# Patient Record
Sex: FEMALE | Race: WHITE | NOT HISPANIC OR LATINO | Employment: FULL TIME | URBAN - METROPOLITAN AREA
[De-identification: names, ages, dates, MRNs, and addresses within clinical notes are randomized per-mention and may not be internally consistent; named-entity substitution may affect disease eponyms.]

---

## 2017-03-16 DIAGNOSIS — M79.7 FIBROMYALGIA: ICD-10-CM

## 2017-03-16 RX ORDER — AMITRIPTYLINE HYDROCHLORIDE 25 MG/1
TABLET, FILM COATED ORAL
Qty: 30 TABLET | Refills: 11 | Status: SHIPPED | OUTPATIENT
Start: 2017-03-16 | End: 2018-04-23 | Stop reason: SDUPTHER

## 2017-03-21 ENCOUNTER — LAB VISIT (OUTPATIENT)
Dept: LAB | Facility: HOSPITAL | Age: 22
End: 2017-03-21
Attending: FAMILY MEDICINE
Payer: COMMERCIAL

## 2017-03-21 ENCOUNTER — OFFICE VISIT (OUTPATIENT)
Dept: INTERNAL MEDICINE | Facility: CLINIC | Age: 22
End: 2017-03-21
Payer: COMMERCIAL

## 2017-03-21 VITALS
WEIGHT: 162.06 LBS | DIASTOLIC BLOOD PRESSURE: 78 MMHG | HEIGHT: 61 IN | OXYGEN SATURATION: 98 % | BODY MASS INDEX: 30.6 KG/M2 | SYSTOLIC BLOOD PRESSURE: 114 MMHG | HEART RATE: 119 BPM | TEMPERATURE: 99 F

## 2017-03-21 DIAGNOSIS — R10.9 FLANK PAIN: Primary | ICD-10-CM

## 2017-03-21 DIAGNOSIS — R10.9 ABDOMINAL PAIN, UNSPECIFIED LOCATION: ICD-10-CM

## 2017-03-21 DIAGNOSIS — R10.9 FLANK PAIN: ICD-10-CM

## 2017-03-21 LAB
25(OH)D3+25(OH)D2 SERPL-MCNC: 24 NG/ML
ALBUMIN SERPL BCP-MCNC: 3.3 G/DL
ALP SERPL-CCNC: 77 U/L
ALT SERPL W/O P-5'-P-CCNC: 10 U/L
ANION GAP SERPL CALC-SCNC: 10 MMOL/L
AST SERPL-CCNC: 14 U/L
BASOPHILS # BLD AUTO: 0.01 K/UL
BASOPHILS NFR BLD: 0.2 %
BILIRUB SERPL-MCNC: 0.4 MG/DL
BUN SERPL-MCNC: 12 MG/DL
CALCIUM SERPL-MCNC: 8.7 MG/DL
CHLORIDE SERPL-SCNC: 103 MMOL/L
CO2 SERPL-SCNC: 23 MMOL/L
CREAT SERPL-MCNC: 0.9 MG/DL
DIFFERENTIAL METHOD: NORMAL
EOSINOPHIL # BLD AUTO: 0 K/UL
EOSINOPHIL NFR BLD: 0.3 %
ERYTHROCYTE [DISTWIDTH] IN BLOOD BY AUTOMATED COUNT: 12.4 %
EST. GFR  (AFRICAN AMERICAN): >60 ML/MIN/1.73 M^2
EST. GFR  (NON AFRICAN AMERICAN): >60 ML/MIN/1.73 M^2
FLUAV AG SPEC QL IA: NEGATIVE
FLUBV AG SPEC QL IA: NEGATIVE
GLUCOSE SERPL-MCNC: 110 MG/DL
HCT VFR BLD AUTO: 39 %
HGB BLD-MCNC: 13.1 G/DL
LYMPHOCYTES # BLD AUTO: 1.4 K/UL
LYMPHOCYTES NFR BLD: 21.2 %
MCH RBC QN AUTO: 29.1 PG
MCHC RBC AUTO-ENTMCNC: 33.6 %
MCV RBC AUTO: 87 FL
MONOCYTES # BLD AUTO: 0.5 K/UL
MONOCYTES NFR BLD: 7.6 %
NEUTROPHILS # BLD AUTO: 4.5 K/UL
NEUTROPHILS NFR BLD: 70.7 %
PLATELET # BLD AUTO: 279 K/UL
PMV BLD AUTO: 11 FL
POTASSIUM SERPL-SCNC: 3.5 MMOL/L
PROT SERPL-MCNC: 7.2 G/DL
RBC # BLD AUTO: 4.5 M/UL
SODIUM SERPL-SCNC: 136 MMOL/L
SPECIMEN SOURCE: NORMAL
T3 SERPL-MCNC: 92 NG/DL
T4 FREE SERPL-MCNC: 0.84 NG/DL
TSH SERPL DL<=0.005 MIU/L-ACNC: 0.81 UIU/ML
WBC # BLD AUTO: 6.41 K/UL

## 2017-03-21 PROCEDURE — 80053 COMPREHEN METABOLIC PANEL: CPT

## 2017-03-21 PROCEDURE — 87400 INFLUENZA A/B EACH AG IA: CPT | Mod: 59,PO

## 2017-03-21 PROCEDURE — 99213 OFFICE O/P EST LOW 20 MIN: CPT | Mod: S$GLB,,, | Performed by: PHYSICIAN ASSISTANT

## 2017-03-21 PROCEDURE — 85025 COMPLETE CBC W/AUTO DIFF WBC: CPT

## 2017-03-21 PROCEDURE — 83036 HEMOGLOBIN GLYCOSYLATED A1C: CPT

## 2017-03-21 PROCEDURE — 84480 ASSAY TRIIODOTHYRONINE (T3): CPT

## 2017-03-21 PROCEDURE — 36415 COLL VENOUS BLD VENIPUNCTURE: CPT | Mod: PO

## 2017-03-21 PROCEDURE — 1160F RVW MEDS BY RX/DR IN RCRD: CPT | Mod: S$GLB,,, | Performed by: PHYSICIAN ASSISTANT

## 2017-03-21 PROCEDURE — 99999 PR PBB SHADOW E&M-EST. PATIENT-LVL IV: CPT | Mod: PBBFAC,,, | Performed by: PHYSICIAN ASSISTANT

## 2017-03-21 PROCEDURE — 84439 ASSAY OF FREE THYROXINE: CPT

## 2017-03-21 PROCEDURE — 82306 VITAMIN D 25 HYDROXY: CPT

## 2017-03-21 PROCEDURE — 84443 ASSAY THYROID STIM HORMONE: CPT

## 2017-03-21 NOTE — PATIENT INSTRUCTIONS
Abdominal Pain    Abdominal pain is pain in the stomach or belly area. Everyone has this pain from time to time. In many cases it goes away on its own. But abdominal pain can sometimes be due to a serious problem, such as appendicitis. So its important to know when to seek help.  Causes of abdominal pain  There are many possible causes of abdominal pain. Common causes in adults include:  · Constipation, diarrhea, or gas  · Stomach acid flowing back up into the esophagus (acid reflux or heartburn)  · Severe acid reflux, called GERD (gastroesophageal reflux disease)  · A sore in the lining of the stomach or small intestine (peptic ulcer)  · Inflammation of the gallbladder, liver, or pancreas  · Gallstones or kidney stones  · Appendicitis   · Intestinal blockage   · An internal organ pushing through a muscle or other tissue (hernia)  · Urinary tract infections  · In women, menstrual cramps, fibroids, or endometriosis  · Inflammation or infection of the intestines  Diagnosing the cause of abdominal pain  Your healthcare provider will do a physical exam help find the cause of your pain. If needed, tests will be ordered. Belly pain has many possible causes. So it can be hard to find the reason for your pain. Giving details about your pain can help. Tell your provider where and when you feel the pain, and what makes it better or worse. Also let your provider know if you have other symptoms such as:  · Fever  · Tiredness  · Upset stomach (nausea)  · Vomiting  · Changes in bathroom habits  Treating abdominal pain  Some causes of pain need emergency medical treatment right away. These include appendicitis or a bowel blockage. Other problems can be treated with rest, fluids, or medicines. Your healthcare provider can give you specific instructions for treatment or self-care based on what is causing your pain.  If you have vomiting or diarrhea, sip water or other clear fluids. When you are ready to eat solid foods again,  start with small amounts of easy-to-digest, low-fat foods. These include apple sauce, toast, or crackers.   When to seek medical care  Call 911 or go to the hospital right away if you:  · Cant pass stool and are vomiting  · Are vomiting blood or have bloody diarrhea or black, tarry diarrhea  · Have chest, neck, or shoulder pain  · Feel like you might pass out  · Have pain in your shoulder blades with nausea  · Have sudden, severe belly pain  · Have new, severe pain unlike any you have felt before  · Have a belly that is rigid, hard, and tender to touch  Call your healthcare provider if you have:  · Pain for more than 5 days  · Bloating for more than 2 days  · Diarrhea for more than 5 days  · A fever of 100.4°F (38.0°C) or higher, or as directed by your provider  · Pain that gets worse  · Weight loss for no reason  · Continued lack of appetite  · Blood in your stool  How to prevent abdominal pain  Here are some tips to help prevent abdominal pain:  · Eat smaller amounts of food at one time.  · Avoid greasy, fried, or other high-fat foods.  · Avoid foods that give you gas.  · Exercise regularly.  · Drink plenty of fluids.  To help prevent GERD symptoms:  · Quit smoking.  · Reduce alcohol and certain foods that increase stomach acid.  · Avoid aspirin and over-the-counter pain and fever medicines (NSAIDS or nonsteroidal anti-inflammatory drugs), if possible  · Lose extra weight.  · Finish eating at least 2 hours before you go to bed or lie down.  · Raise the head of your bed.  Date Last Reviewed: 7/1/2016  © 2176-7141 [employer]. [address] [place] [address], [place] [address]. All rights reserved. This information is not intended as a substitute for professional medical care. Always follow your healthcare professional's instructions.        Back Care Tips    Caring for your back  These are things you can do to prevent a recurrence of acute back pain and to reduce symptoms from chronic back pain:  · Maintain a  healthy weight. If you are overweight, losing weight will help most types of back pain.  · Exercise is an important part of recovery from most types of back pain. The muscles behind and in front of the spine support the back. This means strengthening both the back muscles and the abdominal muscles will provide better support for your spine.   · Swimming and brisk walking are good overall exercises to improve your fitness level.  · Practice safe lifting methods (below).  · Practice good posture when sitting, standing and walking. Avoid prolonged sitting. This puts more stress on the lower back than standing or walking.  · Wear quality shoes with sufficient arch support. Foot and ankle alignment can affect back symptoms. Women should avoid wearing high heels.  · Therapeutic massage can help relax the back muscles without stretching them.  · During the first 24 to 72 hours after an acute injury or flare-up of chronic back pain, apply an ice pack to the painful area for 20 minutes and then remove it for 20 minutes, over a period of 60 to 90 minutes, or several times a day. As a safety precaution, do not use a heating pad at bedtime. Sleeping on a heating pad can lead to skin burns or tissue damage.  · You can alternate ice and heat therapies.  Medications  Talk to your healthcare provider before using medicines, especially if you have other medical problems or are taking other medicines.  · You may use acetaminophen or ibuprofen to control pain, unless your healthcare provider prescribed other pain medicine. If you have chronic conditions like diabetes, liver or kidney disease, stomach ulcers, or gastrointestinal bleeding, or are taking blood thinners, talk with your healthcare provider before taking any medicines.  · Be careful if you are given prescription pain medicines, narcotics, or medicine for muscle spasm. They can cause drowsiness, affect your coordination, reflexes, and judgment. Do not drive or operate heavy  machinery while taking these types of medicines. Take prescription pain medicine only as prescribed by your healthcare provider.  Lumbar stretch  Here is a simple stretching exercise that will help relax muscle spasm and keep your back more limber. If exercise makes your back pain worse, dont do it.  · Lie on your back with your knees bent and both feet on the ground.  · Slowly raise your left knee to your chest as you flatten your lower back against the floor. Hold for 5 seconds.  · Relax and repeat the exercise with your right knee.  · Do 10 of these exercises for each leg.  Safe lifting method  · Dont bend over at the waist to lift an object off the floor.  Instead, bend your knees and hips in a squat.   · Keep your back and head upright  · Hold the object close to your body, directly in front of you.  · Straighten your legs to lift the object.   · Lower the object to the floor in the reverse fashion.  · If you must slide something across the floor, push it.  Posture tips  Sitting  Sit in chairs with straight backs or low-back support. Keep your knees lower than your hips, with your feet flat on the floor.  When driving, sit up straight. Adjust the seat forward so you are not leaning toward the steering wheel.  A small pillow or rolled towel behind your lower back may help if you are driving long distances.   Standing  When standing for long periods, shift most of your weight to one leg at a time. Alternate legs every few minutes.   Sleeping  The best way to sleep is on your side with your knees bent. Put a low pillow under your head to support your neck in a neutral spine position. Avoid thick pillows that bend your neck to one side. Put a pillow between your legs to further relax your lower back. If you sleep on your back, put pillows under your knees to support your legs in a slightly flexed position. Use a firm mattress. If your mattress sags, replace it, or use a 1/2-inch plywood board under the mattress  to add support.  Follow-up care  Follow up with your healthcare provider, or as advised.  If X-rays, a CT scan or an MRI scan were taken, they will be reviewed by a radiologist. You will be notified of any new findings that may affect your care.  Call 911  Seek emergency medical care if any of the following occur:  · Trouble breathing  · Confusion  · Very drowsy  · Fainting or loss of consciousness  · Rapid or very slow heart rate  · Loss of  bowel or bladder control  When to seek medical care  Call your healthcare provider if any of the following occur:  · Pain becomes worse or spreads to your arms or legs  · Weakness or numbness in one or both arms or legs  · Numbness in the groin area  Date Last Reviewed: 6/1/2016 © 2000-2016 [employer]. [address] [place] [address]. All rights reserved. This information is not intended as a substitute for professional medical care. Always follow your healthcare professional's instructions.        Back Pain (Acute or Chronic)    Back pain is one of the most common problems. The good news is that most people feel better in 1 to 2 weeks, and most of the rest in 1 to 2 months. Most people can remain active.  People experience and describe pain differently; not everyone is the same.  · The pain can be sharp, stabbing, shooting, aching, cramping or burning.  · Movement, standing, bending, lifting, sitting, or walking may worsen pain.  · It can be localized to one spot or area, or it can be more generalized.  · It can spread or radiate upwards, to the front, or go down your arms or legs (sciatica).  · It can cause muscle spasm.  Most of the time, mechanical problems with the muscles or spine cause the pain. Mechanical problems are usually caused by an injury to the muscles or ligaments. While illness can cause back pain, it is usually not caused by a serious illness. Mechanical problems include:   · Physical activity such as sports, exercise, work, or  normal activity  · Overexertion, lifting, pushing, pulling incorrectly or too aggressively  · Sudden twisting, bending, or stretching from an accident, or accidental movement  · Poor posture  · Stretching or moving wrong, without noticing pain at the time  · Poor coordination, lack of regular exercise (check with your doctor about this)  · Spinal disc disease or arthritis  · Stress  Pain can also be related to pregnancy, or illness like appendicitis, bladder or kidney infections, pelvic infections, and many other things.  Acute back pain usually gets better in 1 to 2 weeks. Back pain related to disk disease, arthritis in the spinal joints or spinal stenosis (narrowing of the spinal canal) can become chronic and last for months or years.  Unless you had a physical injury (for example, a car accident or fall) X-rays are usually not needed for the initial evaluation of back pain. If pain continues and does not respond to medical treatment, X-rays and other tests may be needed.  Home care  Try these home care recommendations:  · When in bed, try to find a position of comfort. A firm mattress is best. Try lying flat on your back with pillows under your knees. You can also try lying on your side with your knees bent up towards your chest and a pillow between your knees.  · At first, do not try to stretch out the sore spots. If there is a strain, it is not like the good soreness you get after exercising without an injury. In this case, stretching may make it worse.  · Avoid prolong sitting, long car rides, or travel. This puts more stress on the lower back than standing or walking.  · During the first 24 to 72 hours after an acute injury or flare up of chronic back pain, apply an ice pack to the painful area for 20 minutes and then remove it for 20 minutes. Do this over a period of 60 to 90 minutes or several times a day. This will reduce swelling and pain. Wrap the ice pack in a thin towel or plastic to protect your  skin.  · You can start with ice, then switch to heat. Heat (hot shower, hot bath, or heating pad) reduces pain and works well for muscle spasms. Heat can be applied to the painful area for 20 minutes then remove it for 20 minutes. Do this over a period of 60 to 90 minutes or several times a day. Do not sleep on a heating pad. It can lead to skin burns or tissue damage.  · You can alternate ice and heat therapy. Talk with your doctor about the best treatment for your back pain.  · Therapeutic massage can help relax the back muscles without stretching them.  · Be aware of safe lifting methods and do not lift anything without stretching first.  Medicines  Talk to your doctor before using medicine, especially if you have other medical problems or are taking other medicines.  · You may use over-the-counter medicine as directed on the bottle to control pain, unless another pain medicine was prescribed. If you have chronic conditions like diabetes, liver or kidney disease, stomach ulcers, or gastrointestinal bleeding, or are taking blood thinners, talk to your doctor before taking any medicine.  · Be careful if you are given a prescription medicines, narcotics, or medicine for muscle spasms. They can cause drowsiness, affect your coordination, reflexes, and judgement. Do not drive or operate heavy machinery.  Follow-up care  Follow up with your healthcare provider, or as advised.   A radiologist will review any X-rays that were taken. Your provide will notify you of any new findings that may affect your care.  Call 911  Call emergency services if any of the following occur:  · Trouble breathing  · Confusion  · Very drowsy or trouble awakening  · Fainting or loss of consciousness  · Rapid or very slow heart rate  · Loss of bowel or bladder control  When to seek medical advice  Call your healthcare provider right away if any of these occur:   · Pain becomes worse or spreads to your legs  · Weakness or numbness in one or both  legs  · Numbness in the groin or genital area  Date Last Reviewed: 7/1/2016  © 5179-2908 [employer]. [address] [place] [id number]. All rights reserved. This information is not intended as a substitute for professional medical care. Always follow your healthcare professional's instructions.        Causes of Lumbar (Low Back) Pain  Low back pain can be caused by problems with any part of the lumbar spine. A disk can herniate (push out) and press on a nerve. Vertebrae can rub against each other or slip out of place. This can irritate facet joints and nerves. It can also lead to stenosis, a narrowing of the spinal canal or foramen.  Pressure from a disk  Constant wear and tear on a disk can cause it to weaken and push outward. Part of the disk may then press on nearby nerves. There are two common types of herniated disks:  Contained means the soft nucleus is protruding outward.   Extruded means the firm annulus has torn, letting the soft center squeeze through.     Pressure from bone  An unstable spine   With age, a disk may thin and wear out. Vertebrae above and below the disk may begin to touch. This can put pressure on nerves. It can also cause bone spurs (growths) to form where the bones rub together.    Stenosis results when bone spurs narrow the foramen or spinal canal. This also puts pressure on nerves. Slipping vertebrae can irritate nerves and joints. They can also worsen stenosis.    In some cases, vertebrae become unstable and slip forward. This is called spondylolisthesis.     Date Last Reviewed: 10/12/2015  © 3670-4240 [employer]. [address] [place] [id number]. All rights reserved. This information is not intended as a substitute for professional medical care. Always follow your healthcare professional's instructions.

## 2017-03-21 NOTE — PROGRESS NOTES
Subjective:      Patient ID: Erlinda Rain is a 21 y.o. female.    Chief Complaint: Abdominal Pain and Back Pain (lower/hx of fibromyalgia)    Abdominal Pain   This is a chronic problem. The current episode started more than 1 month ago. The onset quality is gradual. The problem occurs intermittently. The problem has been waxing and waning. The pain is located in the generalized abdominal region. The pain is moderate. The abdominal pain does not radiate. Associated symptoms include diarrhea, nausea and vomiting. Pertinent negatives include no anorexia, arthralgias, belching, constipation, dysuria, fever, flatus, frequency, headaches, hematochezia, hematuria, melena, myalgias or weight loss. The pain is aggravated by eating. The pain is relieved by nothing. She has tried nothing for the symptoms. The treatment provided no relief. There is no history of abdominal surgery, colon cancer, Crohn's disease, gallstones, GERD, irritable bowel syndrome, pancreatitis, PUD or ulcerative colitis.   Back Pain   This is a new problem. The current episode started yesterday. The problem occurs constantly. The problem has been gradually worsening since onset. The pain is present in the costovertebral angle. The pain is moderate. The pain is the same all the time. Associated symptoms include abdominal pain. Pertinent negatives include no bladder incontinence, bowel incontinence, chest pain, dysuria, fever, headaches, leg pain, numbness, paresis, paresthesias, pelvic pain, perianal numbness, tingling, weakness or weight loss. Risk factors: fibromyalgia. She has tried nothing for the symptoms.       Review of Systems   Constitutional: Negative for fever and weight loss.   Cardiovascular: Negative for chest pain.   Gastrointestinal: Positive for abdominal distention, abdominal pain, diarrhea, nausea and vomiting. Negative for anal bleeding, anorexia, blood in stool, bowel incontinence, constipation, flatus, hematochezia, melena and  "rectal pain.   Genitourinary: Negative for bladder incontinence, dysuria, frequency, hematuria and pelvic pain.   Musculoskeletal: Positive for back pain. Negative for arthralgias, joint swelling, myalgias, neck pain and neck stiffness.   Skin: Negative for rash.   Neurological: Negative for tingling, weakness, numbness, headaches and paresthesias.     Objective:   /78  Pulse (!) 119  Temp 98.7 °F (37.1 °C) (Tympanic)   Ht 5' 1" (1.549 m)  Wt 73.5 kg (162 lb 0.6 oz)  LMP 02/27/2017 (Approximate)  SpO2 98%  BMI 30.62 kg/m2    Physical Exam   Constitutional: She is oriented to person, place, and time. She appears well-developed and well-nourished.   HENT:   Head: Normocephalic and atraumatic.   Right Ear: External ear normal.   Left Ear: External ear normal.   Nose: Nose normal.   Mouth/Throat: Oropharynx is clear and moist.   Eyes: Conjunctivae and EOM are normal. Pupils are equal, round, and reactive to light.   Neck: Normal range of motion. Neck supple.   Cardiovascular: Normal rate, regular rhythm and normal heart sounds.  Exam reveals no gallop and no friction rub.    No murmur heard.  Pulmonary/Chest: Effort normal and breath sounds normal. No respiratory distress. She has no wheezes. She has no rales. She exhibits no tenderness.   Abdominal: Soft. Bowel sounds are normal. She exhibits no distension and no mass. There is no tenderness. There is no rebound and no guarding. No hernia.   Musculoskeletal: Normal range of motion.   Lymphadenopathy:     She has no cervical adenopathy.   Neurological: She is alert and oriented to person, place, and time.   Skin: Skin is warm and dry.   Psychiatric: She has a normal mood and affect. Her behavior is normal. Judgment and thought content normal.   Vitals reviewed.      Assessment:     1. Flank pain    2. Abdominal pain, unspecified location      Plan:   Flank pain  -     Vitamin D; Future; Expected date: 3/21/17  -     Influenza antigen Nasopharyngeal Swab  -  "    Urinalysis; Future; Expected date: 3/21/17    Abdominal pain, unspecified location  -     Ambulatory referral to Gastroenterology  -     CBC auto differential; Future; Expected date: 3/21/17  -     Comprehensive metabolic panel; Future  -     TSH; Future  -     Hemoglobin A1c; Future; Expected date: 3/21/17  -     T4, free; Future; Expected date: 3/21/17  -     T3; Future; Expected date: 3/21/17  -     Influenza antigen Nasopharyngeal Swab    -eliminate fatty foods from diet.     Return if symptoms worsen or fail to improve.

## 2017-03-21 NOTE — MR AVS SNAPSHOT
[facility] Medicine  [address] [facility] [place] [address]  Phone: [phone]  Fax: [phone]                  Erlinda Rain   3/21/2017 11:40 AM   Office Visit    Description:  Female : 1995   Provider:  Ella Dueñas PA-C   Department:  [facility] - Internal Medicine           Reason for Visit     Abdominal Pain     Back Pain           Diagnoses this Visit        Comments    Flank pain    -  Primary     Abdominal pain, unspecified location                To Do List           Future Appointments        Provider Department Dept Phone    3/21/2017 1:00 PM LAB, SAME DAY [facility] [phone]    3/21/2017 2:10 PM SPECIMEN, [facility] [phone]    3/23/2017 11:15 AM Amanda Patterson MD [facility] - OB/ -[phone]    3/23/2017 1:40 PM WILBER Euceda [facility] - Gastroenterology [phone]    2017 3:00 PM Charan Henriquez MD [facility] - Rheumatology [phone]      Goals (5 Years of Data)     None      Follow-Up and Disposition     Return if symptoms worsen or fail to improve.      [facility]     [facility] -  Assistance  Registered nurses in the [facility] provide clinical advisement, health education, appointment booking, and other advisory services.  Call for this free service at [phone].             Medications           Message regarding Medications     Verify the changes and/or additions to your medication regime listed below are the same as discussed with your clinician today.  If any of these changes or additions are incorrect, please notify your healthcare provider.        STOP taking these medications     ascorbic acid (VITAMIN C) 250 MG tablet Take 250 mg by mouth once daily.           Verify that the below list of medications is an accurate representation of the medications you are currently taking.  If none reported, the list may be blank. If incorrect, please  "contact your healthcare provider. Carry this list with you in case of emergency.           Current Medications     amitriptyline (ELAVIL) 25 MG tablet TAKE 1 TABLET(25 MG) BY MOUTH EVERY EVENING    duloxetine (CYMBALTA) 60 MG capsule Take 1 capsule (60 mg total) by mouth once daily.    MICROGESTIN FE 1/20, 28, 1 mg-20 mcg (21)/75 mg (7) per tablet TK 1 T PO QD    multivitamin capsule Take 1 capsule by mouth once daily.    polyethylene glycol (GLYCOLAX) 17 gram/dose powder Take 17 g by mouth once daily.           Clinical Reference Information           Your Vitals Were     BP Pulse Temp Height Weight Last Period    114/78 119 98.7 °F (37.1 °C) (Tympanic) 5' 1" (1.549 m) 73.5 kg (162 lb 0.6 oz) 02/27/2017 (Approximate)    SpO2 BMI             98% 30.62 kg/m2         Blood Pressure          Most Recent Value    BP  114/78      Allergies as of 3/21/2017     Kiwi (Actinidia Chinensis)      Immunizations Administered on Date of Encounter - 3/21/2017     None      Orders Placed During Today's Visit      Normal Orders This Visit    Ambulatory referral to Gastroenterology     Influenza antigen Nasopharyngeal Swab     Future Labs/Procedures Expected by Expires    CBC auto differential  3/21/2017 3/21/2018    Hemoglobin A1c  3/21/2017 5/20/2018    T3  3/21/2017 5/20/2018    T4, free  3/21/2017 5/20/2018    Urinalysis  3/21/2017 5/20/2018    Vitamin D  3/21/2017 3/21/2018    Comprehensive metabolic panel  As directed 3/21/2018    TSH  As directed 5/20/2018      Instructions      Abdominal Pain    Abdominal pain is pain in the stomach or belly area. Everyone has this pain from time to time. In many cases it goes away on its own. But abdominal pain can sometimes be due to a serious problem, such as appendicitis. So its important to know when to seek help.  Causes of abdominal pain  There are many possible causes of abdominal pain. Common causes in adults include:  · Constipation, diarrhea, or gas  · Stomach acid flowing back up " into the esophagus (acid reflux or heartburn)  · Severe acid reflux, called GERD (gastroesophageal reflux disease)  · A sore in the lining of the stomach or small intestine (peptic ulcer)  · Inflammation of the gallbladder, liver, or pancreas  · Gallstones or kidney stones  · Appendicitis   · Intestinal blockage   · An internal organ pushing through a muscle or other tissue (hernia)  · Urinary tract infections  · In women, menstrual cramps, fibroids, or endometriosis  · Inflammation or infection of the intestines  Diagnosing the cause of abdominal pain  Your healthcare provider will do a physical exam help find the cause of your pain. If needed, tests will be ordered. Belly pain has many possible causes. So it can be hard to find the reason for your pain. Giving details about your pain can help. Tell your provider where and when you feel the pain, and what makes it better or worse. Also let your provider know if you have other symptoms such as:  · Fever  · Tiredness  · Upset stomach (nausea)  · Vomiting  · Changes in bathroom habits  Treating abdominal pain  Some causes of pain need emergency medical treatment right away. These include appendicitis or a bowel blockage. Other problems can be treated with rest, fluids, or medicines. Your healthcare provider can give you specific instructions for treatment or self-care based on what is causing your pain.  If you have vomiting or diarrhea, sip water or other clear fluids. When you are ready to eat solid foods again, start with small amounts of easy-to-digest, low-fat foods. These include apple sauce, toast, or crackers.   When to seek medical care  Call 911 or go to the hospital right away if you:  · Cant pass stool and are vomiting  · Are vomiting blood or have bloody diarrhea or black, tarry diarrhea  · Have chest, neck, or shoulder pain  · Feel like you might pass out  · Have pain in your shoulder blades with nausea  · Have sudden, severe belly pain  · Have new,  severe pain unlike any you have felt before  · Have a belly that is rigid, hard, and tender to touch  Call your healthcare provider if you have:  · Pain for more than 5 days  · Bloating for more than 2 days  · Diarrhea for more than 5 days  · A fever of 100.4°F (38.0°C) or higher, or as directed by your provider  · Pain that gets worse  · Weight loss for no reason  · Continued lack of appetite  · Blood in your stool  How to prevent abdominal pain  Here are some tips to help prevent abdominal pain:  · Eat smaller amounts of food at one time.  · Avoid greasy, fried, or other high-fat foods.  · Avoid foods that give you gas.  · Exercise regularly.  · Drink plenty of fluids.  To help prevent GERD symptoms:  · Quit smoking.  · Reduce alcohol and certain foods that increase stomach acid.  · Avoid aspirin and over-the-counter pain and fever medicines (NSAIDS or nonsteroidal anti-inflammatory drugs), if possible  · Lose extra weight.  · Finish eating at least 2 hours before you go to bed or lie down.  · Raise the head of your bed.  Date Last Reviewed: 7/1/2016  © 9602-2399 [employer]. [address] [place] [address]. All rights reserved. This information is not intended as a substitute for professional medical care. Always follow your healthcare professional's instructions.        Back Care Tips    Caring for your back  These are things you can do to prevent a recurrence of acute back pain and to reduce symptoms from chronic back pain:  · Maintain a healthy weight. If you are overweight, losing weight will help most types of back pain.  · Exercise is an important part of recovery from most types of back pain. The muscles behind and in front of the spine support the back. This means strengthening both the back muscles and the abdominal muscles will provide better support for your spine.   · Swimming and brisk walking are good overall exercises to improve your fitness level.  · Practice safe lifting  methods (below).  · Practice good posture when sitting, standing and walking. Avoid prolonged sitting. This puts more stress on the lower back than standing or walking.  · Wear quality shoes with sufficient arch support. Foot and ankle alignment can affect back symptoms. Women should avoid wearing high heels.  · Therapeutic massage can help relax the back muscles without stretching them.  · During the first 24 to 72 hours after an acute injury or flare-up of chronic back pain, apply an ice pack to the painful area for 20 minutes and then remove it for 20 minutes, over a period of 60 to 90 minutes, or several times a day. As a safety precaution, do not use a heating pad at bedtime. Sleeping on a heating pad can lead to skin burns or tissue damage.  · You can alternate ice and heat therapies.  Medications  Talk to your healthcare provider before using medicines, especially if you have other medical problems or are taking other medicines.  · You may use acetaminophen or ibuprofen to control pain, unless your healthcare provider prescribed other pain medicine. If you have chronic conditions like diabetes, liver or kidney disease, stomach ulcers, or gastrointestinal bleeding, or are taking blood thinners, talk with your healthcare provider before taking any medicines.  · Be careful if you are given prescription pain medicines, narcotics, or medicine for muscle spasm. They can cause drowsiness, affect your coordination, reflexes, and judgment. Do not drive or operate heavy machinery while taking these types of medicines. Take prescription pain medicine only as prescribed by your healthcare provider.  Lumbar stretch  Here is a simple stretching exercise that will help relax muscle spasm and keep your back more limber. If exercise makes your back pain worse, dont do it.  · Lie on your back with your knees bent and both feet on the ground.  · Slowly raise your left knee to your chest as you flatten your lower back against  the floor. Hold for 5 seconds.  · Relax and repeat the exercise with your right knee.  · Do 10 of these exercises for each leg.  Safe lifting method  · Dont bend over at the waist to lift an object off the floor.  Instead, bend your knees and hips in a squat.   · Keep your back and head upright  · Hold the object close to your body, directly in front of you.  · Straighten your legs to lift the object.   · Lower the object to the floor in the reverse fashion.  · If you must slide something across the floor, push it.  Posture tips  Sitting  Sit in chairs with straight backs or low-back support. Keep your knees lower than your hips, with your feet flat on the floor.  When driving, sit up straight. Adjust the seat forward so you are not leaning toward the steering wheel.  A small pillow or rolled towel behind your lower back may help if you are driving long distances.   Standing  When standing for long periods, shift most of your weight to one leg at a time. Alternate legs every few minutes.   Sleeping  The best way to sleep is on your side with your knees bent. Put a low pillow under your head to support your neck in a neutral spine position. Avoid thick pillows that bend your neck to one side. Put a pillow between your legs to further relax your lower back. If you sleep on your back, put pillows under your knees to support your legs in a slightly flexed position. Use a firm mattress. If your mattress sags, replace it, or use a 1/2-inch plywood board under the mattress to add support.  Follow-up care  Follow up with your healthcare provider, or as advised.  If X-rays, a CT scan or an MRI scan were taken, they will be reviewed by a radiologist. You will be notified of any new findings that may affect your care.  Call 911  Seek emergency medical care if any of the following occur:  · Trouble breathing  · Confusion  · Very drowsy  · Fainting or loss of consciousness  · Rapid or very slow heart rate  · Loss of  bowel or  bladder control  When to seek medical care  Call your healthcare provider if any of the following occur:  · Pain becomes worse or spreads to your arms or legs  · Weakness or numbness in one or both arms or legs  · Numbness in the groin area  Date Last Reviewed: 6/1/2016 © 2000-2016 [employer]. [address] [place] [address], [place] [address]. All rights reserved. This information is not intended as a substitute for professional medical care. Always follow your healthcare professional's instructions.        Back Pain (Acute or Chronic)    Back pain is one of the most common problems. The good news is that most people feel better in 1 to 2 weeks, and most of the rest in 1 to 2 months. Most people can remain active.  People experience and describe pain differently; not everyone is the same.  · The pain can be sharp, stabbing, shooting, aching, cramping or burning.  · Movement, standing, bending, lifting, sitting, or walking may worsen pain.  · It can be localized to one spot or area, or it can be more generalized.  · It can spread or radiate upwards, to the front, or go down your arms or legs (sciatica).  · It can cause muscle spasm.  Most of the time, mechanical problems with the muscles or spine cause the pain. Mechanical problems are usually caused by an injury to the muscles or ligaments. While illness can cause back pain, it is usually not caused by a serious illness. Mechanical problems include:   · Physical activity such as sports, exercise, work, or normal activity  · Overexertion, lifting, pushing, pulling incorrectly or too aggressively  · Sudden twisting, bending, or stretching from an accident, or accidental movement  · Poor posture  · Stretching or moving wrong, without noticing pain at the time  · Poor coordination, lack of regular exercise (check with your doctor about this)  · Spinal disc disease or arthritis  · Stress  Pain can also be related to pregnancy, or illness like appendicitis,  bladder or kidney infections, pelvic infections, and many other things.  Acute back pain usually gets better in 1 to 2 weeks. Back pain related to disk disease, arthritis in the spinal joints or spinal stenosis (narrowing of the spinal canal) can become chronic and last for months or years.  Unless you had a physical injury (for example, a car accident or fall) X-rays are usually not needed for the initial evaluation of back pain. If pain continues and does not respond to medical treatment, X-rays and other tests may be needed.  Home care  Try these home care recommendations:  · When in bed, try to find a position of comfort. A firm mattress is best. Try lying flat on your back with pillows under your knees. You can also try lying on your side with your knees bent up towards your chest and a pillow between your knees.  · At first, do not try to stretch out the sore spots. If there is a strain, it is not like the good soreness you get after exercising without an injury. In this case, stretching may make it worse.  · Avoid prolong sitting, long car rides, or travel. This puts more stress on the lower back than standing or walking.  · During the first 24 to 72 hours after an acute injury or flare up of chronic back pain, apply an ice pack to the painful area for 20 minutes and then remove it for 20 minutes. Do this over a period of 60 to 90 minutes or several times a day. This will reduce swelling and pain. Wrap the ice pack in a thin towel or plastic to protect your skin.  · You can start with ice, then switch to heat. Heat (hot shower, hot bath, or heating pad) reduces pain and works well for muscle spasms. Heat can be applied to the painful area for 20 minutes then remove it for 20 minutes. Do this over a period of 60 to 90 minutes or several times a day. Do not sleep on a heating pad. It can lead to skin burns or tissue damage.  · You can alternate ice and heat therapy. Talk with your doctor about the best  treatment for your back pain.  · Therapeutic massage can help relax the back muscles without stretching them.  · Be aware of safe lifting methods and do not lift anything without stretching first.  Medicines  Talk to your doctor before using medicine, especially if you have other medical problems or are taking other medicines.  · You may use over-the-counter medicine as directed on the bottle to control pain, unless another pain medicine was prescribed. If you have chronic conditions like diabetes, liver or kidney disease, stomach ulcers, or gastrointestinal bleeding, or are taking blood thinners, talk to your doctor before taking any medicine.  · Be careful if you are given a prescription medicines, narcotics, or medicine for muscle spasms. They can cause drowsiness, affect your coordination, reflexes, and judgement. Do not drive or operate heavy machinery.  Follow-up care  Follow up with your healthcare provider, or as advised.   A radiologist will review any X-rays that were taken. Your provide will notify you of any new findings that may affect your care.  Call 911  Call emergency services if any of the following occur:  · Trouble breathing  · Confusion  · Very drowsy or trouble awakening  · Fainting or loss of consciousness  · Rapid or very slow heart rate  · Loss of bowel or bladder control  When to seek medical advice  Call your healthcare provider right away if any of these occur:   · Pain becomes worse or spreads to your legs  · Weakness or numbness in one or both legs  · Numbness in the groin or genital area  Date Last Reviewed: 7/1/2016  © 1870-6362 [employer]. [address] [place] [address], [place] [address]. All rights reserved. This information is not intended as a substitute for professional medical care. Always follow your healthcare professional's instructions.        Causes of Lumbar (Low Back) Pain  Low back pain can be caused by problems with any part of the lumbar spine. A disk can  herniate (push out) and press on a nerve. Vertebrae can rub against each other or slip out of place. This can irritate facet joints and nerves. It can also lead to stenosis, a narrowing of the spinal canal or foramen.  Pressure from a disk  Constant wear and tear on a disk can cause it to weaken and push outward. Part of the disk may then press on nearby nerves. There are two common types of herniated disks:  Contained means the soft nucleus is protruding outward.   Extruded means the firm annulus has torn, letting the soft center squeeze through.     Pressure from bone  An unstable spine   With age, a disk may thin and wear out. Vertebrae above and below the disk may begin to touch. This can put pressure on nerves. It can also cause bone spurs (growths) to form where the bones rub together.    Stenosis results when bone spurs narrow the foramen or spinal canal. This also puts pressure on nerves. Slipping vertebrae can irritate nerves and joints. They can also worsen stenosis.    In some cases, vertebrae become unstable and slip forward. This is called spondylolisthesis.     Date Last Reviewed: 10/12/2015  © 8183-0027 [employer]. [address] [place] [address]. All rights reserved. This information is not intended as a substitute for professional medical care. Always follow your healthcare professional's instructions.             Language Assistance Services     ATTENTION: Language assistance services are available, free of charge. Please call [phone].      ATENCIÓN: Si habla español, tiene a worley disposición servicios gratuitos de asistencia lingüística. Llame al [phone].     [facility] Ý: N?u b?n nói Ti?ng Vi?t, có các d?ch v? h? tr? ngôn ng? mi?n phí dành cho b?n. G?i s? [phone].         [facility] - [facility] complies with applicable Federal civil rights laws and does not discriminate on the basis of race, color, national origin, age, disability, or sex.

## 2017-03-21 NOTE — LETTER
March 21, 2017      [facility] - Internal Medicine  [address] [facility] [address] [place]  [place] [address]  Phone: [phone]  Fax: [phone]       Patient: Erlinda Rain   YOB: 1995  Date of Visit: 03/21/2017    To Whom It May Concern:    Erlinda was at [facility] on 03/21/2017. She may return to work/school on 3/22/2017 with no restrictions. If you have any questions or concerns, or if I can be of further assistance, please do not hesitate to contact me.    Sincerely,          Ella Dueñas PA-C

## 2017-03-22 ENCOUNTER — PATIENT MESSAGE (OUTPATIENT)
Dept: OBSTETRICS AND GYNECOLOGY | Facility: CLINIC | Age: 22
End: 2017-03-22

## 2017-03-22 LAB
ESTIMATED AVG GLUCOSE: 100 MG/DL
HBA1C MFR BLD HPLC: 5.1 %

## 2017-03-23 ENCOUNTER — TELEPHONE (OUTPATIENT)
Dept: INTERNAL MEDICINE | Facility: CLINIC | Age: 22
End: 2017-03-23

## 2017-03-23 ENCOUNTER — INITIAL CONSULT (OUTPATIENT)
Dept: GASTROENTEROLOGY | Facility: CLINIC | Age: 22
End: 2017-03-23
Payer: COMMERCIAL

## 2017-03-23 VITALS
HEART RATE: 100 BPM | BODY MASS INDEX: 30.39 KG/M2 | SYSTOLIC BLOOD PRESSURE: 112 MMHG | DIASTOLIC BLOOD PRESSURE: 78 MMHG | WEIGHT: 160.94 LBS | HEIGHT: 61 IN

## 2017-03-23 DIAGNOSIS — R31.9 HEMATURIA: Primary | ICD-10-CM

## 2017-03-23 DIAGNOSIS — K59.04 CHRONIC IDIOPATHIC CONSTIPATION: ICD-10-CM

## 2017-03-23 DIAGNOSIS — R10.9 FLANK PAIN: ICD-10-CM

## 2017-03-23 DIAGNOSIS — R10.13 EPIGASTRIC ABDOMINAL PAIN: Primary | ICD-10-CM

## 2017-03-23 DIAGNOSIS — R11.0 NAUSEA: ICD-10-CM

## 2017-03-23 PROCEDURE — 99214 OFFICE O/P EST MOD 30 MIN: CPT | Mod: S$GLB,,, | Performed by: NURSE PRACTITIONER

## 2017-03-23 PROCEDURE — 1160F RVW MEDS BY RX/DR IN RCRD: CPT | Mod: S$GLB,,, | Performed by: NURSE PRACTITIONER

## 2017-03-23 PROCEDURE — 99999 PR PBB SHADOW E&M-EST. PATIENT-LVL III: CPT | Mod: PBBFAC,,, | Performed by: NURSE PRACTITIONER

## 2017-03-23 RX ORDER — CHOLECALCIFEROL (VITAMIN D3) 25 MCG
185 TABLET ORAL DAILY
COMMUNITY
End: 2020-06-29

## 2017-03-23 RX ORDER — OMEPRAZOLE 20 MG/1
20 CAPSULE, DELAYED RELEASE ORAL DAILY
Qty: 30 CAPSULE | Refills: 2 | Status: SHIPPED | OUTPATIENT
Start: 2017-03-23 | End: 2017-05-15 | Stop reason: ALTCHOICE

## 2017-03-23 NOTE — MR AVS SNAPSHOT
[facility]  [address] [facility] [place] [address]  Phone: [phone]  Fax: [phone]                  Erlinda Rain   3/23/2017 1:40 PM   Initial consult    Description:  Female : 1995   Provider:  WILBER Euceda   Department:  [facility]           Reason for Visit     Abdominal Pain     Nausea     Emesis           Diagnoses this Visit        Comments    Epigastric abdominal pain    -  Primary            To Do List           Future Appointments        Provider Department Dept Phone    3/28/2017 3:30 PM [facility] CT1 LIMIT 500 LBS [facility] [phone]    2017 3:00 PM Charan Henriquez MD [facility] - Rheumatology [phone]    2017 3:40 PM WILBER Euceda [facility] Gastroenterology [phone]    2017 11:15 AM Amanda Patterson MD [facility] - OB/ -[phone]      Goals (5 Years of Data)     None      Follow-Up and Disposition     Return in about 4 weeks (around 2017).       These Medications        Disp Refills Start End    omeprazole (PRILOSEC) 20 MG capsule 30 capsule 2 3/23/2017 3/23/2018    Take 1 capsule (20 mg total) by mouth once daily. - Oral    Pharmacy: [facility] [address] [place] [address] TO [place] AT [facility] Ph #: [phone]         [facility]     [facility] Care [facility] -  Assistance  Registered nurses in the [facility] provide clinical advisement, health education, appointment booking, and other advisory services.  Call for this free service at [phone].             Medications           Message regarding Medications     Verify the changes and/or additions to your medication regime listed below are the same as discussed with your clinician today.  If any of these changes or additions are incorrect, please notify your healthcare provider.        START taking these NEW medications        Refills    omeprazole (PRILOSEC) 20 MG capsule 2    Sig: Take 1  "capsule (20 mg total) by mouth once daily.    Class: Normal    Route: Oral           Verify that the below list of medications is an accurate representation of the medications you are currently taking.  If none reported, the list may be blank. If incorrect, please contact your healthcare provider. Carry this list with you in case of emergency.           Current Medications     amitriptyline (ELAVIL) 25 MG tablet TAKE 1 TABLET(25 MG) BY MOUTH EVERY EVENING    duloxetine (CYMBALTA) 60 MG capsule Take 1 capsule (60 mg total) by mouth once daily.    MICROGESTIN FE 1/20, 28, 1 mg-20 mcg (21)/75 mg (7) per tablet TK 1 T PO QD    multivitamin capsule Take 1 capsule by mouth once daily.    omeprazole (PRILOSEC) 20 MG capsule Take 1 capsule (20 mg total) by mouth once daily.    polyethylene glycol (GLYCOLAX) 17 gram/dose powder Take 17 g by mouth once daily.    vitamin D 1000 units Tab Take 185 mg by mouth once daily.           Clinical Reference Information           Your Vitals Were     BP Pulse Height Weight Last Period BMI    112/78 100 5' 1" (1.549 m) 73 kg (160 lb 15 oz) 02/27/2017 (Approximate) 30.41 kg/m2      Blood Pressure          Most Recent Value    BP  112/78      Allergies as of 3/23/2017     Kiwi (Actinidia Chinensis)      Immunizations Administered on Date of Encounter - 3/23/2017     None      Language Assistance Services     ATTENTION: Language assistance services are available, free of charge. Please call [phone].      ATENCIÓN: Si habla español, tiene a worley disposición servicios gratuitos de asistencia lingüística. Llame al [phone].     [facility] Ý: N?u b?n nói Ti?ng Vi?t, có các d?ch v? h? tr? ngôn ng? mi?n phí dành cho b?n. G?i s? [phone].         [facility] - Gastroenterology complies with applicable Federal civil rights laws and does not discriminate on the basis of race, color, national origin, age, disability, or sex.        "

## 2017-03-23 NOTE — LETTER
March 23, 2017      Nury Díaz MD  [address] [facility] [place] [address]           [facility]  [address] [facility] [place] [address]  Phone: [phone]  Fax: [phone]          Patient: Erlnida Rain   MR Number: [id number]   YOB: 1995   Date of Visit: 3/23/2017       Dear Dr. Nury Díaz:    Thank you for referring Erlinda Rain to me for evaluation. Attached you will find relevant portions of my assessment and plan of care.    If you have questions, please do not hesitate to call me. I look forward to following Erlinda Rain along with you.    Sincerely,    Maria Dolores Dennis, [facility]    Enclosure  CC:  No Recipients    If you would like to receive this communication electronically, please contact [email or web address] or [phone] to request more information on [employer] access.    For providers and/or their staff who would like to refer a patient to [facility], please contact us through our one-stop-shop provider referral line, [facility] , at [phone].    If you feel you have received this communication in error or would no longer like to receive these types of communications, please e-mail [email or web address]

## 2017-03-23 NOTE — PROGRESS NOTES
Clinic Consult:  [facility] Consultation Note    Reason for Consult:  The primary encounter diagnosis was Epigastric abdominal pain. Diagnoses of Chronic idiopathic constipation and Nausea were also pertinent to this visit.    PCP: Gabrielle Macias   [address] [facility] [address] / [place] [address]    HPI:  This is a 21 y.o. female here for evaluation of the above  She reports that over the last few months, she has had intermittent episodes of epigastric abdominal pain with nausea.  She denies any known exacerbating factors or reliving factors.  She does not think that the symptoms are exacerbated by food intake or worse on an empty stomach as she has had symptoms at both of those times.  She has not tried any OTC antacids for relief.   She admits to chronic constipation for most of her life.  She has tried miralax, not consistently, with mild relief of symptoms. She reports a BM every 3-4 days with straining.   Denies any melena or hematochezia.  No weight loss.  She does have some associated low back pain, but has attributed that to her fibromyalgia.       Review of Systems   Constitutional: Negative for chills, fever, malaise/fatigue and weight loss.   Respiratory: Negative for cough.    Cardiovascular: Negative for chest pain.   Gastrointestinal:        Per HPI   Musculoskeletal: Negative for myalgias.   Skin: Negative for itching and rash.   Neurological: Negative for headaches.   Psychiatric/Behavioral: The patient is not nervous/anxious.        Medical History:   History reviewed. No pertinent past medical history.    Surgical History:  Past Surgical History:   Procedure Laterality Date    none         Family History:   Family History   Problem Relation Age of Onset    Hyperlipidemia Mother     Hyperlipidemia Father     Diabetes Father     Heart disease Father     Eczema Neg Hx     Lupus Neg Hx     Psoriasis Neg Hx     Melanoma Neg Hx        Social History:   Social History   Substance Use  "Topics    Smoking status: Never Smoker    Smokeless tobacco: Never Used    Alcohol use No      Comment: socially       Allergies: Reviewed    Home Medications:   Current Outpatient Prescriptions on File Prior to Visit   Medication Sig Dispense Refill    amitriptyline (ELAVIL) 25 MG tablet TAKE 1 TABLET(25 MG) BY MOUTH EVERY EVENING 30 tablet 11    duloxetine (CYMBALTA) 60 MG capsule Take 1 capsule (60 mg total) by mouth once daily. 30 capsule 11    MICROGESTIN FE 1/20, 28, 1 mg-20 mcg (21)/75 mg (7) per tablet TK 1 T PO QD  11    multivitamin capsule Take 1 capsule by mouth once daily.      polyethylene glycol (GLYCOLAX) 17 gram/dose powder Take 17 g by mouth once daily. 238 g 6     No current facility-administered medications on file prior to visit.        Physical Exam:  Vital Signs:  /78  Pulse 100  Ht 5' 1" (1.549 m)  Wt 73 kg (160 lb 15 oz)  LMP 02/27/2017 (Approximate)  BMI 30.41 kg/m2  Body mass index is 30.41 kg/(m^2).  Physical Exam   Constitutional: She is oriented to person, place, and time. She appears well-developed and well-nourished.   HENT:   Head: Normocephalic.   Eyes: No scleral icterus.   Neck: Normal range of motion.   Cardiovascular: Normal rate and regular rhythm.    Pulmonary/Chest: Effort normal and breath sounds normal.   Abdominal: Soft. Bowel sounds are normal. She exhibits no distension. There is no tenderness.   Musculoskeletal: Normal range of motion.   Neurological: She is alert and oriented to person, place, and time.   Skin: Skin is warm and dry.   Psychiatric: She has a normal mood and affect.   Vitals reviewed.      Labs: Pertinent labs reviewed.    Assessment:  1. Epigastric abdominal pain    2. Chronic idiopathic constipation    3. Nausea         Recommendations:  Epigastric abdominal pain  Nausea  - Unclear etiology of symptoms  -Gastritis vs PUD vs other etiologies.  - Will start on PPI and monitor symtpoms  -     omeprazole (PRILOSEC) 20 MG capsule; Take 1 " capsule (20 mg total) by mouth once daily.  Dispense: 30 capsule; Refill: 2    Chronic idiopathic constipation  - May be the cause of all symptoms.  - Start Miralax daily  - Increase water intake and dietary fiber.    Pt has CT scan schedule for next week by PCP.  Will monitor for results.             Return in about 4 weeks (around 4/20/2017).        Thank you so much for allowing me to participate in the care of Erlinda Hineskevin Dennis, WILBER-C

## 2017-03-27 ENCOUNTER — TELEPHONE (OUTPATIENT)
Dept: RADIOLOGY | Facility: HOSPITAL | Age: 22
End: 2017-03-27

## 2017-03-28 ENCOUNTER — HOSPITAL ENCOUNTER (OUTPATIENT)
Dept: RADIOLOGY | Facility: HOSPITAL | Age: 22
Discharge: HOME | End: 2017-03-28
Attending: FAMILY MEDICINE
Payer: COMMERCIAL

## 2017-03-28 DIAGNOSIS — R31.9 HEMATURIA: ICD-10-CM

## 2017-03-28 DIAGNOSIS — R10.9 FLANK PAIN: ICD-10-CM

## 2017-03-28 PROCEDURE — 74176 CT ABD & PELVIS W/O CONTRAST: CPT | Mod: 26,,, | Performed by: RADIOLOGY

## 2017-03-28 PROCEDURE — 74176 CT ABD & PELVIS W/O CONTRAST: CPT | Mod: TC,PO

## 2017-03-29 ENCOUNTER — TELEPHONE (OUTPATIENT)
Dept: INTERNAL MEDICINE | Facility: CLINIC | Age: 22
End: 2017-03-29

## 2017-03-29 ENCOUNTER — PATIENT MESSAGE (OUTPATIENT)
Dept: INTERNAL MEDICINE | Facility: CLINIC | Age: 22
End: 2017-03-29

## 2017-03-29 NOTE — TELEPHONE ENCOUNTER
----- Message from Ysabel Callahan sent at 3/29/2017 10:20 AM CDT -----  Contact: Pt   Pt called and stated she was returning a call to the nurse. She can be reached at [phone].    Thanks,  TF

## 2017-04-04 DIAGNOSIS — N94.6 DYSMENORRHEA: ICD-10-CM

## 2017-04-04 RX ORDER — NORETHINDRONE ACETATE AND ETHINYL ESTRADIOL .02; 1 MG/1; MG/1
TABLET ORAL
Qty: 28 TABLET | Refills: 1 | Status: SHIPPED | OUTPATIENT
Start: 2017-04-04 | End: 2017-05-02 | Stop reason: SDUPTHER

## 2017-04-05 ENCOUNTER — PATIENT MESSAGE (OUTPATIENT)
Dept: INTERNAL MEDICINE | Facility: CLINIC | Age: 22
End: 2017-04-05

## 2017-05-02 ENCOUNTER — OFFICE VISIT (OUTPATIENT)
Dept: OBSTETRICS AND GYNECOLOGY | Facility: CLINIC | Age: 22
End: 2017-05-02
Payer: COMMERCIAL

## 2017-05-02 VITALS
BODY MASS INDEX: 30.07 KG/M2 | WEIGHT: 163.38 LBS | DIASTOLIC BLOOD PRESSURE: 74 MMHG | HEIGHT: 62 IN | SYSTOLIC BLOOD PRESSURE: 110 MMHG

## 2017-05-02 DIAGNOSIS — N94.6 DYSMENORRHEA: ICD-10-CM

## 2017-05-02 DIAGNOSIS — Z01.419 ENCOUNTER FOR GYNECOLOGICAL EXAMINATION WITHOUT ABNORMAL FINDING: Primary | ICD-10-CM

## 2017-05-02 DIAGNOSIS — N92.6 IRREGULAR PERIODS/MENSTRUAL CYCLES: ICD-10-CM

## 2017-05-02 PROCEDURE — 99999 PR PBB SHADOW E&M-EST. PATIENT-LVL III: CPT | Mod: PBBFAC,,, | Performed by: OBSTETRICS & GYNECOLOGY

## 2017-05-02 PROCEDURE — 99395 PREV VISIT EST AGE 18-39: CPT | Mod: S$GLB,,, | Performed by: OBSTETRICS & GYNECOLOGY

## 2017-05-02 RX ORDER — MELOXICAM 15 MG/1
TABLET ORAL
Refills: 0 | COMMUNITY
Start: 2017-04-19 | End: 2018-04-27

## 2017-05-02 RX ORDER — NORETHINDRONE ACETATE AND ETHINYL ESTRADIOL .02; 1 MG/1; MG/1
1 TABLET ORAL DAILY
Qty: 28 TABLET | Refills: 11 | Status: SHIPPED | OUTPATIENT
Start: 2017-05-02 | End: 2017-06-15

## 2017-05-02 NOTE — PROGRESS NOTES
CC: Well woman exam    Erlinda Rain is a 21 y.o. female  presents for a well woman exam.  LMP: Patient's last menstrual period was 2017 (approximate)..  Has occ vaginal itching. Reports mother has questions about work up due to irregular cycles. Has been on ocps for few years for dysmenorrhea & irregular cycles. 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 No pertinent past medical history.  Past Surgical History:   Procedure Laterality Date    none       Social History     Social History    Marital status: Single     Spouse name: N/A    Number of children: 0    Years of education: N/A     Occupational History    Student- [employer]       Social History Main Topics    Smoking status: Never Smoker    Smokeless tobacco: Never Used    Alcohol use No      Comment: socially    Drug use: No    Sexual activity: Not Currently     Birth control/ protection: OCP     Other Topics Concern    None     Social History Narrative     Family History   Problem Relation Age of Onset    Hyperlipidemia Mother     Hyperlipidemia Father     Diabetes Father     Heart disease Father     Eczema Neg Hx     Lupus Neg Hx     Psoriasis Neg Hx     Melanoma Neg Hx      OB History      Para Term  AB TAB SAB Ectopic Multiple Living    0 0 0 0 0 0 0 0 0 0          Current Outpatient Prescriptions:     amitriptyline (ELAVIL) 25 MG tablet, TAKE 1 TABLET(25 MG) BY MOUTH EVERY EVENING, Disp: 30 tablet, Rfl: 11    duloxetine (CYMBALTA) 60 MG capsule, Take 1 capsule (60 mg total) by mouth once daily., Disp: 30 capsule, Rfl: 11    meloxicam (MOBIC) 15 MG tablet, TK 1 T PO QD PC, Disp: , Rfl: 0    MICROGESTIN FE /, 28, 1 mg-20 mcg (21)/75 mg (7) per tablet, TK 1 T PO QD, Disp: , Rfl: 11    multivitamin capsule, Take 1 capsule by mouth once daily., Disp: , Rfl:     norethindrone-ethinyl estradiol (MICROGESTIN /) 1-20 mg-mcg per tablet, Take 1 tablet  "by mouth once daily., Disp: 28 tablet, Rfl: 11    omeprazole (PRILOSEC) 20 MG capsule, Take 1 capsule (20 mg total) by mouth once daily., Disp: 30 capsule, Rfl: 2    polyethylene glycol (GLYCOLAX) 17 gram/dose powder, Take 17 g by mouth once daily., Disp: 238 g, Rfl: 6    vitamin D 1000 units Tab, Take 185 mg by mouth once daily., Disp: , Rfl:     GYNECOLOGY HISTORY:  No abnormal pap; never been sexually active. No stds    DATA REVIEWED:  Last pap: 2016 Results: normal    /74  Ht 5' 2" (1.575 m)  Wt 74.1 kg (163 lb 5.8 oz)  LMP 04/30/2017 (Approximate)  BMI 29.88 kg/m2    ROS:  GENERAL: Denies weight gain or weight loss. Feeling well overall.   SKIN: Denies rash or lesions.   HEAD: Denies head injury or headache.   NODES: Denies enlarged lymph nodes.   CHEST: Denies chest pain or shortness of breath.   CARDIOVASCULAR: Denies palpitations or left sided chest pain.   ABDOMEN: No abdominal pain, constipation, diarrhea, nausea, vomiting or rectal bleeding.   URINARY: No frequency, dysuria, hematuria, or burning on urination.  REPRODUCTIVE: See HPI.   BREASTS: The patient denies pain, lumps, or nipple discharge.   HEMATOLOGIC: No easy bruisability or excessive bleeding.   MUSCULOSKELETAL: Denies joint pain or swelling.   NEUROLOGIC: Denies syncope or weakness.   PSYCHIATRIC: Denies depression, anxiety or mood swings.    PHYSICAL EXAM:    APPEARANCE: Well nourished, well developed, in no acute distress.  AFFECT: WNL, alert and oriented x 3  SKIN: No acne; (+) hirsutism & body hair  NECK: Neck symmetric without masses or thyromegaly  NODES: No inguinal, cervical, axillary, or femoral lymph node enlargement  CHEST: Good respiratory effect  ABDOMEN: Soft.  No tenderness or masses.  No hepatosplenomegaly.  No hernias.  BREASTS: Symmetrical, no skin changes or visible lesions.  No palpable masses, nipple discharge bilaterally.  PELVIC: Normal external genitalia without lesions.  Normal hair distribution.  Adequate " perineal body, normal urethral meatus.  Vagina moist and well rugated without lesions or discharge; itching reported at introitus but appears normal.  Cervix pink, without lesions, discharge or tenderness.  No significant cystocele or rectocele.  Bimanual exam shows uterus to be normal size, regular, mobile and nontender.  Adnexa without masses or tenderness.    EXTREMITIES: No edema.    Encounter for gynecological examination without abnormal finding    Irregular periods/menstrual cycles  -     norethindrone-ethinyl estradiol (MICROGESTIN 1/20) 1-20 mg-mcg per tablet; Take 1 tablet by mouth once daily.  Dispense: 28 tablet; Refill: 11    Dysmenorrhea  -     norethindrone-ethinyl estradiol (MICROGESTIN 1/20) 1-20 mg-mcg per tablet; Take 1 tablet by mouth once daily.  Dispense: 28 tablet; Refill: 11    Patient was counseled today on A.C.S. Pap guidelines (Q3) and recommendations for yearly pelvic exams, yearly mammograms starting age 40, and clinical breast exams; to see her PCP for other health maintenance.

## 2017-05-15 ENCOUNTER — LAB VISIT (OUTPATIENT)
Dept: LAB | Facility: HOSPITAL | Age: 22
End: 2017-05-15
Attending: FAMILY MEDICINE
Payer: COMMERCIAL

## 2017-05-15 ENCOUNTER — OFFICE VISIT (OUTPATIENT)
Dept: INTERNAL MEDICINE | Facility: CLINIC | Age: 22
End: 2017-05-15
Payer: COMMERCIAL

## 2017-05-15 VITALS
OXYGEN SATURATION: 98 % | DIASTOLIC BLOOD PRESSURE: 80 MMHG | HEIGHT: 62 IN | HEART RATE: 114 BPM | RESPIRATION RATE: 16 BRPM | WEIGHT: 164.69 LBS | BODY MASS INDEX: 30.31 KG/M2 | TEMPERATURE: 97 F | SYSTOLIC BLOOD PRESSURE: 118 MMHG

## 2017-05-15 DIAGNOSIS — K64.0 FIRST DEGREE HEMORRHOIDS: ICD-10-CM

## 2017-05-15 DIAGNOSIS — Z00.00 ANNUAL PHYSICAL EXAM: Primary | ICD-10-CM

## 2017-05-15 DIAGNOSIS — Z00.00 ANNUAL PHYSICAL EXAM: ICD-10-CM

## 2017-05-15 PROCEDURE — 86787 VARICELLA-ZOSTER ANTIBODY: CPT

## 2017-05-15 PROCEDURE — 86765 RUBEOLA ANTIBODY: CPT

## 2017-05-15 PROCEDURE — 86580 TB INTRADERMAL TEST: CPT | Mod: S$GLB,,, | Performed by: FAMILY MEDICINE

## 2017-05-15 PROCEDURE — 86735 MUMPS ANTIBODY: CPT

## 2017-05-15 PROCEDURE — 86706 HEP B SURFACE ANTIBODY: CPT

## 2017-05-15 PROCEDURE — 99999 PR PBB SHADOW E&M-EST. PATIENT-LVL III: CPT | Mod: PBBFAC,,, | Performed by: FAMILY MEDICINE

## 2017-05-15 PROCEDURE — 86762 RUBELLA ANTIBODY: CPT

## 2017-05-15 PROCEDURE — 99395 PREV VISIT EST AGE 18-39: CPT | Mod: S$GLB,,, | Performed by: FAMILY MEDICINE

## 2017-05-15 PROCEDURE — 36415 COLL VENOUS BLD VENIPUNCTURE: CPT | Mod: PO

## 2017-05-15 NOTE — PROGRESS NOTES
Subjective:       Patient ID: Erlinda Rain is a 21 y.o. female.    Chief Complaint: Annual Exam; PPD Placement; and Rectal Bleeding    HPI Comments: Pt has been having some for 2 days ago has had some bright right blood.    Rectal Bleeding   This is a new problem. The current episode started today. The problem occurs constantly. The problem has been unchanged. Pertinent negatives include no chest pain, chills, congestion, myalgias, nausea, neck pain, urinary symptoms or vertigo. Nothing aggravates the symptoms. She has tried nothing for the symptoms. The treatment provided moderate relief.     Review of Systems   Constitutional: Negative for chills.   HENT: Negative for congestion.    Respiratory: Negative.    Cardiovascular: Negative for chest pain.   Gastrointestinal: Positive for hematochezia. Negative for nausea.   Genitourinary: Negative.    Musculoskeletal: Negative.  Negative for myalgias and neck pain.   Neurological: Negative.  Negative for vertigo.       Objective:      Physical Exam   Constitutional: She is oriented to person, place, and time. She appears well-developed and well-nourished.   Cardiovascular: Normal rate and regular rhythm.    Pulmonary/Chest: Effort normal and breath sounds normal.   Abdominal: Soft. Bowel sounds are normal.   Neurological: She is alert and oriented to person, place, and time.   Skin: Skin is warm and dry.   Psychiatric: She has a normal mood and affect. Her behavior is normal.       Assessment:       1. Annual physical exam    2. First degree hemorrhoids        Plan:       Annual physical exam  Comments:  Will do titers for school. Generally in good health  Orders:  -     Mumps, IgG Screen; Future; Expected date: 5/15/17  -     Rubeola antibody IgG; Future; Expected date: 5/15/17  -     Rubella antibody, IgG; Future; Expected date: 5/15/17  -     VARICELLA ZOSTER ANTIBODY, IGG; Future; Expected date: 5/15/17  -     Hepatitis B surface antibody; Future; Expected date:  5/15/17  -     POCT TB Skin Test Read    First degree hemorrhoids  Comments:  Will observe for no increase in fiber and water.

## 2017-05-15 NOTE — MR AVS SNAPSHOT
[facility]  [address] [facility] [place] [address]  Phone: [phone]  Fax: [phone]                  Erlinda Rain   5/15/2017 2:00 PM   Office Visit    Description:  Female : 1995   Provider:  Leobardo Zavala MD   Department:  [facility]           Reason for Visit     Annual Exam     PPD Placement     Rectal Bleeding           Diagnoses this Visit        Comments    Annual physical exam    -  Primary Will do titers for school. Generally in good health    First degree hemorrhoids     Will observe for no increase in fiber and water.           To Do List           Future Appointments        Provider Department Dept Phone    5/15/2017 3:00 PM LABORATORY, [facility] [phone]    2017 4:30 PM Charan Henriquez MD [facility] Rheumatology [phone]      Goals (5 Years of Data)     None      [facility]     [facility] Nurse Care Line -  Assistance  Unless otherwise directed by your provider, please contact [facility], our nurse care line that is available for  assistance.     Registered nurses in the [facility] provide: appointment scheduling, clinical advisement, health education, and other advisory services.  Call: [phone] (toll free)               Medications           Message regarding Medications     Verify the changes and/or additions to your medication regime listed below are the same as discussed with your clinician today.  If any of these changes or additions are incorrect, please notify your healthcare provider.        STOP taking these medications     MICROGESTIN FE , 28, 1 mg-20 mcg (21)/75 mg (7) per tablet TK 1 T PO QD    omeprazole (PRILOSEC) 20 MG capsule Take 1 capsule (20 mg total) by mouth once daily.           Verify that the below list of medications is an accurate representation of the medications you are currently taking.  If none reported, the list may be blank. If  "incorrect, please contact your healthcare provider. Carry this list with you in case of emergency.           Current Medications     amitriptyline (ELAVIL) 25 MG tablet TAKE 1 TABLET(25 MG) BY MOUTH EVERY EVENING    duloxetine (CYMBALTA) 60 MG capsule Take 1 capsule (60 mg total) by mouth once daily.    meloxicam (MOBIC) 15 MG tablet TK 1 T PO QD PC    multivitamin capsule Take 1 capsule by mouth once daily.    norethindrone-ethinyl estradiol (MICROGESTIN 1/20) 1-20 mg-mcg per tablet Take 1 tablet by mouth once daily.    polyethylene glycol (GLYCOLAX) 17 gram/dose powder Take 17 g by mouth once daily.    vitamin D 1000 units Tab Take 185 mg by mouth once daily.           Clinical Reference Information           Your Vitals Were     BP Pulse Temp Resp Height    118/80 (BP Location: Right arm, Patient Position: Sitting, BP Method: Manual) 114 97.4 °F (36.3 °C) (Tympanic) 16 5' 2" (1.575 m)    Weight Last Period SpO2 BMI    74.7 kg (164 lb 10.9 oz) 04/30/2017 (Exact Date) 98% 30.12 kg/m2      Blood Pressure          Most Recent Value    BP  118/80      Allergies as of 5/15/2017     Kiwi (Actinidia Chinensis)      Immunizations Administered on Date of Encounter - 5/15/2017     Name Date Dose VIS Date Route    PPD Test  Incomplete 0.1 mL N/A Subcutaneous      Orders Placed During Today's Visit      Normal Orders This Visit    POCT TB Skin Test Read     Future Labs/Procedures Expected by Expires    Hepatitis B surface antibody  5/15/2017 7/14/2018    Mumps, IgG Screen  5/15/2017 5/15/2018    Rubella antibody, IgG  5/15/2017 5/15/2018    Rubeola antibody IgG  5/15/2017 5/15/2018    VARICELLA ZOSTER ANTIBODY, IGG  5/15/2017 7/14/2018      Language Assistance Services     ATTENTION: Language assistance services are available, free of charge. Please call [phone].      ATENCIÓN: Si habla deirdreañol, tiene a worley disposición servicios gratuitos de asistencia lingüística. Llame al [phone].     CHÚ Ý: N?u b?n nói Ti?ng " Vi?t, có các d?ch v? h? tr? ngôn ng? mi?n phí dành cho b?n. G?i s? [phone].         [facility] - Internal Medicine complies with applicable Federal civil rights laws and does not discriminate on the basis of race, color, national origin, age, disability, or sex.

## 2017-05-16 LAB
HBV SURFACE AB SER-ACNC: POSITIVE M[IU]/ML
RUBV IGG SER-ACNC: 28.7 IU/ML
RUBV IGG SER-IMP: REACTIVE

## 2017-05-17 ENCOUNTER — CLINICAL SUPPORT (OUTPATIENT)
Dept: INTERNAL MEDICINE | Facility: CLINIC | Age: 22
End: 2017-05-17
Payer: COMMERCIAL

## 2017-05-17 LAB
MUMPS IGG INTERPRETATION: POSITIVE
MUMPS IGG SCREEN: 2.47 ISR
RUBEOLA IGG ANTIBODY: 2.52 ISR
RUBEOLA INTERPRETATION: POSITIVE
TB INDURATION - 48 HR READ: 0 MM
TB INDURATION - 72 HR READ: NORMAL MM
TB SKIN TEST - 48 HR READ: NEGATIVE
TB SKIN TEST - 72 HR READ: NORMAL
VARICELLA INTERPRETATION: POSITIVE
VARICELLA ZOSTER IGG: 2.04 ISR

## 2017-05-22 ENCOUNTER — TELEPHONE (OUTPATIENT)
Dept: INTERNAL MEDICINE | Facility: CLINIC | Age: 22
End: 2017-05-22

## 2017-05-22 NOTE — TELEPHONE ENCOUNTER
Patient informed that we are out of flu vaccines at this time but that she can go to any pharmacy to to get one if they are still giving them out,

## 2017-05-22 NOTE — TELEPHONE ENCOUNTER
----- Message from Ruby Hilario sent at 5/22/2017  3:29 PM CDT -----  Contact: self [phone]  States that she needs a flu shot before she starts school in August. Please call back at [phone]//thank you acc

## 2017-05-29 ENCOUNTER — TELEPHONE (OUTPATIENT)
Dept: RHEUMATOLOGY | Facility: CLINIC | Age: 22
End: 2017-05-29

## 2017-05-29 DIAGNOSIS — N94.6 DYSMENORRHEA: ICD-10-CM

## 2017-05-29 RX ORDER — NORETHINDRONE ACETATE AND ETHINYL ESTRADIOL AND FERROUS FUMARATE 1MG-20(21)
KIT ORAL
Qty: 28 TABLET | Refills: 11 | Status: SHIPPED | OUTPATIENT
Start: 2017-05-29 | End: 2018-12-27 | Stop reason: SDUPTHER

## 2017-05-30 ENCOUNTER — OFFICE VISIT (OUTPATIENT)
Dept: RHEUMATOLOGY | Facility: CLINIC | Age: 22
End: 2017-05-30
Payer: COMMERCIAL

## 2017-05-30 VITALS
HEART RATE: 107 BPM | DIASTOLIC BLOOD PRESSURE: 76 MMHG | SYSTOLIC BLOOD PRESSURE: 108 MMHG | BODY MASS INDEX: 29.88 KG/M2 | WEIGHT: 163.38 LBS

## 2017-05-30 DIAGNOSIS — E55.9 VITAMIN D DEFICIENCY: ICD-10-CM

## 2017-05-30 DIAGNOSIS — R31.29 HEMATURIA, MICROSCOPIC: ICD-10-CM

## 2017-05-30 DIAGNOSIS — M79.7 FIBROMYALGIA: Primary | ICD-10-CM

## 2017-05-30 PROCEDURE — 99214 OFFICE O/P EST MOD 30 MIN: CPT | Mod: S$GLB,,, | Performed by: INTERNAL MEDICINE

## 2017-05-30 PROCEDURE — 99999 PR PBB SHADOW E&M-EST. PATIENT-LVL III: CPT | Mod: PBBFAC,,, | Performed by: INTERNAL MEDICINE

## 2017-05-30 RX ORDER — ERGOCALCIFEROL 1.25 MG/1
50000 CAPSULE ORAL WEEKLY
Qty: 12 CAPSULE | Refills: 1 | Status: SHIPPED | OUTPATIENT
Start: 2017-05-30 | End: 2017-11-10 | Stop reason: SDUPTHER

## 2017-05-30 RX ORDER — DULOXETIN HYDROCHLORIDE 60 MG/1
60 CAPSULE, DELAYED RELEASE ORAL DAILY
Qty: 90 CAPSULE | Refills: 3 | Status: SHIPPED | OUTPATIENT
Start: 2017-05-30 | End: 2018-06-01 | Stop reason: SDUPTHER

## 2017-05-30 RX ORDER — AMITRIPTYLINE HYDROCHLORIDE 25 MG/1
25 TABLET, FILM COATED ORAL NIGHTLY
Qty: 90 TABLET | Refills: 3 | Status: SHIPPED | OUTPATIENT
Start: 2017-05-30 | End: 2018-06-01 | Stop reason: SDUPTHER

## 2017-05-30 NOTE — ASSESSMENT & PLAN NOTE
Since she is in her menstrual cycle today we couldn't check her urine for hematuria.  Requested to get in touch with Dr. Aguilar to have urine test scheduled sometime in the next couple weeks for evaluation of hematuria.

## 2017-05-30 NOTE — PROGRESS NOTES
RHEUMATOLOGY CLINIC FOLLOW UP VISIT  Chief complaints:-  To follow-up for fibromyalgia.    HPI:-  Erlinda Hawkins a 21 y.o. pleasant female comes in for a follow up visit with above chief complaints.  She has fibromyalgia.  Last visit she was started on amitriptyline along with Cymbalta.  She reports significant improvement and feels well rested after starting amitriptyline.  She denies any significant problems today.  She is concerned about the urine test being abnormal when checked by her primary care physician which was reported to have blood cells in it.  She denies any symptoms of urinary tract infection.  No recurrent fever or chills.  No muscle pain today.      Review of Systems   Constitutional: Negative for chills, fever and weight loss.   HENT: Negative for ear discharge, ear pain, hearing loss, nosebleeds and sore throat.    Eyes: Negative for blurred vision, double vision, photophobia, discharge and redness.   Respiratory: Negative for cough, hemoptysis, sputum production and shortness of breath.    Cardiovascular: Negative for chest pain, palpitations and claudication.   Gastrointestinal: Negative for abdominal pain, constipation, diarrhea, melena, nausea and vomiting.   Genitourinary: Negative for dysuria, frequency, hematuria and urgency.   Musculoskeletal: Positive for back pain and joint pain. Negative for falls, myalgias and neck pain.   Skin: Negative for itching and rash.   Neurological: Negative for dizziness, tremors, sensory change, speech change, focal weakness, seizures, loss of consciousness, weakness and headaches.   Endo/Heme/Allergies: Negative for environmental allergies. Does not bruise/bleed easily.   Psychiatric/Behavioral: Negative for hallucinations and memory loss. The patient does not have insomnia.        History reviewed. No pertinent past medical history.    Past Surgical History:   Procedure Laterality Date    none           Social History   Substance Use Topics    Smoking status: Never Smoker    Smokeless tobacco: Never Used    Alcohol use No      Comment: socially       Family History   Problem Relation Age of Onset    Hyperlipidemia Mother     Hyperlipidemia Father     Diabetes Father     Heart disease Father     Eczema Neg Hx     Lupus Neg Hx     Psoriasis Neg Hx     Melanoma Neg Hx        Review of patient's allergies indicates:   Allergen Reactions    Kiwi (actinidia chinensis) Itching and Nausea Only           Physical examination:-    Vitals:    05/30/17 1614   BP: 108/76   Pulse: 107   Weight: 74.1 kg (163 lb 5.8 oz)   PainSc:   2   PainLoc: Back       Physical Exam   Constitutional: She is oriented to person, place, and time and well-developed, well-nourished, and in no distress. No distress.   HENT:   Head: Normocephalic and atraumatic.   Nose: Nose normal.   Mouth/Throat: Oropharynx is clear and moist. No oropharyngeal exudate.   Eyes: Conjunctivae and EOM are normal. Pupils are equal, round, and reactive to light. Right eye exhibits no discharge. Left eye exhibits no discharge. No scleral icterus.   Neck: Normal range of motion. Neck supple.   Cardiovascular: Normal rate, regular rhythm and intact distal pulses.    Pulmonary/Chest: Effort normal. No respiratory distress. She exhibits no tenderness.   Abdominal: Soft. Bowel sounds are normal. There is no tenderness.   Musculoskeletal:   Few tender points in the interscapular region.  No synovitis or effusion of the small joints of the hands of the feet.  Good range of motion of the large joints.  No effusion.  No telangiectasias or sclerodactyly.   Lymphadenopathy:     She has no cervical adenopathy.   Neurological: She is alert and oriented to person, place, and time. No cranial nerve deficit.   Skin: Skin is warm. No rash noted. She is not diaphoretic. No erythema. No pallor.   Psychiatric: Mood, memory, affect and judgment normal.   Nursing note and vitals  reviewed.      Labs:-  Results for ALDO LIM (MRN [id number]) as of 5/30/2017 16:14   Ref. Range 9/19/2015 08:32 11/17/2015 15:50   CCP Antibodies Latest Ref Range: <5.0 U/mL  <0.5   Rheumatoid Factor Latest Ref Range: 0.0 - 15.0 IU/mL <10.0      Results for ALDO LIM (MRN [id number]) as of 5/30/2017 16:14   Ref. Range 10/14/2015 18:15   [place] Screen Latest Ref Range: Negative <1:160  Negative <1:160       Medication List with Changes/Refills   New Medications    ERGOCALCIFEROL (ERGOCALCIFEROL) 50,000 UNIT CAP    Take 1 capsule (50,000 Units total) by mouth once a week.   Current Medications    MELOXICAM (MOBIC) 15 MG TABLET    TK 1 T PO QD PC    MICROGESTIN FE 1/20, 28, 1 MG-20 MCG (21)/75 MG (7) PER TABLET    TAKE 1 TABLET BY MOUTH EVERY DAY    MULTIVITAMIN CAPSULE    Take 1 capsule by mouth once daily.    NORETHINDRONE-ETHINYL ESTRADIOL (MICROGESTIN 1/20) 1-20 MG-MCG PER TABLET    Take 1 tablet by mouth once daily.    POLYETHYLENE GLYCOL (GLYCOLAX) 17 GRAM/DOSE POWDER    Take 17 g by mouth once daily.    VITAMIN D 1000 UNITS TAB    Take 185 mg by mouth once daily.   Changed and/or Refilled Medications    Modified Medication Previous Medication    AMITRIPTYLINE (ELAVIL) 25 MG TABLET amitriptyline (ELAVIL) 25 MG tablet       Take 1 tablet (25 mg total) by mouth every evening.    TAKE 1 TABLET(25 MG) BY MOUTH EVERY EVENING    DULOXETINE (CYMBALTA) 60 MG CAPSULE duloxetine (CYMBALTA) 60 MG capsule       Take 1 capsule (60 mg total) by mouth once daily.    Take 1 capsule (60 mg total) by mouth once daily.       Assessment/Plans:-  # Fibromyalgia  Fibromyalgia on elavil and cymbalta. Doing well. Continue the same.   - duloxetine (CYMBALTA) 60 MG capsule; Take 1 capsule (60 mg total) by mouth once daily.  Dispense: 90 capsule; Refill: 3  - amitriptyline (ELAVIL) 25 MG tablet; Take 1 tablet (25 mg total) by mouth every evening.  Dispense: 90 tablet; Refill: 3    # Vitamin D deficiency  Try weekly ergocalciferol  supplementation.  - ergocalciferol (ERGOCALCIFEROL) 50,000 unit Cap; Take 1 capsule (50,000 Units total) by mouth once a week.  Dispense: 12 capsule; Refill: 1    # Hematuria, microscopic  Since she is in her menstrual cycle today,I couldn't do an UA.  Requested to get in touch with Dr. Aguilar to have urine test scheduled sometime in the next couple weeks for evaluation of hematuria.     # Return in about 6 months (around 11/30/2017).      Time spent: 30 minutes in face to face discussion concerning diagnosis, prognosis, review of lab and test results, benefits of treatment as well as management of disease, counseling of patient and coordination of care between various health care providers . Greater than half the time spent was used for coordination of care and counseling of patient.      Disclaimer: This note was prepared using voice recognition system and is likely to have sound alike errors and is not proof read.  Please call me with any questions.

## 2017-05-31 ENCOUNTER — PATIENT MESSAGE (OUTPATIENT)
Dept: INTERNAL MEDICINE | Facility: CLINIC | Age: 22
End: 2017-05-31

## 2017-05-31 ENCOUNTER — PATIENT MESSAGE (OUTPATIENT)
Dept: OBSTETRICS AND GYNECOLOGY | Facility: CLINIC | Age: 22
End: 2017-05-31

## 2017-06-14 ENCOUNTER — PATIENT MESSAGE (OUTPATIENT)
Dept: INTERNAL MEDICINE | Facility: CLINIC | Age: 22
End: 2017-06-14

## 2017-06-14 DIAGNOSIS — R30.0 DYSURIA: Primary | ICD-10-CM

## 2017-06-15 ENCOUNTER — OFFICE VISIT (OUTPATIENT)
Dept: OTOLARYNGOLOGY | Facility: CLINIC | Age: 22
End: 2017-06-15
Payer: COMMERCIAL

## 2017-06-15 ENCOUNTER — LAB VISIT (OUTPATIENT)
Dept: LAB | Facility: HOSPITAL | Age: 22
End: 2017-06-15
Attending: INTERNAL MEDICINE
Payer: COMMERCIAL

## 2017-06-15 VITALS
DIASTOLIC BLOOD PRESSURE: 85 MMHG | BODY MASS INDEX: 30 KG/M2 | HEART RATE: 94 BPM | SYSTOLIC BLOOD PRESSURE: 132 MMHG | WEIGHT: 164 LBS

## 2017-06-15 DIAGNOSIS — R30.0 DYSURIA: ICD-10-CM

## 2017-06-15 DIAGNOSIS — H61.21 HEARING LOSS DUE TO CERUMEN IMPACTION, RIGHT: Primary | ICD-10-CM

## 2017-06-15 LAB
BILIRUB UR QL STRIP: NEGATIVE
CLARITY UR: CLEAR
COLOR UR: YELLOW
GLUCOSE UR QL STRIP: NEGATIVE
HGB UR QL STRIP: ABNORMAL
KETONES UR QL STRIP: NEGATIVE
LEUKOCYTE ESTERASE UR QL STRIP: NEGATIVE
MICROSCOPIC COMMENT: ABNORMAL
NITRITE UR QL STRIP: NEGATIVE
PH UR STRIP: 6 [PH] (ref 5–8)
PROT UR QL STRIP: NEGATIVE
RBC #/AREA URNS HPF: 6 /HPF (ref 0–4)
SP GR UR STRIP: 1.02 (ref 1–1.03)
URN SPEC COLLECT METH UR: ABNORMAL

## 2017-06-15 PROCEDURE — 81000 URINALYSIS NONAUTO W/SCOPE: CPT | Mod: PO

## 2017-06-15 PROCEDURE — 99999 PR PBB SHADOW E&M-EST. PATIENT-LVL III: CPT | Mod: PBBFAC,,, | Performed by: PHYSICIAN ASSISTANT

## 2017-06-15 PROCEDURE — 99499 UNLISTED E&M SERVICE: CPT | Mod: S$GLB,,, | Performed by: PHYSICIAN ASSISTANT

## 2017-06-15 PROCEDURE — 69210 REMOVE IMPACTED EAR WAX UNI: CPT | Mod: S$GLB,,, | Performed by: PHYSICIAN ASSISTANT

## 2017-06-15 NOTE — LETTER
Erica 15, 2017      Gabrielle Aguilar MD  [address] [place] [address]           [facility]  [address]  [place] [address]  Phone: [phone]  Fax: [phone]          Patient: Erlinda Rain   MR Number: [id number]   YOB: 1995   Date of Visit: 6/15/2017       Dear Dr. Gabrielle Aguilar:    Thank you for referring Erlinda Rain to me for evaluation. Attached you will find relevant portions of my assessment and plan of care.    If you have questions, please do not hesitate to call me. I look forward to following Erlinda Rain along with you.    Sincerely,    Cecile Jacob PA-C    Enclosure  CC:  No Recipients    If you would like to receive this communication electronically, please contact [email or web address] or [phone] to request more information on [facility] Link access.    For providers and/or their staff who would like to refer a patient to [facility], please contact us through our one-stop-shop provider referral line, [facility], at [phone].    If you feel you have received this communication in error or would no longer like to receive these types of communications, please e-mail [email or web address]

## 2017-06-15 NOTE — PROGRESS NOTES
Subjective:   Cerumen impactions     Patient ID: Erlinda Rain is a 21 y.o. female.    Chief Complaint:  Excessive ear wax     Erlinda Rain is a 21 y.o. female here to see me today for evaluation of a possible wax impaction in right ear.  She has complaints of hearing loss in the affected ear, but denies pain or drainage.  Denies dizziness. This has been an issue in the past.  The patient has been using any sort of ear drop to soften the wax.    HPI  Review of Systems   HENT: Positive for hearing loss. Negative for ear discharge, ear pain and tinnitus.        Objective:     Physical Exam   HENT:   Right Ear: External ear and ear canal normal. Decreased hearing is noted.   Left Ear: External ear and ear canal normal. TM intact and normal.   Right cerumen impaction, removal described below       Procedure Note    CHIEF COMPLAINT:  Cerumen Impaction    Description:  The patient was seated in an exam chair.  An ear speculum was placed in the right EAC and was examined under the microscope.  Suction and/or loop curettes were used to remove a large cerumen impaction.  The tympanic membrane was visualized and was normal in appearance.    The patient tolerated the procedure well.          Assessment:     1. Hearing loss due to cerumen impaction, right        Plan:     1.  Cerumen impaction:  Removed today without difficulty.  I would recommend the use of a wax softening drop, either over the counter Debrox or mineral oil, on a weekly basis.  I also instructed the patient to avoid Qtips.  Will schedule an audiogram at her convenience if any further concerns about her hearing.

## 2017-06-26 ENCOUNTER — PATIENT MESSAGE (OUTPATIENT)
Dept: INTERNAL MEDICINE | Facility: CLINIC | Age: 22
End: 2017-06-26

## 2017-07-28 ENCOUNTER — PATIENT MESSAGE (OUTPATIENT)
Dept: RHEUMATOLOGY | Facility: CLINIC | Age: 22
End: 2017-07-28

## 2017-08-01 ENCOUNTER — PATIENT MESSAGE (OUTPATIENT)
Dept: RHEUMATOLOGY | Facility: CLINIC | Age: 22
End: 2017-08-01

## 2017-09-14 ENCOUNTER — PATIENT MESSAGE (OUTPATIENT)
Dept: RHEUMATOLOGY | Facility: CLINIC | Age: 22
End: 2017-09-14

## 2017-11-10 DIAGNOSIS — E55.9 VITAMIN D DEFICIENCY: ICD-10-CM

## 2017-11-10 RX ORDER — ERGOCALCIFEROL 1.25 MG/1
CAPSULE ORAL
Qty: 12 CAPSULE | Refills: 0 | Status: SHIPPED | OUTPATIENT
Start: 2017-11-10 | End: 2018-02-26 | Stop reason: SDUPTHER

## 2018-01-18 ENCOUNTER — PATIENT MESSAGE (OUTPATIENT)
Dept: RHEUMATOLOGY | Facility: CLINIC | Age: 23
End: 2018-01-18

## 2018-02-26 DIAGNOSIS — E55.9 VITAMIN D DEFICIENCY: ICD-10-CM

## 2018-02-26 RX ORDER — ERGOCALCIFEROL 1.25 MG/1
CAPSULE ORAL
Qty: 12 CAPSULE | Refills: 0 | Status: SHIPPED | OUTPATIENT
Start: 2018-02-26 | End: 2018-05-16 | Stop reason: SDUPTHER

## 2018-03-12 ENCOUNTER — CLINICAL SUPPORT (OUTPATIENT)
Dept: URGENT CARE | Facility: CLINIC | Age: 23
End: 2018-03-12
Payer: COMMERCIAL

## 2018-03-12 DIAGNOSIS — Z11.1 SCREENING-PULMONARY TB: Primary | ICD-10-CM

## 2018-03-12 PROCEDURE — 86580 TB INTRADERMAL TEST: CPT | Mod: S$GLB,,, | Performed by: EMERGENCY MEDICINE

## 2018-03-14 LAB
TB INDURATION - 48 HR READ: 0 MM
TB INDURATION - 72 HR READ: NORMAL MM
TB SKIN TEST - 48 HR READ: NEGATIVE
TB SKIN TEST - 72 HR READ: NORMAL

## 2018-03-14 NOTE — PROGRESS NOTES
PPD Reading Note  PPD read and results entered in [employer].  Result: 0 mm induration.  Interpretation: negative  Allergic reaction: no

## 2018-04-23 DIAGNOSIS — M79.7 FIBROMYALGIA: ICD-10-CM

## 2018-04-23 RX ORDER — AMITRIPTYLINE HYDROCHLORIDE 25 MG/1
TABLET, FILM COATED ORAL
Qty: 30 TABLET | Refills: 0 | Status: SHIPPED | OUTPATIENT
Start: 2018-04-23 | End: 2018-04-27 | Stop reason: SDUPTHER

## 2018-04-27 ENCOUNTER — OFFICE VISIT (OUTPATIENT)
Dept: OBSTETRICS AND GYNECOLOGY | Facility: CLINIC | Age: 23
End: 2018-04-27
Payer: COMMERCIAL

## 2018-04-27 ENCOUNTER — LAB VISIT (OUTPATIENT)
Dept: LAB | Facility: HOSPITAL | Age: 23
End: 2018-04-27
Attending: OBSTETRICS & GYNECOLOGY
Payer: COMMERCIAL

## 2018-04-27 ENCOUNTER — PATIENT MESSAGE (OUTPATIENT)
Dept: OBSTETRICS AND GYNECOLOGY | Facility: CLINIC | Age: 23
End: 2018-04-27

## 2018-04-27 VITALS
BODY MASS INDEX: 32.76 KG/M2 | WEIGHT: 178 LBS | HEIGHT: 62 IN | SYSTOLIC BLOOD PRESSURE: 116 MMHG | DIASTOLIC BLOOD PRESSURE: 76 MMHG

## 2018-04-27 DIAGNOSIS — Z12.4 PAP SMEAR FOR CERVICAL CANCER SCREENING: ICD-10-CM

## 2018-04-27 DIAGNOSIS — Z01.419 ENCOUNTER FOR ANNUAL ROUTINE GYNECOLOGICAL EXAMINATION: Primary | ICD-10-CM

## 2018-04-27 DIAGNOSIS — N91.5 OLIGOMENORRHEA, UNSPECIFIED TYPE: ICD-10-CM

## 2018-04-27 DIAGNOSIS — N94.6 DYSMENORRHEA: ICD-10-CM

## 2018-04-27 LAB
PROLACTIN SERPL IA-MCNC: 5 NG/ML
TSH SERPL DL<=0.005 MIU/L-ACNC: 0.98 UIU/ML

## 2018-04-27 PROCEDURE — 84443 ASSAY THYROID STIM HORMONE: CPT

## 2018-04-27 PROCEDURE — 84146 ASSAY OF PROLACTIN: CPT

## 2018-04-27 PROCEDURE — 88175 CYTOPATH C/V AUTO FLUID REDO: CPT

## 2018-04-27 PROCEDURE — 36415 COLL VENOUS BLD VENIPUNCTURE: CPT

## 2018-04-27 PROCEDURE — 99999 PR PBB SHADOW E&M-EST. PATIENT-LVL III: CPT | Mod: PBBFAC,,, | Performed by: OBSTETRICS & GYNECOLOGY

## 2018-04-27 PROCEDURE — 99395 PREV VISIT EST AGE 18-39: CPT | Mod: S$GLB,,, | Performed by: OBSTETRICS & GYNECOLOGY

## 2018-04-27 RX ORDER — NORETHINDRONE ACETATE AND ETHINYL ESTRADIOL .02; 1 MG/1; MG/1
1 TABLET ORAL DAILY
Qty: 30 TABLET | Refills: 11 | Status: SHIPPED | OUTPATIENT
Start: 2018-04-27 | End: 2019-03-20 | Stop reason: SDUPTHER

## 2018-04-27 NOTE — PROGRESS NOTES
Chief Complaint   Patient presents with    Well Woman       HISTORY OF PRESENT ILLNESS:   Erlinda Rain is a 22 y.o. female  who presents for well woman exam.  Patient's last menstrual period was 2018..  She has complains of when she isn't on birth control she will not have a cycle but once a year. She isn't sexually active.  Cycles are  irregular. Declines STD testing. Desires OCPs for birth control.      History reviewed. No pertinent past medical history.       Past Surgical History:   Procedure Laterality Date    none           Social History     Social History    Marital status: Single     Spouse name: N/A    Number of children: 0    Years of education: N/A     Occupational History    Student- [employer]       Social History Main Topics    Smoking status: Never Smoker    Smokeless tobacco: Never Used    Alcohol use No      Comment: socially    Drug use: No    Sexual activity: No     Other Topics Concern    Not on file     Social History Narrative    Full time college student, part time employed.       Family History   Problem Relation Age of Onset    Hyperlipidemia Mother     Hyperlipidemia Father     Diabetes Father     Heart disease Father     Eczema Neg Hx     Lupus Neg Hx     Psoriasis Neg Hx     Melanoma Neg Hx          OB History    Para Term  AB Living   0 0 0 0 0 0   SAB TAB Ectopic Multiple Live Births   0 0 0 0                 COMPREHENSIVE GYN HISTORY:  PAP History: Denies abnormal Paps, last was in 2016   Infection History: Denies STDs. Denies PID.  Benign History:Denies uterine fibroids. Denies ovarian cysts. Denies endometriosis Denies other conditions.  Cancer History: Denies cervical cancer. Denies uterine cancer or hyperplasia. Denies ovarian cancer. Denies vulvar cancer or pre-cancer. Denies vaginal cancer or pre-cancer. Denies breast cancer. Denies colon cancer.  Cycle: 12/once a year/3-5 days     ROS:  GENERAL: Denies weight gain or weight loss. Feeling well  "overall.   SKIN: Denies rash or lesions.   HEAD: Denies headache.   NODES: Denies enlarged lymph nodes.   CHEST: Denies shortness of breath.   ABDOMEN: No abdominal pain, constipation, diarrhea, nausea, vomiting or rectal bleeding.   URINARY: No frequency, dysuria, hematuria, or burning on urination.  REPRODUCTIVE: See HPI.   BREASTS: The patient denies pain, lumps, or nipple discharge.       /76   Ht 5' 2" (1.575 m)   Wt 80.7 kg (178 lb)   LMP 03/26/2018   BMI 32.56 kg/m²     APPEARANCE: Well nourished, well developed, in no acute distress.  NECK: Neck symmetric without  thyromegaly.  NODES: No inguinal, cervical lymph node enlargement.  CHEST: Lungs clear to auscultation.  HEART: Regular rate and rhythm, no murmurs, rubs or gallops.  ABDOMEN: Soft. No tenderness or masses. No hernias. No hepatosplenomegaly.  BREASTS: Symmetrical, no skin changes or visible lesions. No palpable masses, nipple discharge or adenopathy bilaterally.  PELVIC:   VULVA: No lesions. Normal female genitalia.  URETHRAL MEATUS: Normal size and location, no lesions, no prolapse.  URETHRA: No masses, tenderness, prolapse or scarring.  VAGINA: Moist and well rugated, no discharge, no significant cystocele or rectocele.  CERVIX: No lesions and discharge.  UTERUS: Normal size, regular shape, mobile, non-tender, bladder base nontender.  ADNEXA: No masses or tenderness.        1. Encounter for annual routine gynecological examination    2. Pap smear for cervical cancer screening    3. Oligomenorrhea, unspecified type        Plan:  Routine gyn s/p normal breast exam. Pap without HPV cotesting ordered . STD testing: GC/CT/trich, syphilis, HBV/HCV and HIV declined. Counseled on contraception and desires to continue  OCPs.  2. Discussed anovulation and that it is not normal to go months without a cycle if you are not on birth control. We discussed the most common reason in her age is anovualtion due to PCOS. Discussed the risk of endometrial " hyperplasia/cancer with long term anovulatory bleeding and oligomenorrhea and recommend provera or birth control to help with cycle regulation and to thin the lining of the uterus. Patient expressed her understanding and desires to do continue ocps. Will check TSH and prolactin and get US. If wants to stop the birth control pills can get other hormone work up but she declines at this time.         F/u after US

## 2018-05-02 ENCOUNTER — TELEPHONE (OUTPATIENT)
Dept: OBSTETRICS AND GYNECOLOGY | Facility: CLINIC | Age: 23
End: 2018-05-02

## 2018-05-02 NOTE — TELEPHONE ENCOUNTER
Please send patient pap letter or call to let her know her pap smear was negative and since she has never had an abnormal pap smear the recommendation is that we can space her pap smears to every 3 years but we will still see her annually for her exams. Thanks.

## 2018-05-04 ENCOUNTER — PATIENT MESSAGE (OUTPATIENT)
Dept: OBSTETRICS AND GYNECOLOGY | Facility: CLINIC | Age: 23
End: 2018-05-04

## 2018-05-04 ENCOUNTER — PROCEDURE VISIT (OUTPATIENT)
Dept: OBSTETRICS AND GYNECOLOGY | Facility: CLINIC | Age: 23
End: 2018-05-04
Payer: COMMERCIAL

## 2018-05-04 DIAGNOSIS — N91.5 OLIGOMENORRHEA, UNSPECIFIED TYPE: ICD-10-CM

## 2018-05-04 PROCEDURE — 76830 TRANSVAGINAL US NON-OB: CPT | Mod: S$GLB,,, | Performed by: OBSTETRICS & GYNECOLOGY

## 2018-05-16 DIAGNOSIS — E55.9 VITAMIN D DEFICIENCY: ICD-10-CM

## 2018-05-16 RX ORDER — ERGOCALCIFEROL 1.25 MG/1
CAPSULE ORAL
Qty: 12 CAPSULE | Refills: 0 | Status: SHIPPED | OUTPATIENT
Start: 2018-05-16 | End: 2018-12-27

## 2018-06-01 DIAGNOSIS — M79.7 FIBROMYALGIA: ICD-10-CM

## 2018-06-01 RX ORDER — DULOXETIN HYDROCHLORIDE 60 MG/1
60 CAPSULE, DELAYED RELEASE ORAL DAILY
Qty: 90 CAPSULE | Refills: 3 | Status: SHIPPED | OUTPATIENT
Start: 2018-06-01 | End: 2019-06-16 | Stop reason: SDUPTHER

## 2018-06-01 RX ORDER — AMITRIPTYLINE HYDROCHLORIDE 25 MG/1
25 TABLET, FILM COATED ORAL NIGHTLY
Qty: 90 TABLET | Refills: 3 | Status: SHIPPED | OUTPATIENT
Start: 2018-06-01 | End: 2020-06-29

## 2018-08-08 ENCOUNTER — OFFICE VISIT (OUTPATIENT)
Dept: OTOLARYNGOLOGY | Facility: CLINIC | Age: 23
End: 2018-08-08
Payer: COMMERCIAL

## 2018-08-08 VITALS — HEIGHT: 62 IN | BODY MASS INDEX: 33.47 KG/M2 | WEIGHT: 181.88 LBS | TEMPERATURE: 99 F

## 2018-08-08 DIAGNOSIS — H61.22 IMPACTED CERUMEN OF LEFT EAR: ICD-10-CM

## 2018-08-08 DIAGNOSIS — H93.13 TINNITUS OF BOTH EARS: Primary | ICD-10-CM

## 2018-08-08 PROCEDURE — 3008F BODY MASS INDEX DOCD: CPT | Mod: CPTII,S$GLB,, | Performed by: PHYSICIAN ASSISTANT

## 2018-08-08 PROCEDURE — 99213 OFFICE O/P EST LOW 20 MIN: CPT | Mod: 25,S$GLB,, | Performed by: PHYSICIAN ASSISTANT

## 2018-08-08 PROCEDURE — 99999 PR PBB SHADOW E&M-EST. PATIENT-LVL III: CPT | Mod: PBBFAC,,, | Performed by: PHYSICIAN ASSISTANT

## 2018-08-08 PROCEDURE — 92504 EAR MICROSCOPY EXAMINATION: CPT | Mod: S$GLB,,, | Performed by: PHYSICIAN ASSISTANT

## 2018-08-08 NOTE — PROGRESS NOTES
"REFERRING PROVIDER  Gabrielle Macias Md  [address] [place] [address]  Subjective:   Patient: Erlinda Rain [id number], :1995   Visit date:2018 1:29 PM    Chief Complaint:  Other (pt c/o pain and ear wax)    HPI:     Erlinda Rain is a 23 y.o. female whom I am asked to see for evaluation of diminished hearing in both ears for the past several months. There is a prior history of cerumen impaction.    Her meds, allergies, medical, surgical, social & family histories were reviewed & updated:  -     She has a current medication list which includes the following prescription(s): amitriptyline, duloxetine, ergocalciferol, microgestin fe  (28), multivitamin, norethindrone-ethinyl estradiol, and vitamin d.  -     She  has no past medical history on file.   -     She  does not have any pertinent problems on file.   -     She  has a past surgical history that includes none.  -     She  reports that she has never smoked. She has never used smokeless tobacco. She reports that she does not drink alcohol or use drugs.  -     Her family history includes Diabetes in her father; Heart disease in her father; Hyperlipidemia in her father and mother.  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      Review of Systems:  -     Allergic/Immunologic: is allergic to kiwi (actinidia chinensis)..  -     Constitutional: Current temp: 98.5 °F (36.9 °C) (Tympanic)        Objective:     Physical Exam:  Vitals:  Temp 98.5 °F (36.9 °C) (Tympanic)   Ht 5' 2" (1.575 m)   Wt 82.5 kg (181 lb 14.1 oz)   BMI 33.27 kg/m²   Communication:  Able to communicate, no hoarseness.  Head & Face:  Normocephalic, atraumatic, no sinus tenderness.  Eyes:  Extraocular motions intact.  Ears:  Otoscopy of external auditory canals reveals impaction of bilateral ear canals.  With the patient in the supine position, we used the operating microscope to examine both ears with the appropriate sized ear speculum.  A variety of sterile, " micro-instruments were utilized to remove the cerumen atraumatically from the impacted ear(s).   After removal, the ears were reexamined-  Right Ear:  No mass/lesion of auricle. The external auditory canals is without erythema or discharge. Pneumatic otoscopy of the tympanic membrane revealed no perforation and good mobility, with no fluid in middle ear. Clinical speech reception thresholds grossly normal.  Left Ear:  No mass/lesion of auricle. The external auditory canals is without erythema or discharge. Pneumatic otoscopy of the tympanic membrane revealed no perforation and good mobility, with no fluid in middle ear. Clinical speech reception thresholds grossly normal  Nose:  No masses/lesions of external nose, nasal mucosa, septum, and turbinates were within normal limits.  Mouth:  No mass/lesion of lips, teeth, gums, hard/soft palate, tongue, tonsils, or oropharynx.  Neck & Lymphatics:  No cervical lymphadenopathy, no neck mass/crepitus/ asymmetry, trachea is midline, no thyroid enlargement/tenderness/mass.  Neuro/Psych: Alert with normal mood and affect.   Respiration/Chest:  Symmetric expansion during respiration, normal respiratory effort.  Skin:  Warm and intact.    Assessment & Plan:      Cerumen Impaction - Erlinda has cerumen impaction.  Impaction was removed without difficulty and the patient tolerated this well. We discussed preventative measures and treatment options.  Q-tips must be avoided, instead the ears can be cleaned with OTC ear rinses (or a mixture of alcohol & vinegar in equal parts).   For hard wax, Erlinda may place mineral oil/baby oil in the ear with a cotton ball at night and remove in the shower.  This will assist in softening the wax and allow it to drain out on its own. If the cerumen impacts the ear canal and causes hearing loss or infection she needs to follow-up in the clinic for treatment and cleaning.    Tinnitus    Recommended audiogram, pt lives in [facility] and wishes to schedule with  [facility] there.   Advised pt may try Melatonin at bedtime.     RTC PRN      Mya Spaulding PA-C

## 2018-08-08 NOTE — LETTER
August 8, 2018      Gabrielle Aguilar MD  [address] [place] [address]           [facility]  [address] [place] [address]  [place] [address]  Phone: [phone]  Fax: [phone]          Patient: Erlinda Rain   MR Number: [id number]   YOB: 1995   Date of Visit: 8/8/2018       Dear Dr. Gabrielle Aguilar:    Thank you for referring Erlinda Rain to me for evaluation. Attached you will find relevant portions of my assessment and plan of care.    If you have questions, please do not hesitate to call me. I look forward to following Erlinda Rain along with you.    Sincerely,    Mya Spaulding PA-C    Enclosure  CC:  No Recipients    If you would like to receive this communication electronically, please contact [email or web address] or [phone] to request more information on [facility] Link access.    For providers and/or their staff who would like to refer a patient to [facility], please contact us through our one-stop-shop provider referral line, [facility], at [phone].    If you feel you have received this communication in error or would no longer like to receive these types of communications, please e-mail [email or web address]

## 2018-08-27 ENCOUNTER — CLINICAL SUPPORT (OUTPATIENT)
Dept: OTOLARYNGOLOGY | Facility: CLINIC | Age: 23
End: 2018-08-27
Payer: COMMERCIAL

## 2018-08-27 DIAGNOSIS — H93.13 BILATERAL TINNITUS: Primary | ICD-10-CM

## 2018-08-27 PROCEDURE — 92550 TYMPANOMETRY & REFLEX THRESH: CPT | Mod: S$GLB,,, | Performed by: AUDIOLOGIST-HEARING AID FITTER

## 2018-08-27 PROCEDURE — 92557 COMPREHENSIVE HEARING TEST: CPT | Mod: S$GLB,,, | Performed by: AUDIOLOGIST-HEARING AID FITTER

## 2018-08-27 NOTE — Clinical Note
Ms. Spaulding,Thank you for referring Ms. Rain to me for an audiological evaluation.  Please see the enclosed assessment and audiogram.  Thank you,Florian Owusu, FERN-A

## 2018-08-28 NOTE — PROGRESS NOTES
Florian Owusu, CCC-A  Audiologist - [facility] [address] [place] [address]  [email or web address]  [phone]    Patient: Erlinda Rain   MRN: [id number]  : 1995  AARON: 2018      AUDIOLOGICAL EVALUATION    CASE HISTORY:    Erlinda Rain, 23 y.o., was seen on the above date for an audiological evaluation.  See full case history on enclosed audiogram.    TEST RESULTS:    -Otoscopy was unremarkable for both ears.   -Immittance testing revealed normal middle ear compliance (Type A tympanograms) in both ears.  -Speech reception thresholds (SRT) were obtained at 15 dB HL and 10 dB HL, in the right and left ears, respectively, which was consistent with puretone results.   -Word recognition (WR) scores of 100% were obtained in both ears using a presentation level of 55 dB HL in the right ear and 50 dB HL in the left ear.    IMPRESSION:   Audiological testing indicated that Erlinda Rain has normal hearing in both ears.     RECOMMENDATIONS:   It is recommended that she:  Use precaution and/or hearing protection in noisy environments.    If you should have any questions or concerns regarding the above information, please do not hesitate to contact me at [phone].      _______________________________  Florian Owusu, CCC-A  Audiologist    enclosure: audiogram

## 2018-08-29 ENCOUNTER — PATIENT MESSAGE (OUTPATIENT)
Dept: OTOLARYNGOLOGY | Facility: CLINIC | Age: 23
End: 2018-08-29

## 2018-10-22 ENCOUNTER — OFFICE VISIT (OUTPATIENT)
Dept: INTERNAL MEDICINE | Facility: CLINIC | Age: 23
End: 2018-10-22
Payer: COMMERCIAL

## 2018-10-22 VITALS
DIASTOLIC BLOOD PRESSURE: 80 MMHG | BODY MASS INDEX: 32.78 KG/M2 | OXYGEN SATURATION: 99 % | HEIGHT: 62 IN | HEART RATE: 100 BPM | SYSTOLIC BLOOD PRESSURE: 110 MMHG | WEIGHT: 178.13 LBS

## 2018-10-22 DIAGNOSIS — K59.00 CONSTIPATION, UNSPECIFIED CONSTIPATION TYPE: ICD-10-CM

## 2018-10-22 DIAGNOSIS — Z00.00 ANNUAL PHYSICAL EXAM: Primary | ICD-10-CM

## 2018-10-22 DIAGNOSIS — R31.29 HEMATURIA, MICROSCOPIC: ICD-10-CM

## 2018-10-22 DIAGNOSIS — E55.9 VITAMIN D DEFICIENCY: ICD-10-CM

## 2018-10-22 DIAGNOSIS — Z00.00 HEALTH CARE MAINTENANCE: ICD-10-CM

## 2018-10-22 DIAGNOSIS — E66.9 OBESITY (BMI 30.0-34.9): ICD-10-CM

## 2018-10-22 DIAGNOSIS — M79.7 FIBROMYALGIA: ICD-10-CM

## 2018-10-22 DIAGNOSIS — R00.0 TACHYCARDIA: ICD-10-CM

## 2018-10-22 PROCEDURE — 99395 PREV VISIT EST AGE 18-39: CPT | Mod: S$GLB,,, | Performed by: INTERNAL MEDICINE

## 2018-10-22 PROCEDURE — 99999 PR PBB SHADOW E&M-EST. PATIENT-LVL III: CPT | Mod: PBBFAC,,, | Performed by: INTERNAL MEDICINE

## 2018-10-22 RX ORDER — DOCUSATE SODIUM 100 MG/1
100 CAPSULE, LIQUID FILLED ORAL 2 TIMES DAILY PRN
Qty: 30 CAPSULE | Refills: 3 | Status: CANCELLED | OUTPATIENT
Start: 2018-10-22 | End: 2018-11-01

## 2018-10-22 NOTE — PROGRESS NOTES
Subjective:       Patient ID: Erlinda Rain is a 23 y.o. female who  has a past medical history of Fibromyalgia, Obesity (BMI 30.0-34.9), PCOS (polycystic ovarian syndrome), and Vitamin D deficiency.    Chief Complaint: Establish Care; Labs Only; and Diabetes (wants to check her levels)    HPI    History was obtained from the patient and supplemented through chart review.    She graduated from [employer] in [place] in biology.  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    She was seen by Dr. Leobardo Zavala in 5/2017 for hemorrhoids and was told to increase fiber and water intake.    She was recently diagnosed with PCOS by OBGYN which is 1 of the reasons why she wants to be evaluated for diabetes.  She is on birth control.  She has had 10 lb weight gain for the past few months.  She is doing yoga about 2 to 3 times a week given her history of fibromyalgia.  She is also exercising on the treadmill and with weights.  She cooks her own food and is trying to eat more vegetables.  She is trying to limit carbohydrate intake.  She has had increased thirst and polydipsia for months.  She denies fever, dysuria, hematuria, cloudy appearing urine or odor.  Her dad had type 2 diabetes and an MI in his 30s or 40s, but has made lifestyle changes and is now much healthier.  Her sister has hypothyroidism.  She denies hair loss.  She has constipation about once a day.  She has 1-2 bowel movements a day that are sometimes hard with straining.    If she works out intensely such as with [employer], she will experience lightheadedness with tinnitus.  She notices bilateral tinnitus nightly.  This has been going on for months.  She has been taking amitriptyline for the past year for fibromyalgia.  She was recently seen by ENT 08/20/2018 for decreased hearing and underwent cerumen disimpaction.  Audiogram was normal.  Was recommended to start Melatonin for b/l tinnitus but has  not done that yet.    Tachycardia:  About twice a month, her apple watch has notified her that her heart rate has been above 120 associated with sweatiness.  This occurs randomly and she could be doing nothing at the time.  She does not know how long it lasts.  She has noticed this happening more frequently if she has a glass of alcohol to drink.  She does not notice palpitations, chest pain, shortness of breath, lightheadedness, dizziness.  There has been no syncope.  It might be associated with stress.  She has no family history of sudden early death except for her dad who suffered an MI in his late 30s and 40s.      She had 2 episodes of microscopic hematuria in 2017.  She was unsure of the circumstances when this was worked up but stated that she was not on her period at the time.  Her last menstrual period ended last week.  She has never smoked or has been on cyclophosphamide.    She is followed by Rheum for Fibromyalgia is on Amitriptyline and Cymbalta.    She has had low vitamin-D in the 20s since 2015 to 2017.  She has been taking daily over-the-counter vitamin-D supplement 1000 IU a day for the past 2 years.  Due to the persistently low levels, high-dose vitamin-D 50,000 weekly was added.    Review of Systems   Constitutional: Positive for unexpected weight change. Negative for activity change and fever.   HENT: Positive for rhinorrhea. Negative for hearing loss and trouble swallowing.         Runny nose this week   Eyes: Negative for discharge and visual disturbance.   Respiratory: Negative for chest tightness and wheezing.    Cardiovascular: Positive for palpitations. Negative for chest pain.   Gastrointestinal: Positive for constipation. Negative for blood in stool, diarrhea and vomiting.   Endocrine: Positive for polydipsia and polyuria.   Genitourinary: Negative for difficulty urinating, dysuria, hematuria and menstrual problem.   Musculoskeletal: Positive for arthralgias. Negative for joint swelling  "and neck pain.        Fibromyalgia   Skin: Negative for rash and wound.   Neurological: Positive for headaches. Negative for weakness.        Intermittent HA, stress related   Hematological: Negative for adenopathy.   Psychiatric/Behavioral: Negative for confusion and dysphoric mood.       Past Medical History:   Diagnosis Date    Fibromyalgia     Obesity (BMI 30.0-34.9)     PCOS (polycystic ovarian syndrome)     Vitamin D deficiency      Past Surgical History:   Procedure Laterality Date    none       Family History   Problem Relation Age of Onset    Hyperlipidemia Mother     Hyperlipidemia Father     Heart attacks under age 50 Father     Diabetes type II Father     Hypothyroidism Sister     Eczema Neg Hx     Lupus Neg Hx     Psoriasis Neg Hx     Melanoma Neg Hx     Breast cancer Neg Hx     Ovarian cancer Neg Hx     Colon cancer Neg Hx      Social History     Socioeconomic History    Marital status: Single     Spouse name: Not on file    Number of children: 0    Years of education: Not on file    Highest education level: Not on file   Social Needs    Financial resource strain: Not on file    Food insecurity - worry: Not on file    Food insecurity - inability: Not on file    Transportation needs - medical: Not on file    Transportation needs - non-medical: Not on file   Occupational History    Occupation: Student- [employer]    Tobacco Use    Smoking status: Never Smoker    Smokeless tobacco: Never Used   Substance and Sexual Activity    Alcohol use: No     Comment: socially    Drug use: No    Sexual activity: No     Partners: Male     Birth control/protection: OCP   Other Topics Concern    Are you pregnant or think you may be? Not Asked    Breast-feeding Not Asked   Social History Narrative    Full time college student, part time employed.     Objective:      Vitals:    10/22/18 1353   BP: 110/80   Pulse: 100   SpO2: 99%   Weight: 80.8 kg (178 lb 2.1 oz)   Height: 5' 2" (1.575 m) "      Physical Exam   Constitutional: She is oriented to person, place, and time. She appears well-developed and well-nourished. No distress.   HENT:   Head: Normocephalic and atraumatic.   Nose: Nose normal. No mucosal edema.   Mouth/Throat: Posterior oropharyngeal erythema present. No oropharyngeal exudate.   Some facial hair   Eyes: Conjunctivae and EOM are normal. Pupils are equal, round, and reactive to light. Right eye exhibits no discharge. Left eye exhibits no discharge. No scleral icterus.   Neck: Neck supple. No tracheal deviation present. No thyromegaly present.   Cardiovascular: Normal rate, regular rhythm, normal heart sounds and intact distal pulses.   No murmur heard.  Regular tachycardia   Pulmonary/Chest: Effort normal and breath sounds normal. No respiratory distress. She has no wheezes.   Abdominal: Soft. Bowel sounds are normal. She exhibits no distension. There is no tenderness. There is no rebound.   Musculoskeletal: She exhibits no edema or deformity.   Lymphadenopathy:     She has no cervical adenopathy.   Neurological: She is alert and oriented to person, place, and time. No cranial nerve deficit. Coordination normal.   Skin: Skin is warm and dry. Capillary refill takes less than 2 seconds. She is not diaphoretic. No erythema.   Psychiatric: She has a normal mood and affect. Her behavior is normal.         Lab Results   Component Value Date    WBC 6.41 03/21/2017    HGB 13.1 03/21/2017    HCT 39.0 03/21/2017     03/21/2017    CHOL 185 06/24/2015    TRIG 126 06/24/2015    HDL 48 06/24/2015    ALT 10 03/21/2017    AST 14 03/21/2017     03/21/2017    K 3.5 03/21/2017     03/21/2017    CREATININE 0.9 03/21/2017    BUN 12 03/21/2017    CO2 23 03/21/2017    TSH 0.977 04/27/2018    HGBA1C 5.1 03/21/2017       The ASCVD Risk score (Sancho MARIJA Jr., et al., 2013) failed to calculate for the following reasons:    The 2013 ASCVD risk score is only valid for ages 40 to 79    (Imaging have  been independently reviewed)  03/20/2017 CT abdomen without nephrolithiasis.  Normal Adrenals.    Assessment:       1. Annual physical exam    2. Hematuria, microscopic    3. Vitamin D deficiency    4. Health care maintenance    5. Constipation, unspecified constipation type    6. Tachycardia    7. Obesity (BMI 30.0-34.9)    8. Fibromyalgia          Plan:       Erlinda was seen today for establish care, labs only and diabetes.    Diagnoses and all orders for this visit:    Annual physical exam    Hematuria, microscopic  Comments:  2 episodes in 2017. Unclear workup.  No tobacco use. Currently not on her period  Orders:  -     Cancel: URINALYSIS  -     Urinalysis; Future    Vitamin D deficiency  Comments:  on 1000 IU daily + 50K weekly.  Orders:  -     Vitamin D; Future    Health care maintenance  Comments:  Up-to-date on flu and Tdap  Orders:  -     CBC auto differential; Future  -     Comprehensive metabolic panel; Future  -     Hemoglobin A1c; Future  -     Lipid panel; Future    Constipation, unspecified constipation type  Comments:  Not bothersome. Declined stool softeners at this time. Has good hydration and is trying to increase fiber intake    Tachycardia  Comments:  Unclear triggers. Asymptomatic. Might be secondary to dehydration, stress, amitryiptline. If progressive, educated patient on ED return precautions  Orders:  -     TSH; Future  -     EKG 12-lead; Future    Obesity (BMI 30.0-34.9)  Comments:  Discussed increasing exercise    Fibromyalgia  Comments:  managed by Rheumatology. On Cymbalta and amitriptyline. Encouraged yoga and stretching    Other orders  -     Cancel: docusate sodium (COLACE) 100 MG capsule; Take 1 capsule (100 mg total) by mouth 2 (two) times daily as needed for Constipation.           Notification of Lab Results: [facility]    Side effects of medication(s) were discussed in detail and patient voiced understanding.  Patient will call back for any issues or complications.     RTC in 1  year(s) or sooner PRN.

## 2018-10-23 ENCOUNTER — HOSPITAL ENCOUNTER (OUTPATIENT)
Dept: CARDIOLOGY | Facility: OTHER | Age: 23
Discharge: HOME | End: 2018-10-23
Attending: INTERNAL MEDICINE
Payer: COMMERCIAL

## 2018-10-23 DIAGNOSIS — R00.0 TACHYCARDIA: ICD-10-CM

## 2018-10-23 PROCEDURE — 93005 ELECTROCARDIOGRAM TRACING: CPT

## 2018-10-23 PROCEDURE — 93010 ELECTROCARDIOGRAM REPORT: CPT | Mod: ,,, | Performed by: INTERNAL MEDICINE

## 2018-11-26 ENCOUNTER — IMMUNIZATION (OUTPATIENT)
Dept: URGENT CARE | Facility: CLINIC | Age: 23
End: 2018-11-26
Payer: COMMERCIAL

## 2018-11-26 DIAGNOSIS — Z11.1 SCREENING FOR TUBERCULOSIS: Primary | ICD-10-CM

## 2018-11-26 PROCEDURE — 86580 TB INTRADERMAL TEST: CPT | Mod: S$GLB,,, | Performed by: EMERGENCY MEDICINE

## 2018-11-26 NOTE — PROGRESS NOTES
Subjective:       Patient ID: Erlinda Rain is a 23 y.o. female.    Vitals:  vitals were not taken for this visit.     Chief Complaint: PPD Placement    PPD placement       <OUCOOHADULT>    Objective:      Physical Exam    Assessment:       No diagnosis found.    Plan:         There are no diagnoses linked to this encounter.

## 2018-11-28 LAB
TB INDURATION - 48 HR READ: NORMAL MM
TB INDURATION - 72 HR READ: 0 MM
TB SKIN TEST - 48 HR READ: NORMAL
TB SKIN TEST - 72 HR READ: NORMAL

## 2018-12-04 ENCOUNTER — PATIENT MESSAGE (OUTPATIENT)
Dept: INTERNAL MEDICINE | Facility: CLINIC | Age: 23
End: 2018-12-04

## 2018-12-04 DIAGNOSIS — M54.2 NECK PAIN: Primary | ICD-10-CM

## 2018-12-27 ENCOUNTER — OFFICE VISIT (OUTPATIENT)
Dept: OTOLARYNGOLOGY | Facility: CLINIC | Age: 23
End: 2018-12-27
Payer: COMMERCIAL

## 2018-12-27 VITALS
TEMPERATURE: 99 F | HEART RATE: 105 BPM | SYSTOLIC BLOOD PRESSURE: 116 MMHG | WEIGHT: 179.44 LBS | DIASTOLIC BLOOD PRESSURE: 80 MMHG | BODY MASS INDEX: 32.82 KG/M2

## 2018-12-27 DIAGNOSIS — H61.23 BILATERAL IMPACTED CERUMEN: Primary | ICD-10-CM

## 2018-12-27 PROCEDURE — 99499 UNLISTED E&M SERVICE: CPT | Mod: S$GLB,,, | Performed by: PHYSICIAN ASSISTANT

## 2018-12-27 PROCEDURE — 99999 PR PBB SHADOW E&M-EST. PATIENT-LVL III: CPT | Mod: PBBFAC,,, | Performed by: PHYSICIAN ASSISTANT

## 2018-12-27 PROCEDURE — 69210 REMOVE IMPACTED EAR WAX UNI: CPT | Mod: S$GLB,,, | Performed by: PHYSICIAN ASSISTANT

## 2018-12-27 NOTE — PROGRESS NOTES
Subjective:   Cerumen impactions     Patient ID: Erlinda Rain is a 23 y.o. female.    Chief Complaint:  Excessive ear wax     Erlinda Rain is a 23 y.o. female here to see me today for evaluation of a possible wax impaction in bilateral ears.  She has complaints of hearing loss in the affected ears, but denies pain or drainage.  This has been an issue in the past.  The patient has not been using any sort of ear drop to soften the wax.  She has used olive oil in the past.  She was here last with Mya for ear cleaning in 8/2018.    HPI  Review of Systems   HENT: Positive for hearing loss and tinnitus. Negative for ear discharge, ear pain.        Objective:     Physical Exam   HENT:   Right Ear: External ear and ear canal normal. Decreased hearing is noted.   Left Ear: External ear and ear canal normal. Decreased hearing is noted.   Bilateral complete cerumen impactions, removal described below       Procedure Note    CHIEF COMPLAINT:  Cerumen Impaction    Description:  The patient was seated in an exam chair.  An ear speculum was placed in the right EAC and was examined under the microscope.  Suction and/or loop curettes were used to remove a large cerumen impaction.  The tympanic membrane was visualized and was normal in appearance.  The procedure was repeated on the left side in a similar fashion.  The TM was intact and normal on this side as well.  The patient tolerated the procedure well.           Assessment:     1. Bilateral impacted cerumen        Plan:     1.  Cerumen impaction:  Removed today without difficulty.  I would recommend the use of a wax softening drop, either over the counter Debrox or mineral oil, on a weekly basis.  I also instructed the patient to avoid Qtips.  RTC in 4-6 months for ear cleaning.

## 2018-12-27 NOTE — LETTER
December 27, 2018      Princess Patrick MD  [address]  [address]  [place] [address]           [facility]  [address] [facility] [address]  [place] [address]  Phone: [phone]  Fax: [phone]          Patient: Erlinda Rain   MR Number: [id number]   YOB: 1995   Date of Visit: 12/27/2018       Dear Dr. Princess Patrick:    Thank you for referring Erlinda Rain to me for evaluation. Attached you will find relevant portions of my assessment and plan of care.    If you have questions, please do not hesitate to call me. I look forward to following Erlinda Rain along with you.    Sincerely,    Cecile Jacob PA-C    Enclosure  CC:  No Recipients    If you would like to receive this communication electronically, please contact [email or web address] or [phone] to request more information on [facility] access.    For providers and/or their staff who would like to refer a patient to [facility], please contact us through our one-stop-shop provider referral line, [facility], at [phone].    If you feel you have received this communication in error or would no longer like to receive these types of communications, please e-mail [email or web address]

## 2019-02-16 ENCOUNTER — HOSPITAL ENCOUNTER (EMERGENCY)
Facility: HOSPITAL | Age: 24
Discharge: HOME | End: 2019-02-16
Attending: EMERGENCY MEDICINE
Payer: COMMERCIAL

## 2019-02-16 VITALS
BODY MASS INDEX: 31.28 KG/M2 | TEMPERATURE: 99 F | HEIGHT: 62 IN | DIASTOLIC BLOOD PRESSURE: 90 MMHG | RESPIRATION RATE: 18 BRPM | SYSTOLIC BLOOD PRESSURE: 136 MMHG | OXYGEN SATURATION: 98 % | HEART RATE: 100 BPM | WEIGHT: 170 LBS

## 2019-02-16 DIAGNOSIS — Z77.21 EXPOSURE TO BLOOD OR BODY FLUID: Primary | ICD-10-CM

## 2019-02-16 LAB
ALBUMIN SERPL BCP-MCNC: 3.8 G/DL
ALP SERPL-CCNC: 89 U/L
ALT SERPL W/O P-5'-P-CCNC: 19 U/L
ANION GAP SERPL CALC-SCNC: 12 MMOL/L
AST SERPL-CCNC: 19 U/L
B-HCG UR QL: NEGATIVE
BASOPHILS # BLD AUTO: 0.02 K/UL
BASOPHILS NFR BLD: 0.2 %
BILIRUB SERPL-MCNC: 0.1 MG/DL
BUN SERPL-MCNC: 13 MG/DL
CALCIUM SERPL-MCNC: 9.6 MG/DL
CHLORIDE SERPL-SCNC: 106 MMOL/L
CO2 SERPL-SCNC: 22 MMOL/L
CREAT SERPL-MCNC: 0.9 MG/DL
DIFFERENTIAL METHOD: ABNORMAL
EOSINOPHIL # BLD AUTO: 0.1 K/UL
EOSINOPHIL NFR BLD: 1 %
ERYTHROCYTE [DISTWIDTH] IN BLOOD BY AUTOMATED COUNT: 12.6 %
EST. GFR  (AFRICAN AMERICAN): >60 ML/MIN/1.73 M^2
EST. GFR  (NON AFRICAN AMERICAN): >60 ML/MIN/1.73 M^2
GLUCOSE SERPL-MCNC: 90 MG/DL
HCT VFR BLD AUTO: 37.3 %
HGB BLD-MCNC: 12.1 G/DL
LYMPHOCYTES # BLD AUTO: 3.7 K/UL
LYMPHOCYTES NFR BLD: 29.4 %
MCH RBC QN AUTO: 27.4 PG
MCHC RBC AUTO-ENTMCNC: 32.4 G/DL
MCV RBC AUTO: 85 FL
MONOCYTES # BLD AUTO: 0.7 K/UL
MONOCYTES NFR BLD: 5.7 %
NEUTROPHILS # BLD AUTO: 8 K/UL
NEUTROPHILS NFR BLD: 63.7 %
PLATELET # BLD AUTO: 389 K/UL
PMV BLD AUTO: 10.3 FL
POTASSIUM SERPL-SCNC: 3.2 MMOL/L
PROT SERPL-MCNC: 8.2 G/DL
RBC # BLD AUTO: 4.41 M/UL
SODIUM SERPL-SCNC: 140 MMOL/L
WBC # BLD AUTO: 12.54 K/UL

## 2019-02-16 PROCEDURE — 86703 HIV-1/HIV-2 1 RESULT ANTBDY: CPT

## 2019-02-16 PROCEDURE — 99282 EMERGENCY DEPT VISIT SF MDM: CPT

## 2019-02-16 PROCEDURE — 86705 HEP B CORE ANTIBODY IGM: CPT

## 2019-02-16 PROCEDURE — 80053 COMPREHEN METABOLIC PANEL: CPT

## 2019-02-16 PROCEDURE — 85025 COMPLETE CBC W/AUTO DIFF WBC: CPT

## 2019-02-16 PROCEDURE — 86706 HEP B SURFACE ANTIBODY: CPT

## 2019-02-16 PROCEDURE — 81025 URINE PREGNANCY TEST: CPT
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
## 2019-02-18 ENCOUNTER — TELEPHONE (OUTPATIENT)
Dept: RHEUMATOLOGY | Facility: CLINIC | Age: 24
End: 2019-02-18

## 2019-02-18 LAB
HBV CORE IGM SERPL QL IA: NEGATIVE
HBV SURFACE AB SER-ACNC: POSITIVE M[IU]/ML

## 2019-02-19 LAB — HIV 1+2 AB+HIV1 P24 AG SERPL QL IA: NEGATIVE

## 2019-02-28 ENCOUNTER — TELEPHONE (OUTPATIENT)
Dept: INTERNAL MEDICINE | Facility: CLINIC | Age: 24
End: 2019-02-28

## 2019-02-28 DIAGNOSIS — Z77.21 EXPOSURE TO BLOOD OR BODY FLUID: Primary | ICD-10-CM

## 2019-03-04 PROBLEM — Z77.21 EXPOSURE TO BLOOD OR BODY FLUID: Status: ACTIVE | Noted: 2019-03-04

## 2019-03-04 NOTE — PROGRESS NOTES
Subjective:       Patient ID: Erlinda Rain is a 23 y.o. female who  has a past medical history of Fibromyalgia, Obesity (BMI 30.0-34.9), PCOS (polycystic ovarian syndrome), and Vitamin D deficiency.    Chief Complaint: Follow-up (hep C blood splatered on face, bump behind ear)    HPI    History was obtained from the patient and supplemented through chart review.  ED 2/2019 for blood exposure, but not in eyes/mouth.     She graduated from [employer] in [place] in biology.  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    Presents to follow-up following ER visit for body fluid exposure.    Body fluid exposure:  Was working at [facility].  The physician was removing leg wrapping on a dialysis patient with known HCV.  Unknown if he had been treated, but likely not since the patient denied h/o HCV.  Reports blood splattering in her face, but not in eyes mouth.  Went to the ED 2/2019.  She declined ART. HIV negative.  Hepatitis B ab positive, but she also already received the Hepatitis vaccine in the past for nursing school.  Had not yet had blood work for Hepatitis B antigen and Hepatitis-C.  No transaminitis on ED labs.      She currently feels well and denies  jaundice, nausea, dark urine, and right upper quadrant pain.      R ear abscess:    Underwent R ear piercing at the mid ear in the cartilage in 1/2019.  Reports a raised area with some clear discharge and crusting.  No erythema, fever.  Has some soreness.  History of similar episode when she had an ear piercing at the superior aspect of her ear (helix); this     Microscopic hematuria:  She had 2 episodes of microscopic hematuria in 2017.  She was unsure of the circumstances when this was worked up but stated that she was not on her period at the time.  She has never smoked or has been on cyclophosphamide.  Repeat UA with occult blood, but 1 RBCs.    Review of Systems   Constitutional: Negative for  "activity change and fever.   HENT: Negative for hearing loss, rhinorrhea and trouble swallowing.    Eyes: Negative for discharge and visual disturbance.   Respiratory: Negative for chest tightness and wheezing.    Cardiovascular: Negative for chest pain and palpitations.   Gastrointestinal: Negative for abdominal pain and nausea.   Genitourinary: Negative for dysuria and menstrual problem.   Musculoskeletal: Positive for arthralgias. Negative for joint swelling and neck pain.        Fibromyalgia   Skin: Positive for wound. Negative for color change.   Neurological: Negative for weakness and light-headedness.   Hematological: Negative for adenopathy.   Psychiatric/Behavioral: Negative for confusion and dysphoric mood.       I personally reviewed Past Medical History, Past Surgical History, Social History, and Family History.    Objective:      Vitals:    03/06/19 1306   BP: 117/72   Pulse: 88   SpO2: 98%   Weight: 81.3 kg (179 lb 3.7 oz)   Height: 5' 2" (1.575 m)      Physical Exam   Constitutional: She is oriented to person, place, and time. She appears well-developed and well-nourished. No distress.   HENT:   Head: Normocephalic and atraumatic.   Right Ear: External ear normal.   Left Ear: External ear normal.   Ears:    Nose: Nose normal. No mucosal edema.   Mouth/Throat: Posterior oropharyngeal erythema present. No oropharyngeal exudate.   hirsutism   Eyes: Conjunctivae and EOM are normal. Pupils are equal, round, and reactive to light. Right eye exhibits no discharge. Left eye exhibits no discharge. No scleral icterus.   Neck: Neck supple. No tracheal deviation present. No thyromegaly present.   Cardiovascular: Normal rate, regular rhythm, normal heart sounds and intact distal pulses.   No murmur heard.  Pulmonary/Chest: Effort normal and breath sounds normal. No respiratory distress. She has no wheezes.   Abdominal: Soft. Bowel sounds are normal. She exhibits no distension. There is no tenderness. There is no " rebound.   Musculoskeletal: She exhibits no edema or deformity.   Lymphadenopathy:     She has no cervical adenopathy.   Neurological: She is alert and oriented to person, place, and time. No cranial nerve deficit. Coordination normal.   Skin: Skin is warm and dry. Capillary refill takes less than 2 seconds. She is not diaphoretic. No erythema.   Psychiatric: She has a normal mood and affect. Her behavior is normal.         Lab Results   Component Value Date    WBC 12.54 02/16/2019    HGB 12.1 02/16/2019    HCT 37.3 02/16/2019     (H) 02/16/2019    CHOL 194 10/23/2018    TRIG 123 10/23/2018    HDL 51 10/23/2018    ALT 19 02/16/2019    AST 19 02/16/2019     02/16/2019    K 3.2 (L) 02/16/2019     02/16/2019    CREATININE 0.9 02/16/2019    BUN 13 02/16/2019    CO2 22 (L) 02/16/2019    TSH 1.841 10/23/2018    HGBA1C 5.0 10/23/2018       The ASCVD Risk score ([place] MARIJA Jr., et al., 2013) failed to calculate for the following reasons:    The 2013 ASCVD risk score is only valid for ages 40 to 79    (Imaging have been independently reviewed)  03/20/2017 CT abdomen without nephrolithiasis.  Normal Adrenals.    EKG NSR    Assessment:       1. Exposure to blood or body fluid    2. Abscess of external ear, right    3. Hematuria, microscopic          Plan:       Erlinda was seen today for follow-up.    Diagnoses and all orders for this visit:    Exposure to blood or body fluid  Comments:  Expsosure to HCV. HIV NR. HBc Ab NR, HBsAb +, but had HBV vaccine for nursing school. Reschedule HBV antigen, HCV Ab. Add RNA. Check in 3mo, 6 mo. for acute HCV  Orders:  -     HIV RNA, QUANTITATIVE, PCR; Future  -     HIV RNA, QUANTITATIVE, PCR; Future  -     Hepatitis C antibody; Future  -     HIV RNA, QUANTITATIVE, PCR; Future  -     Hepatitis C antibody; Future  -     HCV MONITOR, PARUL AMPLICOR; Future  -     HCV MONITOR, PARUL AMPLICOR; Future  -     HCV MONITOR, PARUL AMPLICOR; Future    Abscess of external ear,  right  Comments:  No systemic symptoms.  Recommend removal of ear piercing.  Discussed return precautions. Rec referral to plastics for I&D if persistent or worsening  Orders:  -     mupirocin calcium 2% (BACTROBAN) 2 % cream; Apply topically 3 (three) times daily. for 7 days    Hematuria, microscopic  Comments:  Repeat UA with occult blood, but 1 RBCs. CT without nephrolithiasis.  No further workup needed.         Addendum:  Ordered HIV RNA instead of hepatitis C RNA.  Will contact the lab to see if it can be done as an add on from earlier blood work.  Will also contact the patient to reschedule labs now, in 3 months, and in 6 months.    Notification of Lab Results: [facility]    Side effects of medication(s) were discussed in detail and patient voiced understanding.  Patient will call back for any issues or complications.     RTC in  for well visit

## 2019-03-06 ENCOUNTER — OFFICE VISIT (OUTPATIENT)
Dept: INTERNAL MEDICINE | Facility: CLINIC | Age: 24
End: 2019-03-06
Payer: COMMERCIAL

## 2019-03-06 ENCOUNTER — LAB VISIT (OUTPATIENT)
Dept: LAB | Facility: OTHER | Age: 24
End: 2019-03-06
Attending: INTERNAL MEDICINE
Payer: COMMERCIAL

## 2019-03-06 VITALS
OXYGEN SATURATION: 98 % | BODY MASS INDEX: 32.99 KG/M2 | SYSTOLIC BLOOD PRESSURE: 117 MMHG | HEART RATE: 88 BPM | DIASTOLIC BLOOD PRESSURE: 72 MMHG | WEIGHT: 179.25 LBS | HEIGHT: 62 IN

## 2019-03-06 DIAGNOSIS — Z77.21 EXPOSURE TO BLOOD OR BODY FLUID: ICD-10-CM

## 2019-03-06 DIAGNOSIS — R31.29 HEMATURIA, MICROSCOPIC: ICD-10-CM

## 2019-03-06 DIAGNOSIS — H60.01 ABSCESS OF EXTERNAL EAR, RIGHT: ICD-10-CM

## 2019-03-06 DIAGNOSIS — Z77.21 EXPOSURE TO BLOOD OR BODY FLUID: Primary | ICD-10-CM

## 2019-03-06 PROCEDURE — 86803 HEPATITIS C AB TEST: CPT

## 2019-03-06 PROCEDURE — 99215 OFFICE O/P EST HI 40 MIN: CPT | Mod: S$GLB,,, | Performed by: INTERNAL MEDICINE

## 2019-03-06 PROCEDURE — 87536 HIV-1 QUANT&REVRSE TRNSCRPJ: CPT

## 2019-03-06 PROCEDURE — 3008F BODY MASS INDEX DOCD: CPT | Mod: CPTII,S$GLB,, | Performed by: INTERNAL MEDICINE

## 2019-03-06 PROCEDURE — 87340 HEPATITIS B SURFACE AG IA: CPT

## 2019-03-06 PROCEDURE — 99999 PR PBB SHADOW E&M-EST. PATIENT-LVL III: CPT | Mod: PBBFAC,,, | Performed by: INTERNAL MEDICINE

## 2019-03-06 PROCEDURE — 99215 PR OFFICE/OUTPT VISIT, EST, LEVL V, 40-54 MIN: ICD-10-PCS | Mod: S$GLB,,, | Performed by: INTERNAL MEDICINE

## 2019-03-06 PROCEDURE — 3008F PR BODY MASS INDEX (BMI) DOCUMENTED: ICD-10-PCS | Mod: CPTII,S$GLB,, | Performed by: INTERNAL MEDICINE

## 2019-03-06 PROCEDURE — 36415 COLL VENOUS BLD VENIPUNCTURE: CPT

## 2019-03-06 PROCEDURE — 99999 PR PBB SHADOW E&M-EST. PATIENT-LVL III: ICD-10-PCS | Mod: PBBFAC,,, | Performed by: INTERNAL MEDICINE

## 2019-03-06 RX ORDER — MUPIROCIN CALCIUM 20 MG/G
CREAM TOPICAL 3 TIMES DAILY
Qty: 15 G | Refills: 0 | Status: SHIPPED | OUTPATIENT
Start: 2019-03-06 | End: 2019-03-13

## 2019-03-07 ENCOUNTER — TELEPHONE (OUTPATIENT)
Dept: INTERNAL MEDICINE | Facility: CLINIC | Age: 24
End: 2019-03-07

## 2019-03-07 LAB
HBV SURFACE AG SERPL QL IA: NEGATIVE
HCV AB SERPL QL IA: NEGATIVE

## 2019-03-07 NOTE — TELEPHONE ENCOUNTER
Called patient backand left voicemail stating the time is now 4:14pm I was returning your call to call me back when you get the message

## 2019-03-07 NOTE — TELEPHONE ENCOUNTER
----- Message from Berlin Marino sent at 3/7/2019 11:03 AM CST -----  Contact: ALDO LIM [[id number]]  Name of Who is Calling: ALDO LIM [[id number]]       What is the request in detail: Pt returned a miss call from from staff. A call back is requested. Also, pt states that she wont be able to talk after 4pm due to work. Please advise. Thanks.         Can the clinic reply by [facility]: No       What Number to Call Back if not in [facility]: [phone]

## 2019-03-07 NOTE — TELEPHONE ENCOUNTER
----- Message from Princess Patrick MD sent at 3/7/2019 10:19 AM CST -----  Please delete the scheduled HIV RNA (viral load) lab tests.  Please call the lab to see if they can add the hepatitis C viral load to her earlier blood work.  If not, please ask the patient if she can come back for repeat blood draw.  I have also ordered hepatitis C viral load in 3 months and in 6 months; please lump this with her scheduled blood work in 3 and 6 months.  Thank you!

## 2019-03-11 ENCOUNTER — LAB VISIT (OUTPATIENT)
Dept: LAB | Facility: OTHER | Age: 24
End: 2019-03-11
Attending: INTERNAL MEDICINE
Payer: COMMERCIAL

## 2019-03-11 DIAGNOSIS — Z77.21 EXPOSURE TO BLOOD OR BODY FLUID: ICD-10-CM

## 2019-03-11 DIAGNOSIS — Z77.21 EXPOSURE TO BLOOD OR BODY FLUID: Primary | ICD-10-CM

## 2019-03-11 LAB
HCV AB SERPL QL IA: NEGATIVE
HIV UQ DATE RECEIVED: NORMAL
HIV UQ DATE REPORTED: NORMAL
HIV1 RNA # SERPL NAA+PROBE: <40 COPIES/ML
HIV1 RNA SERPL NAA+PROBE-LOG#: <1.6 LOG (10) COPIES/ML
HIV1 RNA SERPL QL NAA+PROBE: NOT DETECTED

## 2019-03-11 PROCEDURE — 36415 COLL VENOUS BLD VENIPUNCTURE: CPT

## 2019-03-11 PROCEDURE — 87522 HEPATITIS C REVRS TRNSCRPJ: CPT

## 2019-03-11 PROCEDURE — 86803 HEPATITIS C AB TEST: CPT

## 2019-03-13 LAB — HCV RNA SERPL NAA+PROBE-ACNC: NORMAL IU/ML

## 2019-03-16 ENCOUNTER — PATIENT MESSAGE (OUTPATIENT)
Dept: DERMATOLOGY | Facility: CLINIC | Age: 24
End: 2019-03-16

## 2019-03-19 ENCOUNTER — PATIENT MESSAGE (OUTPATIENT)
Dept: OBSTETRICS AND GYNECOLOGY | Facility: CLINIC | Age: 24
End: 2019-03-19

## 2019-03-20 ENCOUNTER — PATIENT MESSAGE (OUTPATIENT)
Dept: OBSTETRICS AND GYNECOLOGY | Facility: CLINIC | Age: 24
End: 2019-03-20

## 2019-03-20 RX ORDER — NORETHINDRONE ACETATE AND ETHINYL ESTRADIOL .02; 1 MG/1; MG/1
1 TABLET ORAL DAILY
Qty: 30 TABLET | Refills: 1 | Status: SHIPPED | OUTPATIENT
Start: 2019-03-20 | End: 2019-04-10 | Stop reason: SDUPTHER

## 2019-03-20 NOTE — TELEPHONE ENCOUNTER
Patient has her annual exam scheduled for April   She states her pharmacy says she need a refill sent in  Please advise

## 2019-03-21 ENCOUNTER — PATIENT MESSAGE (OUTPATIENT)
Dept: OBSTETRICS AND GYNECOLOGY | Facility: CLINIC | Age: 24
End: 2019-03-21

## 2019-03-27 ENCOUNTER — OFFICE VISIT (OUTPATIENT)
Dept: DERMATOLOGY | Facility: CLINIC | Age: 24
End: 2019-03-27
Payer: COMMERCIAL

## 2019-03-27 DIAGNOSIS — L91.0 KELOID: Primary | ICD-10-CM

## 2019-03-27 PROCEDURE — 99213 OFFICE O/P EST LOW 20 MIN: CPT | Mod: 25,S$GLB,, | Performed by: NURSE PRACTITIONER

## 2019-03-27 PROCEDURE — 99999 PR PBB SHADOW E&M-EST. PATIENT-LVL II: ICD-10-PCS | Mod: PBBFAC,,, | Performed by: NURSE PRACTITIONER

## 2019-03-27 PROCEDURE — 99213 PR OFFICE/OUTPT VISIT, EST, LEVL III, 20-29 MIN: ICD-10-PCS | Mod: 25,S$GLB,, | Performed by: NURSE PRACTITIONER

## 2019-03-27 PROCEDURE — 11900 PR INJECTION INTO SKIN LESIONS, UP TO 7: ICD-10-PCS | Mod: S$GLB,,, | Performed by: NURSE PRACTITIONER

## 2019-03-27 PROCEDURE — 11900 INJECT SKIN LESIONS </W 7: CPT | Mod: S$GLB,,, | Performed by: NURSE PRACTITIONER

## 2019-03-27 PROCEDURE — 99999 PR PBB SHADOW E&M-EST. PATIENT-LVL II: CPT | Mod: PBBFAC,,, | Performed by: NURSE PRACTITIONER

## 2019-03-27 NOTE — PROGRESS NOTES
Subjective:       Patient ID:  Erlinda Rain is a 23 y.o. female who presents for   Chief Complaint   Patient presents with    Keloid     right ear, [id number] , getting bigger , tender, no tx      Keloid  - Initial  Affected locations: right ear  Duration: 1 month  Signs / symptoms: growing and tender  Severity: mild to moderate  Timing: constant  Aggravated by: picking, pressure and friction  Relieving factors/Treatments tried: nothing  Improvement on treatment: no relief        Review of Systems   Constitutional: Negative for fever, chills and fatigue.   Skin: Positive for tendency to form keloidal scars.        Objective:    Physical Exam   Constitutional: She appears well-developed and well-nourished. No distress.   Neurological: She is alert and oriented to person, place, and time. She is not disoriented.   Psychiatric: She has a normal mood and affect.   Skin:   Areas Examined (abnormalities noted in diagram):   Head / Face Inspection Performed  Neck Inspection Performed              Diagram Legend     Erythematous scaling macule/papule c/w actinic keratosis       Vascular papule c/w angioma      Pigmented verrucoid papule/plaque c/w seborrheic keratosis      Yellow umbilicated papule c/w sebaceous hyperplasia      Irregularly shaped tan macule c/w lentigo     1-2 mm smooth white papules consistent with Milia      Movable subcutaneous cyst with punctum c/w epidermal inclusion cyst      Subcutaneous movable cyst c/w pilar cyst      Firm pink to brown papule c/w dermatofibroma      Pedunculated fleshy papule(s) c/w skin tag(s)      Evenly pigmented macule c/w junctional nevus     Mildly variegated pigmented, slightly irregular-bordered macule c/w mildly atypical nevus      Flesh colored to evenly pigmented papule c/w intradermal nevus       Pink pearly papule/plaque c/w basal cell carcinoma      Erythematous hyperkeratotic cursted plaque c/w SCC      Surgical scar with no sign of skin cancer recurrence      Open  and closed comedones      Inflammatory papules and pustules      Verrucoid papule consistent consistent with wart     Erythematous eczematous patches and plaques     Dystrophic onycholytic nail with subungual debris c/w onychomycosis     Umbilicated papule    Erythematous-base heme-crusted tan verrucoid plaque consistent with inflamed seborrheic keratosis     Erythematous Silvery Scaling Plaque c/w Psoriasis     See annotation      Assessment / Plan:        Keloid    Intralesional Kenalog 40mg/cc (0.3 cc total) injected into 1 lesions on the right posterior helix today after obtaining verbal consent including risk of surrounding hypopigmentation. Patient tolerated procedure well.    Units:1  NDC for Kenalog 40mg/cc:  8561-9023-51    Recommend to discontinue wearing earring before follow up visit.    RTC 1 month               Follow up in about 1 month (around 4/27/2019).

## 2019-04-10 ENCOUNTER — OFFICE VISIT (OUTPATIENT)
Dept: OBSTETRICS AND GYNECOLOGY | Facility: CLINIC | Age: 24
End: 2019-04-10
Payer: COMMERCIAL

## 2019-04-10 VITALS
WEIGHT: 175.94 LBS | DIASTOLIC BLOOD PRESSURE: 84 MMHG | BODY MASS INDEX: 32.37 KG/M2 | HEIGHT: 62 IN | SYSTOLIC BLOOD PRESSURE: 102 MMHG

## 2019-04-10 DIAGNOSIS — Z11.3 SCREENING EXAMINATION FOR STD (SEXUALLY TRANSMITTED DISEASE): ICD-10-CM

## 2019-04-10 DIAGNOSIS — Z30.41 ENCOUNTER FOR SURVEILLANCE OF CONTRACEPTIVE PILLS: ICD-10-CM

## 2019-04-10 DIAGNOSIS — Z01.419 ENCOUNTER FOR ANNUAL ROUTINE GYNECOLOGICAL EXAMINATION: Primary | ICD-10-CM

## 2019-04-10 PROCEDURE — 99395 PREV VISIT EST AGE 18-39: CPT | Mod: S$GLB,,, | Performed by: OBSTETRICS & GYNECOLOGY

## 2019-04-10 PROCEDURE — 99999 PR PBB SHADOW E&M-EST. PATIENT-LVL III: ICD-10-PCS | Mod: PBBFAC,,, | Performed by: OBSTETRICS & GYNECOLOGY

## 2019-04-10 PROCEDURE — 99395 PR PREVENTIVE VISIT,EST,18-39: ICD-10-PCS | Mod: S$GLB,,, | Performed by: OBSTETRICS & GYNECOLOGY

## 2019-04-10 PROCEDURE — 99999 PR PBB SHADOW E&M-EST. PATIENT-LVL III: CPT | Mod: PBBFAC,,, | Performed by: OBSTETRICS & GYNECOLOGY

## 2019-04-10 RX ORDER — NORETHINDRONE ACETATE AND ETHINYL ESTRADIOL .02; 1 MG/1; MG/1
1 TABLET ORAL DAILY
Qty: 90 TABLET | Refills: 3 | Status: SHIPPED | OUTPATIENT
Start: 2019-04-10 | End: 2019-09-24 | Stop reason: SDUPTHER

## 2019-04-10 NOTE — PROGRESS NOTES
Chief Complaint   Patient presents with    Well Woman       HISTORY OF PRESENT ILLNESS:   Erlinda Rain is a 23 y.o. female  who presents for well woman exam.  No LMP recorded..  She has complains of when she isn't on birth control she will not have a cycle but once a year. She isn't sexually active.  Cycles are  irregular. Had normal pelvic US in 2018 but was diagnosed with PCOS in the past. Declines STD testing. On OCPs for birth control.      Past Medical History:   Diagnosis Date    Fibromyalgia     Obesity (BMI 30.0-34.9)     PCOS (polycystic ovarian syndrome)     Vitamin D deficiency           Past Surgical History:   Procedure Laterality Date    none           Social History     Socioeconomic History    Marital status: Single     Spouse name: Not on file    Number of children: 0    Years of education: Not on file    Highest education level: Not on file   Occupational History    Occupation: Student- [employer]    Social Needs    Financial resource strain: Not on file    Food insecurity:     Worry: Not on file     Inability: Not on file    Transportation needs:     Medical: Not on file     Non-medical: Not on file   Tobacco Use    Smoking status: Never Smoker    Smokeless tobacco: Never Used   Substance and Sexual Activity    Alcohol use: No     Comment: socially    Drug use: No    Sexual activity: Never     Partners: Male     Birth control/protection: OCP   Lifestyle    Physical activity:     Days per week: Not on file     Minutes per session: Not on file    Stress: Not on file   Relationships    Social connections:     Talks on phone: Not on file     Gets together: Not on file     Attends Judaism service: Not on file     Active member of club or organization: Not on file     Attends meetings of clubs or organizations: Not on file     Relationship status: Not on file   Other Topics Concern    Are you pregnant or think you may be? Not Asked    Breast-feeding Not Asked   Social History  "Narrative    Full time college student, part time employed.       Family History   Problem Relation Age of Onset    Hyperlipidemia Mother     Hyperlipidemia Father     Heart attacks under age 50 Father     Diabetes type II Father     Hypothyroidism Sister     Eczema Neg Hx     Lupus Neg Hx     Psoriasis Neg Hx     Melanoma Neg Hx     Breast cancer Neg Hx     Ovarian cancer Neg Hx     Colon cancer Neg Hx          OB History    Para Term  AB Living   0 0 0 0 0 0   SAB TAB Ectopic Multiple Live Births   0 0 0 0           COMPREHENSIVE GYN HISTORY:  PAP History: Denies abnormal Paps, 216 and 2018 NILM   Infection History: Denies STDs. Denies PID.  Benign History:Denies uterine fibroids. Denies ovarian cysts. Denies endometriosis Denies other conditions.  Cancer History: Denies cervical cancer. Denies uterine cancer or hyperplasia. Denies ovarian cancer. Denies vulvar cancer or pre-cancer. Denies vaginal cancer or pre-cancer. Denies breast cancer. Denies colon cancer.  Cycle: 10/once a year/3-5 days (no polycystic ovaries on US)  Not sexually active    ROS:  GENERAL: Denies weight gain or weight loss. Feeling well overall.   SKIN: Denies rash or lesions.   HEAD: Denies headache.   NODES: Denies enlarged lymph nodes.   CHEST: Denies shortness of breath.   ABDOMEN: No abdominal pain, constipation, diarrhea, nausea, vomiting or rectal bleeding.   URINARY: No frequency, dysuria, hematuria, or burning on urination.  REPRODUCTIVE: See HPI.   BREASTS: The patient denies pain, lumps, or nipple discharge.       /84   Ht 5' 2" (1.575 m)   Wt 79.8 kg (175 lb 14.8 oz)   BMI 32.18 kg/m²     APPEARANCE: Well nourished, well developed, in no acute distress.  NECK: Neck symmetric without  thyromegaly.  NODES: No inguinal, cervical lymph node enlargement.  CHEST: Lungs clear to auscultation.  HEART: Regular rate and rhythm, no murmurs, rubs or gallops.  ABDOMEN: Soft. No tenderness or masses. No " hernias. No hepatosplenomegaly.  BREASTS: Symmetrical, no skin changes or visible lesions. No palpable masses, nipple discharge or adenopathy bilaterally.  PELVIC:   VULVA: No lesions. Normal female genitalia.  URETHRAL MEATUS: Normal size and location, no lesions, no prolapse.  URETHRA: No masses, tenderness, prolapse or scarring.  VAGINA: Moist and well rugated, no discharge, no significant cystocele or rectocele.  CERVIX: No lesions and discharge.  UTERUS: Normal size, regular shape, mobile, non-tender, bladder base nontender.  ADNEXA: No masses or tenderness.    4/2018 TVUS: normal     1. Encounter for annual routine gynecological examination    2. Screening examination for STD (sexually transmitted disease)    3. Encounter for surveillance of contraceptive pills        Plan:  Routine gyn s/p normal breast exam. Pap up to date, next needed 2021. STD testing: GC/CT/trich, syphilis, HBV/HCV and HIV declined. Counseled on contraception and desires to continue  OCPs.          F/u after US

## 2019-04-20 ENCOUNTER — PATIENT MESSAGE (OUTPATIENT)
Dept: RHEUMATOLOGY | Facility: CLINIC | Age: 24
End: 2019-04-20

## 2019-06-15 DIAGNOSIS — M79.7 FIBROMYALGIA: ICD-10-CM

## 2019-06-16 RX ORDER — DULOXETIN HYDROCHLORIDE 60 MG/1
CAPSULE, DELAYED RELEASE ORAL
Qty: 90 CAPSULE | Refills: 0 | Status: SHIPPED | OUTPATIENT
Start: 2019-06-16 | End: 2019-09-17 | Stop reason: SDUPTHER

## 2019-09-17 DIAGNOSIS — M79.7 FIBROMYALGIA: ICD-10-CM

## 2019-09-17 RX ORDER — DULOXETIN HYDROCHLORIDE 60 MG/1
CAPSULE, DELAYED RELEASE ORAL
Qty: 90 CAPSULE | Refills: 3 | Status: SHIPPED | OUTPATIENT
Start: 2019-09-17 | End: 2019-10-01 | Stop reason: SDUPTHER

## 2019-09-24 RX ORDER — NORETHINDRONE ACETATE AND ETHINYL ESTRADIOL .02; 1 MG/1; MG/1
1 TABLET ORAL DAILY
Qty: 84 TABLET | Refills: 2 | Status: SHIPPED | OUTPATIENT
Start: 2019-09-24 | End: 2020-06-08 | Stop reason: SDUPTHER

## 2019-10-01 DIAGNOSIS — M79.7 FIBROMYALGIA: ICD-10-CM

## 2019-10-01 RX ORDER — DULOXETIN HYDROCHLORIDE 60 MG/1
60 CAPSULE, DELAYED RELEASE ORAL DAILY
Qty: 90 CAPSULE | Refills: 3 | Status: SHIPPED | OUTPATIENT
Start: 2019-10-01 | End: 2020-08-20 | Stop reason: SDUPTHER

## 2019-10-03 ENCOUNTER — OFFICE VISIT (OUTPATIENT)
Dept: URGENT CARE | Facility: CLINIC | Age: 24
End: 2019-10-03
Payer: COMMERCIAL

## 2019-10-03 VITALS
BODY MASS INDEX: 32.46 KG/M2 | DIASTOLIC BLOOD PRESSURE: 88 MMHG | TEMPERATURE: 99 F | SYSTOLIC BLOOD PRESSURE: 118 MMHG | HEIGHT: 62 IN | OXYGEN SATURATION: 100 % | RESPIRATION RATE: 20 BRPM | WEIGHT: 176.38 LBS | HEART RATE: 86 BPM

## 2019-10-03 DIAGNOSIS — J02.9 SORE THROAT: ICD-10-CM

## 2019-10-03 DIAGNOSIS — R05.9 COUGH: ICD-10-CM

## 2019-10-03 DIAGNOSIS — J32.9 SINUSITIS, UNSPECIFIED CHRONICITY, UNSPECIFIED LOCATION: Primary | ICD-10-CM

## 2019-10-03 LAB
CTP QC/QA: YES
S PYO RRNA THROAT QL PROBE: NEGATIVE

## 2019-10-03 PROCEDURE — 87880 POCT RAPID STREP A: ICD-10-PCS | Mod: QW,S$GLB,, | Performed by: PHYSICIAN ASSISTANT

## 2019-10-03 PROCEDURE — 87880 STREP A ASSAY W/OPTIC: CPT | Mod: QW,S$GLB,, | Performed by: PHYSICIAN ASSISTANT

## 2019-10-03 PROCEDURE — 99214 PR OFFICE/OUTPT VISIT, EST, LEVL IV, 30-39 MIN: ICD-10-PCS | Mod: 25,S$GLB,, | Performed by: PHYSICIAN ASSISTANT

## 2019-10-03 PROCEDURE — 99214 OFFICE O/P EST MOD 30 MIN: CPT | Mod: 25,S$GLB,, | Performed by: PHYSICIAN ASSISTANT

## 2019-10-03 PROCEDURE — 3008F PR BODY MASS INDEX (BMI) DOCUMENTED: ICD-10-PCS | Mod: CPTII,S$GLB,, | Performed by: PHYSICIAN ASSISTANT

## 2019-10-03 PROCEDURE — 3008F BODY MASS INDEX DOCD: CPT | Mod: CPTII,S$GLB,, | Performed by: PHYSICIAN ASSISTANT

## 2019-10-03 RX ORDER — PREDNISONE 20 MG/1
TABLET ORAL
Qty: 6 TABLET | Refills: 0 | Status: SHIPPED | OUTPATIENT
Start: 2019-10-03 | End: 2019-10-07

## 2019-10-03 RX ORDER — PROMETHAZINE HYDROCHLORIDE AND DEXTROMETHORPHAN HYDROBROMIDE 6.25; 15 MG/5ML; MG/5ML
5 SYRUP ORAL NIGHTLY PRN
Qty: 118 ML | Refills: 0 | Status: SHIPPED | OUTPATIENT
Start: 2019-10-03 | End: 2019-10-13

## 2019-10-03 RX ORDER — AMOXICILLIN AND CLAVULANATE POTASSIUM 875; 125 MG/1; MG/1
1 TABLET, FILM COATED ORAL 2 TIMES DAILY
Qty: 14 TABLET | Refills: 0 | Status: SHIPPED | OUTPATIENT
Start: 2019-10-03 | End: 2019-10-10

## 2019-10-03 RX ORDER — BENZONATATE 200 MG/1
200 CAPSULE ORAL 3 TIMES DAILY PRN
Qty: 30 CAPSULE | Refills: 0 | Status: SHIPPED | OUTPATIENT
Start: 2019-10-03 | End: 2019-10-13

## 2019-10-03 NOTE — PROGRESS NOTES
"Subjective:       Patient ID: Erlinda Rain is a 24 y.o. female.    Vitals:  height is 5' 2" (1.575 m) and weight is 80 kg (176 lb 5.9 oz). Her temperature is 98.7 °F (37.1 °C). Her blood pressure is 118/88 and her pulse is 86. Her respiration is 20 and oxygen saturation is 100%.     Chief Complaint: Sore Throat    Patient has had sore throat, sinus congestion, sinus pressure, cough and fatigue for over 1 week.    Sore Throat    This is a new problem. The current episode started in the past 7 days. The problem has been unchanged. Neither side of throat is experiencing more pain than the other. There has been no fever. The pain is at a severity of 5/10. The pain is moderate. Associated symptoms include congestion and coughing. Pertinent negatives include no diarrhea, ear pain, headaches, neck pain, shortness of breath, stridor or vomiting. 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 The treatment provided no relief.       Constitution: Negative for chills, sweating, fatigue and fever.   HENT: Positive for congestion, postnasal drip, sinus pressure and sore throat. Negative for ear pain.    Neck: Negative for neck pain, neck stiffness and painful lymph nodes.   Cardiovascular: Negative for chest pain, leg swelling, palpitations and sob on exertion.   Eyes: Negative for double vision and blurred vision.   Respiratory: Positive for cough. Negative for sputum production, bloody sputum, COPD, shortness of breath, stridor and wheezing.    Gastrointestinal: Negative for nausea, vomiting and diarrhea.   Genitourinary: Negative for dysuria, frequency, urgency and history of kidney stones.   Musculoskeletal: Negative for joint pain, joint swelling, muscle cramps and muscle ache.   Skin: Negative for color change, pale, rash and bruising.   Allergic/Immunologic: Negative for seasonal allergies.   Neurological: Negative for dizziness, history of vertigo, light-headedness, passing out and headaches.   Hematologic/Lymphatic: Negative for swollen lymph " nodes.   Psychiatric/Behavioral: Negative for nervous/anxious, sleep disturbance and depression. The patient is not nervous/anxious.        Objective:      Physical Exam   Constitutional: She is oriented to person, place, and time. She appears well-developed and well-nourished. She is cooperative.  Non-toxic appearance. She does not have a sickly appearance. She does not appear ill. No distress.   HENT:   Head: Normocephalic and atraumatic.   Right Ear: Hearing, tympanic membrane, external ear and ear canal normal.   Left Ear: Hearing, tympanic membrane, external ear and ear canal normal.   Nose: Mucosal edema present. No rhinorrhea or nasal deformity. No epistaxis. Right sinus exhibits maxillary sinus tenderness. Right sinus exhibits no frontal sinus tenderness. Left sinus exhibits maxillary sinus tenderness. Left sinus exhibits no frontal sinus tenderness.   Mouth/Throat: Uvula is midline, oropharynx is clear and moist and mucous membranes are normal. No trismus in the jaw. Normal dentition. No uvula swelling. No oropharyngeal exudate, posterior oropharyngeal edema, posterior oropharyngeal erythema or tonsillar abscesses. Tonsils are 2+ on the right. Tonsils are 2+ on the left. No tonsillar exudate.   Postnasal drainage appreciated posterior pharynx.   Eyes: Conjunctivae and lids are normal. No scleral icterus.   Sclera clear bilat   Neck: Trachea normal, full passive range of motion without pain and phonation normal. Neck supple.   Cardiovascular: Normal rate, regular rhythm, normal heart sounds, intact distal pulses and normal pulses.   Pulmonary/Chest: Effort normal and breath sounds normal. No accessory muscle usage or stridor. No tachypnea and no bradypnea. No respiratory distress. She has no decreased breath sounds. She has no wheezes. She has no rhonchi. She has no rales.   Abdominal: Soft. Normal appearance and bowel sounds are normal. She exhibits no distension. There is no tenderness.   Musculoskeletal:  Normal range of motion. She exhibits no edema or deformity.   Lymphadenopathy:        Head (right side): No submandibular, no preauricular and no posterior auricular adenopathy present.        Head (left side): No submandibular, no preauricular and no posterior auricular adenopathy present.     She has no cervical adenopathy.   Neurological: She is alert and oriented to person, place, and time. She exhibits normal muscle tone. Coordination normal.   Skin: Skin is warm, dry and intact. She is not diaphoretic. No pallor.   Psychiatric: She has a normal mood and affect. Her speech is normal and behavior is normal. Judgment and thought content normal. Cognition and memory are normal.   Nursing note and vitals reviewed.        Results for orders placed or performed in visit on 10/03/19   POCT rapid strep A   Result Value Ref Range    Rapid Strep A Screen Negative Negative     Acceptable Yes        Assessment:       1. Sinusitis, unspecified chronicity, unspecified location    2. Sore throat    3. Cough        Plan:         Sinusitis, unspecified chronicity, unspecified location  -     amoxicillin-clavulanate 875-125mg (AUGMENTIN) 875-125 mg per tablet; Take 1 tablet by mouth 2 (two) times daily. for 7 days  Dispense: 14 tablet; Refill: 0  -     predniSONE (DELTASONE) 20 MG tablet; Take 2 tablets (40 mg total) by mouth once daily for 2 days, THEN 1 tablet (20 mg total) once daily for 2 days.  Dispense: 6 tablet; Refill: 0    Sore throat  -     POCT rapid strep A    Cough  -     benzonatate (TESSALON) 200 MG capsule; Take 1 capsule (200 mg total) by mouth 3 (three) times daily as needed for Cough.  Dispense: 30 capsule; Refill: 0  -     promethazine-dextromethorphan (PROMETHAZINE-DM) 6.25-15 mg/5 mL Syrp; Take 5 mLs by mouth nightly as needed.  Dispense: 118 mL; Refill: 0  -     predniSONE (DELTASONE) 20 MG tablet; Take 2 tablets (40 mg total) by mouth once daily for 2 days, THEN 1 tablet (20 mg total) once  daily for 2 days.  Dispense: 6 tablet; Refill: 0      Patient Instructions     - Rest.    - Drink plenty of fluids.      - Viral upper respiratory infections typically run their course in 10-14 days.     - Tylenol or Ibuprofen as directed as needed for fever/pain. Avoid tylenol if you have a history of liver disease. Do not take ibuprofen if you have a history of GI bleeding, kidney disease, or if you take blood thinners.     - You can take over-the-counter claritin, zyrtec, allegra, or xyzal as directed. These are antihistamines that can help with runny nose, nasal congestion, sneezing, and helps to dry up post-nasal drip, which usually causes sore throat and cough.   - If you do NOT have high blood pressure, you may use a decongestant form (D)  of this medication or if you do not take the D form, you can take sudafed  (pseudoephedrine) over the counter, which is a decongestant.    - You can use Flonase (fluticasone) nasal spray as directed for sinus congestion and postnasal drip. This is a steroid nasal spray that works locally over time to decrease the inflammation in your nose/sinuses and help with allergic symptoms. This is not an quick- relief spray like afrin, but it works well if used daily.  Discontinue if you develop nose bleed  - use nasal saline prior to Flonase.    - Use Ocean Spray Nasal Saline 1-3 puffs each nostril every 2-3 hours then blow out onto tissue. This is to irrigate the nasal passage way to clear the sinus openings. Use until sinus problem resolved.    - Take the tessalon (benzonatate) as needed as prescribed for cough   - Only take the promethazine- DM as directed, as needed at night for cough. This medication may cause drowsiness.     - you can take plain Mucinex (guaifenesin) 1200 mg twice a day to help loosen mucous    -warm salt water gargles can help with sore throat    - warm tea with honey can help with cough. Honey is a natural cough suppressant.    - You received a steroid  (prednisone) today.  This can elevate your blood pressure, elevate your blood sugar, water weight gain, nervous energy, redness to the face and dimpling of the skin where the shot goes in.   - Do not use steroids more than 3 times per year.   - If you have diabetes, please check you blood sugar frequently.  - If you have high blood pressure, please check your blood pressure frequently.       - You have been given an antibiotic to treat your condition today.    - Please complete the antibiotic as directed on the bottle.   - If you are female and on oral birth control pills, use additional methods to prevent pregnancy while on antibiotics and for one cycle after.   - you can take over-the-counter probiotics during and after antibiotic use to help preserve gut brendon and reduce gastrointestinal symptoms  - You were given a prescription for antibiotic, but do not start taking it yet.  Wait 2-3 days to see if your symptoms improve without it.  If they don't or you get significantly worse, then start the antibiotics.    - Follow up with your PCP or specialty clinic as directed in the next 1-2 weeks if not improved or as needed.  You can call [phone] to schedule an appointment with the appropriate provider.      - Go to the ER if you develop new or worsening symptoms.     - You must understand that you have received an Urgent Care treatment only and that you may be released before all of your medical problems are known or treated.   - You, the patient, will arrange for follow up care as instructed.   - If your condition worsens or fails to improve we recommend that you receive another evaluation at the ER immediately or contact your PCP to discuss your concerns or return here.         Self-Care for Sore Throats    Sore throats happen for many reasons, such as colds, allergies, and infections caused by viruses or bacteria. In any case, your throat becomes red and sore. Your goal for self-care is to reduce your  discomfort while giving your throat a chance to heal.  Moisten and soothe your throat  Tips include the following:  · Try a sip of water first thing after waking up.  · Keep your throat moist by drinking 6 or more glasses of clear liquids every day.  · Run a cool-air humidifier in your room overnight.  · Avoid cigarette smoke.   · Suck on throat lozenges, cough drops, hard candy, ice chips, or frozen fruit-juice bars. Use the sugar-free versions if your diet or medical condition requires them.  Gargle to ease irritation  Gargling every hour or 2 can ease irritation. Try gargling with 1 of these solutions:  · 1/4 teaspoon of salt in 1/2 cup of warm water  · An over-the-counter anesthetic gargle  Use medicine for more relief  Over-the-counter medicine can reduce sore throat symptoms. Ask your pharmacist if you have questions about which medicine to use:  · Ease pain with anesthetic sprays. Aspirin or an aspirin substitute also helps. Remember, never give aspirin to anyone 18 or younger, or if you are already taking blood thinners.   · For sore throats caused by allergies, try antihistamines to block the allergic reaction.  · Remember: unless a sore throat is caused by a bacterial infection, antibiotics wont help you.  Prevent future sore throats  Prevention tips include the following:  · Stop smoking or reduce contact with secondhand smoke. Smoke irritates the tender throat lining.  · Limit contact with pets and with allergy-causing substances, such as pollen and mold.  · When youre around someone with a sore throat or cold, wash your hands often to keep viruses or bacteria from spreading.  · Dont strain your vocal cords.  Call your healthcare provider  Contact your healthcare provider if you have:  · A temperature over 101°F (38.3°C)  · White spots on the throat  · Great difficulty swallowing  · Trouble breathing  · A skin rash  · Recent exposure to someone else with strep bacteria  · Severe hoarseness and swollen  glands in the neck or jaw   Date Last Reviewed: 8/1/2016  © 2818-9613 [employer]. [address] [place] [address], [place] [address]. All rights reserved. This information is not intended as a substitute for professional medical care. Always follow your healthcare professional's instructions.

## 2019-10-03 NOTE — PROGRESS NOTES
"Subjective:       Patient ID: Erlinda Rain is a 24 y.o. female.    Vitals:  height is 5' 2" (1.575 m) and weight is 80 kg (176 lb 5.9 oz). Her temperature is 98.7 °F (37.1 °C). Her blood pressure is 118/88 and her pulse is 86. Her respiration is 20 and oxygen saturation is 100%.     Chief Complaint: Sore Throat    HPI  <OUCOOHADULT>    Objective:      Physical Exam    Assessment:       No diagnosis found.    Plan:         There are no diagnoses linked to this encounter.     "

## 2019-10-03 NOTE — PATIENT INSTRUCTIONS
- Rest.    - Drink plenty of fluids.      - Viral upper respiratory infections typically run their course in 10-14 days.     - Tylenol or Ibuprofen as directed as needed for fever/pain. Avoid tylenol if you have a history of liver disease. Do not take ibuprofen if you have a history of GI bleeding, kidney disease, or if you take blood thinners.     - You can take over-the-counter claritin, zyrtec, allegra, or xyzal as directed. These are antihistamines that can help with runny nose, nasal congestion, sneezing, and helps to dry up post-nasal drip, which usually causes sore throat and cough.   - If you do NOT have high blood pressure, you may use a decongestant form (D)  of this medication or if you do not take the D form, you can take sudafed  (pseudoephedrine) over the counter, which is a decongestant.    - You can use Flonase (fluticasone) nasal spray as directed for sinus congestion and postnasal drip. This is a steroid nasal spray that works locally over time to decrease the inflammation in your nose/sinuses and help with allergic symptoms. This is not an quick- relief spray like afrin, but it works well if used daily.  Discontinue if you develop nose bleed  - use nasal saline prior to Flonase.    - Use Ocean Spray Nasal Saline 1-3 puffs each nostril every 2-3 hours then blow out onto tissue. This is to irrigate the nasal passage way to clear the sinus openings. Use until sinus problem resolved.    - Take the tessalon (benzonatate) as needed as prescribed for cough   - Only take the promethazine- DM as directed, as needed at night for cough. This medication may cause drowsiness.     - you can take plain Mucinex (guaifenesin) 1200 mg twice a day to help loosen mucous    -warm salt water gargles can help with sore throat    - warm tea with honey can help with cough. Honey is a natural cough suppressant.    - You received a steroid (prednisone) today.  This can elevate your blood pressure, elevate your blood sugar,  water weight gain, nervous energy, redness to the face and dimpling of the skin where the shot goes in.   - Do not use steroids more than 3 times per year.   - If you have diabetes, please check you blood sugar frequently.  - If you have high blood pressure, please check your blood pressure frequently.       - You have been given an antibiotic to treat your condition today.    - Please complete the antibiotic as directed on the bottle.   - If you are female and on oral birth control pills, use additional methods to prevent pregnancy while on antibiotics and for one cycle after.   - you can take over-the-counter probiotics during and after antibiotic use to help preserve gut brendon and reduce gastrointestinal symptoms  - You were given a prescription for antibiotic, but do not start taking it yet.  Wait 2-3 days to see if your symptoms improve without it.  If they don't or you get significantly worse, then start the antibiotics.    - Follow up with your PCP or specialty clinic as directed in the next 1-2 weeks if not improved or as needed.  You can call [phone] to schedule an appointment with the appropriate provider.      - Go to the ER if you develop new or worsening symptoms.     - You must understand that you have received an Urgent Care treatment only and that you may be released before all of your medical problems are known or treated.   - You, the patient, will arrange for follow up care as instructed.   - If your condition worsens or fails to improve we recommend that you receive another evaluation at the ER immediately or contact your PCP to discuss your concerns or return here.         Self-Care for Sore Throats    Sore throats happen for many reasons, such as colds, allergies, and infections caused by viruses or bacteria. In any case, your throat becomes red and sore. Your goal for self-care is to reduce your discomfort while giving your throat a chance to heal.  Moisten and soothe your throat  Tips  include the following:  · Try a sip of water first thing after waking up.  · Keep your throat moist by drinking 6 or more glasses of clear liquids every day.  · Run a cool-air humidifier in your room overnight.  · Avoid cigarette smoke.   · Suck on throat lozenges, cough drops, hard candy, ice chips, or frozen fruit-juice bars. Use the sugar-free versions if your diet or medical condition requires them.  Gargle to ease irritation  Gargling every hour or 2 can ease irritation. Try gargling with 1 of these solutions:  · 1/4 teaspoon of salt in 1/2 cup of warm water  · An over-the-counter anesthetic gargle  Use medicine for more relief  Over-the-counter medicine can reduce sore throat symptoms. Ask your pharmacist if you have questions about which medicine to use:  · Ease pain with anesthetic sprays. Aspirin or an aspirin substitute also helps. Remember, never give aspirin to anyone 18 or younger, or if you are already taking blood thinners.   · For sore throats caused by allergies, try antihistamines to block the allergic reaction.  · Remember: unless a sore throat is caused by a bacterial infection, antibiotics wont help you.  Prevent future sore throats  Prevention tips include the following:  · Stop smoking or reduce contact with secondhand smoke. Smoke irritates the tender throat lining.  · Limit contact with pets and with allergy-causing substances, such as pollen and mold.  · When youre around someone with a sore throat or cold, wash your hands often to keep viruses or bacteria from spreading.  · Dont strain your vocal cords.  Call your healthcare provider  Contact your healthcare provider if you have:  · A temperature over 101°F (38.3°C)  · White spots on the throat  · Great difficulty swallowing  · Trouble breathing  · A skin rash  · Recent exposure to someone else with strep bacteria  · Severe hoarseness and swollen glands in the neck or jaw   Date Last Reviewed: 8/1/2016  © 7835-9736 [employer] [address] [place] [address]. All rights reserved. This information is not intended as a substitute for professional medical care. Always follow your healthcare professional's instructions.

## 2019-11-14 ENCOUNTER — OFFICE VISIT (OUTPATIENT)
Dept: URGENT CARE | Facility: CLINIC | Age: 24
End: 2019-11-14
Payer: COMMERCIAL

## 2019-11-14 VITALS
OXYGEN SATURATION: 99 % | RESPIRATION RATE: 20 BRPM | TEMPERATURE: 100 F | WEIGHT: 176 LBS | HEART RATE: 135 BPM | BODY MASS INDEX: 32.39 KG/M2 | DIASTOLIC BLOOD PRESSURE: 96 MMHG | SYSTOLIC BLOOD PRESSURE: 131 MMHG | HEIGHT: 62 IN

## 2019-11-14 DIAGNOSIS — H66.91 RIGHT OTITIS MEDIA, UNSPECIFIED OTITIS MEDIA TYPE: ICD-10-CM

## 2019-11-14 DIAGNOSIS — R55 SYNCOPE AND COLLAPSE: ICD-10-CM

## 2019-11-14 DIAGNOSIS — J06.9 VIRAL UPPER RESPIRATORY TRACT INFECTION WITH COUGH: Primary | ICD-10-CM

## 2019-11-14 LAB
CTP QC/QA: YES
CTP QC/QA: YES
FLUAV AG NPH QL: NEGATIVE
FLUBV AG NPH QL: NEGATIVE
GLUCOSE SERPL-MCNC: 123 MG/DL (ref 70–110)
POC ANION GAP: 18 MMOL/L (ref 10–20)
POC BUN: 9 MMOL/L (ref 8–26)
POC CHLORIDE: 100 MMOL/L (ref 98–109)
POC CREATININE: 0.9 MG/DL (ref 0.6–1.3)
POC HEMATOCRIT: 37 %PCV (ref 37–47)
POC HEMOGLOBIN: 12.6 G/DL (ref 12.5–16)
POC ICA: 1.2 MMOL/L (ref 1.12–1.32)
POC POTASSIUM: 3.5 MMOL/L (ref 3.5–4.9)
POC SODIUM: 139 MMOL/L (ref 138–146)
POC TCO2: 25 MMOL/L (ref 24–29)
S PYO RRNA THROAT QL PROBE: NEGATIVE

## 2019-11-14 PROCEDURE — 80047 BASIC METABLC PNL IONIZED CA: CPT | Mod: QW,S$GLB,, | Performed by: PHYSICIAN ASSISTANT

## 2019-11-14 PROCEDURE — 87804 INFLUENZA ASSAY W/OPTIC: CPT | Mod: QW,S$GLB,, | Performed by: PHYSICIAN ASSISTANT

## 2019-11-14 PROCEDURE — 87880 POCT RAPID STREP A: ICD-10-PCS | Mod: QW,S$GLB,, | Performed by: PHYSICIAN ASSISTANT

## 2019-11-14 PROCEDURE — 99214 PR OFFICE/OUTPT VISIT, EST, LEVL IV, 30-39 MIN: ICD-10-PCS | Mod: S$GLB,,, | Performed by: PHYSICIAN ASSISTANT

## 2019-11-14 PROCEDURE — 99214 OFFICE O/P EST MOD 30 MIN: CPT | Mod: S$GLB,,, | Performed by: PHYSICIAN ASSISTANT

## 2019-11-14 PROCEDURE — 93010 EKG 12-LEAD: ICD-10-PCS | Mod: S$GLB,,, | Performed by: INTERNAL MEDICINE

## 2019-11-14 PROCEDURE — 3008F BODY MASS INDEX DOCD: CPT | Mod: CPTII,S$GLB,, | Performed by: PHYSICIAN ASSISTANT

## 2019-11-14 PROCEDURE — 93010 ELECTROCARDIOGRAM REPORT: CPT | Mod: S$GLB,,, | Performed by: INTERNAL MEDICINE

## 2019-11-14 PROCEDURE — 3008F PR BODY MASS INDEX (BMI) DOCUMENTED: ICD-10-PCS | Mod: CPTII,S$GLB,, | Performed by: PHYSICIAN ASSISTANT

## 2019-11-14 PROCEDURE — 87804 POCT INFLUENZA A/B: ICD-10-PCS | Mod: QW,S$GLB,, | Performed by: PHYSICIAN ASSISTANT

## 2019-11-14 PROCEDURE — 93005 EKG 12-LEAD: ICD-10-PCS | Mod: S$GLB,,, | Performed by: PHYSICIAN ASSISTANT

## 2019-11-14 PROCEDURE — 93005 ELECTROCARDIOGRAM TRACING: CPT | Mod: S$GLB,,, | Performed by: PHYSICIAN ASSISTANT

## 2019-11-14 PROCEDURE — 80047 POCT CHEMISTRY PANEL: ICD-10-PCS | Mod: QW,S$GLB,, | Performed by: PHYSICIAN ASSISTANT

## 2019-11-14 PROCEDURE — 87880 STREP A ASSAY W/OPTIC: CPT | Mod: QW,S$GLB,, | Performed by: PHYSICIAN ASSISTANT

## 2019-11-14 RX ORDER — BENZONATATE 200 MG/1
200 CAPSULE ORAL 3 TIMES DAILY PRN
Qty: 30 CAPSULE | Refills: 0 | Status: SHIPPED | OUTPATIENT
Start: 2019-11-14 | End: 2019-11-24

## 2019-11-14 RX ORDER — AMOXICILLIN 875 MG/1
875 TABLET, FILM COATED ORAL 2 TIMES DAILY
Qty: 14 TABLET | Refills: 0 | Status: SHIPPED | OUTPATIENT
Start: 2019-11-14 | End: 2019-11-21

## 2019-11-14 RX ORDER — PREDNISONE 20 MG/1
TABLET ORAL
Qty: 6 TABLET | Refills: 0 | Status: SHIPPED | OUTPATIENT
Start: 2019-11-14 | End: 2019-11-18

## 2019-11-14 NOTE — PATIENT INSTRUCTIONS
- Rest.    - Drink plenty of fluids.      - Viral upper respiratory infections typically run their course in 10-14 days.     - Tylenol or Ibuprofen as directed as needed for fever/pain. Avoid tylenol if you have a history of liver disease. Do not take ibuprofen if you have a history of GI bleeding, kidney disease, or if you take blood thinners.     - You can take over-the-counter claritin, zyrtec, allegra, or xyzal as directed. These are antihistamines that can help with runny nose, nasal congestion, sneezing, and helps to dry up post-nasal drip, which usually causes sore throat and cough.   - If you do NOT have high blood pressure, you may use a decongestant form (D)  of this medication or if you do not take the D form, you can take sudafed  (pseudoephedrine) over the counter, which is a decongestant.    - You can use Flonase (fluticasone) nasal spray as directed for sinus congestion and postnasal drip. This is a steroid nasal spray that works locally over time to decrease the inflammation in your nose/sinuses and help with allergic symptoms. This is not an quick- relief spray like afrin, but it works well if used daily.  Discontinue if you develop nose bleed  - use nasal saline prior to Flonase.    - Use Ocean Spray Nasal Saline 1-3 puffs each nostril every 2-3 hours then blow out onto tissue. This is to irrigate the nasal passage way to clear the sinus openings. Use until sinus problem resolved.    - You have been given an antibiotic to treat your condition today.    - Please complete the antibiotic as directed on the bottle.   - If you are female and on oral birth control pills, use additional methods to prevent pregnancy while on antibiotics and for one cycle after.   - you can take over-the-counter probiotics during and after antibiotic use to help preserve gut brendon and reduce gastrointestinal symptoms  .- You were given a prescription for antibiotic, but do not start taking it yet.  Wait 2-3 days to see if  your symptoms improve without it.  If they don't or you get significantly worse, then start the antibiotics.      - you can take plain Mucinex (guaifenesin)  twice a day to help loosen mucous    - Take the tessalon (benzonatate) as needed as prescribed for cough   - Only take the promethazine- DM as directed, as needed at night for cough. This medication may cause drowsiness.     -warm salt water gargles can help with sore throat    - warm tea with honey can help with cough. Honey is a natural cough suppressant.    - You received a steroid (prednisone) today.  This can elevate your blood pressure, elevate your blood sugar, water weight gain, nervous energy, redness to the face and dimpling of the skin where the shot goes in.   - Do not use steroids more than 3 times per year.   - If you have diabetes, please check you blood sugar frequently.  - If you have high blood pressure, please check your blood pressure frequently.     - Follow up with your PCP or specialty clinic as directed in the next 1-2 weeks if not improved or as needed.  You can call [phone] to schedule an appointment with the appropriate provider.      - Go to the ER if you develop new or worsening symptoms.     - You must understand that you have received an Urgent Care treatment only and that you may be released before all of your medical problems are known or treated.   - You, the patient, will arrange for follow up care as instructed.   - If your condition worsens or fails to improve we recommend that you receive another evaluation at the ER immediately or contact your PCP to discuss your concerns or return here.           Viral Upper Respiratory Illness (Adult)  You have a viral upper respiratory illness (URI), which is another term for the common cold. This illness is contagious during the first few days. It is spread through the air by coughing and sneezing. It may also be spread by direct contact (touching the sick person and then  touching your own eyes, nose, or mouth). Frequent handwashing will decrease risk of spread. Most viral illnesses go away within 7 to 10 days with rest and simple home remedies. Sometimes the illness may last for several weeks. Antibiotics will not kill a virus, and they are generally not prescribed for this condition.    Home care  · If symptoms are severe, rest at home for the first 2 to 3 days. When you resume activity, don't let yourself get too tired.  · Avoid being exposed to cigarette smoke (yours or others).  · You may use acetaminophen or ibuprofen to control pain and fever, unless another medicine was prescribed. (Note: If you have chronic liver or kidney disease, have ever had a stomach ulcer or gastrointestinal bleeding, or are taking blood-thinning medicines, talk with your healthcare provider before using these medicines.) Aspirin should never be given to anyone under 18 years of age who is ill with a viral infection or fever. It may cause severe liver or brain damage.  · Your appetite may be poor, so a light diet is fine. Avoid dehydration by drinking 6 to 8 glasses of fluids per day (water, soft drinks, juices, tea, or soup). Extra fluids will help loosen secretions in the nose and lungs.  · Over-the-counter cold medicines will not shorten the length of time youre sick, but they may be helpful for the following symptoms: cough, sore throat, and nasal and sinus congestion. (Note: Do not use decongestants if you have high blood pressure.)  Follow-up care  Follow up with your healthcare provider, or as advised.  When to seek medical advice  Call your healthcare provider right away if any of these occur:  · Cough with lots of colored sputum (mucus)  · Severe headache; face, neck, or ear pain  · Difficulty swallowing due to throat pain  · Fever of 100.4°F (38°C)  Call 911, or get immediate medical care  Call emergency services right away if any of these occur:  · Chest pain, shortness of breath,  wheezing, or difficulty breathing  · Coughing up blood  · Inability to swallow due to throat pain  Date Last Reviewed: 9/13/2015 © 2000-2017 [employer]. [address] [place] [address], [place] [address]. All rights reserved. This information is not intended as a substitute for professional medical care. Always follow your healthcare professional's instructions.        Causes of Syncope  Syncope (fainting) has many causes. Sometimes it is not serious. In other cases, syncope is a sign of a heart problem. But treatment can help    When syncope is not serious  Your healthcare provider may call your problem vasovagal syncope, reflex syncope, or orthostatic hypotension. These types of syncope are generally not serious. They can be caused by:  · Strong feelings, such as anxiety or fear. A nerve signal may briefly change your heart rate and lower your blood pressure too much.  · Standing for too long. Standing may cause blood to pool in your legs. When this happens, your brain may not receive all the blood it needs.  · Standing up too quickly. Your blood pressure may not adjust fast enough to changes in posture and may drop too low. Certain medicines can also cause this problem. Examples of medicines that can cause a drop in blood pressure include diuretics, blood pressure medicines, and medicines for chest pain. Your pharmacist or healthcare provider can discuss these with you.  · Reaction to normal body functions. When you go to the bathroom, have gastrointestinal discomfort, nausea, or pain, your heart may have a natural reflex to slow down and lower blood pressure. This can result in syncope. This may also follow exercise, eating, laughter, weight lifting, or playing musical instruments like the trumpet or trombone.  When heart trouble causes syncope  A heart problem can decrease the amount of oxygen-rich blood that reaches the brain. Heart trouble can be serious and even life threatening if not treated:  · A  slow heart rate. Electrical signals tell the chambers of the heart when to pump. But the signals may be slowed or blocked (heart block) as they travel on the hearts electrical pathways. This can be caused by aging, scarred heart tissue, or damage from heart disease. When the heart rate slows, not enough blood is pumped.  · A fast heart rate. Certain problems can make the heart race. For instance, after a heart attack, also known as acute myocardial infarction, or AMI, abnormal electrical signals may be created. These signals can make the heart suddenly beat very fast. The heart pumps before the chambers can fill with blood. So less blood reaches the brain and other parts of the body. Illegal drugs, certain medicines, heart disease, or an inherited condition can also cause this.  · A heart valve problem. Blood travels through the chambers of the heart as it is pumped. Heart valves open and close to help move blood in the right direction. But a valve may not open or close fully, if its hardened or scarred. As a result, less blood is pumped through the heart to the brain and body. Most often, syncope occurs when a person's aortic valve is critically narrowed and he or she participates in  a strenuous activity.  · A heart muscle problem. Some people develop a thickened heart muscle that blocks blood flow out of the heart to the body. This is called hypertrophic cardiomyopathy. Being dehydrated and having hypertrophic cardiomyopathy can increase the risk for syncope.  Whatever the cause of syncope, it is important to be evaluated by your healthcare provider. You may need to be seen by a cardiologist, neurologist, or an ear, nose, and throat specialist. Do not drive, operate heavy machinery, or participate in activities in which you would be at risk for falls and injury if you have syncope and have not been evaluated.  Date Last Reviewed: 5/1/2016  © 7134-2410 [employer]. [address] [place] [address], [facility]  [place] [address]. All rights reserved. This information is not intended as a substitute for professional medical care. Always follow your healthcare professional's instructions.        Treating Syncope: Prevention  If you have been told that your fainting is not caused by a heart problem, you can help prevent fainting. And you can learn to respond to your bodys warning signs.     For your safety and the safety of others, limit your driving as instructed. Do not operate heavy machinery. Also, be careful not to engage in activities that could cause injury if you lose consciousness. Ask your healthcare provider what activities are safe for you.         If you feel faint, lie down for a while and elevate your feet. Sit down and put your head between your knees if you can't lie down.     If you feel faint  · Know the warning signs of fainting: weakness, nausea, dimmed vision, tunnel vision, sweating, feeling flush or warm, dizziness, lightheadedness, or a fast heartbeat.  · Dont ignore or fight any signs that you may faint.  · Lie down until you feel better and elevate your feet if possible. Your symptoms should go away in about 20 to 30 minutes.  Small changes make a big difference  · If you are in a conference, auditorium, or group setting such as a movie theater or play, sit near the aisle so you can leave if you feel faint.  · Get up slowly after you have been lying down.  · If prescribed, wear special stockings to keep blood from pooling in your legs.  · If directed, add salt to your food to raise your blood pressure. Dont skip meals.  · Dont stand for long. Shift your weight over your legs while standing. Shop when lines are short.  · Drink water often, especially when exercising during hot weather.  · Ask your healthcare provider if it is safe for you to drink alcohol, as this can cause syncope in some people.  The role of medicine  Medicines sometimes can play a role in both causing and preventing syncope.  Your  medicines may be changed or reduced. Blood pressure medicine may cause fainting. Certain combinations of medicines may also make you faint. And some nonprescription medicines, herbs, and teas may cause symptoms, too. Tell your healthcare provider about any medicine youre taking and if you started using a new one recently.  Medicines may be prescribed. Taking certain medicines can help prevent fainting. Your healthcare provider can discuss these with you.  Do not use illicit or recreational drugs. These can worsen your risk factors or interfere with prescribed medicines that your healthcare provider has given you.   Date Last Reviewed: 5/1/2016  © 2387-9535 [employer]. [address] [place] [address], [place] [address]. All rights reserved. This information is not intended as a substitute for professional medical care. Always follow your healthcare professional's instructions.

## 2019-11-14 NOTE — PROGRESS NOTES
"Subjective:       Patient ID: Erlinda Rain is a 24 y.o. female.    Vitals:  height is 5' 2" (1.575 m) and weight is 79.8 kg (176 lb). Her temperature is 100.1 °F (37.8 °C). Her blood pressure is 131/96 (abnormal) and her pulse is 135 (abnormal). Her respiration is 20 and oxygen saturation is 99%.     Chief Complaint: URI    Patient presents with 4 days of fever, cough, chills, congestion, runny nose, sore throat, body aches.  She denies chest pain or shortness of breath. Pt has taken mucin ex yesterday , promethazine-dm at night, sudafed in the morning and tylenol.     Patient also prevents for evaluation of 2 syncopal episodes recently.  She states that 8 days ago she was working out during a boxing class and sat down because she was tired and upon standing she began to immediately feel lightheaded, with ringing in her years, and felt like her heart was beating really fast and then she fainted for a few seconds.  This was witnessed by her .  She states earlier that day she did donate blood.  She had no chest pain or shortness of breath.  She recovered completely without any issues.  She states that this is happened in the past many years ago when she over exerted herself.      Yesterday she passed out again. She states she lost conscious for a few seconds.  This occurred when she was sitting for long period time and stood up quickly and started walking towards her room and was almost to her bed when she collapsed.  She had the same prodrome of symptoms. She did not hit her head. Denies chest pain, dyspnea.    She denies any current chest pain, dyspnea, palpitations, dizziness, lightheadedness, or presyncope. She has not been able to reproduce presyncope.     URI    This is a new problem. Episode onset: 4 days ago. The problem has been unchanged. There has been no fever. Associated symptoms include congestion, coughing, headaches and a sore throat. Pertinent negatives include no chest pain, diarrhea, dysuria, " ear pain, nausea, neck pain, rash or vomiting.       Constitution: Negative for appetite change, chills, sweating, fatigue, fever, unexpected weight change and generalized weakness.   HENT: Positive for congestion, postnasal drip and sore throat. Negative for ear pain.    Neck: Negative for neck pain, neck stiffness, painful lymph nodes and neck swelling.   Cardiovascular: Negative for chest pain, leg swelling, palpitations and sob on exertion.   Eyes: Negative for double vision and blurred vision.   Respiratory: Positive for cough and sputum production. Negative for chest tightness, bloody sputum, COPD and shortness of breath.    Gastrointestinal: Negative for nausea, vomiting and diarrhea.   Genitourinary: Negative for dysuria, frequency, urgency, urine decreased, flank pain, bed wetting, hematuria and history of kidney stones.   Musculoskeletal: Negative for pain, joint pain, joint swelling, muscle cramps and muscle ache.   Skin: Negative for color change, pale, rash and bruising.   Allergic/Immunologic: Negative for seasonal allergies.   Neurological: Positive for headaches and loss of consciousness. Negative for dizziness, history of vertigo, light-headedness, passing out, facial drooping, speech difficulty, coordination disturbances, loss of balance, history of migraines, disorientation, altered mental status, numbness, tingling, seizures and tremors.   Hematologic/Lymphatic: Negative for swollen lymph nodes.   Psychiatric/Behavioral: Negative for altered mental status, disorientation, nervous/anxious, sleep disturbance and depression. The patient is not nervous/anxious.        Objective:      Physical Exam   Constitutional: She is oriented to person, place, and time. She appears well-developed and well-nourished. She is cooperative.  Non-toxic appearance. She does not have a sickly appearance. She does not appear ill. No distress.   Patient very pleasant sitting comfortably in no acute distress.  Nontoxic  appearing.   HENT:   Head: Normocephalic and atraumatic.   Right Ear: Hearing, external ear and ear canal normal. Tympanic membrane is erythematous and bulging.   Left Ear: Hearing, tympanic membrane, external ear and ear canal normal.   Nose: Mucosal edema present. No rhinorrhea or nasal deformity. No epistaxis. Right sinus exhibits no maxillary sinus tenderness and no frontal sinus tenderness. Left sinus exhibits no maxillary sinus tenderness and no frontal sinus tenderness.   Mouth/Throat: Uvula is midline, oropharynx is clear and moist and mucous membranes are normal. No trismus in the jaw. Normal dentition. No uvula swelling. No oropharyngeal exudate, posterior oropharyngeal edema, posterior oropharyngeal erythema, tonsillar abscesses or cobblestoning. Tonsils are 1+ on the right. Tonsils are 1+ on the left. No tonsillar exudate.   Eyes: Pupils are equal, round, and reactive to light. Conjunctivae, EOM and lids are normal. No scleral icterus.   Neck: Trachea normal, normal range of motion, full passive range of motion without pain and phonation normal. Neck supple. No neck rigidity. No edema and no erythema present.   Cardiovascular: Normal rate, regular rhythm, normal heart sounds, intact distal pulses and normal pulses.   Pulmonary/Chest: Effort normal and breath sounds normal. No accessory muscle usage or stridor. No tachypnea and no bradypnea. No respiratory distress. She has no decreased breath sounds. She has no wheezes. She has no rhonchi. She has no rales.   Abdominal: Soft. Normal appearance and bowel sounds are normal. She exhibits no distension. There is no tenderness.   Musculoskeletal: Normal range of motion. She exhibits no edema or deformity.   Lymphadenopathy:        Head (right side): No submandibular, no preauricular and no posterior auricular adenopathy present.        Head (left side): No submandibular, no preauricular and no posterior auricular adenopathy present.     She has no cervical  adenopathy.   Neurological: She is alert and oriented to person, place, and time. She has normal strength. She is not disoriented. She displays no atrophy and no tremor. No cranial nerve deficit or sensory deficit. She exhibits normal muscle tone. She displays a negative Romberg sign. She displays no seizure activity. Coordination and gait normal. GCS eye subscore is 4. GCS verbal subscore is 5. GCS motor subscore is 6.   Alert, oriented x 3. EOMI, PERRLA. Cranial nerves intact: facial expressions (smile, raising eyebrows, shutting eyes, pursed lips) symmetric. Shoulder shrug strength 5/5; sternocleidomastoid muscle strength 5/5 bilaterally. Jaw is midline without deviation. Tongue protrudes at midline without fasciculations.  Uvula rises at midline. Sensation to face in distribution of CN V1, V2, and V3 intact. Sensation to upper and lower extremities intact. Finger to nose, rapid rhythmic alternating movements, and heel to shin test are intact and smooth bilaterally. Patient ambulates unassisted without rigidity or ataxia. Romberg negative. Voice quality, comprehension, articulation, coherence assessed as appropriate.   Bilateral shoulders, elbows, wrists, knees exhibit full range of motion and 5/5 strength.   strength 5/5 bilaterally. Uvula rises at midline.       Skin: Skin is warm, dry, intact, not diaphoretic and not pale.   Psychiatric: She has a normal mood and affect. Her speech is normal and behavior is normal. Judgment and thought content normal. Cognition and memory are normal.   Nursing note and vitals reviewed.        Vitals:    11/14/19 1616 11/14/19 1624 11/14/19 1626 11/14/19 1628   BP: 134/86 (!) 139/94 (!) 144/93 (!) 131/96   BP Location:  Left arm Left arm Left arm   Patient Position:  Lying Sitting Standing   BP Method:  Small (Automatic) Small (Automatic) Small (Automatic)   Pulse: (!) 132 (!) 119 (!) 124 (!) 135   Resp: 20      Temp: 100.1 °F (37.8 °C)      SpO2: 100%  99% 99%   Weight:  "79.8 kg (176 lb)      Height: 5' 2" (1.575 m)        Patient is not symptomatic during orthostatics vital signs.     Results for orders placed or performed in visit on 11/14/19   POCT rapid strep A   Result Value Ref Range    Rapid Strep A Screen Negative Negative     Acceptable Yes    POCT Influenza A/B   Result Value Ref Range    Rapid Influenza A Ag Negative Negative    Rapid Influenza B Ag Negative Negative     Acceptable Yes    POCT Chemistry Panel   Result Value Ref Range    POC Sodium 139 138 - 146 MMOL/L    POC Potassium 3.5 3.5 - 4.9 MMOL/L    POC Chloride 100 98 - 109 MMOL/L    POC BUN 9 8 - 26 MMOL/L    POC Glucose 123 (A) 70 - 110 MG/DL    POC Creatinine 0.9 0.6 - 1.3 mg/dL    POC iCA 1.20 1.12 - 1.32 MMOL/L    POC TCO2 25 24 - 29 MMOL/L    POC Hematocrit 37 37 - 47 %PCV    POC Hemoglobin 12.6 12.5 - 16 g/dL    POC Anion Gap 18 10.0 - 20 MMOL/L     The EKG shows a normal, regular Sinus Tachycardia, at a rate of 109, there are not ST Changes. There is a previous EKG for comparison.  When compared to EKG from 10/23//2018, sinus tachycardia replaces normal sinus rhythm.  This EKG was interpreted by me.    Instructed to follow up closely with PCP regarding syncope. Gave ER precautions and informed of safety measures with syncope.  Instructed to drink plenty of fluids and rest.     Assessment:       1. Viral upper respiratory tract infection with cough    2. Syncope and collapse    3. Right otitis media, unspecified otitis media type        Plan:         Viral upper respiratory tract infection with cough  -     POCT rapid strep A  -     POCT Influenza A/B  -     benzonatate (TESSALON) 200 MG capsule; Take 1 capsule (200 mg total) by mouth 3 (three) times daily as needed for Cough.  Dispense: 30 capsule; Refill: 0  -     predniSONE (DELTASONE) 20 MG tablet; Take 2 tablets (40 mg total) by mouth once daily for 2 days, THEN 1 tablet (20 mg total) once daily for 2 days.  Dispense: 6 " tablet; Refill: 0    Syncope and collapse  -     IN OFFICE EKG 12-LEAD (to Muse)  -     POCT Chemistry Panel    Right otitis media, unspecified otitis media type  -     amoxicillin (AMOXIL) 875 MG tablet; Take 1 tablet (875 mg total) by mouth 2 (two) times daily. for 7 days  Dispense: 14 tablet; Refill: 0      Patient Instructions     - Rest.    - Drink plenty of fluids.      - Viral upper respiratory infections typically run their course in 10-14 days.     - Tylenol or Ibuprofen as directed as needed for fever/pain. Avoid tylenol if you have a history of liver disease. Do not take ibuprofen if you have a history of GI bleeding, kidney disease, or if you take blood thinners.     - You can take over-the-counter claritin, zyrtec, allegra, or xyzal as directed. These are antihistamines that can help with runny nose, nasal congestion, sneezing, and helps to dry up post-nasal drip, which usually causes sore throat and cough.   - If you do NOT have high blood pressure, you may use a decongestant form (D)  of this medication or if you do not take the D form, you can take sudafed  (pseudoephedrine) over the counter, which is a decongestant.    - You can use Flonase (fluticasone) nasal spray as directed for sinus congestion and postnasal drip. This is a steroid nasal spray that works locally over time to decrease the inflammation in your nose/sinuses and help with allergic symptoms. This is not an quick- relief spray like afrin, but it works well if used daily.  Discontinue if you develop nose bleed  - use nasal saline prior to Flonase.    - Use Ocean Spray Nasal Saline 1-3 puffs each nostril every 2-3 hours then blow out onto tissue. This is to irrigate the nasal passage way to clear the sinus openings. Use until sinus problem resolved.    - You have been given an antibiotic to treat your condition today.    - Please complete the antibiotic as directed on the bottle.   - If you are female and on oral birth control pills,  use additional methods to prevent pregnancy while on antibiotics and for one cycle after.   - you can take over-the-counter probiotics during and after antibiotic use to help preserve gut brendon and reduce gastrointestinal symptoms  .- You were given a prescription for antibiotic, but do not start taking it yet.  Wait 2-3 days to see if your symptoms improve without it.  If they don't or you get significantly worse, then start the antibiotics.      - you can take plain Mucinex (guaifenesin)  twice a day to help loosen mucous    - Take the tessalon (benzonatate) as needed as prescribed for cough   - Only take the promethazine- DM as directed, as needed at night for cough. This medication may cause drowsiness.     -warm salt water gargles can help with sore throat    - warm tea with honey can help with cough. Honey is a natural cough suppressant.    - You received a steroid (prednisone) today.  This can elevate your blood pressure, elevate your blood sugar, water weight gain, nervous energy, redness to the face and dimpling of the skin where the shot goes in.   - Do not use steroids more than 3 times per year.   - If you have diabetes, please check you blood sugar frequently.  - If you have high blood pressure, please check your blood pressure frequently.     - Follow up with your PCP or specialty clinic as directed in the next 1-2 weeks if not improved or as needed.  You can call [phone] to schedule an appointment with the appropriate provider.      - Go to the ER if you develop new or worsening symptoms.     - You must understand that you have received an Urgent Care treatment only and that you may be released before all of your medical problems are known or treated.   - You, the patient, will arrange for follow up care as instructed.   - If your condition worsens or fails to improve we recommend that you receive another evaluation at the ER immediately or contact your PCP to discuss your concerns or return  here.           Viral Upper Respiratory Illness (Adult)  You have a viral upper respiratory illness (URI), which is another term for the common cold. This illness is contagious during the first few days. It is spread through the air by coughing and sneezing. It may also be spread by direct contact (touching the sick person and then touching your own eyes, nose, or mouth). Frequent handwashing will decrease risk of spread. Most viral illnesses go away within 7 to 10 days with rest and simple home remedies. Sometimes the illness may last for several weeks. Antibiotics will not kill a virus, and they are generally not prescribed for this condition.    Home care  · If symptoms are severe, rest at home for the first 2 to 3 days. When you resume activity, don't let yourself get too tired.  · Avoid being exposed to cigarette smoke (yours or others).  · You may use acetaminophen or ibuprofen to control pain and fever, unless another medicine was prescribed. (Note: If you have chronic liver or kidney disease, have ever had a stomach ulcer or gastrointestinal bleeding, or are taking blood-thinning medicines, talk with your healthcare provider before using these medicines.) Aspirin should never be given to anyone under 18 years of age who is ill with a viral infection or fever. It may cause severe liver or brain damage.  · Your appetite may be poor, so a light diet is fine. Avoid dehydration by drinking 6 to 8 glasses of fluids per day (water, soft drinks, juices, tea, or soup). Extra fluids will help loosen secretions in the nose and lungs.  · Over-the-counter cold medicines will not shorten the length of time youre sick, but they may be helpful for the following symptoms: cough, sore throat, and nasal and sinus congestion. (Note: Do not use decongestants if you have high blood pressure.)  Follow-up care  Follow up with your healthcare provider, or as advised.  When to seek medical advice  Call your healthcare provider  right away if any of these occur:  · Cough with lots of colored sputum (mucus)  · Severe headache; face, neck, or ear pain  · Difficulty swallowing due to throat pain  · Fever of 100.4°F (38°C)  Call 911, or get immediate medical care  Call emergency services right away if any of these occur:  · Chest pain, shortness of breath, wheezing, or difficulty breathing  · Coughing up blood  · Inability to swallow due to throat pain  Date Last Reviewed: 9/13/2015 © 2000-2017 [employer]. [address] [place] [id number]. All rights reserved. This information is not intended as a substitute for professional medical care. Always follow your healthcare professional's instructions.        Causes of Syncope  Syncope (fainting) has many causes. Sometimes it is not serious. In other cases, syncope is a sign of a heart problem. But treatment can help    When syncope is not serious  Your healthcare provider may call your problem vasovagal syncope, reflex syncope, or orthostatic hypotension. These types of syncope are generally not serious. They can be caused by:  · Strong feelings, such as anxiety or fear. A nerve signal may briefly change your heart rate and lower your blood pressure too much.  · Standing for too long. Standing may cause blood to pool in your legs. When this happens, your brain may not receive all the blood it needs.  · Standing up too quickly. Your blood pressure may not adjust fast enough to changes in posture and may drop too low. Certain medicines can also cause this problem. Examples of medicines that can cause a drop in blood pressure include diuretics, blood pressure medicines, and medicines for chest pain. Your pharmacist or healthcare provider can discuss these with you.  · Reaction to normal body functions. When you go to the bathroom, have gastrointestinal discomfort, nausea, or pain, your heart may have a natural reflex to slow down and lower blood pressure. This can result in  syncope. This may also follow exercise, eating, laughter, weight lifting, or playing musical instruments like the trumpet or trombone.  When heart trouble causes syncope  A heart problem can decrease the amount of oxygen-rich blood that reaches the brain. Heart trouble can be serious and even life threatening if not treated:  · A slow heart rate. Electrical signals tell the chambers of the heart when to pump. But the signals may be slowed or blocked (heart block) as they travel on the hearts electrical pathways. This can be caused by aging, scarred heart tissue, or damage from heart disease. When the heart rate slows, not enough blood is pumped.  · A fast heart rate. Certain problems can make the heart race. For instance, after a heart attack, also known as acute myocardial infarction, or AMI, abnormal electrical signals may be created. These signals can make the heart suddenly beat very fast. The heart pumps before the chambers can fill with blood. So less blood reaches the brain and other parts of the body. Illegal drugs, certain medicines, heart disease, or an inherited condition can also cause this.  · A heart valve problem. Blood travels through the chambers of the heart as it is pumped. Heart valves open and close to help move blood in the right direction. But a valve may not open or close fully, if its hardened or scarred. As a result, less blood is pumped through the heart to the brain and body. Most often, syncope occurs when a person's aortic valve is critically narrowed and he or she participates in  a strenuous activity.  · A heart muscle problem. Some people develop a thickened heart muscle that blocks blood flow out of the heart to the body. This is called hypertrophic cardiomyopathy. Being dehydrated and having hypertrophic cardiomyopathy can increase the risk for syncope.  Whatever the cause of syncope, it is important to be evaluated by your healthcare provider. You may need to be seen by a  cardiologist, neurologist, or an ear, nose, and throat specialist. Do not drive, operate heavy machinery, or participate in activities in which you would be at risk for falls and injury if you have syncope and have not been evaluated.  Date Last Reviewed: 5/1/2016  © 4478-1625 [employer]. [address] [place] [address], [place] [address]. All rights reserved. This information is not intended as a substitute for professional medical care. Always follow your healthcare professional's instructions.        Treating Syncope: Prevention  If you have been told that your fainting is not caused by a heart problem, you can help prevent fainting. And you can learn to respond to your bodys warning signs.     For your safety and the safety of others, limit your driving as instructed. Do not operate heavy machinery. Also, be careful not to engage in activities that could cause injury if you lose consciousness. Ask your healthcare provider what activities are safe for you.         If you feel faint, lie down for a while and elevate your feet. Sit down and put your head between your knees if you can't lie down.     If you feel faint  · Know the warning signs of fainting: weakness, nausea, dimmed vision, tunnel vision, sweating, feeling flush or warm, dizziness, lightheadedness, or a fast heartbeat.  · Dont ignore or fight any signs that you may faint.  · Lie down until you feel better and elevate your feet if possible. Your symptoms should go away in about 20 to 30 minutes.  Small changes make a big difference  · If you are in a conference, auditorium, or group setting such as a movie theater or play, sit near the aisle so you can leave if you feel faint.  · Get up slowly after you have been lying down.  · If prescribed, wear special stockings to keep blood from pooling in your legs.  · If directed, add salt to your food to raise your blood pressure. Dont skip meals.  · Dont stand for long. Shift your weight over your  legs while standing. Shop when lines are short.  · Drink water often, especially when exercising during hot weather.  · Ask your healthcare provider if it is safe for you to drink alcohol, as this can cause syncope in some people.  The role of medicine  Medicines sometimes can play a role in both causing and preventing syncope.  Your medicines may be changed or reduced. Blood pressure medicine may cause fainting. Certain combinations of medicines may also make you faint. And some nonprescription medicines, herbs, and teas may cause symptoms, too. Tell your healthcare provider about any medicine youre taking and if you started using a new one recently.  Medicines may be prescribed. Taking certain medicines can help prevent fainting. Your healthcare provider can discuss these with you.  Do not use illicit or recreational drugs. These can worsen your risk factors or interfere with prescribed medicines that your healthcare provider has given you.   Date Last Reviewed: 5/1/2016  © 2799-1499 [employer]. [address] [place] [address], [place] [address]. All rights reserved. This information is not intended as a substitute for professional medical care. Always follow your healthcare professional's instructions.

## 2020-04-02 ENCOUNTER — TELEPHONE (OUTPATIENT)
Dept: OBSTETRICS AND GYNECOLOGY | Facility: CLINIC | Age: 25
End: 2020-04-02

## 2020-04-02 NOTE — TELEPHONE ENCOUNTER
Spoke with patient regarding up coming appointment.  Due to the Covid-19 concern, called to assist patient to reschedule annual visit unless she is having issues and needs to be seen.  Patient denies needing to be seen.  States that she will call back to reschedule.  Appointment canceled.

## 2020-04-21 DIAGNOSIS — Z01.84 ANTIBODY RESPONSE EXAMINATION: ICD-10-CM

## 2020-04-23 ENCOUNTER — LAB VISIT (OUTPATIENT)
Dept: LAB | Facility: HOSPITAL | Age: 25
End: 2020-04-23
Attending: INTERNAL MEDICINE
Payer: COMMERCIAL

## 2020-04-23 DIAGNOSIS — Z01.84 ANTIBODY RESPONSE EXAMINATION: ICD-10-CM

## 2020-04-23 LAB — SARS-COV-2 IGG SERPL QL IA: NEGATIVE

## 2020-04-23 PROCEDURE — 36415 COLL VENOUS BLD VENIPUNCTURE: CPT

## 2020-04-23 PROCEDURE — 86769 SARS-COV-2 COVID-19 ANTIBODY: CPT

## 2020-05-19 ENCOUNTER — OFFICE VISIT (OUTPATIENT)
Dept: PODIATRY | Facility: CLINIC | Age: 25
End: 2020-05-19
Payer: COMMERCIAL

## 2020-05-19 ENCOUNTER — HOSPITAL ENCOUNTER (OUTPATIENT)
Dept: RADIOLOGY | Facility: HOSPITAL | Age: 25
Discharge: HOME | End: 2020-05-19
Attending: PODIATRIST
Payer: COMMERCIAL

## 2020-05-19 VITALS
HEART RATE: 78 BPM | DIASTOLIC BLOOD PRESSURE: 86 MMHG | BODY MASS INDEX: 30.67 KG/M2 | WEIGHT: 166.69 LBS | HEIGHT: 62 IN | SYSTOLIC BLOOD PRESSURE: 116 MMHG

## 2020-05-19 DIAGNOSIS — Z18.31: ICD-10-CM

## 2020-05-19 DIAGNOSIS — M79.672 FOOT PAIN, LEFT: ICD-10-CM

## 2020-05-19 DIAGNOSIS — M79.672 FOOT PAIN, LEFT: Primary | ICD-10-CM

## 2020-05-19 PROCEDURE — 3008F PR BODY MASS INDEX (BMI) DOCUMENTED: ICD-10-PCS | Mod: CPTII,S$GLB,, | Performed by: PODIATRIST

## 2020-05-19 PROCEDURE — 73718 MRI FOOT (FOREFOOT) LEFT WITHOUT CONTRAST: ICD-10-PCS | Mod: 26,LT,, | Performed by: RADIOLOGY

## 2020-05-19 PROCEDURE — 99203 PR OFFICE/OUTPT VISIT, NEW, LEVL III, 30-44 MIN: ICD-10-PCS | Mod: S$GLB,,, | Performed by: PODIATRIST

## 2020-05-19 PROCEDURE — 73718 MRI LOWER EXTREMITY W/O DYE: CPT | Mod: 26,LT,, | Performed by: RADIOLOGY

## 2020-05-19 PROCEDURE — 99999 PR PBB SHADOW E&M-EST. PATIENT-LVL III: CPT | Mod: PBBFAC,,, | Performed by: PODIATRIST

## 2020-05-19 PROCEDURE — 3008F BODY MASS INDEX DOCD: CPT | Mod: CPTII,S$GLB,, | Performed by: PODIATRIST

## 2020-05-19 PROCEDURE — 99203 OFFICE O/P NEW LOW 30 MIN: CPT | Mod: S$GLB,,, | Performed by: PODIATRIST

## 2020-05-19 PROCEDURE — 73718 MRI LOWER EXTREMITY W/O DYE: CPT | Mod: TC,LT

## 2020-05-19 PROCEDURE — 99999 PR PBB SHADOW E&M-EST. PATIENT-LVL III: ICD-10-PCS | Mod: PBBFAC,,, | Performed by: PODIATRIST

## 2020-05-20 ENCOUNTER — PATIENT MESSAGE (OUTPATIENT)
Dept: PODIATRY | Facility: CLINIC | Age: 25
End: 2020-05-20

## 2020-05-20 NOTE — PROGRESS NOTES
Subjective:      Patient ID: Erlinda Rain is a 24 y.o. female.    Chief Complaint: Foot Pain and Foot Problem (both)    Erlinda is a 24 y.o. female who presents to the podiatry clinic  with complaint of  left foot pain. Onset of the symptoms was Mid march . Precipitating event: stepping on sea urchin while surfing in [place]. Current symptoms include: ability to bear weight, but with some pain and pain w/ flexion of great toe L . Aggravating factors: walking. Symptoms have gradually improved. Patient has had no prior foot problems. Evaluation to date: none. Treatment to date: none.  She reports no visible spines L     Review of Systems   Constitution: Negative for chills, decreased appetite and fever.   Cardiovascular: Negative for leg swelling.   Musculoskeletal: Negative for arthritis, joint pain, joint swelling and myalgias.   Gastrointestinal: Negative for nausea and vomiting.   Neurological: Negative for loss of balance, numbness and paresthesias.               Patient Active Problem List   Diagnosis    Dysmenorrhea    Arm numbness left    Fibromyalgia    Vitamin D deficiency    Hematuria, microscopic    Constipation    Obesity (BMI 30.0-34.9)    Exposure to blood or body fluid    Abscess of external ear, right       Current Outpatient Medications on File Prior to Visit   Medication Sig Dispense Refill    DULoxetine (CYMBALTA) 60 MG capsule Take 1 capsule (60 mg total) by mouth once daily. 90 capsule 3    multivitamin capsule Take 1 capsule by mouth once daily.      norethindrone-ethinyl estradiol (MICROGESTIN 1/20) 1-20 mg-mcg per tablet Take 1 tablet by mouth once daily. 84 tablet 2    amitriptyline (ELAVIL) 25 MG tablet Take 1 tablet (25 mg total) by mouth every evening. (Patient not taking: Reported on 11/14/2019) 90 tablet 3    vitamin D 1000 units Tab Take 185 mg by mouth once daily.       No current facility-administered medications on file prior to visit.        Review of patient's allergies  indicates:   Allergen Reactions    Kiwi (actinlauro chinensis) Itching and Nausea Only       Past Surgical History:   Procedure Laterality Date    none         Family History   Problem Relation Age of Onset    Hyperlipidemia Mother     Hyperlipidemia Father     Heart attacks under age 50 Father     Diabetes type II Father     Hypothyroidism Sister     Eczema Neg Hx     Lupus Neg Hx     Psoriasis Neg Hx     Melanoma Neg Hx     Breast cancer Neg Hx     Ovarian cancer Neg Hx     Colon cancer Neg Hx        Social History     Socioeconomic History    Marital status: Single     Spouse name: Not on file    Number of children: 0    Years of education: Not on file    Highest education level: Not on file   Occupational History    Occupation: Student- [employer]    Social Needs    Financial resource strain: Not on file    Food insecurity:     Worry: Not on file     Inability: Not on file    Transportation needs:     Medical: Not on file     Non-medical: Not on file   Tobacco Use    Smoking status: Never Smoker    Smokeless tobacco: Never Used   Substance and Sexual Activity    Alcohol use: No     Comment: socially    Drug use: No    Sexual activity: Never     Partners: Male     Birth control/protection: OCP   Lifestyle    Physical activity:     Days per week: Not on file     Minutes per session: Not on file    Stress: Not on file   Relationships    Social connections:     Talks on phone: Not on file     Gets together: Not on file     Attends Restorationism service: Not on file     Active member of club or organization: Not on file     Attends meetings of clubs or organizations: Not on file     Relationship status: Not on file   Other Topics Concern    Are you pregnant or think you may be? Not Asked    Breast-feeding Not Asked   Social History Narrative    Full time college student, part time employed.           Objective:       Vitals:    05/19/20 1315   BP: 116/86   Pulse: 78   Weight: 75.6 kg (166 lb 10.7  "oz)   Height: 5' 2" (1.575 m)   PainSc:   3   PainLoc: Foot        Physical Exam   Constitutional: She is oriented to person, place, and time. She appears well-developed and well-nourished.   Cardiovascular:   Pulses:       Dorsalis pedis pulses are 2+ on the right side, and 2+ on the left side.        Posterior tibial pulses are 2+ on the right side, and 2+ on the left side.   Musculoskeletal: She exhibits tenderness. She exhibits no edema.        Right ankle: Normal.        Left ankle: Normal.        Right foot: There is no swelling, no crepitus and no deformity.        Left foot: There is no swelling, no crepitus and no deformity.   Adequate joint range of motion without pain, limitation, nor crepitation Bilateral feet and ankle joints. Muscle strength is 5/5 in all groups bilaterally.      TTP palpation 1st MTPJ. Pain w/ hallux flexion L     Hyperpigmented pinpoint skin lesions 1st MPJ. No surrounding erythema, edema, malodor, nor drainage noted . No signs of infection. No palpable spicule's      Plantar R hallux urchin spine x 2    Lymphadenopathy:   No palpable lymph nodes   Neurological: She is alert and oriented to person, place, and time. She has normal strength.   Skin: Skin is warm, dry and intact. No rash noted. No erythema. Nails show no clubbing.   Psychiatric: She has a normal mood and affect. Her behavior is normal.             MRI 5/19/20  Soft Tissues: Muscles demonstrate normal volume and signal characteristics.  There is a 1.0 x 0.7 x 0.5 cm focus of somewhat ill-defined T2 hyperintense signal at the plantar aspect of the flexor hallucis longus at the level of the MTP joint.    Miscellaneous: No evidence of Juan's neuroma.      Impression       Small focus of nonspecific T2 hyperintense signal at the plantar aspect of the 1st MTP joint.  Given history of penetrating injury, foreign body granuloma is possible.  Alternatively, nonspecific fibrous tissue.  Contrast enhanced examination may be " helpful.       Assessment:       Encounter Diagnoses   Name Primary?    Foot pain, left Yes    Retained animal quills or spines          Plan:       Erlinda was seen today for foot pain and foot problem.    Diagnoses and all orders for this visit:    Foot pain, left  -     MRI Foot (Forefoot) Left Without Contrast; Future    Retained animal quills or spines      I counseled the patient on her conditions, their implications and medical management.    Small retained sea urchin spines R hallux removed with patients verbal permission utilizing a sterile suture removal kit.    No visible nor palpable spines L foot     MRI reviewed in detail. Small possible plantar 1st MPJ FBG vs retained spine noted.     Will monitor and consider sx excision     .

## 2020-06-29 ENCOUNTER — OFFICE VISIT (OUTPATIENT)
Dept: OBSTETRICS AND GYNECOLOGY | Facility: CLINIC | Age: 25
End: 2020-06-29
Attending: OBSTETRICS & GYNECOLOGY
Payer: COMMERCIAL

## 2020-06-29 VITALS
HEIGHT: 62 IN | DIASTOLIC BLOOD PRESSURE: 74 MMHG | SYSTOLIC BLOOD PRESSURE: 116 MMHG | BODY MASS INDEX: 30.02 KG/M2 | WEIGHT: 163.13 LBS

## 2020-06-29 DIAGNOSIS — Z01.419 WELL WOMAN EXAM WITH ROUTINE GYNECOLOGICAL EXAM: Primary | ICD-10-CM

## 2020-06-29 PROBLEM — Z77.21 EXPOSURE TO BLOOD OR BODY FLUID: Status: RESOLVED | Noted: 2019-03-04 | Resolved: 2020-06-29

## 2020-06-29 PROBLEM — R31.29 HEMATURIA, MICROSCOPIC: Status: RESOLVED | Noted: 2017-05-30 | Resolved: 2020-06-29

## 2020-06-29 PROBLEM — H60.01 ABSCESS OF EXTERNAL EAR, RIGHT: Status: RESOLVED | Noted: 2019-03-06 | Resolved: 2020-06-29

## 2020-06-29 PROCEDURE — 99999 PR PBB SHADOW E&M-EST. PATIENT-LVL III: CPT | Mod: PBBFAC,,, | Performed by: OBSTETRICS & GYNECOLOGY

## 2020-06-29 PROCEDURE — 99999 PR PBB SHADOW E&M-EST. PATIENT-LVL III: ICD-10-PCS | Mod: PBBFAC,,, | Performed by: OBSTETRICS & GYNECOLOGY

## 2020-06-29 PROCEDURE — 99395 PR PREVENTIVE VISIT,EST,18-39: ICD-10-PCS | Mod: S$GLB,,, | Performed by: OBSTETRICS & GYNECOLOGY

## 2020-06-29 PROCEDURE — 99395 PREV VISIT EST AGE 18-39: CPT | Mod: S$GLB,,, | Performed by: OBSTETRICS & GYNECOLOGY

## 2020-06-29 RX ORDER — NORETHINDRONE ACETATE AND ETHINYL ESTRADIOL .02; 1 MG/1; MG/1
1 TABLET ORAL DAILY
Qty: 84 TABLET | Refills: 4 | Status: SHIPPED | OUTPATIENT
Start: 2020-06-29 | End: 2021-06-10

## 2020-06-29 NOTE — PROGRESS NOTES
SUBJECTIVE:   24 y.o. female   for annual routine Pap and checkup. Patient's last menstrual period was 2020 (exact date)..  She has no unusual complaints.        Past Medical History:   Diagnosis Date    Fibromyalgia     Obesity (BMI 30.0-34.9)     PCOS (polycystic ovarian syndrome)     Vitamin D deficiency      Past Surgical History:   Procedure Laterality Date    none       Social History     Socioeconomic History    Marital status: Single     Spouse name: Not on file    Number of children: 0    Years of education: Not on file    Highest education level: Not on file   Occupational History    Occupation: Student- [employer]    Social Needs    Financial resource strain: Not on file    Food insecurity     Worry: Not on file     Inability: Not on file    Transportation needs     Medical: Not on file     Non-medical: Not on file   Tobacco Use    Smoking status: Never Smoker    Smokeless tobacco: Never Used   Substance and Sexual Activity    Alcohol use: No     Comment: socially    Drug use: No    Sexual activity: Never     Partners: Male     Birth control/protection: OCP   Lifestyle    Physical activity     Days per week: Not on file     Minutes per session: Not on file    Stress: Not on file   Relationships    Social connections     Talks on phone: Not on file     Gets together: Not on file     Attends Bahai service: Not on file     Active member of club or organization: Not on file     Attends meetings of clubs or organizations: Not on file     Relationship status: Not on file   Other Topics Concern    Are you pregnant or think you may be? Not Asked    Breast-feeding Not Asked   Social History Narrative    Full time college student, part time employed.     Family History   Problem Relation Age of Onset    Hyperlipidemia Mother     Hyperlipidemia Father     Heart attacks under age 50 Father     Diabetes type II Father     Hypothyroidism Sister     Eczema Neg Hx     Lupus Neg Hx   "   Psoriasis Neg Hx     Melanoma Neg Hx     Breast cancer Neg Hx     Ovarian cancer Neg Hx     Colon cancer Neg Hx      OB History    Para Term  AB Living   0 0 0 0 0 0   SAB TAB Ectopic Multiple Live Births   0 0 0 0           Current Outpatient Medications   Medication Sig Dispense Refill    DULoxetine (CYMBALTA) 60 MG capsule Take 1 capsule (60 mg total) by mouth once daily. 90 capsule 3    multivitamin capsule Take 1 capsule by mouth once daily.      norethindrone-ethinyl estradiol (MICROGESTIN ) 1-20 mg-mcg per tablet Take 1 tablet by mouth once daily. 84 tablet 4     No current facility-administered medications for this visit.      Allergies: Kiwi (actinidia chinensis)     ROS:  Constitutional: no weight loss, weight gain, fever, fatigue  Eyes:  No vision changes, glasses/contacts  ENT/Mouth: No ulcers, sinus problems, ears ringing, headache  Cardiovascular: No inability to lie flat, chest pain, exercise intolerance, swelling, heart palpitations  Respiratory: No wheezing, coughing blood, shortness of breath, or cough  Gastrointestinal: No diarrhea, bloody stool, nausea/vomiting, constipation, gas, hemorrhoids  Genitourinary: No blood in urine, painful urination, urgency of urination, frequency of urination, incomplete emptying, incontinence, abnormal bleeding, painful periods, heavy periods, vaginal discharge, vaginal odor, painful intercourse, sexual problems, bleeding after intercourse.  Musculoskeletal: No muscle weakness  Skin/Breast: No painful breasts, nipple discharge, masses, rash, ulcers  Neurological: No passing out, seizures, numbness, headache  Endocrine: No diabetes, hypothyroid, hyperthyroid, hot flashes, hair loss, abnormal hair growth, ance  Psychiatric: No depression, crying  Hematologic: No bruises, bleeding, swollen lymph nodes, anemia.      OBJECTIVE:   The patient appears well, alert, oriented x 3, in no distress.  /74   Ht 5' 2" (1.575 m)   Wt 74 kg (163 " lb 2.3 oz)   LMP 06/21/2020 (Exact Date)   BMI 29.84 kg/m²   NECK: no thyromegaly, trachea midline  SKIN: no acne, striae, hirsutism  BREAST EXAM: breasts appear normal, no suspicious masses, no skin or nipple changes or axillary nodes  ABDOMEN: no hernias, masses, or hepatosplenomegaly  GENITALIA: normal external genitalia, no erythema, no discharge  URETHRA: normal urethra, normal urethral meatus  VAGINA: Normal  CERVIX: no lesions or cervical motion tenderness  UTERUS: normal size, contour, position, consistency, mobility, non-tender  ADNEXA: normal adnexa and no mass, fullness, tenderness    \  ASSESSMENT:   Duglas was seen today for well woman.    Diagnoses and all orders for this visit:    Well woman exam with routine gynecological exam    Other orders  -     norethindrone-ethinyl estradiol (MICROGESTIN 1/20) 1-20 mg-mcg per tablet; Take 1 tablet by mouth once daily.

## 2020-07-09 ENCOUNTER — OFFICE VISIT (OUTPATIENT)
Dept: PODIATRY | Facility: CLINIC | Age: 25
End: 2020-07-09
Payer: COMMERCIAL

## 2020-07-09 VITALS
DIASTOLIC BLOOD PRESSURE: 81 MMHG | HEIGHT: 62 IN | BODY MASS INDEX: 30 KG/M2 | SYSTOLIC BLOOD PRESSURE: 117 MMHG | RESPIRATION RATE: 17 BRPM | WEIGHT: 163 LBS | HEART RATE: 90 BPM

## 2020-07-09 DIAGNOSIS — Z18.31: Primary | ICD-10-CM

## 2020-07-09 PROCEDURE — 3008F PR BODY MASS INDEX (BMI) DOCUMENTED: ICD-10-PCS | Mod: CPTII,S$GLB,, | Performed by: PODIATRIST

## 2020-07-09 PROCEDURE — 99213 OFFICE O/P EST LOW 20 MIN: CPT | Mod: S$GLB,,, | Performed by: PODIATRIST

## 2020-07-09 PROCEDURE — 3008F BODY MASS INDEX DOCD: CPT | Mod: CPTII,S$GLB,, | Performed by: PODIATRIST

## 2020-07-09 PROCEDURE — 99999 PR PBB SHADOW E&M-EST. PATIENT-LVL III: ICD-10-PCS | Mod: PBBFAC,,, | Performed by: PODIATRIST

## 2020-07-09 PROCEDURE — 99213 PR OFFICE/OUTPT VISIT, EST, LEVL III, 20-29 MIN: ICD-10-PCS | Mod: S$GLB,,, | Performed by: PODIATRIST

## 2020-07-09 PROCEDURE — 99999 PR PBB SHADOW E&M-EST. PATIENT-LVL III: CPT | Mod: PBBFAC,,, | Performed by: PODIATRIST

## 2020-07-13 NOTE — PROGRESS NOTES
Subjective:      Patient ID: Erlinda Rain is a 25 y.o. female.    Chief Complaint: Follow-up (Left foot possible surgery -  pre op ) and Foot Pain    Erlinda is a 25 y.o. female who presents to the podiatry clinic  with complaint of  left foot pain. Onset of the symptoms was Mid march . Precipitating event: stepping on sea urchin while surfing in [place]. Current symptoms include: ability to bear weight, but with some pain and pain w/ flexion of great toe L . Aggravating factors: walking. Symptoms have gradually improved. Patient has had no prior foot problems. Evaluation to date: none. Treatment to date: none.  She reports no visible spines L       7.9.20- Erlinda Rain presents for f/u sea urchin injury . Reports most of her pain has subsided. She is able to work pain free. She does occasionally feel pain in the area, however it is much better than prior   Review of Systems   Constitution: Negative for chills, decreased appetite and fever.   Cardiovascular: Negative for leg swelling.   Musculoskeletal: Negative for arthritis, joint pain, joint swelling and myalgias.   Gastrointestinal: Negative for nausea and vomiting.   Neurological: Negative for loss of balance, numbness and paresthesias.               Patient Active Problem List   Diagnosis    Dysmenorrhea    Fibromyalgia    Constipation       Current Outpatient Medications on File Prior to Visit   Medication Sig Dispense Refill    DULoxetine (CYMBALTA) 60 MG capsule Take 1 capsule (60 mg total) by mouth once daily. 90 capsule 3    multivitamin capsule Take 1 capsule by mouth once daily.      norethindrone-ethinyl estradiol (MICROGESTIN 1/20) 1-20 mg-mcg per tablet Take 1 tablet by mouth once daily. 84 tablet 4     No current facility-administered medications on file prior to visit.        Review of patient's allergies indicates:   Allergen Reactions    Kiwi (actinidia chinensis) Itching and Nausea Only       Past Surgical History:   Procedure Laterality  "Date    none         Family History   Problem Relation Age of Onset    Hyperlipidemia Mother     Hyperlipidemia Father     Heart attacks under age 50 Father     Diabetes type II Father     Hypothyroidism Sister     Eczema Neg Hx     Lupus Neg Hx     Psoriasis Neg Hx     Melanoma Neg Hx     Breast cancer Neg Hx     Ovarian cancer Neg Hx     Colon cancer Neg Hx        Social History     Socioeconomic History    Marital status: Single     Spouse name: Not on file    Number of children: 0    Years of education: Not on file    Highest education level: Not on file   Occupational History    Occupation: Student- [employer]    Social Needs    Financial resource strain: Not on file    Food insecurity     Worry: Not on file     Inability: Not on file    Transportation needs     Medical: Not on file     Non-medical: Not on file   Tobacco Use    Smoking status: Never Smoker    Smokeless tobacco: Never Used   Substance and Sexual Activity    Alcohol use: No     Comment: socially    Drug use: No    Sexual activity: Never     Partners: Male     Birth control/protection: OCP   Lifestyle    Physical activity     Days per week: Not on file     Minutes per session: Not on file    Stress: Not on file   Relationships    Social connections     Talks on phone: Not on file     Gets together: Not on file     Attends Mandaen service: Not on file     Active member of club or organization: Not on file     Attends meetings of clubs or organizations: Not on file     Relationship status: Not on file   Other Topics Concern    Are you pregnant or think you may be? Not Asked    Breast-feeding Not Asked   Social History Narrative    Full time college student, part time employed.           Objective:       Vitals:    07/09/20 1309   BP: 117/81   Pulse: 90   Resp: 17   Weight: 73.9 kg (163 lb)   Height: 5' 2" (1.575 m)   PainSc: 0-No pain        Physical Exam  Constitutional:       Appearance: She is well-developed. "   Cardiovascular:      Pulses:           Dorsalis pedis pulses are 2+ on the right side and 2+ on the left side.        Posterior tibial pulses are 2+ on the right side and 2+ on the left side.   Musculoskeletal:      Right ankle: Normal.      Left ankle: Normal.      Right foot: No swelling, crepitus or deformity.      Left foot: No swelling, crepitus or deformity.      Comments: Adequate joint range of motion without pain, limitation, nor crepitation Bilateral feet and ankle joints. Muscle strength is 5/5 in all groups bilaterally.      Resolved TTP palpation 1st MTPJ.   Mild tenderness w/ hyperflexion of hallux     No visible spines, skin injuries noted. No surrounding erythema, edema, malodor, nor drainage noted      Lymphadenopathy:      Comments: No palpable lymph nodes   Skin:     General: Skin is warm and dry.      Findings: No erythema or rash.      Nails: There is no clubbing.     Neurological:      Mental Status: She is alert and oriented to person, place, and time.   Psychiatric:         Behavior: Behavior normal.               MRI 5/19/20  Soft Tissues: Muscles demonstrate normal volume and signal characteristics.  There is a 1.0 x 0.7 x 0.5 cm focus of somewhat ill-defined T2 hyperintense signal at the plantar aspect of the flexor hallucis longus at the level of the MTP joint.    Miscellaneous: No evidence of Juan's neuroma.      Impression       Small focus of nonspecific T2 hyperintense signal at the plantar aspect of the 1st MTP joint.  Given history of penetrating injury, foreign body granuloma is possible.  Alternatively, nonspecific fibrous tissue.  Contrast enhanced examination may be helpful.       Assessment:       Encounter Diagnosis   Name Primary?    Retained animal quills or spines Yes         Plan:       Erlinda was seen today for follow-up and foot pain.    Diagnoses and all orders for this visit:    Retained animal quills or spines      I counseled the patient on her conditions, their  implications and medical management.  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 At this point the risk( possible painful plantar scar, delayed healing, lost wages,possible nerve damage, etc)  do not outweigh the benefit (since she is asymptomatic and able to perform ADLs w/o interference )  RTC prn

## 2020-08-28 ENCOUNTER — TELEPHONE (OUTPATIENT)
Dept: INTERNAL MEDICINE | Facility: CLINIC | Age: 25
End: 2020-08-28

## 2020-08-28 ENCOUNTER — PATIENT OUTREACH (OUTPATIENT)
Dept: ADMINISTRATIVE | Facility: HOSPITAL | Age: 25
End: 2020-08-28

## 2020-08-28 NOTE — TELEPHONE ENCOUNTER
----- Message from [facility] sent at 8/28/2020  8:50 AM CDT -----  Type:  Patient Returning Call    Who Called: ALDO LIM     Who Left Message for Patient: Renay Johnson    Does the patient know what this is regarding?: No    Best Call Back Number: [phone]    Additional Information:  Patient states she did not get a pap smwear at her last OB appointment. Please advise.

## 2020-09-25 ENCOUNTER — PATIENT MESSAGE (OUTPATIENT)
Dept: OBSTETRICS AND GYNECOLOGY | Facility: CLINIC | Age: 25
End: 2020-09-25

## 2020-09-28 DIAGNOSIS — Z30.41 ENCOUNTER FOR SURVEILLANCE OF CONTRACEPTIVE PILLS: Primary | ICD-10-CM

## 2020-10-07 ENCOUNTER — PATIENT MESSAGE (OUTPATIENT)
Dept: OBSTETRICS AND GYNECOLOGY | Facility: CLINIC | Age: 25
End: 2020-10-07

## 2020-10-14 ENCOUNTER — PROCEDURE VISIT (OUTPATIENT)
Dept: OBSTETRICS AND GYNECOLOGY | Facility: CLINIC | Age: 25
End: 2020-10-14
Payer: COMMERCIAL

## 2020-10-14 VITALS
WEIGHT: 171.31 LBS | BODY MASS INDEX: 31.52 KG/M2 | SYSTOLIC BLOOD PRESSURE: 118 MMHG | DIASTOLIC BLOOD PRESSURE: 82 MMHG | HEIGHT: 62 IN

## 2020-10-14 DIAGNOSIS — Z30.430 ENCOUNTER FOR IUD INSERTION: Primary | ICD-10-CM

## 2020-10-14 PROCEDURE — 58300 INSERTION OF INTRAUTERINE DEVICE: ICD-10-PCS | Mod: S$GLB,,, | Performed by: STUDENT IN AN ORGANIZED HEALTH CARE EDUCATION/TRAINING PROGRAM

## 2020-10-14 PROCEDURE — 58300 INSERT INTRAUTERINE DEVICE: CPT | Mod: S$GLB,,, | Performed by: STUDENT IN AN ORGANIZED HEALTH CARE EDUCATION/TRAINING PROGRAM

## 2020-10-14 NOTE — PROCEDURES
INSERTION OF INTRAUTERINE DEVICE    Date/Time: 10/14/2020 2:45 PM  Performed by: Mildred Mendiola MD  Authorized by: Mildred Mendiola MD     Consent:     Consent obtained:  Verbal and written    Consent given by:  Patient    Procedure risks and benefits discussed: yes      Patient questions answered: yes      Patient agrees, verbalizes understanding, and wants to proceed: yes      Instructions and paperwork completed: yes    Procedure:     Pelvic exam performed: yes      Negative urine pregnancy test: yes      Cervix cleaned and prepped: yes      Speculum placed in vagina: yes      Tenaculum applied to cervix: yes      Uterus sounded: yes      Uterus sound depth (cm):  7    IUD inserted with no complications: yes      IUD type:  Kyleena    Strings trimmed: yes    Post-procedure:     Patient tolerated procedure well: yes      Patient will follow up after next period: yes

## 2020-10-29 ENCOUNTER — OFFICE VISIT (OUTPATIENT)
Dept: URGENT CARE | Facility: CLINIC | Age: 25
End: 2020-10-29
Payer: COMMERCIAL

## 2020-10-29 VITALS
SYSTOLIC BLOOD PRESSURE: 124 MMHG | HEART RATE: 83 BPM | DIASTOLIC BLOOD PRESSURE: 87 MMHG | OXYGEN SATURATION: 98 % | TEMPERATURE: 98 F

## 2020-10-29 DIAGNOSIS — R05.9 COUGH: ICD-10-CM

## 2020-10-29 DIAGNOSIS — J02.9 SORE THROAT: Primary | ICD-10-CM

## 2020-10-29 LAB
CTP QC/QA: YES
CTP QC/QA: YES
MOLECULAR STREP A: NEGATIVE
SARS-COV-2 RDRP RESP QL NAA+PROBE: NEGATIVE

## 2020-10-29 PROCEDURE — U0002 COVID-19 LAB TEST NON-CDC: HCPCS | Mod: QW,S$GLB,, | Performed by: NURSE PRACTITIONER

## 2020-10-29 PROCEDURE — U0002: ICD-10-PCS | Mod: QW,S$GLB,, | Performed by: NURSE PRACTITIONER

## 2020-10-29 PROCEDURE — 99214 PR OFFICE/OUTPT VISIT, EST, LEVL IV, 30-39 MIN: ICD-10-PCS | Mod: S$GLB,,, | Performed by: NURSE PRACTITIONER

## 2020-10-29 PROCEDURE — 87651 STREP A DNA AMP PROBE: CPT | Mod: QW,S$GLB,, | Performed by: NURSE PRACTITIONER

## 2020-10-29 PROCEDURE — 87651 POCT STREP A MOLECULAR: ICD-10-PCS | Mod: QW,S$GLB,, | Performed by: NURSE PRACTITIONER

## 2020-10-29 PROCEDURE — 99214 OFFICE O/P EST MOD 30 MIN: CPT | Mod: S$GLB,,, | Performed by: NURSE PRACTITIONER

## 2020-10-29 RX ORDER — AMOXICILLIN 875 MG/1
875 TABLET, FILM COATED ORAL 2 TIMES DAILY
Qty: 20 TABLET | Refills: 0 | Status: SHIPPED | OUTPATIENT
Start: 2020-10-29 | End: 2020-11-08

## 2020-10-29 NOTE — PROGRESS NOTES
Subjective:       Patient ID: Erlinda Rain is a 25 y.o. female.    Vitals:  temperature is 98.3 °F (36.8 °C). Her blood pressure is 124/87 and her pulse is 83. Her oxygen saturation is 98%.     Chief Complaint: Sore Throat    Pt presents today with a sore throat which has been going for the last x2 days now. Pt denies all other associated symptoms and is not concerned of COVID today.  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  At home treatments tried: cough drops & OTC 1000 MG of Tylenol, last dose being on last night    Sore Throat   This is a new problem. The current episode started in the past 7 days. The pain is worse on the left side. There has been no fever. Associated symptoms include coughing and trouble swallowing (painful). Pertinent negatives include no abdominal pain, congestion, diarrhea, drooling, ear discharge, ear pain, headaches, hoarse voice, plugged ear sensation, neck pain, shortness of breath, stridor, swollen glands or vomiting. She has had no exposure to strep or mono.       Constitution: Negative for chills, fatigue and fever.   HENT: Positive for sore throat and trouble swallowing (painful). Negative for ear pain, ear discharge, drooling and congestion.    Neck: Negative for neck pain, neck stiffness, painful lymph nodes and neck swelling.   Cardiovascular: Negative for chest pain and leg swelling.   Eyes: Negative for double vision and blurred vision.   Respiratory: Positive for cough. Negative for shortness of breath and stridor.    Gastrointestinal: Negative for abdominal pain, nausea, vomiting and diarrhea.   Genitourinary: Negative for dysuria, frequency, urgency and history of kidney stones.   Musculoskeletal: Negative for joint pain, joint swelling, muscle cramps and muscle ache.   Skin: Negative for color change, pale, rash and bruising.   Allergic/Immunologic:  Negative for seasonal allergies.   Neurological: Negative for dizziness, history of vertigo, light-headedness, passing out and headaches.   Hematologic/Lymphatic: Negative for swollen lymph nodes.   Psychiatric/Behavioral: Negative for nervous/anxious, sleep disturbance and depression. The patient is not nervous/anxious.        Objective:      Physical Exam   Constitutional: She is oriented to person, place, and time. She appears well-developed. She is cooperative.  Non-toxic appearance. She does not appear ill. No distress.   HENT:   Head: Normocephalic and atraumatic.   Ears:   Right Ear: Hearing, tympanic membrane, external ear and ear canal normal.   Left Ear: Hearing, tympanic membrane, external ear and ear canal normal.   Nose: Nose normal. No mucosal edema, rhinorrhea or nasal deformity. No epistaxis. Right sinus exhibits no maxillary sinus tenderness and no frontal sinus tenderness. Left sinus exhibits no maxillary sinus tenderness and no frontal sinus tenderness.   Mouth/Throat: Uvula is midline and mucous membranes are normal. No trismus in the jaw. Normal dentition. No uvula swelling. Posterior oropharyngeal erythema present. No oropharyngeal exudate, posterior oropharyngeal edema or tonsillar abscesses. Tonsils are 2+ on the right. Tonsils are 2+ on the left. Tonsillar exudate.   Eyes: Conjunctivae and lids are normal. No scleral icterus.   Neck: Trachea normal, full passive range of motion without pain and phonation normal. Neck supple. No neck rigidity. No edema and no erythema present.   Cardiovascular: Normal rate, regular rhythm, normal heart sounds and normal pulses.   Pulmonary/Chest: Effort normal and breath sounds normal. No respiratory distress. She has no decreased breath sounds. She has no rhonchi.   Abdominal: Normal appearance.   Musculoskeletal: Normal range of motion.         General: No deformity.   Lymphadenopathy:     She has no cervical adenopathy.   Neurological: She is alert and  oriented to person, place, and time. She exhibits normal muscle tone. Coordination normal.   Skin: Skin is warm, dry, intact, not diaphoretic and not pale. Psychiatric: Her speech is normal and behavior is normal. Judgment and thought content normal.   Nursing note and vitals reviewed.    Results for orders placed or performed in visit on 10/29/20   POCT Strep A, Molecular   Result Value Ref Range    Molecular Strep A, POC Negative Negative     Acceptable Yes    POCT COVID-19 Rapid Screening   Result Value Ref Range    POC Rapid COVID Negative Negative     Acceptable Yes          Assessment:       1. Sore throat    2. Cough        Plan:       Lab reviewed.  Presumed strep s/t physical exam.  Sore throat  -     POCT Strep A, Molecular  -     POCT COVID-19 Rapid Screening  -     amoxicillin (AMOXIL) 875 MG tablet; Take 1 tablet (875 mg total) by mouth 2 (two) times daily. for 10 days  Dispense: 20 tablet; Refill: 0    Cough  -     POCT COVID-19 Rapid Screening      Patient Instructions                                                         Pharyngitis   If your condition worsens or fails to improve we recommend that you receive another evaluation at the ER immediately or contact your PCP to discuss your concerns or return here. You must understand that you've received an urgent care treatment only and that you may be released before all your medical problems are known or treated. You the patient will arrange for followup care as instructed.   If the strept culture was done and returns negative in 3-5 days and you are still having a sore throat, you may need to get a mono spot test done or repeated.   Tylenol or ibuprofen for pain may help as long as you are not allergic to these meds or have a medical condition such as stomach ulcers, liver or kidney disease or taking blood thinners etc that would   prevent you from using these medications.   Rest and fluids will help as well.   If you  were prescribed antibiotics and are female and on BCP use additional methods to prevent pregnancy while on the antibiotics and for one cycle after       Pharyngitis: Strep (Presumed)    You have pharyngitis (sore throat). The cause is thought to be the streptococcus, or strep, bacterium. Strep throat infection can cause throat pain that is worse when swallowing, aching all over, headache, and fever. The infection may be spread by coughing, kissing, or touching others after touching your mouth or nose. Antibiotic medications are given to treat the infection.  Home care  · Rest at home. Drink plenty of fluids to avoid dehydration.  · No work or school for the first 2 days of taking the antibiotics. After this time, you will not be contagious. You can then return to work or school if you are feeling better.   · The antibiotic medication must be taken for the full 10 days, even if you feel better. This is very important to ensure the infection is treated. It is also important to prevent drug-resistant organisms from developing. If you were given an antibiotic shot, no more antibiotics are needed.  · You may use acetaminophen or ibuprofen to control pain or fever, unless another medicine was prescribed for this. If you have chronic liver or kidney disease or ever had a stomach ulcer or GI bleeding, talk with your doctor before using these medicines.  · Throat lozenges or a throat-numbing sprays can help reduce throat pain. Gargling with warm salt water can also help. Dissolve 1/2 teaspoon of salt in 1 8 ounce glass of warm water.   · Avoid salty or spicy foods, which can irritate the throat.  Follow-up care  Follow up with your healthcare provider or our staff if you are not improving over the next week.  When to seek medical advice  Call your healthcare provider right away if any of these occur:  · Fever as directed by your doctor.   · New or worsening ear pain, sinus pain, or headache  · Painful lumps in the back of  neck  · Stiff neck  · Lymph nodes are getting larger  · Inability to swallow liquids, excessive drooling, or inability to open mouth wide due to throat pain  · Signs of dehydration (very dark urine or no urine, sunken eyes, dizziness)  · Trouble breathing or noisy breathing  · Muffled voice  · New rash  Date Last Reviewed: 4/13/2015  © 1553-1483 [employer]. [address] [place] [address], [place] [address]. All rights reserved. This information is not intended as a substitute for professional medical care. Always follow your healthcare professional's instructions.

## 2020-10-29 NOTE — PATIENT INSTRUCTIONS
Pharyngitis   If your condition worsens or fails to improve we recommend that you receive another evaluation at the ER immediately or contact your PCP to discuss your concerns or return here. You must understand that you've received an urgent care treatment only and that you may be released before all your medical problems are known or treated. You the patient will arrange for followup care as instructed.   If the strept culture was done and returns negative in 3-5 days and you are still having a sore throat, you may need to get a mono spot test done or repeated.   Tylenol or ibuprofen for pain may help as long as you are not allergic to these meds or have a medical condition such as stomach ulcers, liver or kidney disease or taking blood thinners etc that would   prevent you from using these medications.   Rest and fluids will help as well.   If you were prescribed antibiotics and are female and on BCP use additional methods to prevent pregnancy while on the antibiotics and for one cycle after       Pharyngitis: Strep (Presumed)    You have pharyngitis (sore throat). The cause is thought to be the streptococcus, or strep, bacterium. Strep throat infection can cause throat pain that is worse when swallowing, aching all over, headache, and fever. The infection may be spread by coughing, kissing, or touching others after touching your mouth or nose. Antibiotic medications are given to treat the infection.  Home care  · Rest at home. Drink plenty of fluids to avoid dehydration.  · No work or school for the first 2 days of taking the antibiotics. After this time, you will not be contagious. You can then return to work or school if you are feeling better.   · The antibiotic medication must be taken for the full 10 days, even if you feel better. This is very important to ensure the infection is treated. It is also important to prevent drug-resistant organisms from  developing. If you were given an antibiotic shot, no more antibiotics are needed.  · You may use acetaminophen or ibuprofen to control pain or fever, unless another medicine was prescribed for this. If you have chronic liver or kidney disease or ever had a stomach ulcer or GI bleeding, talk with your doctor before using these medicines.  · Throat lozenges or a throat-numbing sprays can help reduce throat pain. Gargling with warm salt water can also help. Dissolve 1/2 teaspoon of salt in 1 8 ounce glass of warm water.   · Avoid salty or spicy foods, which can irritate the throat.  Follow-up care  Follow up with your healthcare provider or our staff if you are not improving over the next week.  When to seek medical advice  Call your healthcare provider right away if any of these occur:  · Fever as directed by your doctor.   · New or worsening ear pain, sinus pain, or headache  · Painful lumps in the back of neck  · Stiff neck  · Lymph nodes are getting larger  · Inability to swallow liquids, excessive drooling, or inability to open mouth wide due to throat pain  · Signs of dehydration (very dark urine or no urine, sunken eyes, dizziness)  · Trouble breathing or noisy breathing  · Muffled voice  · New rash  Date Last Reviewed: 4/13/2015  © 7759-2985 [employer] [address] [place] [address], [place] [address]. All rights reserved. This information is not intended as a substitute for professional medical care. Always follow your healthcare professional's instructions.

## 2020-10-29 NOTE — LETTER
October 29, 2020      [facility]  [address] [address]  Phone: [phone]  Fax: [phone]       Patient: Erlinda Rain   YOB: 1995  Date of Visit: 10/29/2020    To Whom It May Concern:    Michell Rain  was at [facility] on 10/29/2020. She may return to work/school on 10/31/20 with no restrictions. If you have any questions or concerns, or if I can be of further assistance, please do not hesitate to contact me.    Sincerely,        Elena Matthew, NP

## 2020-11-10 ENCOUNTER — OFFICE VISIT (OUTPATIENT)
Dept: OBSTETRICS AND GYNECOLOGY | Facility: CLINIC | Age: 25
End: 2020-11-10
Payer: COMMERCIAL

## 2020-11-10 VITALS
WEIGHT: 175.69 LBS | DIASTOLIC BLOOD PRESSURE: 68 MMHG | HEIGHT: 62 IN | BODY MASS INDEX: 32.33 KG/M2 | SYSTOLIC BLOOD PRESSURE: 102 MMHG

## 2020-11-10 DIAGNOSIS — Z30.431 ENCOUNTER FOR ROUTINE CHECKING OF INTRAUTERINE CONTRACEPTIVE DEVICE (IUD): Primary | ICD-10-CM

## 2020-11-10 PROCEDURE — 3008F BODY MASS INDEX DOCD: CPT | Mod: CPTII,S$GLB,, | Performed by: OBSTETRICS & GYNECOLOGY

## 2020-11-10 PROCEDURE — 99999 PR PBB SHADOW E&M-EST. PATIENT-LVL III: CPT | Mod: PBBFAC,,, | Performed by: OBSTETRICS & GYNECOLOGY

## 2020-11-10 PROCEDURE — 99999 PR PBB SHADOW E&M-EST. PATIENT-LVL III: ICD-10-PCS | Mod: PBBFAC,,, | Performed by: OBSTETRICS & GYNECOLOGY

## 2020-11-10 PROCEDURE — 99212 PR OFFICE/OUTPT VISIT, EST, LEVL II, 10-19 MIN: ICD-10-PCS | Mod: S$GLB,,, | Performed by: OBSTETRICS & GYNECOLOGY

## 2020-11-10 PROCEDURE — 3008F PR BODY MASS INDEX (BMI) DOCUMENTED: ICD-10-PCS | Mod: CPTII,S$GLB,, | Performed by: OBSTETRICS & GYNECOLOGY

## 2020-11-10 PROCEDURE — 99212 OFFICE O/P EST SF 10 MIN: CPT | Mod: S$GLB,,, | Performed by: OBSTETRICS & GYNECOLOGY

## 2020-11-10 NOTE — PROGRESS NOTES
"  Reason for visit: IUD Check    HPI:    25 y.o. female  comes in for follow up after IUD placement one month prior. She does not complain of pain or abnormal discharge. Minimal bleeding since the IUD was placed    Patient's last menstrual period was 10/11/2020 (exact date).    Contraception: kyleena IUD      Past medical, surgical, social, family, and obstetric history: Reviewed and updated in EMR.  Medications: Reviewed and updated in EMR.  Allergies: Kiwi (actinidia chinensis)       Review of Systems   Constitutional: Negative for fever.   Gastrointestinal: Negative for abdominal pain, nausea and vomiting.   Genitourinary: Positive for vaginal bleeding. Negative for pelvic pain and vaginal discharge.   Neurological: Negative for headaches.         Vitals: /68   Ht 5' 2" (1.575 m)   Wt 79.7 kg (175 lb 11.3 oz)   LMP 10/11/2020 (Exact Date)   BMI 32.14 kg/m²     Physical Exam  Constitutional:       General: She is not in acute distress.     Appearance: Normal appearance. She is well-developed.   Genitourinary:      Vulva, urethra, bladder, vagina, cervix and uterus normal.      No vulval lesion noted.      Bladder is not tender.      No vaginal discharge.      IUD strings visualized.      Uterus is not enlarged or tender.      Right adnexa not tender or full.      Left adnexa not tender or full.   Neck:      Musculoskeletal: Normal range of motion and neck supple.   Pulmonary:      Effort: Pulmonary effort is normal. No respiratory distress.   Abdominal:      General: There is no distension.      Palpations: There is no hepatomegaly, splenomegaly or mass.      Tenderness: There is no abdominal tenderness.      Hernia: No hernia is present.   Musculoskeletal:      Right lower leg: No edema.      Left lower leg: No edema.   Neurological:      Mental Status: She is alert and oriented to person, place, and time.   Skin:     Findings: No rash.   Psychiatric:         Mood and Affect: Mood and affect " normal.           Assessment & Plan:    Encounter for routine checking of intrauterine contraceptive device (IUD)        - Upon speculum and bimanual examination, the IUD strings were visualized and the patient did not have pain during the examination.  - Low concern for infection or displacement  - Patient may follow up in 7 months for WWE or prn

## 2020-11-19 ENCOUNTER — LAB VISIT (OUTPATIENT)
Dept: URGENT CARE | Facility: CLINIC | Age: 25
End: 2020-11-19
Payer: COMMERCIAL

## 2020-11-19 ENCOUNTER — TELEPHONE (OUTPATIENT)
Dept: PRIMARY CARE CLINIC | Facility: CLINIC | Age: 25
End: 2020-11-19

## 2020-11-19 ENCOUNTER — OFFICE VISIT (OUTPATIENT)
Dept: URGENT CARE | Facility: CLINIC | Age: 25
End: 2020-11-19
Payer: COMMERCIAL

## 2020-11-19 VITALS
TEMPERATURE: 98 F | HEART RATE: 65 BPM | HEIGHT: 62 IN | OXYGEN SATURATION: 99 % | BODY MASS INDEX: 32.2 KG/M2 | DIASTOLIC BLOOD PRESSURE: 76 MMHG | WEIGHT: 175 LBS | SYSTOLIC BLOOD PRESSURE: 117 MMHG

## 2020-11-19 DIAGNOSIS — R05.9 COUGH: ICD-10-CM

## 2020-11-19 DIAGNOSIS — J02.9 PHARYNGITIS, UNSPECIFIED ETIOLOGY: Primary | ICD-10-CM

## 2020-11-19 DIAGNOSIS — J02.9 SORE THROAT: ICD-10-CM

## 2020-11-19 DIAGNOSIS — R05.9 COUGH: Primary | ICD-10-CM

## 2020-11-19 LAB
CTP QC/QA: YES
HETEROPH AB SER QL: NEGATIVE
MOLECULAR STREP A: NEGATIVE
SARS-COV-2 RDRP RESP QL NAA+PROBE: NEGATIVE

## 2020-11-19 PROCEDURE — 99214 PR OFFICE/OUTPT VISIT, EST, LEVL IV, 30-39 MIN: ICD-10-PCS | Mod: 25,S$GLB,, | Performed by: PHYSICIAN ASSISTANT

## 2020-11-19 PROCEDURE — 3008F PR BODY MASS INDEX (BMI) DOCUMENTED: ICD-10-PCS | Mod: CPTII,S$GLB,, | Performed by: PHYSICIAN ASSISTANT

## 2020-11-19 PROCEDURE — U0002 COVID-19 LAB TEST NON-CDC: HCPCS | Mod: QW,S$GLB,, | Performed by: INTERNAL MEDICINE

## 2020-11-19 PROCEDURE — 99214 OFFICE O/P EST MOD 30 MIN: CPT | Mod: 25,S$GLB,, | Performed by: PHYSICIAN ASSISTANT

## 2020-11-19 PROCEDURE — 96372 THER/PROPH/DIAG INJ SC/IM: CPT | Mod: S$GLB,,, | Performed by: PHYSICIAN ASSISTANT

## 2020-11-19 PROCEDURE — 87651 STREP A DNA AMP PROBE: CPT | Mod: QW,S$GLB,, | Performed by: PHYSICIAN ASSISTANT

## 2020-11-19 PROCEDURE — 86308 POCT INFECTIOUS MONONUCLEOSIS: ICD-10-PCS | Mod: QW,S$GLB,, | Performed by: PHYSICIAN ASSISTANT

## 2020-11-19 PROCEDURE — 86308 HETEROPHILE ANTIBODY SCREEN: CPT | Mod: QW,S$GLB,, | Performed by: PHYSICIAN ASSISTANT

## 2020-11-19 PROCEDURE — U0002: ICD-10-PCS | Mod: QW,S$GLB,, | Performed by: INTERNAL MEDICINE

## 2020-11-19 PROCEDURE — 87651 POCT STREP A MOLECULAR: ICD-10-PCS | Mod: QW,S$GLB,, | Performed by: PHYSICIAN ASSISTANT

## 2020-11-19 PROCEDURE — 3008F BODY MASS INDEX DOCD: CPT | Mod: CPTII,S$GLB,, | Performed by: PHYSICIAN ASSISTANT

## 2020-11-19 PROCEDURE — 96372 PR INJECTION,THERAP/PROPH/DIAG2ST, IM OR SUBCUT: ICD-10-PCS | Mod: S$GLB,,, | Performed by: PHYSICIAN ASSISTANT

## 2020-11-19 RX ORDER — BETAMETHASONE SODIUM PHOSPHATE AND BETAMETHASONE ACETATE 3; 3 MG/ML; MG/ML
6 INJECTION, SUSPENSION INTRA-ARTICULAR; INTRALESIONAL; INTRAMUSCULAR; SOFT TISSUE
Status: COMPLETED | OUTPATIENT
Start: 2020-11-19 | End: 2020-11-19

## 2020-11-19 RX ORDER — DEXAMETHASONE SODIUM PHOSPHATE 100 MG/10ML
6 INJECTION INTRAMUSCULAR; INTRAVENOUS ONCE
Status: DISCONTINUED | OUTPATIENT
Start: 2020-11-19 | End: 2020-11-19

## 2020-11-19 RX ADMIN — BETAMETHASONE SODIUM PHOSPHATE AND BETAMETHASONE ACETATE 6 MG: 3; 3 INJECTION, SUSPENSION INTRA-ARTICULAR; INTRALESIONAL; INTRAMUSCULAR; SOFT TISSUE at 01:11

## 2020-11-19 NOTE — TELEPHONE ENCOUNTER
Patient has been notified of COVID test result on behalf of [employer] and provided clinical guidance on such. Cleared to RTW 11/20.

## 2020-11-19 NOTE — PROGRESS NOTES
"Subjective:       Patient ID: Erlinda Rain is a 25 y.o. female.    Vitals:  height is 5' 2" (1.575 m) and weight is 79.4 kg (175 lb). Her temperature is 98 °F (36.7 °C). Her blood pressure is 117/76 and her pulse is 65. Her oxygen saturation is 99%.     Chief Complaint: COVID-19 Concerns    PT STATES SORE THROAT, COUGH  PT HAD STREP 2 WEEKS AGO. 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     Other  This is a new problem. The current episode started in the past 7 days. Associated symptoms include a sore throat. Pertinent negatives include no arthralgias, chest pain, chills, congestion, coughing, fatigue, fever, headaches, joint swelling, myalgias, nausea, rash, vertigo, vomiting or weakness. Treatments tried: BENADRYL  The treatment provided mild relief.       Constitution: Negative for chills, fatigue and fever.   HENT: Positive for sore throat. Negative for congestion.    Neck: Negative for painful lymph nodes.   Cardiovascular: Negative for chest pain and leg swelling.   Eyes: Negative for double vision and blurred vision.   Respiratory: Negative for cough and shortness of breath.    Gastrointestinal: Negative for nausea, vomiting and diarrhea.   Genitourinary: Negative for dysuria, frequency, urgency and history of kidney stones.   Musculoskeletal: Negative for joint pain, joint swelling, muscle cramps and muscle ache.   Skin: Negative for color change, pale, rash and bruising.   Allergic/Immunologic: Negative for seasonal allergies.   Neurological: Negative for dizziness, history of vertigo, light-headedness, passing out and headaches.   Hematologic/Lymphatic: Negative for swollen lymph nodes.   Psychiatric/Behavioral: Negative for nervous/anxious, sleep disturbance and depression. The patient is not nervous/anxious.        Objective:      Physical Exam   Constitutional: She is oriented to person, place, and time. She appears well-developed. She is cooperative.  Non-toxic appearance. She does " not appear ill. No distress.   HENT:   Head: Normocephalic and atraumatic.   Ears:   Right Ear: Hearing, tympanic membrane, external ear and ear canal normal.   Left Ear: Hearing, tympanic membrane, external ear and ear canal normal.   Nose: Nose normal. No mucosal edema, rhinorrhea or nasal deformity. No epistaxis. Right sinus exhibits no maxillary sinus tenderness and no frontal sinus tenderness. Left sinus exhibits no maxillary sinus tenderness and no frontal sinus tenderness.   Mouth/Throat: Uvula is midline and mucous membranes are normal. No trismus in the jaw. Normal dentition. No uvula swelling. Posterior oropharyngeal erythema present. No oropharyngeal exudate, posterior oropharyngeal edema, tonsillar abscesses or cobblestoning. Tonsils are 1+ on the right. Tonsils are 1+ on the left. No tonsillar exudate.   Eyes: Conjunctivae and lids are normal. No scleral icterus.   Neck: Trachea normal, full passive range of motion without pain and phonation normal. Neck supple. No neck rigidity. No edema and no erythema present.   Cardiovascular: Normal rate, regular rhythm, normal heart sounds and normal pulses.   Pulmonary/Chest: Effort normal and breath sounds normal. No respiratory distress. She has no decreased breath sounds. She has no rhonchi.   Abdominal: Normal appearance.   Musculoskeletal: Normal range of motion.         General: No deformity.   Lymphadenopathy:        Head (right side): No submental, no submandibular, no tonsillar, no preauricular and no posterior auricular adenopathy present.        Head (left side): No submental, no submandibular, no tonsillar, no preauricular and no posterior auricular adenopathy present.     She has cervical adenopathy.        Right cervical: Superficial cervical adenopathy present. No deep cervical adenopathy present.       Left cervical: Superficial cervical adenopathy present. No deep cervical adenopathy present.   Neurological: She is alert and oriented to person,  place, and time. She exhibits normal muscle tone. Coordination normal.   Skin: Skin is warm, dry, intact, not diaphoretic and not pale. Psychiatric: Her speech is normal and behavior is normal. Judgment and thought content normal.   Nursing note and vitals reviewed.        Assessment:       1. Pharyngitis, unspecified etiology    2. Sore throat        Plan:         Pharyngitis, unspecified etiology    Sore throat  -     POCT Strep A, Molecular  -     POCT Infectious mononucleosis antibody    Other orders  -     Discontinue: dexamethasone injection 6 mg  -     betamethasone acetate-betamethasone sodium phosphate injection 6 mg    reviewed lab results with pt. Pt requested steroid injection. Discussed risks and benefits. Discussed diagnosis with patient as well as treatment and home care. Discussed return to clinic precautions vs ER precautions. All patients questions answered. Patient verbalized understanding. Patient agreed with plan of care.           Self-Care for Sore Throats    Sore throats happen for many reasons, such as colds, allergies, and infections caused by viruses or bacteria. In any case, your throat becomes red and sore. Your goal for self-care is to reduce your discomfort while giving your throat a chance to heal.  Moisten and soothe your throat  Tips include the following:  · Try a sip of water first thing after waking up.  · Keep your throat moist by drinking 6 or more glasses of clear liquids every day.  · Run a cool-air humidifier in your room overnight.  · Avoid cigarette smoke.   · Suck on throat lozenges, cough drops, hard candy, ice chips, or frozen fruit-juice bars. Use the sugar-free versions if your diet or medical condition requires them.  Gargle to ease irritation  Gargling every hour or 2 can ease irritation. Try gargling with 1 of these solutions:  · 1/4 teaspoon of salt in 1/2 cup of warm water  · An over-the-counter anesthetic gargle  Use medicine for more relief  Over-the-counter  medicine can reduce sore throat symptoms. Ask your pharmacist if you have questions about which medicine to use:  · Ease pain with anesthetic sprays. Aspirin or an aspirin substitute also helps. Remember, never give aspirin to anyone 18 or younger, or if you are already taking blood thinners.   · For sore throats caused by allergies, try antihistamines to block the allergic reaction.  · Remember: unless a sore throat is caused by a bacterial infection, antibiotics wont help you.  Prevent future sore throats  Prevention tips include the following:  · Stop smoking or reduce contact with secondhand smoke. Smoke irritates the tender throat lining.  · Limit contact with pets and with allergy-causing substances, such as pollen and mold.  · When youre around someone with a sore throat or cold, wash your hands often to keep viruses or bacteria from spreading.  · Dont strain your vocal cords.  Call your healthcare provider  Contact your healthcare provider if you have:  · A temperature over 101°F (38.3°C)  · White spots on the throat  · Great difficulty swallowing  · Trouble breathing  · A skin rash  · Recent exposure to someone else with strep bacteria  · Severe hoarseness and swollen glands in the neck or jaw   Date Last Reviewed: 8/1/2016  © 9079-2819 [employer]. [address] [place] [address], [place] [address]. All rights reserved. This information is not intended as a substitute for professional medical care. Always follow your healthcare professional's instructions.    Please follow up with your Primary care provider within 2-5 days if your signs and symptoms have not resolved or worsen.     If your condition worsens or fails to improve we recommend that you receive another evaluation at the emergency room immediately or contact your primary medical clinic to discuss your concerns.   You must understand that you have received an Urgent Care treatment only and that you may be released before all of your  medical problems are known or treated. You, the patient, will arrange for follow up care as instructed.     RED FLAGS/WARNING SYMPTOMS DISCUSSED WITH PATIENT THAT WOULD WARRANT EMERGENT MEDICAL ATTENTION. PATIENT VERBALIZED UNDERSTANDING.

## 2020-11-19 NOTE — PATIENT INSTRUCTIONS
Self-Care for Sore Throats    Sore throats happen for many reasons, such as colds, allergies, and infections caused by viruses or bacteria. In any case, your throat becomes red and sore. Your goal for self-care is to reduce your discomfort while giving your throat a chance to heal.  Moisten and soothe your throat  Tips include the following:  · Try a sip of water first thing after waking up.  · Keep your throat moist by drinking 6 or more glasses of clear liquids every day.  · Run a cool-air humidifier in your room overnight.  · Avoid cigarette smoke.   · Suck on throat lozenges, cough drops, hard candy, ice chips, or frozen fruit-juice bars. Use the sugar-free versions if your diet or medical condition requires them.  Gargle to ease irritation  Gargling every hour or 2 can ease irritation. Try gargling with 1 of these solutions:  · 1/4 teaspoon of salt in 1/2 cup of warm water  · An over-the-counter anesthetic gargle  Use medicine for more relief  Over-the-counter medicine can reduce sore throat symptoms. Ask your pharmacist if you have questions about which medicine to use:  · Ease pain with anesthetic sprays. Aspirin or an aspirin substitute also helps. Remember, never give aspirin to anyone 18 or younger, or if you are already taking blood thinners.   · For sore throats caused by allergies, try antihistamines to block the allergic reaction.  · Remember: unless a sore throat is caused by a bacterial infection, antibiotics wont help you.  Prevent future sore throats  Prevention tips include the following:  · Stop smoking or reduce contact with secondhand smoke. Smoke irritates the tender throat lining.  · Limit contact with pets and with allergy-causing substances, such as pollen and mold.  · When youre around someone with a sore throat or cold, wash your hands often to keep viruses or bacteria from spreading.  · Dont strain your vocal cords.  Call your healthcare provider  Contact your healthcare provider if  you have:  · A temperature over 101°F (38.3°C)  · White spots on the throat  · Great difficulty swallowing  · Trouble breathing  · A skin rash  · Recent exposure to someone else with strep bacteria  · Severe hoarseness and swollen glands in the neck or jaw   Date Last Reviewed: 8/1/2016  © 6375-0111 [employer]. [address] [place] [address]. All rights reserved. This information is not intended as a substitute for professional medical care. Always follow your healthcare professional's instructions.    Please follow up with your Primary care provider within 2-5 days if your signs and symptoms have not resolved or worsen.     If your condition worsens or fails to improve we recommend that you receive another evaluation at the emergency room immediately or contact your primary medical clinic to discuss your concerns.   You must understand that you have received an Urgent Care treatment only and that you may be released before all of your medical problems are known or treated. You, the patient, will arrange for follow up care as instructed.     RED FLAGS/WARNING SYMPTOMS DISCUSSED WITH PATIENT THAT WOULD WARRANT EMERGENT MEDICAL ATTENTION. PATIENT VERBALIZED UNDERSTANDING.

## 2020-11-24 ENCOUNTER — TELEPHONE (OUTPATIENT)
Dept: EMERGENCY MEDICINE | Facility: HOSPITAL | Age: 25
End: 2020-11-24

## 2020-11-24 DIAGNOSIS — J32.9 SINUSITIS, UNSPECIFIED CHRONICITY, UNSPECIFIED LOCATION: Primary | ICD-10-CM

## 2020-11-24 RX ORDER — AMOXICILLIN AND CLAVULANATE POTASSIUM 875; 125 MG/1; MG/1
1 TABLET, FILM COATED ORAL 2 TIMES DAILY
Qty: 14 TABLET | Refills: 0 | Status: SHIPPED | OUTPATIENT
Start: 2020-11-24 | End: 2021-06-10

## 2020-11-24 NOTE — TELEPHONE ENCOUNTER
Pt reports facial tenderness, cough, rhinorrhea and congestion.  Requests abx therapy. 7d duration.  Will rx augmentin to her pharmacy.

## 2020-12-15 ENCOUNTER — IMMUNIZATION (OUTPATIENT)
Dept: OBSTETRICS AND GYNECOLOGY | Facility: CLINIC | Age: 25
End: 2020-12-15
Payer: COMMERCIAL

## 2020-12-15 DIAGNOSIS — Z23 NEED FOR VACCINATION: ICD-10-CM

## 2020-12-15 PROCEDURE — 0001A COVID-19, MRNA, LNP-S, PF, 30 MCG/0.3 ML DOSE VACCINE: ICD-10-PCS | Mod: CV19,,, | Performed by: FAMILY MEDICINE

## 2020-12-15 PROCEDURE — 91300 COVID-19, MRNA, LNP-S, PF, 30 MCG/0.3 ML DOSE VACCINE: CPT | Mod: ,,, | Performed by: FAMILY MEDICINE

## 2020-12-15 PROCEDURE — 0001A COVID-19, MRNA, LNP-S, PF, 30 MCG/0.3 ML DOSE VACCINE: CPT | Mod: CV19,,, | Performed by: FAMILY MEDICINE

## 2020-12-15 PROCEDURE — 91300 COVID-19, MRNA, LNP-S, PF, 30 MCG/0.3 ML DOSE VACCINE: ICD-10-PCS | Mod: ,,, | Performed by: FAMILY MEDICINE

## 2021-01-06 ENCOUNTER — IMMUNIZATION (OUTPATIENT)
Dept: OBSTETRICS AND GYNECOLOGY | Facility: CLINIC | Age: 26
End: 2021-01-06
Payer: COMMERCIAL

## 2021-01-06 DIAGNOSIS — Z23 NEED FOR VACCINATION: ICD-10-CM

## 2021-01-06 PROCEDURE — 91300 COVID-19, MRNA, LNP-S, PF, 30 MCG/0.3 ML DOSE VACCINE: CPT | Mod: PBBFAC | Performed by: FAMILY MEDICINE

## 2021-01-06 PROCEDURE — 0002A COVID-19, MRNA, LNP-S, PF, 30 MCG/0.3 ML DOSE VACCINE: CPT | Mod: PBBFAC | Performed by: FAMILY MEDICINE

## 2021-02-25 ENCOUNTER — PATIENT MESSAGE (OUTPATIENT)
Dept: OBSTETRICS AND GYNECOLOGY | Facility: CLINIC | Age: 26
End: 2021-02-25

## 2021-03-23 ENCOUNTER — OFFICE VISIT (OUTPATIENT)
Dept: OBSTETRICS AND GYNECOLOGY | Facility: CLINIC | Age: 26
End: 2021-03-23
Payer: COMMERCIAL

## 2021-03-23 ENCOUNTER — LAB VISIT (OUTPATIENT)
Dept: LAB | Facility: OTHER | Age: 26
End: 2021-03-23
Attending: OBSTETRICS & GYNECOLOGY
Payer: COMMERCIAL

## 2021-03-23 VITALS
DIASTOLIC BLOOD PRESSURE: 84 MMHG | SYSTOLIC BLOOD PRESSURE: 118 MMHG | BODY MASS INDEX: 32.94 KG/M2 | HEIGHT: 62 IN | WEIGHT: 179 LBS

## 2021-03-23 DIAGNOSIS — R63.5 WEIGHT GAIN: ICD-10-CM

## 2021-03-23 DIAGNOSIS — R63.4 WEIGHT LOSS: ICD-10-CM

## 2021-03-23 DIAGNOSIS — Z12.4 CERVICAL CANCER SCREENING: ICD-10-CM

## 2021-03-23 DIAGNOSIS — Z11.3 ROUTINE SCREENING FOR STI (SEXUALLY TRANSMITTED INFECTION): ICD-10-CM

## 2021-03-23 DIAGNOSIS — L70.9 ACNE, UNSPECIFIED ACNE TYPE: ICD-10-CM

## 2021-03-23 DIAGNOSIS — R63.5 WEIGHT GAIN: Primary | ICD-10-CM

## 2021-03-23 LAB — TSH SERPL DL<=0.005 MIU/L-ACNC: 0.65 UIU/ML (ref 0.4–4)

## 2021-03-23 PROCEDURE — 99999 PR PBB SHADOW E&M-EST. PATIENT-LVL III: ICD-10-PCS | Mod: PBBFAC,,, | Performed by: OBSTETRICS & GYNECOLOGY

## 2021-03-23 PROCEDURE — 1126F PR PAIN SEVERITY QUANTIFIED, NO PAIN PRESENT: ICD-10-PCS | Mod: S$GLB,,, | Performed by: OBSTETRICS & GYNECOLOGY

## 2021-03-23 PROCEDURE — 84443 ASSAY THYROID STIM HORMONE: CPT | Performed by: OBSTETRICS & GYNECOLOGY

## 2021-03-23 PROCEDURE — 3008F PR BODY MASS INDEX (BMI) DOCUMENTED: ICD-10-PCS | Mod: CPTII,S$GLB,, | Performed by: OBSTETRICS & GYNECOLOGY

## 2021-03-23 PROCEDURE — 99395 PREV VISIT EST AGE 18-39: CPT | Mod: 25,S$GLB,, | Performed by: OBSTETRICS & GYNECOLOGY

## 2021-03-23 PROCEDURE — 99999 PR PBB SHADOW E&M-EST. PATIENT-LVL III: CPT | Mod: PBBFAC,,, | Performed by: OBSTETRICS & GYNECOLOGY

## 2021-03-23 PROCEDURE — 99395 PR PREVENTIVE VISIT,EST,18-39: ICD-10-PCS | Mod: 25,S$GLB,, | Performed by: OBSTETRICS & GYNECOLOGY

## 2021-03-23 PROCEDURE — 1126F AMNT PAIN NOTED NONE PRSNT: CPT | Mod: S$GLB,,, | Performed by: OBSTETRICS & GYNECOLOGY

## 2021-03-23 PROCEDURE — 36415 COLL VENOUS BLD VENIPUNCTURE: CPT | Performed by: OBSTETRICS & GYNECOLOGY

## 2021-03-23 PROCEDURE — 88175 CYTOPATH C/V AUTO FLUID REDO: CPT | Performed by: OBSTETRICS & GYNECOLOGY

## 2021-03-23 PROCEDURE — 3008F BODY MASS INDEX DOCD: CPT | Mod: CPTII,S$GLB,, | Performed by: OBSTETRICS & GYNECOLOGY

## 2021-03-23 RX ORDER — METFORMIN HYDROCHLORIDE 500 MG/1
TABLET ORAL
Qty: 90 TABLET | Refills: 11 | Status: SHIPPED | OUTPATIENT
Start: 2021-03-23 | End: 2022-11-30

## 2021-03-24 ENCOUNTER — TELEPHONE (OUTPATIENT)
Dept: OBSTETRICS AND GYNECOLOGY | Facility: CLINIC | Age: 26
End: 2021-03-24

## 2021-03-30 ENCOUNTER — PATIENT MESSAGE (OUTPATIENT)
Dept: DERMATOLOGY | Facility: CLINIC | Age: 26
End: 2021-03-30

## 2021-03-30 ENCOUNTER — PATIENT MESSAGE (OUTPATIENT)
Dept: FAMILY MEDICINE | Facility: CLINIC | Age: 26
End: 2021-03-30

## 2021-03-31 ENCOUNTER — OFFICE VISIT (OUTPATIENT)
Dept: DERMATOLOGY | Facility: CLINIC | Age: 26
End: 2021-03-31
Payer: COMMERCIAL

## 2021-03-31 ENCOUNTER — PATIENT MESSAGE (OUTPATIENT)
Dept: OBSTETRICS AND GYNECOLOGY | Facility: CLINIC | Age: 26
End: 2021-03-31

## 2021-03-31 DIAGNOSIS — L70.0 ACNE VULGARIS: Primary | ICD-10-CM

## 2021-03-31 DIAGNOSIS — L30.9 DERMATITIS: ICD-10-CM

## 2021-03-31 PROCEDURE — 99214 OFFICE O/P EST MOD 30 MIN: CPT | Mod: 95,,, | Performed by: STUDENT IN AN ORGANIZED HEALTH CARE EDUCATION/TRAINING PROGRAM

## 2021-03-31 PROCEDURE — 99214 PR OFFICE/OUTPT VISIT, EST, LEVL IV, 30-39 MIN: ICD-10-PCS | Mod: 95,,, | Performed by: STUDENT IN AN ORGANIZED HEALTH CARE EDUCATION/TRAINING PROGRAM

## 2021-03-31 RX ORDER — TRIAMCINOLONE ACETONIDE 1 MG/G
CREAM TOPICAL 2 TIMES DAILY
Qty: 80 G | Refills: 1 | Status: SHIPPED | OUTPATIENT
Start: 2021-03-31 | End: 2021-06-10

## 2021-03-31 RX ORDER — TRIFAROTENE 50 UG/G
1 CREAM TOPICAL NIGHTLY
Qty: 45 G | Refills: 2 | Status: SHIPPED | OUTPATIENT
Start: 2021-03-31 | End: 2023-06-05 | Stop reason: ALTCHOICE

## 2021-03-31 RX ORDER — DOXYCYCLINE HYCLATE 100 MG
TABLET ORAL
Qty: 30 TABLET | Refills: 2 | Status: SHIPPED | OUTPATIENT
Start: 2021-03-31 | End: 2021-06-10

## 2021-04-05 LAB
FINAL PATHOLOGIC DIAGNOSIS: NORMAL
Lab: NORMAL

## 2021-04-17 ENCOUNTER — DOCUMENTATION ONLY (OUTPATIENT)
Dept: BARIATRICS | Facility: CLINIC | Age: 26
End: 2021-04-17

## 2021-04-20 ENCOUNTER — OFFICE VISIT (OUTPATIENT)
Dept: OBSTETRICS AND GYNECOLOGY | Facility: CLINIC | Age: 26
End: 2021-04-20
Payer: COMMERCIAL

## 2021-04-20 VITALS
BODY MASS INDEX: 32.86 KG/M2 | HEIGHT: 62 IN | WEIGHT: 178.56 LBS | DIASTOLIC BLOOD PRESSURE: 68 MMHG | SYSTOLIC BLOOD PRESSURE: 114 MMHG

## 2021-04-20 DIAGNOSIS — N94.10 DYSPAREUNIA IN FEMALE: Primary | ICD-10-CM

## 2021-04-20 PROCEDURE — 99999 PR PBB SHADOW E&M-EST. PATIENT-LVL III: ICD-10-PCS | Mod: PBBFAC,,, | Performed by: OBSTETRICS & GYNECOLOGY

## 2021-04-20 PROCEDURE — 1126F AMNT PAIN NOTED NONE PRSNT: CPT | Mod: S$GLB,,, | Performed by: OBSTETRICS & GYNECOLOGY

## 2021-04-20 PROCEDURE — 99214 PR OFFICE/OUTPT VISIT, EST, LEVL IV, 30-39 MIN: ICD-10-PCS | Mod: S$GLB,,, | Performed by: OBSTETRICS & GYNECOLOGY

## 2021-04-20 PROCEDURE — 3008F BODY MASS INDEX DOCD: CPT | Mod: CPTII,S$GLB,, | Performed by: OBSTETRICS & GYNECOLOGY

## 2021-04-20 PROCEDURE — 1126F PR PAIN SEVERITY QUANTIFIED, NO PAIN PRESENT: ICD-10-PCS | Mod: S$GLB,,, | Performed by: OBSTETRICS & GYNECOLOGY

## 2021-04-20 PROCEDURE — 99999 PR PBB SHADOW E&M-EST. PATIENT-LVL III: CPT | Mod: PBBFAC,,, | Performed by: OBSTETRICS & GYNECOLOGY

## 2021-04-20 PROCEDURE — 3008F PR BODY MASS INDEX (BMI) DOCUMENTED: ICD-10-PCS | Mod: CPTII,S$GLB,, | Performed by: OBSTETRICS & GYNECOLOGY

## 2021-04-20 PROCEDURE — 99214 OFFICE O/P EST MOD 30 MIN: CPT | Mod: S$GLB,,, | Performed by: OBSTETRICS & GYNECOLOGY

## 2021-04-20 RX ORDER — NORETHINDRONE ACETATE AND ETHINYL ESTRADIOL .02; 1 MG/1; MG/1
1 TABLET ORAL DAILY
Qty: 90 TABLET | Refills: 3 | Status: SHIPPED | OUTPATIENT
Start: 2021-04-20 | End: 2022-04-11

## 2021-06-10 ENCOUNTER — OFFICE VISIT (OUTPATIENT)
Dept: INTERNAL MEDICINE | Facility: CLINIC | Age: 26
End: 2021-06-10
Payer: COMMERCIAL

## 2021-06-10 ENCOUNTER — LAB VISIT (OUTPATIENT)
Dept: LAB | Facility: OTHER | Age: 26
End: 2021-06-10
Attending: OBSTETRICS & GYNECOLOGY
Payer: COMMERCIAL

## 2021-06-10 VITALS
HEIGHT: 62 IN | HEART RATE: 87 BPM | BODY MASS INDEX: 32.94 KG/M2 | SYSTOLIC BLOOD PRESSURE: 120 MMHG | WEIGHT: 179 LBS | OXYGEN SATURATION: 99 % | DIASTOLIC BLOOD PRESSURE: 80 MMHG

## 2021-06-10 DIAGNOSIS — Z77.21 EXPOSURE TO BLOOD OR BODY FLUID: ICD-10-CM

## 2021-06-10 DIAGNOSIS — Z00.00 ANNUAL PHYSICAL EXAM: ICD-10-CM

## 2021-06-10 DIAGNOSIS — R53.83 FATIGUE, UNSPECIFIED TYPE: ICD-10-CM

## 2021-06-10 DIAGNOSIS — E28.2 PCOS (POLYCYSTIC OVARIAN SYNDROME): ICD-10-CM

## 2021-06-10 DIAGNOSIS — R63.5 WEIGHT GAIN: ICD-10-CM

## 2021-06-10 DIAGNOSIS — Z23 NEED FOR TETANUS, DIPHTHERIA, AND ACELLULAR PERTUSSIS (TDAP) VACCINE: ICD-10-CM

## 2021-06-10 DIAGNOSIS — Z00.00 ANNUAL PHYSICAL EXAM: Primary | ICD-10-CM

## 2021-06-10 DIAGNOSIS — E66.9 OBESITY (BMI 30.0-34.9): ICD-10-CM

## 2021-06-10 PROBLEM — L70.0 ACNE VULGARIS: Status: ACTIVE | Noted: 2021-06-10

## 2021-06-10 LAB
ALBUMIN SERPL BCP-MCNC: 3.5 G/DL (ref 3.5–5.2)
ALBUMIN SERPL BCP-MCNC: 3.5 G/DL (ref 3.5–5.2)
ALP SERPL-CCNC: 63 U/L (ref 55–135)
ALP SERPL-CCNC: 63 U/L (ref 55–135)
ALT SERPL W/O P-5'-P-CCNC: 20 U/L (ref 10–44)
ALT SERPL W/O P-5'-P-CCNC: 20 U/L (ref 10–44)
ANION GAP SERPL CALC-SCNC: 10 MMOL/L (ref 8–16)
ANION GAP SERPL CALC-SCNC: 10 MMOL/L (ref 8–16)
AST SERPL-CCNC: 19 U/L (ref 10–40)
AST SERPL-CCNC: 19 U/L (ref 10–40)
BASOPHILS # BLD AUTO: 0.04 K/UL (ref 0–0.2)
BASOPHILS NFR BLD: 0.4 % (ref 0–1.9)
BILIRUB SERPL-MCNC: 0.4 MG/DL (ref 0.1–1)
BILIRUB SERPL-MCNC: 0.4 MG/DL (ref 0.1–1)
BUN SERPL-MCNC: 13 MG/DL (ref 6–20)
BUN SERPL-MCNC: 13 MG/DL (ref 6–20)
CALCIUM SERPL-MCNC: 9.8 MG/DL (ref 8.7–10.5)
CALCIUM SERPL-MCNC: 9.8 MG/DL (ref 8.7–10.5)
CHLORIDE SERPL-SCNC: 104 MMOL/L (ref 95–110)
CHLORIDE SERPL-SCNC: 104 MMOL/L (ref 95–110)
CO2 SERPL-SCNC: 26 MMOL/L (ref 23–29)
CO2 SERPL-SCNC: 26 MMOL/L (ref 23–29)
CREAT SERPL-MCNC: 0.9 MG/DL (ref 0.5–1.4)
CREAT SERPL-MCNC: 0.9 MG/DL (ref 0.5–1.4)
DIFFERENTIAL METHOD: NORMAL
EOSINOPHIL # BLD AUTO: 0.1 K/UL (ref 0–0.5)
EOSINOPHIL NFR BLD: 1 % (ref 0–8)
ERYTHROCYTE [DISTWIDTH] IN BLOOD BY AUTOMATED COUNT: 13.1 % (ref 11.5–14.5)
EST. GFR  (AFRICAN AMERICAN): >60 ML/MIN/1.73 M^2
EST. GFR  (AFRICAN AMERICAN): >60 ML/MIN/1.73 M^2
EST. GFR  (NON AFRICAN AMERICAN): >60 ML/MIN/1.73 M^2
EST. GFR  (NON AFRICAN AMERICAN): >60 ML/MIN/1.73 M^2
ESTIMATED AVG GLUCOSE: 100 MG/DL (ref 68–131)
GLUCOSE SERPL-MCNC: 76 MG/DL (ref 70–110)
GLUCOSE SERPL-MCNC: 76 MG/DL (ref 70–110)
HBA1C MFR BLD: 5.1 % (ref 4–5.6)
HCT VFR BLD AUTO: 39.4 % (ref 37–48.5)
HGB BLD-MCNC: 13 G/DL (ref 12–16)
IMM GRANULOCYTES # BLD AUTO: 0.03 K/UL (ref 0–0.04)
IMM GRANULOCYTES NFR BLD AUTO: 0.3 % (ref 0–0.5)
LYMPHOCYTES # BLD AUTO: 2.4 K/UL (ref 1–4.8)
LYMPHOCYTES NFR BLD: 24.8 % (ref 18–48)
MCH RBC QN AUTO: 28.5 PG (ref 27–31)
MCHC RBC AUTO-ENTMCNC: 33 G/DL (ref 32–36)
MCV RBC AUTO: 86 FL (ref 82–98)
MONOCYTES # BLD AUTO: 0.5 K/UL (ref 0.3–1)
MONOCYTES NFR BLD: 5.5 % (ref 4–15)
NEUTROPHILS # BLD AUTO: 6.7 K/UL (ref 1.8–7.7)
NEUTROPHILS NFR BLD: 68 % (ref 38–73)
NRBC BLD-RTO: 0 /100 WBC
PLATELET # BLD AUTO: 306 K/UL (ref 150–450)
PMV BLD AUTO: 11.5 FL (ref 9.2–12.9)
POTASSIUM SERPL-SCNC: 3.5 MMOL/L (ref 3.5–5.1)
POTASSIUM SERPL-SCNC: 3.5 MMOL/L (ref 3.5–5.1)
PROT SERPL-MCNC: 7.4 G/DL (ref 6–8.4)
PROT SERPL-MCNC: 7.4 G/DL (ref 6–8.4)
RBC # BLD AUTO: 4.56 M/UL (ref 4–5.4)
SODIUM SERPL-SCNC: 140 MMOL/L (ref 136–145)
SODIUM SERPL-SCNC: 140 MMOL/L (ref 136–145)
WBC # BLD AUTO: 9.8 K/UL (ref 3.9–12.7)

## 2021-06-10 PROCEDURE — 83036 HEMOGLOBIN GLYCOSYLATED A1C: CPT | Performed by: INTERNAL MEDICINE

## 2021-06-10 PROCEDURE — 36415 COLL VENOUS BLD VENIPUNCTURE: CPT | Performed by: OBSTETRICS & GYNECOLOGY

## 2021-06-10 PROCEDURE — 85025 COMPLETE CBC W/AUTO DIFF WBC: CPT | Performed by: INTERNAL MEDICINE

## 2021-06-10 PROCEDURE — 99999 PR PBB SHADOW E&M-EST. PATIENT-LVL IV: ICD-10-PCS | Mod: PBBFAC,,, | Performed by: INTERNAL MEDICINE

## 2021-06-10 PROCEDURE — 86803 HEPATITIS C AB TEST: CPT | Performed by: INTERNAL MEDICINE

## 2021-06-10 PROCEDURE — 1126F AMNT PAIN NOTED NONE PRSNT: CPT | Mod: S$GLB,,, | Performed by: INTERNAL MEDICINE

## 2021-06-10 PROCEDURE — 99395 PREV VISIT EST AGE 18-39: CPT | Mod: S$GLB,,, | Performed by: INTERNAL MEDICINE

## 2021-06-10 PROCEDURE — 3008F PR BODY MASS INDEX (BMI) DOCUMENTED: ICD-10-PCS | Mod: CPTII,S$GLB,, | Performed by: INTERNAL MEDICINE

## 2021-06-10 PROCEDURE — 99395 PR PREVENTIVE VISIT,EST,18-39: ICD-10-PCS | Mod: S$GLB,,, | Performed by: INTERNAL MEDICINE

## 2021-06-10 PROCEDURE — 3008F BODY MASS INDEX DOCD: CPT | Mod: CPTII,S$GLB,, | Performed by: INTERNAL MEDICINE

## 2021-06-10 PROCEDURE — 80053 COMPREHEN METABOLIC PANEL: CPT | Performed by: OBSTETRICS & GYNECOLOGY

## 2021-06-10 PROCEDURE — 1126F PR PAIN SEVERITY QUANTIFIED, NO PAIN PRESENT: ICD-10-PCS | Mod: S$GLB,,, | Performed by: INTERNAL MEDICINE

## 2021-06-10 PROCEDURE — 99999 PR PBB SHADOW E&M-EST. PATIENT-LVL IV: CPT | Mod: PBBFAC,,, | Performed by: INTERNAL MEDICINE

## 2021-06-11 ENCOUNTER — IMMUNIZATION (OUTPATIENT)
Dept: PHARMACY | Facility: CLINIC | Age: 26
End: 2021-06-11
Payer: COMMERCIAL

## 2021-06-11 LAB — HCV AB SERPL QL IA: NEGATIVE

## 2021-07-01 ENCOUNTER — TELEPHONE (OUTPATIENT)
Dept: SLEEP MEDICINE | Facility: CLINIC | Age: 26
End: 2021-07-01

## 2021-07-09 ENCOUNTER — OFFICE VISIT (OUTPATIENT)
Dept: SLEEP MEDICINE | Facility: CLINIC | Age: 26
End: 2021-07-09
Payer: COMMERCIAL

## 2021-07-09 DIAGNOSIS — E28.2 PCOS (POLYCYSTIC OVARIAN SYNDROME): Primary | ICD-10-CM

## 2021-07-09 DIAGNOSIS — R06.83 SNORING: ICD-10-CM

## 2021-07-09 DIAGNOSIS — E66.9 OBESITY (BMI 30.0-34.9): ICD-10-CM

## 2021-07-09 DIAGNOSIS — M79.7 FIBROMYALGIA: ICD-10-CM

## 2021-07-09 DIAGNOSIS — R53.83 FATIGUE, UNSPECIFIED TYPE: ICD-10-CM

## 2021-07-09 PROCEDURE — 99202 OFFICE O/P NEW SF 15 MIN: CPT | Mod: 95,,, | Performed by: INTERNAL MEDICINE

## 2021-07-09 PROCEDURE — 99202 PR OFFICE/OUTPT VISIT, NEW, LEVL II, 15-29 MIN: ICD-10-PCS | Mod: 95,,, | Performed by: INTERNAL MEDICINE

## 2021-07-15 ENCOUNTER — TELEPHONE (OUTPATIENT)
Dept: SLEEP MEDICINE | Facility: OTHER | Age: 26
End: 2021-07-15

## 2021-07-18 ENCOUNTER — PATIENT MESSAGE (OUTPATIENT)
Dept: INTERNAL MEDICINE | Facility: CLINIC | Age: 26
End: 2021-07-18

## 2021-07-19 ENCOUNTER — TELEPHONE (OUTPATIENT)
Dept: SLEEP MEDICINE | Facility: OTHER | Age: 26
End: 2021-07-19

## 2021-07-26 ENCOUNTER — PATIENT MESSAGE (OUTPATIENT)
Dept: SLEEP MEDICINE | Facility: CLINIC | Age: 26
End: 2021-07-26

## 2021-07-26 ENCOUNTER — PATIENT MESSAGE (OUTPATIENT)
Dept: INTERNAL MEDICINE | Facility: CLINIC | Age: 26
End: 2021-07-26

## 2021-07-26 DIAGNOSIS — Z00.00 ANNUAL PHYSICAL EXAM: Primary | ICD-10-CM

## 2021-07-26 DIAGNOSIS — Z02.1 ENCOUNTER FOR PRE-EMPLOYMENT HEALTH SCREENING EXAMINATION: ICD-10-CM

## 2021-07-28 ENCOUNTER — LAB VISIT (OUTPATIENT)
Dept: LAB | Facility: OTHER | Age: 26
End: 2021-07-28
Attending: INTERNAL MEDICINE
Payer: COMMERCIAL

## 2021-07-28 DIAGNOSIS — Z00.00 ANNUAL PHYSICAL EXAM: ICD-10-CM

## 2021-07-28 DIAGNOSIS — Z02.1 ENCOUNTER FOR PRE-EMPLOYMENT HEALTH SCREENING EXAMINATION: ICD-10-CM

## 2021-07-28 PROBLEM — N94.10 DYSPAREUNIA IN FEMALE: Status: ACTIVE | Noted: 2021-07-28

## 2021-07-28 PROBLEM — R10.2 PELVIC PAIN IN FEMALE: Status: ACTIVE | Noted: 2021-07-28

## 2021-07-28 PROBLEM — R27.9 LACK OF COORDINATION: Status: ACTIVE | Noted: 2021-07-28

## 2021-07-28 PROCEDURE — 86706 HEP B SURFACE ANTIBODY: CPT | Performed by: INTERNAL MEDICINE

## 2021-07-28 PROCEDURE — 36415 COLL VENOUS BLD VENIPUNCTURE: CPT | Performed by: INTERNAL MEDICINE

## 2021-07-31 ENCOUNTER — PATIENT MESSAGE (OUTPATIENT)
Dept: INTERNAL MEDICINE | Facility: CLINIC | Age: 26
End: 2021-07-31

## 2021-07-31 DIAGNOSIS — Z02.1 ENCOUNTER FOR PRE-EMPLOYMENT HEALTH SCREENING EXAMINATION: Primary | ICD-10-CM

## 2021-07-31 DIAGNOSIS — Z28.39 HEPATITIS B VACCINATION NOT UP TO DATE: ICD-10-CM

## 2021-07-31 DIAGNOSIS — Z28.39 INCOMPLETE IMMUNIZATION STATUS: ICD-10-CM

## 2021-07-31 LAB
HBV SURFACE AB SER QL IA: NEGATIVE
HBV SURFACE AB SERPL IA-ACNC: 9 MIU/ML

## 2021-08-02 ENCOUNTER — CLINICAL SUPPORT (OUTPATIENT)
Dept: REHABILITATION | Facility: OTHER | Age: 26
End: 2021-08-02
Attending: OBSTETRICS & GYNECOLOGY
Payer: COMMERCIAL

## 2021-08-02 DIAGNOSIS — R10.2 PELVIC PAIN IN FEMALE: ICD-10-CM

## 2021-08-02 DIAGNOSIS — R27.9 LACK OF COORDINATION: ICD-10-CM

## 2021-08-02 DIAGNOSIS — N94.10 DYSPAREUNIA IN FEMALE: ICD-10-CM

## 2021-08-02 PROCEDURE — 97140 MANUAL THERAPY 1/> REGIONS: CPT

## 2021-08-02 PROCEDURE — 97161 PT EVAL LOW COMPLEX 20 MIN: CPT

## 2021-08-02 PROCEDURE — 97112 NEUROMUSCULAR REEDUCATION: CPT

## 2021-08-02 PROCEDURE — 97110 THERAPEUTIC EXERCISES: CPT

## 2021-08-03 ENCOUNTER — PATIENT MESSAGE (OUTPATIENT)
Dept: INTERNAL MEDICINE | Facility: CLINIC | Age: 26
End: 2021-08-03

## 2021-08-11 ENCOUNTER — PATIENT MESSAGE (OUTPATIENT)
Dept: REHABILITATION | Facility: OTHER | Age: 26
End: 2021-08-11

## 2021-08-23 ENCOUNTER — CLINICAL SUPPORT (OUTPATIENT)
Dept: REHABILITATION | Facility: OTHER | Age: 26
End: 2021-08-23
Attending: INTERNAL MEDICINE
Payer: COMMERCIAL

## 2021-08-23 DIAGNOSIS — N94.10 DYSPAREUNIA IN FEMALE: Primary | ICD-10-CM

## 2021-08-23 DIAGNOSIS — R10.2 PELVIC PAIN IN FEMALE: ICD-10-CM

## 2021-08-23 DIAGNOSIS — R27.9 LACK OF COORDINATION: ICD-10-CM

## 2021-08-23 PROCEDURE — 97140 MANUAL THERAPY 1/> REGIONS: CPT

## 2021-08-23 PROCEDURE — 97530 THERAPEUTIC ACTIVITIES: CPT

## 2021-09-23 ENCOUNTER — CLINICAL SUPPORT (OUTPATIENT)
Dept: REHABILITATION | Facility: OTHER | Age: 26
End: 2021-09-23
Attending: INTERNAL MEDICINE
Payer: COMMERCIAL

## 2021-09-23 DIAGNOSIS — R10.2 PELVIC PAIN IN FEMALE: ICD-10-CM

## 2021-09-23 DIAGNOSIS — R27.9 LACK OF COORDINATION: ICD-10-CM

## 2021-09-23 DIAGNOSIS — N94.10 DYSPAREUNIA IN FEMALE: Primary | ICD-10-CM

## 2021-09-23 PROCEDURE — 97140 MANUAL THERAPY 1/> REGIONS: CPT

## 2021-09-23 PROCEDURE — 97112 NEUROMUSCULAR REEDUCATION: CPT

## 2021-09-23 PROCEDURE — 97110 THERAPEUTIC EXERCISES: CPT

## 2021-09-24 ENCOUNTER — IMMUNIZATION (OUTPATIENT)
Dept: PHARMACY | Facility: CLINIC | Age: 26
End: 2021-09-24
Payer: COMMERCIAL

## 2021-10-01 ENCOUNTER — PATIENT MESSAGE (OUTPATIENT)
Dept: REHABILITATION | Facility: OTHER | Age: 26
End: 2021-10-01

## 2021-10-14 ENCOUNTER — IMMUNIZATION (OUTPATIENT)
Dept: INTERNAL MEDICINE | Facility: CLINIC | Age: 26
End: 2021-10-14
Payer: COMMERCIAL

## 2021-10-14 DIAGNOSIS — Z23 NEED FOR VACCINATION: Primary | ICD-10-CM

## 2021-10-14 PROCEDURE — 91300 COVID-19, MRNA, LNP-S, PF, 30 MCG/0.3 ML DOSE VACCINE: CPT | Mod: PBBFAC | Performed by: INTERNAL MEDICINE

## 2021-10-14 PROCEDURE — 0003A COVID-19, MRNA, LNP-S, PF, 30 MCG/0.3 ML DOSE VACCINE: CPT | Mod: PBBFAC | Performed by: INTERNAL MEDICINE

## 2021-10-15 ENCOUNTER — OFFICE VISIT (OUTPATIENT)
Dept: SLEEP MEDICINE | Facility: CLINIC | Age: 26
End: 2021-10-15
Payer: COMMERCIAL

## 2021-10-15 DIAGNOSIS — E28.2 PCOS (POLYCYSTIC OVARIAN SYNDROME): ICD-10-CM

## 2021-10-15 DIAGNOSIS — G47.10 HYPERSOMNIA: Primary | ICD-10-CM

## 2021-10-15 DIAGNOSIS — Z01.818 PRE-OP TESTING: ICD-10-CM

## 2021-10-15 DIAGNOSIS — M79.7 FIBROMYALGIA: ICD-10-CM

## 2021-10-15 DIAGNOSIS — E66.9 OBESITY (BMI 30.0-34.9): ICD-10-CM

## 2021-10-15 PROCEDURE — 1160F PR REVIEW ALL MEDS BY PRESCRIBER/CLIN PHARMACIST DOCUMENTED: ICD-10-PCS | Mod: CPTII,95,, | Performed by: INTERNAL MEDICINE

## 2021-10-15 PROCEDURE — 1159F MED LIST DOCD IN RCRD: CPT | Mod: CPTII,95,, | Performed by: INTERNAL MEDICINE

## 2021-10-15 PROCEDURE — 99212 OFFICE O/P EST SF 10 MIN: CPT | Mod: 95,,, | Performed by: INTERNAL MEDICINE

## 2021-10-15 PROCEDURE — 1159F PR MEDICATION LIST DOCUMENTED IN MEDICAL RECORD: ICD-10-PCS | Mod: CPTII,95,, | Performed by: INTERNAL MEDICINE

## 2021-10-15 PROCEDURE — 99212 PR OFFICE/OUTPT VISIT, EST, LEVL II, 10-19 MIN: ICD-10-PCS | Mod: 95,,, | Performed by: INTERNAL MEDICINE

## 2021-10-15 PROCEDURE — 1160F RVW MEDS BY RX/DR IN RCRD: CPT | Mod: CPTII,95,, | Performed by: INTERNAL MEDICINE

## 2021-10-15 PROCEDURE — 3044F PR MOST RECENT HEMOGLOBIN A1C LEVEL <7.0%: ICD-10-PCS | Mod: CPTII,95,, | Performed by: INTERNAL MEDICINE

## 2021-10-15 PROCEDURE — 3044F HG A1C LEVEL LT 7.0%: CPT | Mod: CPTII,95,, | Performed by: INTERNAL MEDICINE

## 2021-10-19 ENCOUNTER — TELEPHONE (OUTPATIENT)
Dept: SLEEP MEDICINE | Facility: OTHER | Age: 26
End: 2021-10-19

## 2021-10-21 ENCOUNTER — TELEPHONE (OUTPATIENT)
Dept: SLEEP MEDICINE | Facility: OTHER | Age: 26
End: 2021-10-21

## 2021-10-22 ENCOUNTER — CLINICAL SUPPORT (OUTPATIENT)
Dept: REHABILITATION | Facility: OTHER | Age: 26
End: 2021-10-22
Attending: INTERNAL MEDICINE
Payer: COMMERCIAL

## 2021-10-22 DIAGNOSIS — R27.9 LACK OF COORDINATION: ICD-10-CM

## 2021-10-22 DIAGNOSIS — R10.2 PELVIC PAIN IN FEMALE: ICD-10-CM

## 2021-10-22 DIAGNOSIS — N94.10 DYSPAREUNIA IN FEMALE: Primary | ICD-10-CM

## 2021-10-22 PROCEDURE — 97140 MANUAL THERAPY 1/> REGIONS: CPT

## 2021-10-22 PROCEDURE — 97110 THERAPEUTIC EXERCISES: CPT

## 2021-10-26 ENCOUNTER — PATIENT MESSAGE (OUTPATIENT)
Dept: SLEEP MEDICINE | Facility: OTHER | Age: 26
End: 2021-10-26
Payer: COMMERCIAL

## 2021-11-08 ENCOUNTER — TELEPHONE (OUTPATIENT)
Dept: SLEEP MEDICINE | Facility: OTHER | Age: 26
End: 2021-11-08
Payer: COMMERCIAL

## 2021-11-12 ENCOUNTER — TELEPHONE (OUTPATIENT)
Dept: SLEEP MEDICINE | Facility: OTHER | Age: 26
End: 2021-11-12
Payer: COMMERCIAL

## 2021-11-18 ENCOUNTER — CLINICAL SUPPORT (OUTPATIENT)
Dept: REHABILITATION | Facility: OTHER | Age: 26
End: 2021-11-18
Attending: INTERNAL MEDICINE
Payer: COMMERCIAL

## 2021-11-18 DIAGNOSIS — N94.10 DYSPAREUNIA IN FEMALE: Primary | ICD-10-CM

## 2021-11-18 DIAGNOSIS — R27.9 LACK OF COORDINATION: ICD-10-CM

## 2021-11-18 DIAGNOSIS — R10.2 PELVIC PAIN IN FEMALE: ICD-10-CM

## 2021-11-18 PROCEDURE — 97530 THERAPEUTIC ACTIVITIES: CPT

## 2021-11-18 PROCEDURE — 97140 MANUAL THERAPY 1/> REGIONS: CPT

## 2021-11-26 ENCOUNTER — TELEPHONE (OUTPATIENT)
Dept: SLEEP MEDICINE | Facility: OTHER | Age: 26
End: 2021-11-26
Payer: COMMERCIAL

## 2021-12-02 ENCOUNTER — TELEPHONE (OUTPATIENT)
Dept: SLEEP MEDICINE | Facility: OTHER | Age: 26
End: 2021-12-02
Payer: COMMERCIAL

## 2021-12-02 ENCOUNTER — CLINICAL SUPPORT (OUTPATIENT)
Dept: REHABILITATION | Facility: OTHER | Age: 26
End: 2021-12-02
Payer: COMMERCIAL

## 2021-12-02 ENCOUNTER — HOSPITAL ENCOUNTER (OUTPATIENT)
Dept: SLEEP MEDICINE | Facility: OTHER | Age: 26
Discharge: HOME | End: 2021-12-02
Attending: INTERNAL MEDICINE
Payer: COMMERCIAL

## 2021-12-02 DIAGNOSIS — M79.7 FIBROMYALGIA: ICD-10-CM

## 2021-12-02 DIAGNOSIS — N94.10 DYSPAREUNIA IN FEMALE: Primary | ICD-10-CM

## 2021-12-02 DIAGNOSIS — R10.2 PELVIC PAIN IN FEMALE: ICD-10-CM

## 2021-12-02 DIAGNOSIS — E66.9 OBESITY (BMI 30.0-34.9): ICD-10-CM

## 2021-12-02 DIAGNOSIS — R27.9 LACK OF COORDINATION: ICD-10-CM

## 2021-12-02 DIAGNOSIS — E28.2 PCOS (POLYCYSTIC OVARIAN SYNDROME): ICD-10-CM

## 2021-12-02 DIAGNOSIS — G47.10 HYPERSOMNIA: ICD-10-CM

## 2021-12-02 PROCEDURE — 95810 PR POLYSOMNOGRAPHY, 4 OR MORE: ICD-10-PCS | Mod: 26,,, | Performed by: INTERNAL MEDICINE

## 2021-12-02 PROCEDURE — 97530 THERAPEUTIC ACTIVITIES: CPT

## 2021-12-02 PROCEDURE — 97140 MANUAL THERAPY 1/> REGIONS: CPT

## 2021-12-02 PROCEDURE — 95810 POLYSOM 6/> YRS 4/> PARAM: CPT | Mod: 26,,, | Performed by: INTERNAL MEDICINE

## 2021-12-02 PROCEDURE — 95810 POLYSOM 6/> YRS 4/> PARAM: CPT

## 2021-12-24 ENCOUNTER — PATIENT MESSAGE (OUTPATIENT)
Dept: SLEEP MEDICINE | Facility: CLINIC | Age: 26
End: 2021-12-24
Payer: COMMERCIAL

## 2021-12-28 ENCOUNTER — PATIENT MESSAGE (OUTPATIENT)
Dept: SLEEP MEDICINE | Facility: CLINIC | Age: 26
End: 2021-12-28
Payer: COMMERCIAL

## 2022-01-03 ENCOUNTER — TELEPHONE (OUTPATIENT)
Dept: INTERNAL MEDICINE | Facility: CLINIC | Age: 27
End: 2022-01-03

## 2022-01-03 ENCOUNTER — PATIENT MESSAGE (OUTPATIENT)
Dept: INTERNAL MEDICINE | Facility: CLINIC | Age: 27
End: 2022-01-03
Payer: COMMERCIAL

## 2022-01-03 DIAGNOSIS — Z28.39 HEPATITIS B VACCINATION NOT UP TO DATE: Primary | ICD-10-CM

## 2022-01-05 NOTE — PROGRESS NOTES
Pelvic Health Physical Therapy   Treatment Note     Name: Erlinda Rain  Clinic Number: [id number]    Therapy Diagnosis:   Encounter Diagnoses   Name Primary?    Dyspareunia in female Yes    Pelvic pain in female     Lack of coordination      Physician: Lula Baxter MD    Visit Date: 1/6/2022    Physician Orders: PT Eval and Treat  Medical Diagnosis from Referral: Dyspareunia in female  Evaluation Date: 8/2/2021  Authorization Period Expiration: 6/1/2022  Plan of Care Expiration: 10/31/2021, updated to 3/2/2022  Visit # / Visits authorized: 1/20    Time In: 908  Time Out: 1000  Total Billable Time: 52 minutes    Precautions: hx of fibromyalgia and PCOS    Subjective     Pt reports: performed dilator training a few times since last session but was unable to use while on vacation. She reports decreased pain with using dilator on her own. Pt has not had intercourse due to low sex drive.      She was compliant with home exercise program.  Response to previous treatment: no adverse effects reported  Functional change: none      Pain: 0/10  Location: NA    Constitutional Symptoms Review: The patient denies having any constitutional symptoms.     Objective   Pt verbally consents to intravaginal treatment today.  Signed consent form already on file.     Therapeutic Exercise to develop  ROM and flexibility for 00 minutes including:   Happy baby - 1x10 deep breaths  Supine figure 4 piriformis stretch - 2x60 sec BEBETO  Cat/cow - 1x15   Deep squat - 2x60 sec  Z-sit - 2x60 sec BEBETO  W stretch on physioball - 1x15     Erlinda received the following manual therapy techniques: to develop flexibility, extensibility and desensitization for 30 minutes including:   soft tissue mobilization of (B) posterior and lateral walls of levator ani, obturator internus with diaphragmatic breathing techniques    (not performed this visit)  Soft tissue mobilization of (B) adductors in supine butterfly position    Therapeutic Activity to improve  dynamic functional performance, coordination and education for 22 minutes. Including:   Patient brought in dilator set today.  Pt instructed on use of dilators set and progression.  Patient started with number 4 size and progressed to number 5.      Home Exercises Provided and Patient Education Provided     Education provided:   - anatomy/physiology of pelvic floor, posture/body mechanices, dilator use, diaphragmatic breathing and proper bearing down techniques  Discussed progression of plan of care with patient; educated pt in activity modification; reviewed HEP with pt. Pt demonstrated and verbalized understanding of all instruction and was provided with a handout of HEP (see Patient Instructions).    Written Home Exercises Provided: yes.  Exercises were reviewed and Erlinda was able to demonstrate them prior to the end of the session.  Erlinda demonstrated good  understanding of the education provided.     See EMR under Patient Instructions for exercises provided 1/6/22.    Assessment     Pt reports TTP with ischemic pressure of bilateral PFM, R greater than L this visit. Noted areas of muscle spasm through posterior wall of pelvic floor. Continued dilator training with pt tolerating full insertion/removal of size 4 dilator with no reports of significant discomfort. Progressed to size 5 with pt reporting mild pain but tolerating full insertion. Pt notes more discomfort with removal of dilator vs. insertion which is improved with bearing down. Progressed home program to dilator training with size 5 in order to decrease pelvic muscle guarding and pain during intercourse.      Erlinda is progressing well towards her goals.   Pt prognosis is Excellent.     Pt will continue to benefit from skilled outpatient physical therapy to address the deficits listed in the problem list box on initial evaluation, provide pt/family education and to maximize pt's level of independence in the home and community environment.     Pt's  spiritual, cultural and educational needs considered and pt agreeable to plan of care and goals.     Anticipated barriers to physical therapy: access to sex therapy    Goals:   Short Term Goals: 6 weeks   Pt to demonstrate proper diaphragmatic breathing to help with calming the nervous system in order to decrease pain and to improve abdominal wall musculature extensibility. MET   Pt to report a decrease in pain to no more than 2/10 at it's worst with intravaginal soft tissue mobilization.  PROGRESSING (MET 75%)  Pt will report minimal to no pain with single digit pelvic assessment and display relaxation during this assessment in order to progress to dilator use. PROGRESSING     Long Term Goals: 12 weeks   Pt to be discharged with home plan for carry over after discharge. PROGRESSING  Pt to report being able to have a comfortable vaginal exam without significant increase in pelvic pain. PROGRESSING  Pt to increase abdominal wall strength by at least 1 muscle grade to improve lumbopelvic stability with ADLs that would normally increase pain. PROGRESSING  Pt to report a decrease in pain to no more than 0/10 at it's worst with intravaginal soft tissue mobilization in order to facilitate return to normal function. PROGRESSING  Pt will tolerate a #7 sized dilator in order to improve access to women's healthcare and tolerance for intercourse. PROGRESSING  Pt will be independent with use of dilation in order to progress towards self management and intercourse. PROGRESSING  Pt will report successful intercourse with no pain reported to demonstrate a return towards prior level of function. PROGRESSING    Plan     Plan of care Certification: 8/2/2021 to 10/31/2021, updated to 3/2/2022     Outpatient Physical Therapy 1 times weekly for 12 weeks to include the following interventions: therapeutic exercises, therapeutic activity, neuromuscular re-education, manual therapy, patient/family education and self care/home  management.    Continue dilator training. Continue STM to PFM, low back, and piriformis/gluteals. Progress stretching program.Teach bowel massage.    Anna Kimura, PT

## 2022-01-06 ENCOUNTER — LAB VISIT (OUTPATIENT)
Dept: LAB | Facility: OTHER | Age: 27
End: 2022-01-06
Attending: INTERNAL MEDICINE
Payer: COMMERCIAL

## 2022-01-06 ENCOUNTER — CLINICAL SUPPORT (OUTPATIENT)
Dept: REHABILITATION | Facility: OTHER | Age: 27
End: 2022-01-06
Attending: OBSTETRICS & GYNECOLOGY
Payer: COMMERCIAL

## 2022-01-06 DIAGNOSIS — R10.2 PELVIC PAIN IN FEMALE: ICD-10-CM

## 2022-01-06 DIAGNOSIS — N94.10 DYSPAREUNIA IN FEMALE: Primary | ICD-10-CM

## 2022-01-06 DIAGNOSIS — Z28.39 HEPATITIS B VACCINATION NOT UP TO DATE: ICD-10-CM

## 2022-01-06 DIAGNOSIS — R27.9 LACK OF COORDINATION: ICD-10-CM

## 2022-01-06 PROCEDURE — 86706 HEP B SURFACE ANTIBODY: CPT | Performed by: INTERNAL MEDICINE

## 2022-01-06 PROCEDURE — 97140 MANUAL THERAPY 1/> REGIONS: CPT

## 2022-01-06 PROCEDURE — 36415 COLL VENOUS BLD VENIPUNCTURE: CPT | Performed by: INTERNAL MEDICINE

## 2022-01-06 PROCEDURE — 97530 THERAPEUTIC ACTIVITIES: CPT

## 2022-01-06 NOTE — PATIENT INSTRUCTIONS
Home Exercise Program: 01/06/2022    DILATOR TRAINING  1. Make sure the dilator is clean, free of any visible soiling.   2. Apply water-based lubricant (ie. Slippery stuff, coconut oil, olive oil) to the dilator and the vaginal opening.   3. Either sit on the toilet and lean back on the tank OR lay back with your back well supported by several pillows and both knees bent.   4. Spread labia and insert the dilator.    With the GREEN size:  5. Apply downward pressure onto the levator ani group of muscles on one side.     To find the levator ani muscles:   - In the middle at 6 o'clock = Rectum   - Move a little to the right at 5 o'clock = Right levator ani group   - Move a little to the left at 7 o'clock = Left levator ani group   - A little more to the right at 3/4 o'clock = Right obturator internus muscle   - A little more to the left at 8/9 o'clock = Left obturator internus muscle   - If you feel sharp, stabbing pain = bone or pudendal nerve (wrong)  6. Begin Diaphragmatic Breathing and drop your pelvic floor muscle (releasing the tension), following the encouragement of the dilator.  7. Repeat on the levator ani group of muscles on the other side.  8. Continue for 5-10 minutes.    With the GREEN size:  9. Insert the dilator until it begins to get moderately uncomfortable. Stop at this point.  10. Begin Diaphragmatic Breathing and focus on DROPPING the pelvic floor muscles, releasing their tension.   11. Once the discomfort has dissipated, insert the dilator a little bit more, and repeat this process.  12. You can also move the dilator slightly side/side or forward/backward to get the tissues use to being mobilized.   13. Continue for 5 minutes.     14. Once finished, remove dilator.  15. Wash with anti-bacterial soap and thoroughly rinse. Let air dry whenever possible. Store in a dry, clean location.   16. Repeat 5 of the 7 days per week.    *Don't apply so much pressure that it leaves you too sore to perform again  the next day.    STOP if there is increased bleeding, an infection, or extreme pain.

## 2022-01-07 ENCOUNTER — PATIENT MESSAGE (OUTPATIENT)
Dept: INTERNAL MEDICINE | Facility: CLINIC | Age: 27
End: 2022-01-07
Payer: COMMERCIAL

## 2022-01-07 DIAGNOSIS — Z28.39 HEPATITIS B VACCINATION NOT UP TO DATE: Primary | ICD-10-CM

## 2022-01-07 LAB — HBV SURFACE AB SER-ACNC: POSITIVE M[IU]/ML

## 2022-01-09 NOTE — TELEPHONE ENCOUNTER
Please ask if the lab can add it on.  If not, please ask her to have repeat the lab draw.  Thanks!

## 2022-01-10 ENCOUNTER — PATIENT MESSAGE (OUTPATIENT)
Dept: INTERNAL MEDICINE | Facility: CLINIC | Age: 27
End: 2022-01-10
Payer: COMMERCIAL

## 2022-01-10 ENCOUNTER — LAB VISIT (OUTPATIENT)
Dept: LAB | Facility: OTHER | Age: 27
End: 2022-01-10
Attending: STUDENT IN AN ORGANIZED HEALTH CARE EDUCATION/TRAINING PROGRAM
Payer: COMMERCIAL

## 2022-01-10 DIAGNOSIS — Z28.39 HEPATITIS B VACCINATION NOT UP TO DATE: ICD-10-CM

## 2022-01-10 PROCEDURE — 86706 HEP B SURFACE ANTIBODY: CPT | Performed by: STUDENT IN AN ORGANIZED HEALTH CARE EDUCATION/TRAINING PROGRAM

## 2022-01-10 PROCEDURE — 36415 COLL VENOUS BLD VENIPUNCTURE: CPT | Performed by: STUDENT IN AN ORGANIZED HEALTH CARE EDUCATION/TRAINING PROGRAM

## 2022-01-13 LAB
HBV SURFACE AB SER QL IA: POSITIVE
HBV SURFACE AB SERPL IA-ACNC: NORMAL MIU/ML

## 2022-01-20 NOTE — PROGRESS NOTES
Pelvic Health Physical Therapy   Treatment Note     Name: Erlinda Rain  Clinic Number: [id number]    Therapy Diagnosis:   Encounter Diagnoses   Name Primary?    Dyspareunia in female Yes    Pelvic pain in female     Lack of coordination      Physician: Lula Baxter MD    Visit Date: 1/24/2022    Physician Orders: PT Eval and Treat  Medical Diagnosis from Referral: Dyspareunia in female  Evaluation Date: 8/2/2021  Authorization Period Expiration: 12/31/2022  Plan of Care Expiration: 10/31/2021, updated to 3/2/2022  Visit # / Visits authorized: 3/36    Time In: 1256  Time Out: 1350  Total Billable Time: 54 minutes    Precautions: hx of fibromyalgia and PCOS    Subjective     Pt reports: used dilator 1x since last visit due to being on her period. She has not had intercourse with her boyfriend for a few weeks. She reports that using massage gun and performing stretching program has assisted in decreasing pain.       She was compliant with home exercise program.  Response to previous treatment: no adverse effects reported  Functional change: none      Pain: 0/10  Location: NA    Constitutional Symptoms Review: The patient denies having any constitutional symptoms.     Objective   Pt verbally consents to intravaginal treatment today.  Signed consent form already on file.     Therapeutic Exercise to develop  ROM and flexibility for 15 minutes including:   Happy baby - 1x10 deep breaths  Supine figure 4 piriformis stretch - 2x60 sec BEBETO  Cat/cow - 1x15   Deep squat - 2x60 sec  Z-sit - 2x60 sec BEBETO  W stretch on physioball - 1x15   Open book stretch - 2x10 BEBETO    Erlinda received the following manual therapy techniques: to develop flexibility, extensibility and desensitization for 00 minutes including:   soft tissue mobilization of (B) posterior and lateral walls of levator ani, obturator internus with diaphragmatic breathing techniques    Soft tissue mobilization of (B) adductors in supine butterfly  position    Therapeutic Activity to improve dynamic functional performance, coordination and education for 39 minutes. Including:   Patient brought in dilator set today.  Pt instructed on use of dilators set and progression.  Patient started with number 5 size and progressed to number 6.      Home Exercises Provided and Patient Education Provided     Education provided:   - anatomy/physiology of pelvic floor, posture/body mechanices, dilator use, diaphragmatic breathing and proper bearing down techniques  Discussed progression of plan of care with patient; educated pt in activity modification; reviewed HEP with pt. Pt demonstrated and verbalized understanding of all instruction and was provided with a handout of HEP (see Patient Instructions).    Written Home Exercises Provided: yes.  Exercises were reviewed and Erlinda was able to demonstrate them prior to the end of the session.  Erlinda demonstrated good  understanding of the education provided.     See EMR under Patient Instructions for exercises provided 1/6/22.    Assessment     Pt demonstrates increasing independence with HEP, requiring minimal cueing this visit. Continued dilator training with pt tolerating full insertion/removal of size 5 dilator with no reports of significant discomfort. Progressed to size 6 with pt reporting mild intermittent pain that decreased with deep breathing and gentle bearing down to lengthen PFM. Pt able to tolerate full insertions of size 6 dilator. Emphasized importance of utilized dilator for HEP in order to maximize therapeutic outcomes and decrease pain in between sessions.     Erlinda is progressing well towards her goals.   Pt prognosis is Excellent.     Pt will continue to benefit from skilled outpatient physical therapy to address the deficits listed in the problem list box on initial evaluation, provide pt/family education and to maximize pt's level of independence in the home and community environment.     Pt's spiritual,  cultural and educational needs considered and pt agreeable to plan of care and goals.     Anticipated barriers to physical therapy: access to sex therapy    Goals:   Short Term Goals: 6 weeks   Pt to demonstrate proper diaphragmatic breathing to help with calming the nervous system in order to decrease pain and to improve abdominal wall musculature extensibility. MET   Pt to report a decrease in pain to no more than 2/10 at it's worst with intravaginal soft tissue mobilization.  PROGRESSING (MET 75%)  Pt will report minimal to no pain with single digit pelvic assessment and display relaxation during this assessment in order to progress to dilator use. PROGRESSING     Long Term Goals: 12 weeks   Pt to be discharged with home plan for carry over after discharge. PROGRESSING  Pt to report being able to have a comfortable vaginal exam without significant increase in pelvic pain. PROGRESSING  Pt to increase abdominal wall strength by at least 1 muscle grade to improve lumbopelvic stability with ADLs that would normally increase pain. PROGRESSING  Pt to report a decrease in pain to no more than 0/10 at it's worst with intravaginal soft tissue mobilization in order to facilitate return to normal function. PROGRESSING  Pt will tolerate a #7 sized dilator in order to improve access to women's healthcare and tolerance for intercourse. PROGRESSING  Pt will be independent with use of dilation in order to progress towards self management and intercourse. PROGRESSING  Pt will report successful intercourse with no pain reported to demonstrate a return towards prior level of function. PROGRESSING    Plan     Plan of care Certification: 8/2/2021 to 10/31/2021, updated to 3/2/2022     Outpatient Physical Therapy 1 times weekly for 12 weeks to include the following interventions: therapeutic exercises, therapeutic activity, neuromuscular re-education, manual therapy, patient/family education and self care/home management.    Continue  dilator training. Continue STM to PFM, low back, and piriformis/gluteals. Progress stretching program.Teach bowel massage.    Anna Kimura, PT

## 2022-01-24 ENCOUNTER — CLINICAL SUPPORT (OUTPATIENT)
Dept: REHABILITATION | Facility: OTHER | Age: 27
End: 2022-01-24
Attending: OBSTETRICS & GYNECOLOGY
Payer: COMMERCIAL

## 2022-01-24 DIAGNOSIS — N94.10 DYSPAREUNIA IN FEMALE: Primary | ICD-10-CM

## 2022-01-24 DIAGNOSIS — R10.2 PELVIC PAIN IN FEMALE: ICD-10-CM

## 2022-01-24 DIAGNOSIS — R27.9 LACK OF COORDINATION: ICD-10-CM

## 2022-01-24 PROCEDURE — 97530 THERAPEUTIC ACTIVITIES: CPT

## 2022-01-24 PROCEDURE — 97110 THERAPEUTIC EXERCISES: CPT

## 2022-01-24 NOTE — PATIENT INSTRUCTIONS
Home Exercise Program: 01/24/2022    DILATOR TRAINING  1. Make sure the dilator is clean, free of any visible soiling.   2. Apply water-based lubricant (ie. Slippery stuff, coconut oil, olive oil) to the dilator and the vaginal opening.   3. Either sit on the toilet and lean back on the tank OR lay back with your back well supported by several pillows and both knees bent.   4. Spread labia and insert the dilator.    With the [employer] size:  5. Apply downward pressure onto the levator ani group of muscles on one side.     To find the levator ani muscles:   - In the middle at 6 o'clock = Rectum   - Move a little to the right at 5 o'clock = Right levator ani group   - Move a little to the left at 7 o'clock = Left levator ani group   - A little more to the right at 3/4 o'clock = Right obturator internus muscle   - A little more to the left at 8/9 o'clock = Left obturator internus muscle   - If you feel sharp, stabbing pain = bone or pudendal nerve (wrong)  6. Begin Diaphragmatic Breathing and drop your pelvic floor muscle (releasing the tension), following the encouragement of the dilator.  7. Repeat on the levator ani group of muscles on the other side.  8. Continue for 5-10 minutes.    With the [employer] size:  9. Insert the dilator until it begins to get moderately uncomfortable. Stop at this point.  10. Begin Diaphragmatic Breathing and focus on DROPPING the pelvic floor muscles, releasing their tension.   11. Once the discomfort has dissipated, insert the dilator a little bit more, and repeat this process.  12. You can also move the dilator slightly side/side or forward/backward to get the tissues use to being mobilized.   13. Continue for 5 minutes.     14. Once finished, remove dilator.  15. Wash with anti-bacterial soap and thoroughly rinse. Let air dry whenever possible. Store in a dry, clean location.   16. Repeat 5 of the 7 days per week.    *Don't apply so much pressure that it leaves you too sore to perform again  the next day.    STOP if there is increased bleeding, an infection, or extreme pain.

## 2022-02-04 NOTE — PROGRESS NOTES
"  Pelvic Health Physical Therapy   Treatment Note     Name: Erlinda Rain  Clinic Number: [id number]    Therapy Diagnosis:   Encounter Diagnoses   Name Primary?    Dyspareunia in female Yes    Pelvic pain in female     Lack of coordination      Physician: Lula Baxter MD    Visit Date: 2/7/2022    Physician Orders: PT Eval and Treat  Medical Diagnosis from Referral: Dyspareunia in female  Evaluation Date: 8/2/2021  Authorization Period Expiration: 12/31/2022  Plan of Care Expiration: 10/31/2021, updated to 3/2/2022  Visit # / Visits authorized: 4/36    Time In: 1305  Time Out: 1350  Total Billable Time: 45 minutes    Precautions: hx of fibromyalgia and PCOS    Subjective     Pt reports: using dilator without increase in pain since last visit. She states that she had intercourse with her boyfriend and reports feelings of "tightness" but no pain.       She was compliant with home exercise program.  Response to previous treatment: no adverse effects reported  Functional change: none      Pain: 0/10  Location: NA    Constitutional Symptoms Review: The patient denies having any constitutional symptoms.     Objective   Pt verbally consents to intravaginal treatment today.  Signed consent form already on file.     Therapeutic Exercise to develop  ROM and flexibility for 00 minutes including:   Happy baby - 1x10 deep breaths  Supine figure 4 piriformis stretch - 2x60 sec BEBETO  Cat/cow - 1x15   Deep squat - 2x60 sec  Z-sit - 2x60 sec BEBETO  W stretch on physioball - 1x15   Open book stretch - 2x10 BEBETO    Erlinda received the following manual therapy techniques: to develop flexibility, extensibility and desensitization for 20 minutes including:   soft tissue mobilization of (B) posterior and lateral walls of levator ani, obturator internus with diaphragmatic breathing techniques    (not performed this visit)  Soft tissue mobilization of (B) adductors in supine butterfly position    Therapeutic Activity to improve dynamic " functional performance, coordination and education for 25 minutes. Including:   Patient brought in dilator set today.  Pt instructed on use of dilators set and progression.  Patient used number 6 size.      Home Exercises Provided and Patient Education Provided     Education provided:   - anatomy/physiology of pelvic floor, posture/body mechanices, dilator use, diaphragmatic breathing and proper bearing down techniques  Discussed progression of plan of care with patient; educated pt in activity modification; reviewed HEP with pt. Pt demonstrated and verbalized understanding of all instruction and was provided with a handout of HEP (see Patient Instructions).    Written Home Exercises Provided: Patient instructed to cont prior HEP.  Exercises were reviewed and Erlinda was able to demonstrate them prior to the end of the session.  Erlinda demonstrated good  understanding of the education provided.     See EMR under Patient Instructions for exercises provided prior visit.    Assessment     Pt reports decreased TTP during intravaginal treatment this visit. Pt was able to tolerate full insertion/removal of size 6 dilator with no reports of significant discomfort or demonstrating muscle guarding. She states that certain areas felt sore with insertion, but she was able to manage with relaxation strategies. Recommended pt trial using dilator before intercourse to address sensations of tightness during sex.      Erlinda is progressing well towards her goals.   Pt prognosis is Excellent.     Pt will continue to benefit from skilled outpatient physical therapy to address the deficits listed in the problem list box on initial evaluation, provide pt/family education and to maximize pt's level of independence in the home and community environment.     Pt's spiritual, cultural and educational needs considered and pt agreeable to plan of care and goals.     Anticipated barriers to physical therapy: access to sex therapy    Goals:   Short  Term Goals: 6 weeks   Pt to demonstrate proper diaphragmatic breathing to help with calming the nervous system in order to decrease pain and to improve abdominal wall musculature extensibility. MET   Pt to report a decrease in pain to no more than 2/10 at it's worst with intravaginal soft tissue mobilization.  PROGRESSING (MET 75%)  Pt will report minimal to no pain with single digit pelvic assessment and display relaxation during this assessment in order to progress to dilator use. PROGRESSING     Long Term Goals: 12 weeks   Pt to be discharged with home plan for carry over after discharge. PROGRESSING  Pt to report being able to have a comfortable vaginal exam without significant increase in pelvic pain. PROGRESSING  Pt to increase abdominal wall strength by at least 1 muscle grade to improve lumbopelvic stability with ADLs that would normally increase pain. PROGRESSING  Pt to report a decrease in pain to no more than 0/10 at it's worst with intravaginal soft tissue mobilization in order to facilitate return to normal function. PROGRESSING  Pt will tolerate a #7 sized dilator in order to improve access to women's healthcare and tolerance for intercourse. PROGRESSING  Pt will be independent with use of dilation in order to progress towards self management and intercourse. PROGRESSING  Pt will report successful intercourse with no pain reported to demonstrate a return towards prior level of function. PROGRESSING    Plan     Plan of care Certification: 8/2/2021 to 10/31/2021, updated to 3/2/2022     Outpatient Physical Therapy 1 times weekly for 12 weeks to include the following interventions: therapeutic exercises, therapeutic activity, neuromuscular re-education, manual therapy, patient/family education and self care/home management.    Continue dilator training. Continue STM to PFM, low back, and piriformis/gluteals. Progress stretching program.Teach bowel massage.    Anna Kimura, PT

## 2022-02-07 ENCOUNTER — OFFICE VISIT (OUTPATIENT)
Dept: SLEEP MEDICINE | Facility: CLINIC | Age: 27
End: 2022-02-07
Payer: COMMERCIAL

## 2022-02-07 ENCOUNTER — CLINICAL SUPPORT (OUTPATIENT)
Dept: REHABILITATION | Facility: OTHER | Age: 27
End: 2022-02-07
Attending: OBSTETRICS & GYNECOLOGY
Payer: COMMERCIAL

## 2022-02-07 DIAGNOSIS — E28.2 PCOS (POLYCYSTIC OVARIAN SYNDROME): ICD-10-CM

## 2022-02-07 DIAGNOSIS — R53.83 FATIGUE, UNSPECIFIED TYPE: Primary | ICD-10-CM

## 2022-02-07 DIAGNOSIS — E66.9 OBESITY (BMI 30.0-34.9): ICD-10-CM

## 2022-02-07 DIAGNOSIS — R10.2 PELVIC PAIN IN FEMALE: ICD-10-CM

## 2022-02-07 DIAGNOSIS — M79.7 FIBROMYALGIA: ICD-10-CM

## 2022-02-07 DIAGNOSIS — R27.9 LACK OF COORDINATION: ICD-10-CM

## 2022-02-07 DIAGNOSIS — N94.10 DYSPAREUNIA IN FEMALE: Primary | ICD-10-CM

## 2022-02-07 PROCEDURE — 1160F PR REVIEW ALL MEDS BY PRESCRIBER/CLIN PHARMACIST DOCUMENTED: ICD-10-PCS | Mod: CPTII,95,, | Performed by: INTERNAL MEDICINE

## 2022-02-07 PROCEDURE — 99212 OFFICE O/P EST SF 10 MIN: CPT | Mod: 95,,, | Performed by: INTERNAL MEDICINE

## 2022-02-07 PROCEDURE — 1160F RVW MEDS BY RX/DR IN RCRD: CPT | Mod: CPTII,95,, | Performed by: INTERNAL MEDICINE

## 2022-02-07 PROCEDURE — 99212 PR OFFICE/OUTPT VISIT, EST, LEVL II, 10-19 MIN: ICD-10-PCS | Mod: 95,,, | Performed by: INTERNAL MEDICINE

## 2022-02-07 PROCEDURE — 97530 THERAPEUTIC ACTIVITIES: CPT

## 2022-02-07 PROCEDURE — 1159F MED LIST DOCD IN RCRD: CPT | Mod: CPTII,95,, | Performed by: INTERNAL MEDICINE

## 2022-02-07 PROCEDURE — 97140 MANUAL THERAPY 1/> REGIONS: CPT

## 2022-02-07 PROCEDURE — 1159F PR MEDICATION LIST DOCUMENTED IN MEDICAL RECORD: ICD-10-PCS | Mod: CPTII,95,, | Performed by: INTERNAL MEDICINE

## 2022-02-07 NOTE — PROGRESS NOTES
Subjective:       Patient ID: Erlnida Rain is a 26 y.o. female.    Chief Complaint: Sleeping Problem    HPI     Erlinda Rain returns for follow up.   No change sleep patterns.   Has intermittent issues with sleep onset.   Usually 2-3 days in a row.   Episodes come in waves.      Review of Systems      Objective:      Physical Exam    Assessment:       Problem List Items Addressed This Visit        Endocrine    Obesity (BMI 30.0-34.9)    PCOS (polycystic ovarian syndrome)       Orthopedic    Fibromyalgia       Other    Fatigue - Primary          Plan:       Reviewed sleep hygiene.   Keep more consistent schedule.  Keep sleep logs and return in 2-4 weeks.       The patient location is: home  The chief complaint leading to consultation is: insomnia    Visit type: audiovisual    Face to Face time with patient:  9  11 minutes of total time spent on the encounter, which includes face to face time and non-face to face time preparing to see the patient (eg, review of tests), Obtaining and/or reviewing separately obtained history, Documenting clinical information in the electronic or other health record, Independently interpreting results (not separately reported) and communicating results to the patient/family/caregiver, or Care coordination (not separately reported).         Each patient to whom he or she provides medical services by telemedicine is:  (1) informed of the relationship between the physician and patient and the respective role of any other health care provider with respect to management of the patient; and (2) notified that he or she may decline to receive medical services by telemedicine and may withdraw from such care at any time.    Notes:

## 2022-02-24 ENCOUNTER — OFFICE VISIT (OUTPATIENT)
Dept: URGENT CARE | Facility: CLINIC | Age: 27
End: 2022-02-24
Payer: COMMERCIAL

## 2022-02-24 VITALS
RESPIRATION RATE: 18 BRPM | TEMPERATURE: 99 F | BODY MASS INDEX: 30.18 KG/M2 | HEIGHT: 62 IN | OXYGEN SATURATION: 98 % | SYSTOLIC BLOOD PRESSURE: 113 MMHG | DIASTOLIC BLOOD PRESSURE: 79 MMHG | WEIGHT: 164 LBS | HEART RATE: 94 BPM

## 2022-02-24 DIAGNOSIS — J30.9 ALLERGIC RHINITIS, UNSPECIFIED SEASONALITY, UNSPECIFIED TRIGGER: ICD-10-CM

## 2022-02-24 DIAGNOSIS — J02.9 SORE THROAT: Primary | ICD-10-CM

## 2022-02-24 DIAGNOSIS — J06.9 ACUTE URI: ICD-10-CM

## 2022-02-24 LAB
CTP QC/QA: YES
SARS-COV-2 RDRP RESP QL NAA+PROBE: NEGATIVE

## 2022-02-24 PROCEDURE — 3078F DIAST BP <80 MM HG: CPT | Mod: CPTII,S$GLB,, | Performed by: FAMILY MEDICINE

## 2022-02-24 PROCEDURE — 1159F MED LIST DOCD IN RCRD: CPT | Mod: CPTII,S$GLB,, | Performed by: FAMILY MEDICINE

## 2022-02-24 PROCEDURE — 3074F PR MOST RECENT SYSTOLIC BLOOD PRESSURE < 130 MM HG: ICD-10-PCS | Mod: CPTII,S$GLB,, | Performed by: FAMILY MEDICINE

## 2022-02-24 PROCEDURE — U0002 COVID-19 LAB TEST NON-CDC: HCPCS | Mod: QW,S$GLB,, | Performed by: FAMILY MEDICINE

## 2022-02-24 PROCEDURE — 3074F SYST BP LT 130 MM HG: CPT | Mod: CPTII,S$GLB,, | Performed by: FAMILY MEDICINE

## 2022-02-24 PROCEDURE — U0002: ICD-10-PCS | Mod: QW,S$GLB,, | Performed by: FAMILY MEDICINE

## 2022-02-24 PROCEDURE — 99213 OFFICE O/P EST LOW 20 MIN: CPT | Mod: S$GLB,CS,, | Performed by: FAMILY MEDICINE

## 2022-02-24 PROCEDURE — 3008F BODY MASS INDEX DOCD: CPT | Mod: CPTII,S$GLB,, | Performed by: FAMILY MEDICINE

## 2022-02-24 PROCEDURE — 1160F RVW MEDS BY RX/DR IN RCRD: CPT | Mod: CPTII,S$GLB,, | Performed by: FAMILY MEDICINE

## 2022-02-24 PROCEDURE — 3008F PR BODY MASS INDEX (BMI) DOCUMENTED: ICD-10-PCS | Mod: CPTII,S$GLB,, | Performed by: FAMILY MEDICINE

## 2022-02-24 PROCEDURE — 1159F PR MEDICATION LIST DOCUMENTED IN MEDICAL RECORD: ICD-10-PCS | Mod: CPTII,S$GLB,, | Performed by: FAMILY MEDICINE

## 2022-02-24 PROCEDURE — 3078F PR MOST RECENT DIASTOLIC BLOOD PRESSURE < 80 MM HG: ICD-10-PCS | Mod: CPTII,S$GLB,, | Performed by: FAMILY MEDICINE

## 2022-02-24 PROCEDURE — 99213 PR OFFICE/OUTPT VISIT, EST, LEVL III, 20-29 MIN: ICD-10-PCS | Mod: S$GLB,CS,, | Performed by: FAMILY MEDICINE

## 2022-02-24 PROCEDURE — 1160F PR REVIEW ALL MEDS BY PRESCRIBER/CLIN PHARMACIST DOCUMENTED: ICD-10-PCS | Mod: CPTII,S$GLB,, | Performed by: FAMILY MEDICINE

## 2022-02-24 NOTE — PROGRESS NOTES
"Subjective:       Patient ID: Erlinda Rain is a 26 y.o. female.    Vitals:  height is 5' 2" (1.575 m) and weight is 74.4 kg (164 lb). Her temperature is 98.5 °F (36.9 °C). Her blood pressure is 113/79 and her pulse is 94. Her respiration is 18 and oxygen saturation is 98%.     Chief Complaint: Sinus Problem    Pt is complaining of sore throat, runny nose, sneezing, and sinus pressure that came on two days ago. Pt has hx of sinus allergies. Denies fever, chills, CP, SOB, HA, body aches, weakness/dizziness, N/V, diarrhea, abdominal pain, dysuria, loss of smell or taste.  Denies known COVID exposure.       Sinus Problem  This is a new problem. The problem is unchanged. There has been no fever. Her pain is at a severity of 5/10. The pain is moderate. Associated symptoms include congestion, coughing, ear pain, sinus pressure, sneezing and a sore throat. Pertinent negatives include no headaches or neck pain. (Myalgias) Past treatments include acetaminophen. The treatment provided mild relief.       HENT: Positive for ear pain, congestion, sinus pressure and sore throat.    Neck: Negative for neck pain.   Respiratory: Positive for cough.    Allergic/Immunologic: Positive for sneezing.   Neurological: Negative for headaches.       Objective:      Physical Exam   Constitutional: She is oriented to person, place, and time. She appears well-developed. She is cooperative.  Non-toxic appearance. She does not appear ill. No distress.   HENT:   Head: Normocephalic and atraumatic.   Ears:   Right Ear: Hearing, tympanic membrane, external ear and ear canal normal. impacted cerumen  Left Ear: Hearing, tympanic membrane, external ear and ear canal normal. impacted cerumen  Nose: Congestion present. No mucosal edema, rhinorrhea or nasal deformity. No epistaxis. Right sinus exhibits no maxillary sinus tenderness and no frontal sinus tenderness. Left sinus exhibits no maxillary sinus tenderness and no frontal sinus tenderness. "   Mouth/Throat: Uvula is midline, oropharynx is clear and moist and mucous membranes are normal. No trismus in the jaw. Normal dentition. No uvula swelling. No oropharyngeal exudate or posterior oropharyngeal erythema.   Eyes: Conjunctivae and lids are normal. Right eye exhibits no discharge. Left eye exhibits no discharge. No scleral icterus.   Neck: Trachea normal and phonation normal. Neck supple.   Cardiovascular: Normal rate, regular rhythm, normal heart sounds and normal pulses.   No murmur heard.  Pulmonary/Chest: Effort normal and breath sounds normal. No stridor. No respiratory distress. She has no wheezes. She has no rhonchi. She has no rales.   Abdominal: Normal appearance and bowel sounds are normal. She exhibits no distension and no mass. Soft. There is no abdominal tenderness.   Musculoskeletal: Normal range of motion.         General: No deformity. Normal range of motion.      Cervical back: She exhibits no tenderness.   Lymphadenopathy:     She has no cervical adenopathy.   Neurological: She is alert and oriented to person, place, and time. She exhibits normal muscle tone. Coordination normal.   Skin: Skin is warm, dry, intact, not diaphoretic and not pale.   Psychiatric: Her speech is normal and behavior is normal. Judgment and thought content normal.   Nursing note and vitals reviewed.        Assessment:       1. Sore throat    2. Acute URI    3. Allergic rhinitis, unspecified seasonality, unspecified trigger          Plan:         Sore throat  -     POCT COVID-19 Rapid Screening    Acute URI    Allergic rhinitis, unspecified seasonality, unspecified trigger            Viral Upper Respiratory Infection Discharge Instructions, Adult   About this topic   You have an upper respiratory infection or URI. A URI can affect your nose, throat, ears, and sinuses. A virus is the cause of almost all URIs and antibiotics will not help you feel better more quickly. The common cold is an example of a viral  URI.  URIs are easy to spread from person to person, most often through coughing or sneezing. A URI will almost always get better in a week or two without any treatment.         What care is needed at home?   · Ask your doctor what you need to do when you go home. Make sure you ask questions if you do not understand what the doctor says.  · If you smoke, try to quit. Your doctor or nurse can help.  · Drink lots of fluids like water, juice, or broth. This will help replace any fluids lost if you have a runny nose or fever. Warm tea or soup can help soothe a sore throat.  · If the air in your home feels dry, use a cool mist humidifier. This can help a stuffy nose and make it easier to breathe.  · You can also use saline nose drops to relieve stuffiness.  · If you decide to take over-the-counter cough or cold medicines, follow the directions on the label carefully. Be sure you do not take more than 1 medicine that contains acetaminophen. Also, if you have a heart problem or high blood pressure, check with your doctor before you take any of these medicines.  · Wash your hands often. Cough or sneeze into a tissue or your elbow instead of your hands. This will help keep others healthy.  What follow-up care is needed?   Your doctor may ask you to make visits to the office to check on your progress. Be sure to keep these visits.  What drugs may be needed?   The doctor may order drugs to:  · Open up the tubes of your lungs  · Treat viral infection  · Relieve or stop coughing  · Help with pain from a sore throat  · Relieve runny and stuffy nose  · Provide oxygen  Will physical activity be limited?   You need to rest for a few days to let your body recover from the infection.  What changes to diet are needed?   Eat soft foods like soup if swallowing is too painful.  What problems could happen?   · Asthma attack  · Sinus infections  · Lung problems like pneumonia and bronchitis  · Severe fluid loss. This is dehydration.  What  can be done to prevent this health problem?   · Wash your hands often with soap and water for at least 20 seconds, especially after coughing or sneezing. Alcohol-based hand sanitizers also work to kill the virus.  · If you are sick, cover your mouth and nose with tissue when you cough or sneeze. You can also cough into your elbow. Throw away tissues in the trash and wash your hands after touching used tissues.  · Do not get too close (kissing, hugging) to people who are sick.  · Do not share towels or hankies with anyone who is sick. Clean commonly handled things like door handles, remotes, toys, and phones. Wipe them with a disinfectant.  · Stay away from crowded places.  · Cover your nose and mouth when you sneeze or cough.  · Take vitamin C to help build up your body's ability to fight disease.  · Get a flu shot each year.  When do I need to call the doctor?   · You have trouble breathing when talking or sitting still.  · You have a fever of 100.4°F (38°C) or higher for several days, chills, a very bad sore throat, or ear or sinus pain.  · You develop a new fever after several days of feeling the same or improving.  · You develop chest pain when you cough.  · You have a cough that lasts more than 10 days.  · You cough up blood, or the color of the mucus you cough up changes.  Teach Back: Helping You Understand   The Teach Back Method helps you understand the information we are giving you. After you talk with the staff, tell them in your own words what you learned. This helps to make sure the staff has described each thing clearly. It also helps to explain things that may have been confusing. Before going home, make sure you can do these:  · I can tell you about my condition.  · I can tell you what may help ease my signs.  · I can tell you what I will do if I have a fever, chills, breathing very fast, or trouble breathing.  Where can I learn more?   American Lung  Association  https://www.lung.org/blog/can-you-exercise-with-a-cold   American Lung Association  https://www.lung.org/lung-health-diseases/lung-disease-lookup/influenza/facts-about-the-common-cold   NHS Choices  https://www.nhs.uk/conditions/respiratory-tract-infection/   [employer]Date  https://www.[employer]/contents/the-common-cold-in-adults-beyond-the-basics   Last Reviewed Date   2021-06-08  Consumer Information Use and Disclaimer   This information is not specific medical advice and does not replace information you receive from your health care provider. This is only a brief summary of general information. It does NOT include all information about conditions, illnesses, injuries, tests, procedures, treatments, therapies, discharge instructions or life-style choices that may apply to you. You must talk with your health care provider for complete information about your health and treatment options. This information should not be used to decide whether or not to accept your health care providers advice, instructions or recommendations. Only your health care provider has the knowledge and training to provide advice that is right for you.  Copyright   Copyright © 2021 [employer] and its affiliates and/or licensors. All rights reserved.                     Sore Throat Discharge Instructions, Adult   About this topic   Swelling at the back of your throat is called pharyngitis. Swelling of your throat and tonsils is tonsillopharyngitis. Both are commonly called a sore throat. Your sore throat is likely caused by a virus. Most of the time, a sore throat will go away without antibiotics in a week or two.           What care is needed at home?   · Ask your doctor what you need to do when you go home. Make sure you ask questions if you do not understand what the doctor says. This way you will know what you need to do.  · To help ease a sore throat you can:  ? Use a sore throat spray.  ? Suck on hard candy or throat  lozenges.  ? Gargle with warm saltwater a few times each day. Mix of 1/4 teaspoon (1.25 grams) salt in 8 ounces (240 mL) of warm water.  ? Use a cool mist humidifier to help you breathe easier.  · You may want to take medicine like acetaminophen, ibuprofen, or naproxen for pain.  · If you decide to take over-the-counter cough or cold medicines, follow the directions on the label carefully. Be sure you do not take more than one medicine that contains acetaminophen. Also, if you have a heart problem or high blood pressure, check with your doctor before you take any of these medicines.  · Wash your hands often. This will help keep others healthy.  What follow-up care is needed?   Your doctor may ask you to make visits to the office to check on your progress. Be sure to keep these visits.  What drugs may be needed?   Take your drugs as ordered by your doctor. Do not skip doses or stop when you feel better. The doctor may order drugs to:  · Prevent or fight an infection  · Help with pain  · Lower fever  · Help nasal congestion and runny nose  · Soothe the throat  Will physical activity be limited?   You may need to rest at home for 1 to 2 days or until you are feeling well.  What changes to diet are needed?   If your throat feels too sore to eat solid foods you may drink juice, milk, milkshakes, or soups. Talk to your doctor about what diet is proper for your condition.  What can be done to prevent this health problem?   · Wash your hands often. Be sure to wash after you blow your nose or take care of others with a sore throat.  · Do not share utensils and drinking glasses with someone who has a sore throat. Wash these objects with hot, soapy water.  · Do not share your foods or drinks with others while you are sick. You might infect them.  · Throw away used tissues right away and then wash your hands.  · Get a new toothbrush after signs are gone or you are done with your antibiotics.  When do I need to call the doctor?    · You have trouble breathing.  · Your neck, tongue, or throat is swelling.  · You are drooling because you cannot swallow your saliva.  · You have very bad pain in your throat that keeps you from eating or drinking anything.  · There are large, painful lumps in your neck.  · You have blisters in the back of your throat.  Teach Back: Helping You Understand   The Teach Back Method helps you understand the information we are giving you. After you talk with the staff, tell them in your own words what you learned. This helps to make sure the staff has described each thing clearly. It also helps to explain things that may have been confusing. Before going home, make sure you can do these:  · I can tell you about my condition.  · I can tell you what may help ease my pain.  · I can tell you what I will do if I have trouble breathing or swallowing or have large painful lumps in my throat.  Where can I learn more?   Centers for Disease Control and Prevention  https://www.cdc.gov/antibiotic-use/community/for-patients/common-illnesses/sore-throat.html   Min[employer] of Health  https://www.health.govt.nz/your-health/conditions-and-treatments/diseases-and-illnesses/sore-throat   NHS Choices  https://www.nhs.uk/conditions/sore-throat/   Last Reviewed Date   2021-06-08  Consumer Information Use and Disclaimer   This information is not specific medical advice and does not replace information you receive from your health care provider. This is only a brief summary of general information. It does NOT include all information about conditions, illnesses, injuries, tests, procedures, treatments, therapies, discharge instructions or life-style choices that may apply to you. You must talk with your health care provider for complete information about your health and treatment options. This information should not be used to decide whether or not to accept your health care providers advice, instructions or recommendations. Only your health  care provider has the knowledge and training to provide advice that is right for you.  Copyright   Copyright © 2021 [employer] and its affiliates and/or licensors. All rights reserved.                  Seasonal Allergies Discharge Instructions   About this topic   Seasonal allergies are also called allergic rhinitis or hay fever. You may have sneezing; a stuffy or runny nose; or itchy throat, ears, or eyes. You may feel very tired. Most often, this problem is caused by:  · Pollens from trees, grasses, or weeds.  · Mold spores that grow when the air is humid, wet, or damp.  Some people can breathe in these things and have no problems. But when you have a seasonal allergy, your body acts as if the substance is harming you. Then you have symptoms. You may have symptoms only at some times of the year. If your symptoms last all year, they may be caused by:  · Insects, such as dust mites or cock roaches.  · Animals, such as cats or dogs.  · Mold spores.     What care is needed at home?   · Ask your doctor what you need to do when you go home. Make sure you ask questions if you do not understand what the doctor says.  · Rinse your nose with salt water to get rid of pollen inside of your nose.  · Keep the windows closed and use air conditioning.  · Shower before bed to rinse pollen from your hair and skin.  · Wear a dust mask if you need to be outside.  · Use over-the-counter medicines to help with your symptoms:  ? A steroid nose spray can help with a stuffy nose. It can also help with drainage down the back of your throat.  ? An antihistamine can help with itching, sneezing, or runny nose.  ? An antihistamine eye drop can help with itchy eyes.  ? A decongestant can help with a stuffy nose. Talk with your doctor or pharmacist about your other health problems before you use this kind of medicine. It may not be safe for people with high blood pressure.  What follow-up care is needed?   Your doctor may ask you to make  visits to the office to check on your progress. Your doctor may ask you to see an allergy specialist, or to have testing done to learn what is causing your allergies. Be sure to keep these visits.  What drugs may be needed?   The doctor may order drugs to treat the signs. Take your drugs as ordered. You may also take allergy shots if you have had allergy tests.  Will physical activity be limited?   You may have to limit your activity. If your health problem is caused by an allergy to pollens or molds, you may want to limit your time outdoors. Talk to your doctor about what activities are best for you.  What problems could happen?   · Your signs may not get better with your drugs  · Asthma may get worse  · Sinus infection  What can be done to prevent this health problem?   Try to avoid allergens.  · If you are allergic to pollens, grasses, trees, etc:  ? Stay inside in the morning when pollen counts are high.  ? Avoid going outside when the trees, grasses, or weeds are blooming.  ? Keep the windows of your house and car closed.  ? Use an air conditioner.  · If you are allergic to dust, molds, dust mites, pets, etc:  ? Clean your air conditioner or furnace filters regularly.  ? Cover pillows and mattresses with vinyl covers to lower your exposure to dust mites.  ? Wash bedding weekly in very hot water.  ? Use fewer dust-collecting items, such as curtains, bed skirts, carpeting, and stuffed animals, mainly in your bedroom.  ? If you can't avoid having a furry pet, vacuum often and keep your pet out of bedrooms and other rooms with carpets.  When do I need to call the doctor?   · You are having so much trouble breathing that you can only say one or two words at a time.  · You need to sit upright at all times to be able to breathe and or cannot lie down.  · You have severe swelling of your tongue, lips, or mouth.  · You have trouble breathing when talking or sitting still.  · You have a fever of 100.4°F (38°C) or  higher.  · You have green or yellow mucus.  Teach Back: Helping You Understand   The Teach Back Method helps you understand the information we are giving you. After you talk with the staff, tell them in your own words what you learned. This helps to make sure the staff has described each thing clearly. It also helps to explain things that may have been confusing. Before going home, make sure you can do these:  · I can tell you about my condition.  · I can tell you what may help make the air .  · I can tell you what may help ease my allergies.  Where can I learn more?   Food and Drug Administration  https://www.fda.gov/ForConsumers/ConsumerUpdates/mib439044.htm   NHS Choices  https://www.nhs.uk/conditions/Hay-fever/   Last Reviewed Date   2021-06-21  Consumer Information Use and Disclaimer   This information is not specific medical advice and does not replace information you receive from your health care provider. This is only a brief summary of general information. It does NOT include all information about conditions, illnesses, injuries, tests, procedures, treatments, therapies, discharge instructions or life-style choices that may apply to you. You must talk with your health care provider for complete information about your health and treatment options. This information should not be used to decide whether or not to accept your health care providers advice, instructions or recommendations. Only your health care provider has the knowledge and training to provide advice that is right for you.  Copyright   Copyright © 2021 [employer] and its affiliates and/or licensors. All rights reserved.

## 2022-02-28 NOTE — PROGRESS NOTES
Pelvic Health Physical Therapy   Treatment Note  Recertification Note     Name: Erlinda Rain  Clinic Number: [id number]    Therapy Diagnosis:   Encounter Diagnoses   Name Primary?    Dyspareunia in female Yes    Pelvic pain in female     Lack of coordination      Physician: Lula Baxter MD    Visit Date: 3/3/2022    Physician Orders: PT Eval and Treat  Medical Diagnosis from Referral: Dyspareunia in female  Evaluation Date: 8/2/2021  Authorization Period Expiration: 12/1/2022  Plan of Care Expiration: 10/31/2021, updated to 3/2/2022, updated to 6/1/2022  Visit # / Visits authorized: 5/ 25    Time In: 905  Time Out: 1000  Total Billable Time: 55 minutes    Precautions: hx of fibromyalgia and PCOS    Subjective     Pt reports: having difficult two weeks due to starting menstrual cycle and catching a virus before [place]. She states that her body feels tight overall due to increased walking and standing during parades. Pt reports using dilator 2x since last visit.     She was compliant with home exercise program.  Response to previous treatment: no adverse effects reported  Functional change: none      Pain: 0/10  Location: NA    Constitutional Symptoms Review: The patient denies having any constitutional symptoms.     Objective   Pt verbally consents to intravaginal treatment today.  Signed consent form already on file.     VAGINAL PELVIC FLOOR EXAM     EXTERNAL ASSESSMENT  Introitus: WNL  Skin condition: WNL  Scarring: none   Sensation: WNL   Pain: none  Voluntary contraction: visible lift  Voluntary relaxation: visible drop  Involuntary contraction: visible lift  Bearing down: visible drop  Perineal descent: absent                            INTERNAL ASSESSMENT  Pain: none  Sensation: able to localized pressure appropriately   Vaginal vault: WNL   Muscle Bulk: hypertonus   Muscle Power: 4/5  Muscle Endurance: 10 sec  # Reps To Fatigue: 6   Fast Contractions in 10 seconds:  5                           Quality of contraction: WNL  Specificity: WNL   Coordination: WNL   Prolapse check: none    Therapeutic Exercise to develop  ROM and flexibility for 00 minutes including:   Happy baby - 1x10 deep breaths  Supine figure 4 piriformis stretch - 2x60 sec BEBETO  Cat/cow - 1x15   Deep squat - 2x60 sec  Z-sit - 2x60 sec BEBETO  W stretch on physioball - 1x15   Open book stretch - 2x10 BEBETO    Erlinda received the following manual therapy techniques: to develop flexibility, extensibility and desensitization for 20 minutes including:   soft tissue mobilization of (B) posterior and lateral walls of levator ani, obturator internus with diaphragmatic breathing techniques    (not performed this visit)  Soft tissue mobilization of (B) adductors in supine butterfly position    Therapeutic Activity to improve dynamic functional performance, coordination and education for 35 minutes. Including:   Patient brought in dilator set today.  Pt instructed on use of dilators set and progression.  Patient used number 6 size.      Home Exercises Provided and Patient Education Provided     Education provided:   - anatomy/physiology of pelvic floor, posture/body mechanices, dilator use, diaphragmatic breathing and proper bearing down techniques  Discussed progression of plan of care with patient; educated pt in activity modification; reviewed HEP with pt. Pt demonstrated and verbalized understanding of all instruction and was provided with a handout of HEP (see Patient Instructions).    Written Home Exercises Provided: yes.  Exercises were reviewed and Erlinda was able to demonstrate them prior to the end of the session.  Erlinda demonstrated good  understanding of the education provided.     See EMR under Patient Instructions for exercises provided 3/3/22.    Assessment     Discussed discharge plan with pt: plan to trial using dilator before intercourse to address sensations of tightness during sex and report back next session.  Intravaginal reassessment reveals decreased tension throughout PFM with pt reporting no significant pain or discomfort. Pt was able to tolerate full insertion/removal of size 6 dilator with no reports of significant discomfort or demonstrating muscle guarding. She reports slight pain with removal of dilator, improved with cues to gently bear down to relax PFM. Pt states that she has noticed clitoral sensitivity with increased pressure or rapid stimulation, discussed adjusting pressure or speed in order to decrease discomfort. Pt was given handout on self-pleasure for vulvas to explore other options for clitoral stimulation. Plan to discharge pt at next session pending symptom report.     Erlinda is progressing well towards her goals.   Pt prognosis is Excellent.     Pt will continue to benefit from skilled outpatient physical therapy to address the deficits listed in the problem list box on initial evaluation, provide pt/family education and to maximize pt's level of independence in the home and community environment.     Pt's spiritual, cultural and educational needs considered and pt agreeable to plan of care and goals.     Anticipated barriers to physical therapy: access to sex therapy    Goals:   Short Term Goals: 6 weeks   Pt to demonstrate proper diaphragmatic breathing to help with calming the nervous system in order to decrease pain and to improve abdominal wall musculature extensibility. MET   Pt to report a decrease in pain to no more than 2/10 at it's worst with intravaginal soft tissue mobilization.  MET  Pt will report minimal to no pain with single digit pelvic assessment and display relaxation during this assessment in order to progress to dilator use. MET     Long Term Goals: 12 weeks   Pt to be discharged with home plan for carry over after discharge. PROGRESSING  Pt to report being able to have a comfortable vaginal exam without significant increase in pelvic pain. MET  Pt to report a decrease in  pain to no more than 0/10 at it's worst with intravaginal soft tissue mobilization in order to facilitate return to normal function. PROGRESSING 90%  Pt will tolerate a #6 sized dilator in order to improve access to women's healthcare and tolerance for intercourse. MET  Pt will be independent with use of dilation in order to progress towards self management and intercourse. MET   Pt will report successful intercourse with no pain reported to demonstrate a return towards prior level of function. PROGRESSING (MET 80%)    Plan     Plan of care Certification: 8/2/2021 to 10/31/2021, updated to 3/2/2022, updated to 6/1/2022.     Outpatient Physical Therapy 1 times weekly for 12 weeks to include the following interventions: therapeutic exercises, therapeutic activity, neuromuscular re-education, manual therapy, patient/family education and self care/home management.    Discharge next visit? Continue dilator training. Continue STM to PFM, low back, and piriformis/gluteals. Progress stretching program.Teach bowel massage.    Anna Kimura, PT

## 2022-03-03 ENCOUNTER — CLINICAL SUPPORT (OUTPATIENT)
Dept: REHABILITATION | Facility: OTHER | Age: 27
End: 2022-03-03
Attending: INTERNAL MEDICINE
Payer: COMMERCIAL

## 2022-03-03 DIAGNOSIS — R27.9 LACK OF COORDINATION: ICD-10-CM

## 2022-03-03 DIAGNOSIS — R10.2 PELVIC PAIN IN FEMALE: ICD-10-CM

## 2022-03-03 DIAGNOSIS — N94.10 DYSPAREUNIA IN FEMALE: Primary | ICD-10-CM

## 2022-03-03 PROCEDURE — 97140 MANUAL THERAPY 1/> REGIONS: CPT

## 2022-03-03 PROCEDURE — 97530 THERAPEUTIC ACTIVITIES: CPT

## 2022-03-07 ENCOUNTER — PATIENT MESSAGE (OUTPATIENT)
Dept: INTERNAL MEDICINE | Facility: CLINIC | Age: 27
End: 2022-03-07
Payer: COMMERCIAL

## 2022-03-07 DIAGNOSIS — R53.83 FATIGUE, UNSPECIFIED TYPE: Primary | ICD-10-CM

## 2022-03-07 RX ORDER — BUPROPION HYDROCHLORIDE 150 MG/1
150 TABLET ORAL DAILY
Qty: 30 TABLET | Refills: 3 | Status: SHIPPED | OUTPATIENT
Start: 2022-03-07 | End: 2022-06-03

## 2022-03-07 NOTE — TELEPHONE ENCOUNTER
No new care gaps identified.  Powered by [employer]. Reference number: [id number].   3/07/2022 2:47:02 PM CST

## 2022-03-08 NOTE — TELEPHONE ENCOUNTER
Please schedule an appointment to discuss fatigue in about 1 month.  It can be in-person or virtual, but please change the appt length to be 30 minutes long.  Thanks!

## 2022-03-24 NOTE — PROGRESS NOTES
Pelvic Health Physical Therapy   Treatment Note     Name: Erlinda Rain  Clinic Number: [id number]    Therapy Diagnosis:   Encounter Diagnoses   Name Primary?    Dyspareunia in female Yes    Pelvic pain in female     Lack of coordination      Physician: Lula Baxter MD    Visit Date: 3/31/2022    Physician Orders: PT Eval and Treat  Medical Diagnosis from Referral: Dyspareunia in female  Evaluation Date: 8/2/2021  Authorization Period Expiration: 12/1/2022  Plan of Care Expiration: 10/31/2021, updated to 3/2/2022, updated to 6/1/2022  Visit # / Visits authorized: 6/ 25    Time In: 1002  Time Out: 1100  Total Billable Time: 58 minutes    Precautions: hx of fibromyalgia and PCOS    Subjective     Pt reports: using dilator 1x/week and will try to gradually increase frequency. She states that dilator is no longer painful and she is able to use for trigger point release. Pt reports that she has not attempted intercourse with partner but has been working on communication with him regarding her progress in therapy.     She was compliant with home exercise program.  Response to previous treatment: no adverse effects reported  Functional change: none      Pain: 0/10  Location: NA    Constitutional Symptoms Review: The patient denies having any constitutional symptoms.     Objective   Pt verbally consents to intravaginal treatment today.  Signed consent form already on file.     Therapeutic Exercise to develop  ROM and flexibility for 00 minutes including:   Happy baby - 1x10 deep breaths  Supine figure 4 piriformis stretch - 2x60 sec BEBETO  Cat/cow - 1x15   Deep squat - 2x60 sec  Z-sit - 2x60 sec BEBETO  W stretch on physioball - 1x15   Open book stretch - 2x10 BEBETO    Erlinda received the following manual therapy techniques: to develop flexibility, extensibility and desensitization for 45 minutes including:   soft tissue mobilization of (B) posterior and lateral walls of levator ani, obturator internus with diaphragmatic  breathing techniques    Soft tissue mobilization of (B) adductors in supine butterfly position    Therapeutic Activity to improve dynamic functional performance, coordination and education for 13 minutes. Including:   POC/HEP review with pt to prepare for discharge    Home Exercises Provided and Patient Education Provided     Education provided:   - anatomy/physiology of pelvic floor, posture/body mechanices, dilator use, diaphragmatic breathing and proper bearing down techniques  Discussed progression of plan of care with patient; educated pt in activity modification; reviewed HEP with pt. Pt demonstrated and verbalized understanding of all instruction and was provided with a handout of HEP (see Patient Instructions).    Written Home Exercises Provided: yes.  Exercises were reviewed and Erlinda was able to demonstrate them prior to the end of the session.  Erlinda demonstrated good  understanding of the education provided.     See EMR under Patient Instructions for exercises provided 3/31/22.    Assessment     Pt reports she has been using dilators at home with no complaints of pain or discomfort. She states that she feels comfortable discharging from therapy at this time and maintaining progress independently. Reviewed plan of care and HEP for pt to continue with emphasis on dilator training 2-3x/week and stretching after exercise or increased activity to avoid tension through hips. Pt demonstrates decreased tension throughout PFM with no reports of TTP. She states that R levator ani feels sore but attributes this to previously using dilator. She demonstrates increased flexibility through bilateral adductors with decreased pain during STM.     Erlinda is progressing well towards her goals.   Pt prognosis is Excellent.     Pt will continue to benefit from skilled outpatient physical therapy to address the deficits listed in the problem list box on initial evaluation, provide pt/family education and to maximize pt's level  of independence in the home and community environment.     Pt's spiritual, cultural and educational needs considered and pt agreeable to plan of care and goals.     Anticipated barriers to physical therapy: access to sex therapy    Goals:   Short Term Goals: 6 weeks   Pt to demonstrate proper diaphragmatic breathing to help with calming the nervous system in order to decrease pain and to improve abdominal wall musculature extensibility. MET   Pt to report a decrease in pain to no more than 2/10 at it's worst with intravaginal soft tissue mobilization.  MET  Pt will report minimal to no pain with single digit pelvic assessment and display relaxation during this assessment in order to progress to dilator use. MET     Long Term Goals: 12 weeks   Pt to be discharged with home plan for carry over after discharge. MET  Pt to report being able to have a comfortable vaginal exam without significant increase in pelvic pain. MET  Pt to report a decrease in pain to no more than 0/10 at it's worst with intravaginal soft tissue mobilization in order to facilitate return to normal function. MET  Pt will tolerate a #6 sized dilator in order to improve access to women's healthcare and tolerance for intercourse. MET  Pt will be independent with use of dilation in order to progress towards self management and intercourse. MET   Pt will report successful intercourse with no pain reported to demonstrate a return towards prior level of function. MET    Plan     Plan of care Certification: 8/2/2021 to 10/31/2021, updated to 3/2/2022, updated to 6/1/2022.     Outpatient Physical Therapy 1 times weekly for 12 weeks to include the following interventions: therapeutic exercises, therapeutic activity, neuromuscular re-education, manual therapy, patient/family education and self care/home management.    Anna Kimura, PT

## 2022-03-31 ENCOUNTER — CLINICAL SUPPORT (OUTPATIENT)
Dept: REHABILITATION | Facility: OTHER | Age: 27
End: 2022-03-31
Attending: INTERNAL MEDICINE
Payer: COMMERCIAL

## 2022-03-31 ENCOUNTER — DOCUMENTATION ONLY (OUTPATIENT)
Dept: REHABILITATION | Facility: OTHER | Age: 27
End: 2022-03-31

## 2022-03-31 DIAGNOSIS — R27.9 LACK OF COORDINATION: ICD-10-CM

## 2022-03-31 DIAGNOSIS — R10.2 PELVIC PAIN IN FEMALE: ICD-10-CM

## 2022-03-31 DIAGNOSIS — N94.10 DYSPAREUNIA IN FEMALE: Primary | ICD-10-CM

## 2022-03-31 NOTE — PROGRESS NOTES
PHYSICAL THERAPY DISCHARGE SUMMARY     Name: Erlinda Rain  Clinic Number: [id number]    Diagnosis: Dyspareunia in female  Physician: Lula Baxter MD  Treatment Orders: PT Eval and Treat  Past Medical History:   Diagnosis Date    Fibromyalgia     Obesity (BMI 30.0-34.9)     PCOS (polycystic ovarian syndrome)     Vitamin D deficiency        Initial visit: 8/02/21  Date of Last visit: 3/31/22  Total Visits Received: 11  Missed Visits: 0  ASSESSMENT   Status Towards Goals Met:    Short Term Goals: 6 weeks   Pt to demonstrate proper diaphragmatic breathing to help with calming the nervous system in order to decrease pain and to improve abdominal wall musculature extensibility. MET   Pt to report a decrease in pain to no more than 2/10 at it's worst with intravaginal soft tissue mobilization.  MET  Pt will report minimal to no pain with single digit pelvic assessment and display relaxation during this assessment in order to progress to dilator use. MET     Long Term Goals: 12 weeks   Pt to be discharged with home plan for carry over after discharge. MET  Pt to report being able to have a comfortable vaginal exam without significant increase in pelvic pain. MET  Pt to report a decrease in pain to no more than 0/10 at it's worst with intravaginal soft tissue mobilization in order to facilitate return to normal function. MET  Pt will tolerate a #6 sized dilator in order to improve access to women's healthcare and tolerance for intercourse. MET  Pt will be independent with use of dilation in order to progress towards self management and intercourse. MET   Pt will report successful intercourse with no pain reported to demonstrate a return towards prior level of function. MET      Goals Not achieved and why: Pt has met her current therapy goals at this time.     Discharge reason : Met goals    Erlinda has made excellent progress with participation in the POC towards decreasing dyspareunia and managing pelvic pain  independently.  She has met the majority of her goals and is independent with her home program.  At this time she no longer requires skilled intervention and is appropriate for discharge.       PLAN   This patient is discharged from Physical Therapy Services.     Anna Kimura, PT   3/31/2022

## 2022-03-31 NOTE — PATIENT INSTRUCTIONS
Home Exercise Program: 03/31/2022    DILATOR TRAINING  1. Make sure the dilator is clean, free of any visible soiling.   2. Apply water-based lubricant (ie. Slippery stuff, coconut oil, olive oil) to the dilator and the vaginal opening.   3. Either sit on the toilet and lean back on the tank OR lay back with your back well supported by several pillows and both knees bent.   4. Spread labia and insert the dilator.    With the [employer] size:  5. Apply downward pressure onto the levator ani group of muscles on one side.     To find the levator ani muscles:   - In the middle at 6 o'clock = Rectum   - Move a little to the right at 5 o'clock = Right levator ani group   - Move a little to the left at 7 o'clock = Left levator ani group   - A little more to the right at 3/4 o'clock = Right obturator internus muscle   - A little more to the left at 8/9 o'clock = Left obturator internus muscle   - If you feel sharp, stabbing pain = bone or pudendal nerve (wrong)  6. Begin Diaphragmatic Breathing and drop your pelvic floor muscle (releasing the tension), following the encouragement of the dilator.  7. Repeat on the levator ani group of muscles on the other side.  8. Continue for 5-10 minutes.    With the [employer] size:  9. Insert the dilator until it begins to get moderately uncomfortable. Stop at this point.  10. Begin Diaphragmatic Breathing and focus on DROPPING the pelvic floor muscles, releasing their tension.   11. Once the discomfort has dissipated, insert the dilator a little bit more, and repeat this process.  12. You can also move the dilator slightly side/side or forward/backward to get the tissues use to being mobilized.   13. Continue for 5 minutes.     14. Once finished, remove dilator.  15. Wash with anti-bacterial soap and thoroughly rinse. Let air dry whenever possible. Store in a dry, clean location.   16. Repeat 5 of the 7 days per week.    *Don't apply so much pressure that it leaves you too sore to perform again  the next day.    STOP if there is increased bleeding, an infection, or extreme pain.     STRETCHING PROGRAM   Z-sit stretch  - 60 sec each side      W stretch on ball    Begin seated in a chair, with good posture, core tight.  Place a large physioball in between your legs.  Place hands on physioball and slowly bend forward as far as you can, allowing the ball to roll forward with you.  Hold for the desired amount of time and roll back up into starting position. Now, repeat, but instead of rolling forward, roll towards the left (about 45 degrees) so you feel a stretch in the RIGHT side of the back. Now, repeat, but instead of rolling forward, roll towards the right (about 45 degrees) so you feel a stretch in the LEFT side of the back.    Happy Baby Stretch    Lying on your back, start off with legs up against wall. Bring one leg towards you with knee bent at 90 degrees and hold the inner foot. Repeat with other leg. Hold 1-2 minutes.     Figure Four Stretch    While lying on your back with both knee bent, cross your one leg on the other knee. Next, hold your thigh and pull it up towards your chest until a stretch is felt in the buttock. Hold for 60 seconds on both legs.     Cat/cow    Position yourself on your hands and knees with your hands placed under your shoulders and your knees directly under your hips. Slowly round your back up towards the ceiling and then arch your back down by pulling your abdomen towards the floor. Repeat 15 times.     Deep Squat    Assume a deep squat position, with elbows between the knees and heels firmly planted on the ground (you may prop up against a wall or use a yoga block under the buttock). Relax your pelvic floor and try to deepen into the stretch. Hold this position for 2-3 minutes, breathing deeply.

## 2022-06-03 DIAGNOSIS — R53.83 FATIGUE, UNSPECIFIED TYPE: ICD-10-CM

## 2022-06-03 RX ORDER — BUPROPION HYDROCHLORIDE 150 MG/1
TABLET ORAL
Qty: 90 TABLET | Refills: 0 | Status: SHIPPED | OUTPATIENT
Start: 2022-06-03 | End: 2022-07-08 | Stop reason: SDUPTHER

## 2022-06-03 NOTE — TELEPHONE ENCOUNTER
No new care gaps identified.  [facility] Embedded Care Gaps. Reference number: [id number]. 6/03/2022   12:53:23 AM RANGELT

## 2022-06-03 NOTE — TELEPHONE ENCOUNTER
Refill Routing Note   Medication(s) are not appropriate for processing by [facility] for the following reason(s):      - Medication is a new start (<3 months)    [facility] action(s):  Defer          Medication reconciliation completed: No     Appointments  past 12m or future 3m with PCP    Date Provider   Last Visit   6/10/2021 Princess Patrick MD   Next Visit   7/8/2022 Princess Patrick MD   ED visits in past 90 days: 0        Note composed:12:11 PM 06/03/2022

## 2022-06-08 ENCOUNTER — OFFICE VISIT (OUTPATIENT)
Dept: OBSTETRICS AND GYNECOLOGY | Facility: CLINIC | Age: 27
End: 2022-06-08
Payer: COMMERCIAL

## 2022-06-08 VITALS
DIASTOLIC BLOOD PRESSURE: 80 MMHG | WEIGHT: 161.38 LBS | BODY MASS INDEX: 29.7 KG/M2 | SYSTOLIC BLOOD PRESSURE: 100 MMHG | HEIGHT: 62 IN

## 2022-06-08 DIAGNOSIS — Z01.419 WELL WOMAN EXAM WITH ROUTINE GYNECOLOGICAL EXAM: Primary | ICD-10-CM

## 2022-06-08 PROCEDURE — 3008F BODY MASS INDEX DOCD: CPT | Mod: CPTII,S$GLB,, | Performed by: STUDENT IN AN ORGANIZED HEALTH CARE EDUCATION/TRAINING PROGRAM

## 2022-06-08 PROCEDURE — 1159F PR MEDICATION LIST DOCUMENTED IN MEDICAL RECORD: ICD-10-PCS | Mod: CPTII,S$GLB,, | Performed by: STUDENT IN AN ORGANIZED HEALTH CARE EDUCATION/TRAINING PROGRAM

## 2022-06-08 PROCEDURE — 3079F PR MOST RECENT DIASTOLIC BLOOD PRESSURE 80-89 MM HG: ICD-10-PCS | Mod: CPTII,S$GLB,, | Performed by: STUDENT IN AN ORGANIZED HEALTH CARE EDUCATION/TRAINING PROGRAM

## 2022-06-08 PROCEDURE — 3074F SYST BP LT 130 MM HG: CPT | Mod: CPTII,S$GLB,, | Performed by: STUDENT IN AN ORGANIZED HEALTH CARE EDUCATION/TRAINING PROGRAM

## 2022-06-08 PROCEDURE — 99395 PREV VISIT EST AGE 18-39: CPT | Mod: S$GLB,,, | Performed by: STUDENT IN AN ORGANIZED HEALTH CARE EDUCATION/TRAINING PROGRAM

## 2022-06-08 PROCEDURE — 1159F MED LIST DOCD IN RCRD: CPT | Mod: CPTII,S$GLB,, | Performed by: STUDENT IN AN ORGANIZED HEALTH CARE EDUCATION/TRAINING PROGRAM

## 2022-06-08 PROCEDURE — 3079F DIAST BP 80-89 MM HG: CPT | Mod: CPTII,S$GLB,, | Performed by: STUDENT IN AN ORGANIZED HEALTH CARE EDUCATION/TRAINING PROGRAM

## 2022-06-08 PROCEDURE — 99999 PR PBB SHADOW E&M-EST. PATIENT-LVL III: CPT | Mod: PBBFAC,,, | Performed by: STUDENT IN AN ORGANIZED HEALTH CARE EDUCATION/TRAINING PROGRAM

## 2022-06-08 PROCEDURE — 3008F PR BODY MASS INDEX (BMI) DOCUMENTED: ICD-10-PCS | Mod: CPTII,S$GLB,, | Performed by: STUDENT IN AN ORGANIZED HEALTH CARE EDUCATION/TRAINING PROGRAM

## 2022-06-08 PROCEDURE — 99395 PR PREVENTIVE VISIT,EST,18-39: ICD-10-PCS | Mod: S$GLB,,, | Performed by: STUDENT IN AN ORGANIZED HEALTH CARE EDUCATION/TRAINING PROGRAM

## 2022-06-08 PROCEDURE — 99999 PR PBB SHADOW E&M-EST. PATIENT-LVL III: ICD-10-PCS | Mod: PBBFAC,,, | Performed by: STUDENT IN AN ORGANIZED HEALTH CARE EDUCATION/TRAINING PROGRAM

## 2022-06-08 PROCEDURE — 3074F PR MOST RECENT SYSTOLIC BLOOD PRESSURE < 130 MM HG: ICD-10-PCS | Mod: CPTII,S$GLB,, | Performed by: STUDENT IN AN ORGANIZED HEALTH CARE EDUCATION/TRAINING PROGRAM

## 2022-06-08 NOTE — PATIENT INSTRUCTIONS
Persons nine to 18 years of age: 1,300 mg of calcium, 600 IU of vitamin D  Persons 19 to 50 years of age: 1,000 mg of calcium, 600 IU of vitamin D  Persons 51 to 70 years of age: 1,200 mg of calcium, 600 IU of vitamin D  Persons 71 years and older: 1,200 mg of calcium, 800 IU of vitamin D      [facility]  [email or web address]  [address] [place] [address]  [phone]  Dr Mulligan    [facility]  [email or web address]  [address], [place] [address]  [phone]  Dr Larios      Vaginal Valium Suppositories

## 2022-06-08 NOTE — PROGRESS NOTES
History & Physical  Gynecology      SUBJECTIVE:     Chief Complaint: Well Woman       History of Present Illness:  Annual exam. Still having pain with intercourse. Getting better slowly. Saw PFPT. Does dilators. In with a sex therapist now  Menstrual History: h/o PCOS. OCP helps menses come. Otherwise does not get one. Is wondering about potential fertility issues in future  Obstetric Hx: g0  LMP:   STD/STI Hx: Denies any history of STD's  Contraception Hx: OCP  Sexually Active: one male partner  Family history: Denies any personal or family history of GYN/colon cancers   Social: Wears seatbelts. Exercises. Feels safe at home.   Last pap: 3/2021normal  HPV vaccine: utd  covid vaccine yes  Vitamins: yes        Review of patient's allergies indicates:   Allergen Reactions    Kiwi (actinidia chinensis) Itching and Nausea Only       Past Medical History:   Diagnosis Date    Fibromyalgia     Obesity (BMI 30.0-34.9)     PCOS (polycystic ovarian syndrome)     Vitamin D deficiency      Past Surgical History:   Procedure Laterality Date    none       OB History        0    Para   0    Term   0       0    AB   0    Living   0       SAB   0    IAB   0    Ectopic   0    Multiple   0    Live Births                   Family History   Problem Relation Age of Onset    Hyperlipidemia Mother     Hyperlipidemia Father     Heart attacks under age 50 Father     Diabetes type II Father     Hypothyroidism Sister     Eczema Neg Hx     Lupus Neg Hx     Psoriasis Neg Hx     Melanoma Neg Hx     Breast cancer Neg Hx     Ovarian cancer Neg Hx     Colon cancer Neg Hx      Social History     Tobacco Use    Smoking status: Never Smoker    Smokeless tobacco: Never Used   Substance Use Topics    Alcohol use: No     Comment: socially    Drug use: No       Current Outpatient Medications   Medication Sig    buPROPion (WELLBUTRIN XL) 150 MG TB24 tablet TAKE 1 TABLET BY MOUTH EVERY DAY    metFORMIN (GLUCOPHAGE)  500 MG tablet Take 1 tablet daily x 7 days, then increase to 1 tablet with breakfast and 1 tablet with lunch x 7 days, then increase to 1 tablet 3 times daily (breakfast,lunch and dinner) thereafter    multivitamin capsule Take 1 capsule by mouth once daily.    norethindrone-ethinyl estradiol (MICROGESTIN 1/20) 1-20 mg-mcg per tablet TAKE 1 TABLET BY MOUTH EVERY DAY    trifarotene (AKLIEF) 0.005 % Crea Apply 1 application topically every evening.     No current facility-administered medications for this visit.         Review of Systems:  Review of Systems   Constitutional: Negative for activity change, appetite change, fever and unexpected weight change.   Respiratory: Negative for shortness of breath.    Cardiovascular: Negative for chest pain.   Gastrointestinal: Negative for abdominal pain, blood in stool, constipation, diarrhea, nausea and vomiting.   Genitourinary: Positive for dyspareunia and menstrual problem. Negative for dysmenorrhea, dysuria, hematuria, menorrhagia, pelvic pain, vaginal bleeding, vaginal discharge, vaginal pain, postcoital bleeding and vaginal odor.   Integumentary:  Negative for breast mass.   Breast: Negative for lump and mass       OBJECTIVE:     Physical Exam:  Physical Exam  Vitals reviewed.   Constitutional:       General: She is not in acute distress.     Appearance: She is well-developed. She is not diaphoretic.   HENT:      Head: Normocephalic and atraumatic.   Eyes:      General: No scleral icterus.        Right eye: No discharge.         Left eye: No discharge.      Conjunctiva/sclera: Conjunctivae normal.   Neck:      Thyroid: No thyromegaly.   Cardiovascular:      Rate and Rhythm: Normal rate.   Pulmonary:      Effort: Pulmonary effort is normal.   Chest:   Breasts: Breasts are symmetrical.      Right: No inverted nipple, mass, nipple discharge, skin change or tenderness.      Left: No inverted nipple, mass, nipple discharge, skin change or tenderness.       Abdominal:       General: There is no distension.      Palpations: Abdomen is soft.      Tenderness: There is no abdominal tenderness.   Genitourinary:     Labia:         Right: No rash, tenderness, lesion or injury.         Left: No rash, tenderness, lesion or injury.       Vagina: Normal. No signs of injury and foreign body. No vaginal discharge, erythema, tenderness or bleeding.      Cervix: No cervical motion tenderness, discharge or friability.      Uterus: Not deviated, not enlarged, not fixed and not tender.       Adnexa:         Right: No mass, tenderness or fullness.          Left: No mass, tenderness or fullness.     Musculoskeletal:         General: Normal range of motion.      Cervical back: Normal range of motion and neck supple.   Lymphadenopathy:      Cervical: No cervical adenopathy.   Skin:     General: Skin is warm and dry.      Findings: No erythema or rash.   Neurological:      Mental Status: She is alert and oriented to person, place, and time.           ASSESSMENT:       ICD-10-CM ICD-9-CM    1. Well woman exam with routine gynecological exam  Z01.419 V72.31           Plan:      WWE  - Vaccines utd  - Pap utd  - Mammogram age 38  - GC/CT, affirm n/a  - Daily vitamin discussed.  - CBE normal. Physical exam normal. VSS.  - Consider vaginal valium  - Consider fertility consultation  - RTC for annual or PRN.    Counseling time: 30 minutes    Karlene Larson

## 2022-06-29 ENCOUNTER — CLINICAL SUPPORT (OUTPATIENT)
Dept: OTHER | Facility: CLINIC | Age: 27
End: 2022-06-29
Payer: COMMERCIAL

## 2022-06-29 DIAGNOSIS — Z00.8 ENCOUNTER FOR OTHER GENERAL EXAMINATION: ICD-10-CM

## 2022-06-30 VITALS
DIASTOLIC BLOOD PRESSURE: 80 MMHG | HEIGHT: 62 IN | SYSTOLIC BLOOD PRESSURE: 118 MMHG | BODY MASS INDEX: 30 KG/M2 | WEIGHT: 163 LBS

## 2022-06-30 LAB
GLUCOSE SERPL-MCNC: 107 MG/DL (ref 60–140)
HDLC SERPL-MCNC: 56 MG/DL
POC CHOLESTEROL, LDL (DOCK): 105.81 MG/DL
POC CHOLESTEROL, TOTAL: 182 MG/DL
TRIGL SERPL-MCNC: 113 MG/DL

## 2022-07-08 ENCOUNTER — OFFICE VISIT (OUTPATIENT)
Dept: INTERNAL MEDICINE | Facility: CLINIC | Age: 27
End: 2022-07-08
Payer: COMMERCIAL

## 2022-07-08 VITALS
SYSTOLIC BLOOD PRESSURE: 118 MMHG | HEIGHT: 62 IN | BODY MASS INDEX: 30.91 KG/M2 | HEART RATE: 73 BPM | DIASTOLIC BLOOD PRESSURE: 72 MMHG | WEIGHT: 168 LBS | OXYGEN SATURATION: 99 %

## 2022-07-08 DIAGNOSIS — E28.2 PCOS (POLYCYSTIC OVARIAN SYNDROME): ICD-10-CM

## 2022-07-08 DIAGNOSIS — R53.83 FATIGUE, UNSPECIFIED TYPE: Primary | ICD-10-CM

## 2022-07-08 DIAGNOSIS — E66.9 OBESITY (BMI 30.0-34.9): ICD-10-CM

## 2022-07-08 PROBLEM — G47.33 OSA (OBSTRUCTIVE SLEEP APNEA): Status: ACTIVE | Noted: 2022-07-08

## 2022-07-08 PROBLEM — G47.33 OSA (OBSTRUCTIVE SLEEP APNEA): Status: RESOLVED | Noted: 2022-07-08 | Resolved: 2022-07-08

## 2022-07-08 PROCEDURE — 1159F MED LIST DOCD IN RCRD: CPT | Mod: CPTII,S$GLB,, | Performed by: INTERNAL MEDICINE

## 2022-07-08 PROCEDURE — 3074F SYST BP LT 130 MM HG: CPT | Mod: CPTII,S$GLB,, | Performed by: INTERNAL MEDICINE

## 2022-07-08 PROCEDURE — 3078F DIAST BP <80 MM HG: CPT | Mod: CPTII,S$GLB,, | Performed by: INTERNAL MEDICINE

## 2022-07-08 PROCEDURE — 3008F BODY MASS INDEX DOCD: CPT | Mod: CPTII,S$GLB,, | Performed by: INTERNAL MEDICINE

## 2022-07-08 PROCEDURE — 99215 PR OFFICE/OUTPT VISIT, EST, LEVL V, 40-54 MIN: ICD-10-PCS | Mod: S$GLB,,, | Performed by: INTERNAL MEDICINE

## 2022-07-08 PROCEDURE — 1159F PR MEDICATION LIST DOCUMENTED IN MEDICAL RECORD: ICD-10-PCS | Mod: CPTII,S$GLB,, | Performed by: INTERNAL MEDICINE

## 2022-07-08 PROCEDURE — 1160F RVW MEDS BY RX/DR IN RCRD: CPT | Mod: CPTII,S$GLB,, | Performed by: INTERNAL MEDICINE

## 2022-07-08 PROCEDURE — 99215 OFFICE O/P EST HI 40 MIN: CPT | Mod: S$GLB,,, | Performed by: INTERNAL MEDICINE

## 2022-07-08 PROCEDURE — 3074F PR MOST RECENT SYSTOLIC BLOOD PRESSURE < 130 MM HG: ICD-10-PCS | Mod: CPTII,S$GLB,, | Performed by: INTERNAL MEDICINE

## 2022-07-08 PROCEDURE — 3078F PR MOST RECENT DIASTOLIC BLOOD PRESSURE < 80 MM HG: ICD-10-PCS | Mod: CPTII,S$GLB,, | Performed by: INTERNAL MEDICINE

## 2022-07-08 PROCEDURE — 3008F PR BODY MASS INDEX (BMI) DOCUMENTED: ICD-10-PCS | Mod: CPTII,S$GLB,, | Performed by: INTERNAL MEDICINE

## 2022-07-08 PROCEDURE — 99999 PR PBB SHADOW E&M-EST. PATIENT-LVL IV: ICD-10-PCS | Mod: PBBFAC,,, | Performed by: INTERNAL MEDICINE

## 2022-07-08 PROCEDURE — 99999 PR PBB SHADOW E&M-EST. PATIENT-LVL IV: CPT | Mod: PBBFAC,,, | Performed by: INTERNAL MEDICINE

## 2022-07-08 PROCEDURE — 1160F PR REVIEW ALL MEDS BY PRESCRIBER/CLIN PHARMACIST DOCUMENTED: ICD-10-PCS | Mod: CPTII,S$GLB,, | Performed by: INTERNAL MEDICINE

## 2022-07-08 RX ORDER — BUPROPION HYDROCHLORIDE 300 MG/1
300 TABLET ORAL DAILY
Qty: 30 TABLET | Refills: 11 | Status: SHIPPED | OUTPATIENT
Start: 2022-07-08 | End: 2023-07-18

## 2022-07-08 RX ORDER — METFORMIN HYDROCHLORIDE 500 MG/1
500 TABLET, EXTENDED RELEASE ORAL
Qty: 90 TABLET | Refills: 3 | Status: SHIPPED | OUTPATIENT
Start: 2022-07-08 | End: 2023-07-18 | Stop reason: SDUPTHER

## 2022-07-08 NOTE — PROGRESS NOTES
Subjective:       Patient ID: Erlinda Rain is a 27 y.o. female who  has a past medical history of Fibromyalgia, Obesity (BMI 30.0-34.9), PCOS (polycystic ovarian syndrome), and Vitamin D deficiency.    Chief Complaint: Annual Exam and Fatigue    History was obtained from the patient and supplemented through chart review.  -Saw OBGYN for well-woman exam.    She graduated from [employer] in [place] in biology.  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 She is in [employer] school; aiming to finish in 2025.    HPI    Fatigue:  Works 12 hour shifts.  Does not feel refreshed after adequate sleep.  Difficulty falling asleep, staying asleep.  Mild snoring. No apnea. Not much relief with Melatonin.     H/o FMA. Was on Cymbalta. Mood is ok.     Following with sleep clinic. PSG neg. Discussed sleep hygiene.  Try to be more consistent with sleep schedule. rec sleep diary.    Started Wellbutrin  for fatigue. Some decreased fatigue. Easier to wake up earlier. Has a deeper sleep.  No much anxiety.    Has dyspareunia as below, difficulty with orgasm.    Sleep:  As above  Anhedonia:  Some burn out with work  Guilt: some towards her BF d/t dyspareunia as below.  Energy: improved  Concentration: some difficulty focusing. Gets distracted  Appetite: varied  Psychomotor agitation: has to push herself.  Lab Results   Component Value Date    TSH 0.649 03/23/2021    W0KPIVB 92 03/21/2017    FREET4 0.84 03/21/2017     Obesity:    Declined medical weight loss with bariatric due to cost. Started metformin for PCOS, but forgets to take BID. Only taking it qAM.  Helps c BMs.    Exercise:  Was seeing a  2/week     Diet:  Was on a very strict and specific diet, but difficult to keep it up. No sugar, barely salt and carbs. Lost 10 lbs.  BMI Readings from Last 3 Encounters:   07/08/22 30.73 kg/m²   06/29/22 29.81 kg/m²   06/08/22 29.52 kg/m²     No results found for: [id number]    PCOS:  Irregular menstrual cycles. Following with OBGYN. Switched  "IUD to OCPs. Started metformin as above.  Lab Results   Component Value Date/Time    HGBA1C 5.1 06/10/2021 12:40 PM    HGBA1C 5.0 10/23/2018 07:31 AM    HGBA1C 5.1 03/21/2017 12:34 PM     Lab Results   Component Value Date    TSH 0.649 03/23/2021    Y2AGOXF 92 03/21/2017    FREET4 0.84 03/21/2017               Not addressed today.  Dyspareunia:  She is following c Pelvic PT, sex therapist, OBGYN.    FMA:    Was on amitriptyline, Cymbalta 60 in the past.  Saw rheumatology in 2017. Vit D def. Tapered off Cymbalta. Doing well.  Lab Results   Component Value Date    HXSCBYOP72WM 39 10/23/2018    KCWXWETW94QO 24 (L) 03/21/2017    QQPGJCZW66VI 29 (L) 08/05/2016     Acne vulgaris:  On her back, chest. Associated with IUD. Saw [facility]. Rx'd trifarotene, po doxy.  Consider spironolactone.    Review of Systems   Constitutional: Positive for fatigue. Negative for activity change.   Respiratory: Negative for wheezing.    Cardiovascular: Negative for leg swelling.   Gastrointestinal: Negative for diarrhea.   Genitourinary: Positive for dyspareunia.   Musculoskeletal: Negative for gait problem.   Skin: Negative for rash and wound.   Hematological: Negative for adenopathy.   Psychiatric/Behavioral: Negative for confusion. The patient is not nervous/anxious.        I personally reviewed Past Medical History, Past Surgical History, Social History, and Family History.    Objective:      Vitals:    07/08/22 1218   BP: 118/72   Pulse: 73   SpO2: 99%   Weight: 76.2 kg (167 lb 15.9 oz)   Height: 5' 2" (1.575 m)      Physical Exam  Constitutional:       General: She is not in acute distress.     Appearance: She is well-developed. She is not diaphoretic.   HENT:      Head: Normocephalic and atraumatic.      Nose: Nose normal.      Mouth/Throat:      Pharynx: No oropharyngeal exudate.   Eyes:      General: No scleral icterus.        Right eye: No discharge.         Left eye: No discharge.   Neck:      Thyroid: No thyromegaly.    "   Trachea: No tracheal deviation.   Cardiovascular:      Rate and Rhythm: Normal rate and regular rhythm.      Heart sounds: Normal heart sounds. No murmur heard.  Pulmonary:      Effort: Pulmonary effort is normal. No respiratory distress.      Breath sounds: Normal breath sounds. No wheezing.   Abdominal:      General: Bowel sounds are normal. There is no distension.      Palpations: Abdomen is soft.      Tenderness: There is no abdominal tenderness.   Musculoskeletal:         General: No deformity.      Cervical back: Neck supple.      Right lower leg: No edema.      Left lower leg: No edema.   Lymphadenopathy:      Cervical: No cervical adenopathy.   Skin:     General: Skin is warm and dry.      Findings: No erythema.   Neurological:      Mental Status: She is alert.      Cranial Nerves: No cranial nerve deficit.      Gait: Gait normal.   Psychiatric:         Behavior: Behavior normal.           Lab Results   Component Value Date    WBC 9.80 06/10/2021    HGB 13.0 06/10/2021    HCT 39.4 06/10/2021     06/10/2021    CHOL 194 10/23/2018    TRIG 123 10/23/2018    HDL 51 10/23/2018    ALT 20 06/10/2021    ALT 20 06/10/2021    AST 19 06/10/2021    AST 19 06/10/2021     06/10/2021     06/10/2021    K 3.5 06/10/2021    K 3.5 06/10/2021     06/10/2021     06/10/2021    CREATININE 0.9 06/10/2021    CREATININE 0.9 06/10/2021    BUN 13 06/10/2021    BUN 13 06/10/2021    CO2 26 06/10/2021    CO2 26 06/10/2021    TSH 0.649 03/23/2021    HGBA1C 5.1 06/10/2021       The ASCVD Risk score (Sancho DC Jr., et al., 2013) failed to calculate for the following reasons:    The 2013 ASCVD risk score is only valid for ages 40 to 79    (Imaging have been independently reviewed)  MRI foot with possible foreign body granuloma or nonspecific fibrous tissue.    Assessment:       1. Fatigue, unspecified type    2. Obesity (BMI 30.0-34.9)    3. PCOS (polycystic ovarian syndrome)          Plan:       Erlinda was seen  today for annual exam and fatigue.    Diagnoses and all orders for this visit:    Fatigue, unspecified type  Comments:  Improved. Wellbutrin to 300. Ok to titrate to 450mg daily in about a month. Could consider fluoxetine as adjunct tx. Caution with SSRI d/t sexual dysfunction.   Orders:  -     buPROPion (WELLBUTRIN XL) 300 MG 24 hr tablet; Take 1 tablet (300 mg total) by mouth once daily.    Obesity (BMI 30.0-34.9)  Comments:  Stable. Try to take Metformin regularly; switch to XR if covered. Titrate Wellbutrin. Try nutrition through employee health. Can look into diet programs as well  Orders:  -     buPROPion (WELLBUTRIN XL) 300 MG 24 hr tablet; Take 1 tablet (300 mg total) by mouth once daily.  -     metFORMIN (GLUCOPHAGE-XR) 500 MG ER 24hr tablet; Take 1 tablet (500 mg total) by mouth daily with breakfast.    PCOS (polycystic ovarian syndrome)  Comments:  Following with OBGYN.  A1c, TSH WNL. Switch metformin to XR as above.  Orders:  -     metFORMIN (GLUCOPHAGE-XR) 500 MG ER 24hr tablet; Take 1 tablet (500 mg total) by mouth daily with breakfast.         Side effects of medication(s) were discussed in detail and patient voiced understanding.  Patient will call back for any issues or complications.     I have spent a total of 50 minutes with the patient as well as reviewing the chart/medical record and placing orders on the day of the visit. Discussed fatigue, mental health, weight, PCOS.     RTC in 1 year for annual or sooner for fatigue, mental health, weight.

## 2022-07-08 NOTE — PATIENT INSTRUCTIONS
Dont Forget About Your Free Nutrition Consult!    In your one-on-one individually tailored nutrition consult, your personal dietitian will provide a sample meal plan, daily caloric needs, a how-to on stocking your pantry and kitchen for wellness success, a shopping guide, and guidance around local restaurants, take-out and convenience food options.    All full-time and part-time [employer] employees and their dependents with [employer] or [employer] insurance are eligible for a free 60-minute, one-on-one employee wellness consult, once per calendar year. The service can be done in-person or virtually!    Those with [employer] and other insurance plans also qualify for a one-on-one wellness visit, please email [email or web address] for more information regarding co-payment.    For more information or to make an appointment, please email [email or web address] or call [phone].    Employees who complete a nutrition consult will receive 150 points in their [employer] portal.

## 2022-07-19 ENCOUNTER — PATIENT MESSAGE (OUTPATIENT)
Dept: INTERNAL MEDICINE | Facility: CLINIC | Age: 27
End: 2022-07-19
Payer: COMMERCIAL

## 2022-08-04 ENCOUNTER — HOSPITAL ENCOUNTER (EMERGENCY)
Facility: OTHER | Age: 27
Discharge: HOME | End: 2022-08-04
Attending: EMERGENCY MEDICINE
Payer: COMMERCIAL

## 2022-08-04 VITALS
DIASTOLIC BLOOD PRESSURE: 60 MMHG | HEIGHT: 62 IN | OXYGEN SATURATION: 100 % | WEIGHT: 160 LBS | BODY MASS INDEX: 29.44 KG/M2 | HEART RATE: 80 BPM | SYSTOLIC BLOOD PRESSURE: 114 MMHG | RESPIRATION RATE: 17 BRPM | TEMPERATURE: 98 F

## 2022-08-04 DIAGNOSIS — K52.9 GASTROENTERITIS: Primary | ICD-10-CM

## 2022-08-04 DIAGNOSIS — R10.9 FLANK PAIN: ICD-10-CM

## 2022-08-04 LAB
ALBUMIN SERPL BCP-MCNC: 3.8 G/DL (ref 3.5–5.2)
ALP SERPL-CCNC: 75 U/L (ref 55–135)
ALT SERPL W/O P-5'-P-CCNC: 21 U/L (ref 10–44)
ANION GAP SERPL CALC-SCNC: 12 MMOL/L (ref 8–16)
AST SERPL-CCNC: 19 U/L (ref 10–40)
B-HCG UR QL: NEGATIVE
BASOPHILS # BLD AUTO: 0.02 K/UL (ref 0–0.2)
BASOPHILS NFR BLD: 0.2 % (ref 0–1.9)
BILIRUB SERPL-MCNC: 0.9 MG/DL (ref 0.1–1)
BILIRUB UR QL STRIP: NEGATIVE
BUN SERPL-MCNC: 16 MG/DL (ref 6–20)
CALCIUM SERPL-MCNC: 8.9 MG/DL (ref 8.7–10.5)
CHLORIDE SERPL-SCNC: 103 MMOL/L (ref 95–110)
CLARITY UR: CLEAR
CO2 SERPL-SCNC: 24 MMOL/L (ref 23–29)
COLOR UR: YELLOW
CREAT SERPL-MCNC: 0.9 MG/DL (ref 0.5–1.4)
CTP QC/QA: YES
DIFFERENTIAL METHOD: NORMAL
EOSINOPHIL # BLD AUTO: 0.1 K/UL (ref 0–0.5)
EOSINOPHIL NFR BLD: 1 % (ref 0–8)
ERYTHROCYTE [DISTWIDTH] IN BLOOD BY AUTOMATED COUNT: 12.1 % (ref 11.5–14.5)
EST. GFR  (NO RACE VARIABLE): >60 ML/MIN/1.73 M^2
GLUCOSE SERPL-MCNC: 100 MG/DL (ref 70–110)
GLUCOSE UR QL STRIP: NEGATIVE
HCT VFR BLD AUTO: 43.1 % (ref 37–48.5)
HGB BLD-MCNC: 14.8 G/DL (ref 12–16)
HGB UR QL STRIP: ABNORMAL
IMM GRANULOCYTES # BLD AUTO: 0.02 K/UL (ref 0–0.04)
IMM GRANULOCYTES NFR BLD AUTO: 0.2 % (ref 0–0.5)
KETONES UR QL STRIP: NEGATIVE
LEUKOCYTE ESTERASE UR QL STRIP: NEGATIVE
LIPASE SERPL-CCNC: 27 U/L (ref 4–60)
LYMPHOCYTES # BLD AUTO: 2.3 K/UL (ref 1–4.8)
LYMPHOCYTES NFR BLD: 22.8 % (ref 18–48)
MCH RBC QN AUTO: 30.6 PG (ref 27–31)
MCHC RBC AUTO-ENTMCNC: 34.3 G/DL (ref 32–36)
MCV RBC AUTO: 89 FL (ref 82–98)
MICROSCOPIC COMMENT: ABNORMAL
MONOCYTES # BLD AUTO: 0.8 K/UL (ref 0.3–1)
MONOCYTES NFR BLD: 7.6 % (ref 4–15)
NEUTROPHILS # BLD AUTO: 7 K/UL (ref 1.8–7.7)
NEUTROPHILS NFR BLD: 68.2 % (ref 38–73)
NITRITE UR QL STRIP: NEGATIVE
NRBC BLD-RTO: 0 /100 WBC
PH UR STRIP: 6 [PH] (ref 5–8)
PLATELET # BLD AUTO: 345 K/UL (ref 150–450)
PMV BLD AUTO: 10.7 FL (ref 9.2–12.9)
POTASSIUM SERPL-SCNC: 3.9 MMOL/L (ref 3.5–5.1)
PROT SERPL-MCNC: 7.3 G/DL (ref 6–8.4)
PROT UR QL STRIP: NEGATIVE
RBC # BLD AUTO: 4.84 M/UL (ref 4–5.4)
RBC #/AREA URNS HPF: 15 /HPF (ref 0–4)
SODIUM SERPL-SCNC: 139 MMOL/L (ref 136–145)
SP GR UR STRIP: 1.02 (ref 1–1.03)
URN SPEC COLLECT METH UR: ABNORMAL
UROBILINOGEN UR STRIP-ACNC: NEGATIVE EU/DL
WBC # BLD AUTO: 10.28 K/UL (ref 3.9–12.7)

## 2022-08-04 PROCEDURE — 96361 HYDRATE IV INFUSION ADD-ON: CPT

## 2022-08-04 PROCEDURE — 80053 COMPREHEN METABOLIC PANEL: CPT | Performed by: EMERGENCY MEDICINE

## 2022-08-04 PROCEDURE — 81025 URINE PREGNANCY TEST: CPT | Performed by: EMERGENCY MEDICINE

## 2022-08-04 PROCEDURE — 85025 COMPLETE CBC W/AUTO DIFF WBC: CPT | Performed by: EMERGENCY MEDICINE

## 2022-08-04 PROCEDURE — 99285 EMERGENCY DEPT VISIT HI MDM: CPT | Mod: 25

## 2022-08-04 PROCEDURE — 81000 URINALYSIS NONAUTO W/SCOPE: CPT | Performed by: EMERGENCY MEDICINE

## 2022-08-04 PROCEDURE — 25000003 PHARM REV CODE 250: Performed by: EMERGENCY MEDICINE

## 2022-08-04 PROCEDURE — 96375 TX/PRO/DX INJ NEW DRUG ADDON: CPT

## 2022-08-04 PROCEDURE — 96365 THER/PROPH/DIAG IV INF INIT: CPT

## 2022-08-04 PROCEDURE — 63600175 PHARM REV CODE 636 W HCPCS: Performed by: EMERGENCY MEDICINE

## 2022-08-04 PROCEDURE — 83690 ASSAY OF LIPASE: CPT | Performed by: EMERGENCY MEDICINE

## 2022-08-04 RX ORDER — NAPROXEN 500 MG/1
500 TABLET ORAL 2 TIMES DAILY WITH MEALS
Qty: 20 TABLET | Refills: 0 | Status: SHIPPED | OUTPATIENT
Start: 2022-08-04 | End: 2022-11-30

## 2022-08-04 RX ORDER — MORPHINE SULFATE 4 MG/ML
4 INJECTION, SOLUTION INTRAMUSCULAR; INTRAVENOUS
Status: COMPLETED | OUTPATIENT
Start: 2022-08-04 | End: 2022-08-04

## 2022-08-04 RX ORDER — ONDANSETRON 4 MG/1
4 TABLET, ORALLY DISINTEGRATING ORAL EVERY 6 HOURS PRN
Qty: 20 TABLET | Refills: 0 | Status: SHIPPED | OUTPATIENT
Start: 2022-08-04 | End: 2022-11-30

## 2022-08-04 RX ORDER — DICYCLOMINE HYDROCHLORIDE 20 MG/1
20 TABLET ORAL 4 TIMES DAILY
Qty: 20 TABLET | Refills: 0 | Status: SHIPPED | OUTPATIENT
Start: 2022-08-04 | End: 2022-08-24

## 2022-08-04 RX ORDER — ONDANSETRON 2 MG/ML
4 INJECTION INTRAMUSCULAR; INTRAVENOUS
Status: COMPLETED | OUTPATIENT
Start: 2022-08-04 | End: 2022-08-04

## 2022-08-04 RX ORDER — KETOROLAC TROMETHAMINE 30 MG/ML
10 INJECTION, SOLUTION INTRAMUSCULAR; INTRAVENOUS
Status: COMPLETED | OUTPATIENT
Start: 2022-08-04 | End: 2022-08-04

## 2022-08-04 RX ORDER — PROMETHAZINE HYDROCHLORIDE 25 MG/1
25 TABLET ORAL EVERY 6 HOURS PRN
Qty: 25 TABLET | Refills: 0 | Status: SHIPPED | OUTPATIENT
Start: 2022-08-04 | End: 2022-11-30

## 2022-08-04 RX ADMIN — SODIUM CHLORIDE 1000 ML: 0.9 INJECTION, SOLUTION INTRAVENOUS at 03:08

## 2022-08-04 RX ADMIN — PROMETHAZINE HYDROCHLORIDE 12.5 MG: 25 INJECTION INTRAMUSCULAR; INTRAVENOUS at 05:08

## 2022-08-04 RX ADMIN — ONDANSETRON 4 MG: 2 INJECTION INTRAMUSCULAR; INTRAVENOUS at 03:08

## 2022-08-04 RX ADMIN — MORPHINE SULFATE 4 MG: 4 INJECTION, SOLUTION INTRAMUSCULAR; INTRAVENOUS at 03:08

## 2022-08-04 RX ADMIN — KETOROLAC TROMETHAMINE 10 MG: 30 INJECTION, SOLUTION INTRAMUSCULAR; INTRAVENOUS at 05:08

## 2022-08-04 NOTE — ED TRIAGE NOTES
Patient reports left upper and lower abd pain, bilateral flank pain, N/V/D since yesterday. No vomiting today, 4 episodes of emesis within the last 24 hrs. (+) tenderness to left-side of abdomen

## 2022-08-04 NOTE — Clinical Note
"Erlinda Hebertla" Briseyda was seen and treated in our emergency department on 8/4/2022.  She may return to school on 08/08/2022.  Patient is unable to fly for the next 24 hours.     If you have any questions or concerns, please don't hesitate to call.      Shin Paulson MD"

## 2022-08-04 NOTE — ED PROVIDER NOTES
"Encounter Date: 8/4/2022    SCRIBE #1 NOTE: I, Maria Dolores Javier, am scribing for, and in the presence of, Shin Paulson MD.       History     Chief Complaint   Patient presents with    N/V/D     Since yesterday with low back pain     Time seen by provider: 3:30 AM    This is a 27 y.o. female who presents with complaint of N/V/D that began yesterday morning. She reports about 4 episodes of emesis and 5 episodes of diarrhea. Also reports severe bilateral flank pain that began yesterday evening as well as abdominal pain. She describes the flank pain as "tightness." She states that she typically experiences similar flank pain when she is about to start her menstrual cycle, but it is more severe now. She notes that she is due to start her cycle today. She denies fever or vaginal discharge.    This is the extent of the patient's complaints at this time.    The history is provided by the patient.     Review of patient's allergies indicates:   Allergen Reactions    Kiwi (actinidia chinensis) Itching and Nausea Only     Past Medical History:   Diagnosis Date    Fibromyalgia     Obesity (BMI 30.0-34.9)     PCOS (polycystic ovarian syndrome)     Vitamin D deficiency      Past Surgical History:   Procedure Laterality Date    none       Family History   Problem Relation Age of Onset    Hyperlipidemia Mother     Hyperlipidemia Father     Heart attacks under age 50 Father     Diabetes type II Father     Diabetes Father     Hypertension Father     Hypothyroidism Sister     Eczema Neg Hx     Lupus Neg Hx     Psoriasis Neg Hx     Melanoma Neg Hx     Breast cancer Neg Hx     Ovarian cancer Neg Hx     Colon cancer Neg Hx      Social History     Tobacco Use    Smoking status: Never Smoker    Smokeless tobacco: Never Used   Substance Use Topics    Alcohol use: Yes     Comment: socially    Drug use: No     Review of Systems   Constitutional: Negative for chills and fever.   HENT: Negative for rhinorrhea, sore " throat and trouble swallowing.    Respiratory: Negative for chest tightness, shortness of breath and wheezing.    Cardiovascular: Negative for chest pain, palpitations and leg swelling.   Gastrointestinal: Positive for abdominal pain, diarrhea, nausea and vomiting.   Genitourinary: Positive for flank pain (bilateral). Negative for dysuria, vaginal bleeding and vaginal discharge.   Neurological: Negative for speech difficulty and light-headedness.   All other systems reviewed and are negative.      Physical Exam     Initial Vitals [08/04/22 0248]   BP Pulse Resp Temp SpO2   134/80 90 18 98 °F (36.7 °C) 100 %      MAP       --         Physical Exam    Nursing note and vitals reviewed.  Constitutional: She appears well-developed and well-nourished. She is not diaphoretic. No distress.   HENT:   Head: Normocephalic and atraumatic.   Right Ear: External ear normal.   Left Ear: External ear normal.   Eyes: Conjunctivae and EOM are normal. Pupils are equal, round, and reactive to light.   Neck: Neck supple. No tracheal deviation present.   Normal range of motion.  Cardiovascular: Normal rate, regular rhythm, normal heart sounds and intact distal pulses. Exam reveals no gallop and no friction rub.    No murmur heard.  Pulmonary/Chest: Breath sounds normal. No respiratory distress. She has no wheezes. She has no rhonchi. She has no rales. She exhibits no tenderness.   Abdominal: Abdomen is soft. Bowel sounds are normal. She exhibits no distension and no mass. There is no abdominal tenderness. There is no rebound and no guarding.   Musculoskeletal:         General: Normal range of motion.      Cervical back: Normal range of motion and neck supple.      Comments: Back:  No T/L-spine tenderness to palpation or percussion, no tenderness palpation of lumbar region, no pain with rotation or flexion    No CVA tenderness to palpation or percussion bilaterally     Neurological: She is alert and oriented to person, place, and time.    Skin: Skin is warm and dry. Capillary refill takes less than 2 seconds.   Psychiatric: She has a normal mood and affect. Thought content normal.         ED Course   Procedures  Labs Reviewed   URINALYSIS, REFLEX TO URINE CULTURE - Abnormal; Notable for the following components:       Result Value    Occult Blood UA 2+ (*)     All other components within normal limits    Narrative:     Specimen Source->Urine   URINALYSIS MICROSCOPIC - Abnormal; Notable for the following components:    RBC, UA 15 (*)     All other components within normal limits    Narrative:     Specimen Source->Urine   CBC W/ AUTO DIFFERENTIAL   COMPREHENSIVE METABOLIC PANEL   LIPASE   POCT URINE PREGNANCY          Imaging Results          CT Renal Stone Study ABD Pelvis WO (Final result)  Result time 08/04/22 05:21:18    Final result by Robert Lopez MD (08/04/22 05:21:18)                 Impression:      1.  No evidence of hydronephrosis or obstructive uropathy.    2.  Relatively nondilated fluid-filled loops of small bowel relate to a nonspecific enteritis.      Electronically signed by: Robert Lopez MD  Date:    08/04/2022  Time:    05:21             Narrative:    EXAMINATION:  CT RENAL STONE STUDY ABD PELVIS WO    CLINICAL HISTORY:  Flank pain, kidney stone suspected;    TECHNIQUE:  Low dose axial images, sagittal and coronal reformations were obtained from the lung bases to the pubic symphysis.  Contrast was not administered.    COMPARISON:  CT 03/28/2017    FINDINGS:  The lung bases are unremarkable.  There is no pleural fluid present.  The visualized portions of the heart appear normal.    The liver is unremarkable in appearance on this limited non-contrast examination.  The gallbladder shows no evidence of stones or pericholecystic fluid.  There is no intra-or extrahepatic biliary ductal dilatation.    The stomach, spleen, pancreas, and adrenal glands appear within normal limits for noncontrast technique.    The kidneys are normal  in size and location. There is no evidence of hydronephrosis or nephrolithiasis.  No definite obstructing ureteral calculus identified.  The urinary bladder is unremarkable.  The uterus and adnexa appear within normal limits.  There is no significant free fluid in the pelvis.    The abdominal aorta is normal in course and caliber without significant atherosclerotic calcifications.    There are relatively nondilated fluid-filled loops of small bowel without discrete transition point which may relate to a nonspecific enteritis.  No abnormal colonic wall thickening or inflammatory change appreciated.  There is no CT evidence of acute appendicitis.  There is no ascites, portal venous gas, or free intraperitoneal air.  There are scattered small mesenteric lymph nodes present.    There are degenerative discogenic changes of the thoracic spine at T9-T10. The extraperitoneal soft tissues are unremarkable.                                 Medications   sodium chloride 0.9% bolus 1,000 mL (0 mLs Intravenous Stopped 8/4/22 0444)   ondansetron injection 4 mg (4 mg Intravenous Given 8/4/22 0345)   morphine injection 4 mg (4 mg Intravenous Given 8/4/22 0345)   promethazine (PHENERGAN) 12.5 mg in dextrose 5 % 50 mL IVPB (0 mg Intravenous Stopped 8/4/22 0528)   ketorolac injection 9.999 mg (9.999 mg Intravenous Given 8/4/22 0508)     Medical Decision Making:   History:   Old Medical Records: I decided to obtain old medical records.  Differential Diagnosis:   Ureterolithiais, diverticulitis, colitis, inflammatory bowel disease, gastroenteritis, ileus, small bowel obstruction  Clinical Tests:   Lab Tests: Reviewed and Ordered  Radiological Study: Ordered and Reviewed  ED Management:  27-year-old female who presents with what she felt was likely a food poisoning and has concomitant bilateral flank pains which she states happens commonly with her premenstrual syndrome, but not as severely has no significant findings on physical exam  and workup is unremarkable except for blood in her urine.  Given that she is having flank pains, will check CT renal stone to evaluate for the presence of nephrolithiasis.  Discussed with patient findings of the CT scan, that this likely represents gastroenteritis and concomitant premenstrual syndrome back pain.  Patient inquired as to the possibility of Cronobacter, but discussed with her that at this time I do not suspect this.  Patient discharged home in stable condition. Diagnosis and treatment plan explained to patient. No further workup indicated based on their complaints or examination today. Discussed results with the patient. I educated the patient/guardian on the warning signs and symptoms for which they must seek immediate medical attention. All questions addressed and patient/guardian were given discharge instructions and followup information.           Scribe Attestation:   Scribe #1: I performed the above scribed service and the documentation accurately describes the services I performed. I attest to the accuracy of the note.              Physician Attestation for Scribe: I, MAA, reviewed documentation as scribed in my presence, which is both accurate and complete.      Clinical Impression:   Final diagnoses:  [K52.9] Gastroenteritis (Primary)  [R10.9] Flank pain          ED Disposition Condition    Discharge Stable        ED Prescriptions     Medication Sig Dispense Start Date End Date Auth. Provider    naproxen (NAPROSYN) 500 MG tablet Take 1 tablet (500 mg total) by mouth 2 (two) times daily with meals. For pain 20 tablet 8/4/2022  Shin Paulson MD    ondansetron (ZOFRAN-ODT) 4 MG TbDL Take 1 tablet (4 mg total) by mouth every 6 (six) hours as needed (Nausea and vomiting). 20 tablet 8/4/2022  Shin Paulson MD    dicyclomine (BENTYL) 20 mg tablet Take 1 tablet (20 mg total) by mouth 4 (four) times daily. for 20 days 20 tablet 8/4/2022 8/24/2022 Shin Paulson MD    promethazine (PHENERGAN) 25  MG tablet Take 1 tablet (25 mg total) by mouth every 6 (six) hours as needed for Nausea. 25 tablet 8/4/2022  Shin Paulson MD        Follow-up Information     Follow up With Specialties Details Why Contact Info    Princess Patrick MD Internal Medicine Schedule an appointment as soon as possible for a visit in 3 days For follow-up and re-evaluation [address]  [address]  [facility] [address]  [phone]      [facility] - Emergency Dept Emergency Medicine  As needed, for any new or worsening symptoms [address] [facility] [id number] [phone]           Shin Paulson MD  08/04/22 0541

## 2022-08-10 ENCOUNTER — PATIENT MESSAGE (OUTPATIENT)
Dept: INTERNAL MEDICINE | Facility: CLINIC | Age: 27
End: 2022-08-10
Payer: COMMERCIAL

## 2022-10-08 ENCOUNTER — PATIENT MESSAGE (OUTPATIENT)
Dept: INTERNAL MEDICINE | Facility: CLINIC | Age: 27
End: 2022-10-08
Payer: COMMERCIAL

## 2022-10-12 NOTE — TELEPHONE ENCOUNTER
Please offer an appointment to discuss treatment options for weight, PCOS. Virtual or in person. Thanks!

## 2022-10-28 ENCOUNTER — PATIENT MESSAGE (OUTPATIENT)
Dept: OBSTETRICS AND GYNECOLOGY | Facility: CLINIC | Age: 27
End: 2022-10-28
Payer: COMMERCIAL

## 2022-11-15 ENCOUNTER — OFFICE VISIT (OUTPATIENT)
Dept: URGENT CARE | Facility: CLINIC | Age: 27
End: 2022-11-15
Payer: COMMERCIAL

## 2022-11-15 VITALS
HEART RATE: 85 BPM | OXYGEN SATURATION: 99 % | BODY MASS INDEX: 29.44 KG/M2 | SYSTOLIC BLOOD PRESSURE: 120 MMHG | HEIGHT: 62 IN | TEMPERATURE: 99 F | WEIGHT: 160 LBS | DIASTOLIC BLOOD PRESSURE: 77 MMHG | RESPIRATION RATE: 20 BRPM

## 2022-11-15 DIAGNOSIS — R05.9 COUGH, UNSPECIFIED TYPE: ICD-10-CM

## 2022-11-15 DIAGNOSIS — J00 ACUTE NASOPHARYNGITIS: Primary | ICD-10-CM

## 2022-11-15 LAB
CTP QC/QA: YES
POC MOLECULAR INFLUENZA A AGN: NEGATIVE
POC MOLECULAR INFLUENZA B AGN: NEGATIVE

## 2022-11-15 PROCEDURE — 87502 POCT INFLUENZA A/B MOLECULAR: ICD-10-PCS | Mod: QW,S$GLB,, | Performed by: NURSE PRACTITIONER

## 2022-11-15 PROCEDURE — 1160F PR REVIEW ALL MEDS BY PRESCRIBER/CLIN PHARMACIST DOCUMENTED: ICD-10-PCS | Mod: CPTII,S$GLB,, | Performed by: NURSE PRACTITIONER

## 2022-11-15 PROCEDURE — 3008F PR BODY MASS INDEX (BMI) DOCUMENTED: ICD-10-PCS | Mod: CPTII,S$GLB,, | Performed by: NURSE PRACTITIONER

## 2022-11-15 PROCEDURE — 3078F PR MOST RECENT DIASTOLIC BLOOD PRESSURE < 80 MM HG: ICD-10-PCS | Mod: CPTII,S$GLB,, | Performed by: NURSE PRACTITIONER

## 2022-11-15 PROCEDURE — 99213 OFFICE O/P EST LOW 20 MIN: CPT | Mod: S$GLB,,, | Performed by: NURSE PRACTITIONER

## 2022-11-15 PROCEDURE — 1159F PR MEDICATION LIST DOCUMENTED IN MEDICAL RECORD: ICD-10-PCS | Mod: CPTII,S$GLB,, | Performed by: NURSE PRACTITIONER

## 2022-11-15 PROCEDURE — 3078F DIAST BP <80 MM HG: CPT | Mod: CPTII,S$GLB,, | Performed by: NURSE PRACTITIONER

## 2022-11-15 PROCEDURE — 1160F RVW MEDS BY RX/DR IN RCRD: CPT | Mod: CPTII,S$GLB,, | Performed by: NURSE PRACTITIONER

## 2022-11-15 PROCEDURE — 3074F SYST BP LT 130 MM HG: CPT | Mod: CPTII,S$GLB,, | Performed by: NURSE PRACTITIONER

## 2022-11-15 PROCEDURE — 3008F BODY MASS INDEX DOCD: CPT | Mod: CPTII,S$GLB,, | Performed by: NURSE PRACTITIONER

## 2022-11-15 PROCEDURE — 87502 INFLUENZA DNA AMP PROBE: CPT | Mod: QW,S$GLB,, | Performed by: NURSE PRACTITIONER

## 2022-11-15 PROCEDURE — 1159F MED LIST DOCD IN RCRD: CPT | Mod: CPTII,S$GLB,, | Performed by: NURSE PRACTITIONER

## 2022-11-15 PROCEDURE — 3074F PR MOST RECENT SYSTOLIC BLOOD PRESSURE < 130 MM HG: ICD-10-PCS | Mod: CPTII,S$GLB,, | Performed by: NURSE PRACTITIONER

## 2022-11-15 PROCEDURE — 99213 PR OFFICE/OUTPT VISIT, EST, LEVL III, 20-29 MIN: ICD-10-PCS | Mod: S$GLB,,, | Performed by: NURSE PRACTITIONER

## 2022-11-15 RX ORDER — PROMETHAZINE HYDROCHLORIDE AND DEXTROMETHORPHAN HYDROBROMIDE 6.25; 15 MG/5ML; MG/5ML
5 SYRUP ORAL NIGHTLY PRN
Qty: 120 ML | Refills: 0 | Status: SHIPPED | OUTPATIENT
Start: 2022-11-15 | End: 2022-11-25

## 2022-11-15 NOTE — PROGRESS NOTES
"Subjective:       Patient ID: Erlinda Rain is a 27 y.o. female.    Vitals:  height is 5' 2" (1.575 m) and weight is 72.6 kg (160 lb). Her temperature is 98.6 °F (37 °C). Her blood pressure is 120/77 and her pulse is 85. Her respiration is 20 and oxygen saturation is 99%.     Chief Complaint: Cough    Pt presents with complaint of cough and sore throat since Sunday.  She had similar symptoms last week and was feeling better.  She notes that she went to [place] this weekend and somebody in the group tested positive for flu.  She does not feel flu like.      Cough  This is a recurrent problem. The current episode started in the past 7 days. The problem has been unchanged. The problem occurs every few minutes. The cough is Non-productive. Associated symptoms include ear congestion, headaches, myalgias, nasal congestion, postnasal drip, rhinorrhea and a sore throat. Pertinent negatives include no chest pain, chills, ear pain, fever, heartburn, hemoptysis, rash, shortness of breath, sweats, weight loss or wheezing. Nothing aggravates the symptoms. Treatments tried: otc cold and sinus. The treatment provided no relief.     Constitution: Negative for chills, sweating, fatigue and fever.   HENT:  Positive for postnasal drip and sore throat. Negative for ear pain, congestion, sinus pain, sinus pressure, trouble swallowing and voice change.    Cardiovascular:  Negative for chest pain.   Respiratory:  Positive for cough. Negative for sputum production, bloody sputum, shortness of breath and wheezing.    Gastrointestinal:  Negative for heartburn.   Musculoskeletal:  Positive for muscle ache.   Skin:  Negative for rash.   Neurological:  Positive for headaches.     Objective:      Physical Exam   Constitutional: She is oriented to person, place, and time. She appears well-developed. She is cooperative.  Non-toxic appearance. She does not appear ill. No distress.   HENT:   Head: Normocephalic and atraumatic.   Ears:   Right Ear: " Hearing, tympanic membrane, external ear and ear canal normal.   Left Ear: Hearing, tympanic membrane, external ear and ear canal normal.   Nose: Mucosal edema present. No rhinorrhea or nasal deformity. No epistaxis. Right sinus exhibits no maxillary sinus tenderness and no frontal sinus tenderness. Left sinus exhibits no maxillary sinus tenderness and no frontal sinus tenderness.   Mouth/Throat: Uvula is midline, oropharynx is clear and moist and mucous membranes are normal. No trismus in the jaw. Normal dentition. No uvula swelling. No oropharyngeal exudate, posterior oropharyngeal edema or posterior oropharyngeal erythema.   Eyes: Conjunctivae and lids are normal. No scleral icterus.   Neck: Trachea normal and phonation normal. Neck supple. No edema present. No erythema present. No neck rigidity present.   Cardiovascular: Normal rate, regular rhythm, normal heart sounds and normal pulses.   Pulmonary/Chest: Effort normal and breath sounds normal. No respiratory distress. She has no decreased breath sounds. She has no rhonchi.   Abdominal: Normal appearance.   Musculoskeletal: Normal range of motion.         General: No deformity. Normal range of motion.   Lymphadenopathy:     She has no cervical adenopathy.   Neurological: She is alert and oriented to person, place, and time. She exhibits normal muscle tone. Coordination normal.   Skin: Skin is warm, dry, intact, not diaphoretic and not pale.   Psychiatric: Her speech is normal and behavior is normal. Judgment and thought content normal.   Nursing note and vitals reviewed.      Results for orders placed or performed in visit on 11/15/22   POCT Influenza A/B MOLECULAR   Result Value Ref Range    POC Molecular Influenza A Ag Negative Negative, Not Reported    POC Molecular Influenza B Ag Negative Negative, Not Reported     Acceptable Yes        Assessment:       1. Acute nasopharyngitis    2. Cough, unspecified type          Plan:       Lab reviewed.      Acute nasopharyngitis    Cough, unspecified type  -     POCT Influenza A/B MOLECULAR  -     promethazine-dextromethorphan (PROMETHAZINE-DM) 6.25-15 mg/5 mL Syrp; Take 5 mLs by mouth nightly as needed (cough).  Dispense: 120 mL; Refill: 0       Patient Instructions   Please drink plenty of fluids.  Please get plenty of rest.  Please return here or go to the Emergency Department for any concerns or worsening of condition.  If you do not have Hypertension or any history of palpitations, it is ok to take over the counter Sudafed or Mucinex D or Allegra-D or Claritin-D or Zyrtec-D.  If you do take one of the above, it is ok to combine that with plain over the counter Mucinex or Allegra or Claritin or Zyrtec.  If for example you are taking Zyrtec -D, you can combine that with Mucinex, but not Mucinex-D.  If you are taking Mucinex-D, you can combine that with plain Allegra or Claritin or Zyrtec.   If you do have Hypertension or palpitations, it is safe to take Coricidin HBP for relief of sinus symptoms.  If not allergic, please take over the counter Tylenol (Acetaminophen) and/or Motrin (Ibuprofen) as directed for control of pain and/or fever.  Please follow up with your primary care doctor or specialist as needed.    If you  smoke, please stop smoking.

## 2022-11-15 NOTE — LETTER
November 15, 2022      Urgent Care - [address] [address]  Phone: [phone]  Fax: [phone]       Patient: Erlinda Rain   YOB: 1995  Date of Visit: 11/15/2022    To Whom It May Concern:    Michell Rain  was at [facility] on 11/15/2022. Please excuse from work on 11/16/22 and 11/17/22. If you have any questions or concerns, or if I can be of further assistance, please do not hesitate to contact me.    Sincerely,          Elena Matthew, NP

## 2022-11-17 ENCOUNTER — OCCUPATIONAL HEALTH (OUTPATIENT)
Dept: URGENT CARE | Facility: CLINIC | Age: 27
End: 2022-11-17

## 2022-11-17 DIAGNOSIS — Z13.9 ENCOUNTER FOR SCREENING: Primary | ICD-10-CM

## 2022-11-17 LAB
CTP QC/QA: YES
SARS-COV-2 RDRP RESP QL NAA+PROBE: POSITIVE

## 2022-11-17 PROCEDURE — 87635 SARS-COV-2 COVID-19 AMP PRB: CPT | Mod: QW,S$GLB,, | Performed by: NURSE PRACTITIONER

## 2022-11-17 PROCEDURE — 87635: ICD-10-PCS | Mod: QW,S$GLB,, | Performed by: NURSE PRACTITIONER

## 2022-11-30 ENCOUNTER — OFFICE VISIT (OUTPATIENT)
Dept: INTERNAL MEDICINE | Facility: CLINIC | Age: 27
End: 2022-11-30
Payer: COMMERCIAL

## 2022-11-30 DIAGNOSIS — U07.1 COVID-19 VIRUS INFECTION: Primary | ICD-10-CM

## 2022-11-30 DIAGNOSIS — E28.2 PCOS (POLYCYSTIC OVARIAN SYNDROME): ICD-10-CM

## 2022-11-30 DIAGNOSIS — R53.83 FATIGUE, UNSPECIFIED TYPE: ICD-10-CM

## 2022-11-30 DIAGNOSIS — E66.9 OBESITY (BMI 30.0-34.9): ICD-10-CM

## 2022-11-30 PROCEDURE — 99215 OFFICE O/P EST HI 40 MIN: CPT | Mod: 95,,, | Performed by: INTERNAL MEDICINE

## 2022-11-30 PROCEDURE — 1160F PR REVIEW ALL MEDS BY PRESCRIBER/CLIN PHARMACIST DOCUMENTED: ICD-10-PCS | Mod: CPTII,95,, | Performed by: INTERNAL MEDICINE

## 2022-11-30 PROCEDURE — 99215 PR OFFICE/OUTPT VISIT, EST, LEVL V, 40-54 MIN: ICD-10-PCS | Mod: 95,,, | Performed by: INTERNAL MEDICINE

## 2022-11-30 PROCEDURE — 1159F PR MEDICATION LIST DOCUMENTED IN MEDICAL RECORD: ICD-10-PCS | Mod: CPTII,95,, | Performed by: INTERNAL MEDICINE

## 2022-11-30 PROCEDURE — 1160F RVW MEDS BY RX/DR IN RCRD: CPT | Mod: CPTII,95,, | Performed by: INTERNAL MEDICINE

## 2022-11-30 PROCEDURE — 1159F MED LIST DOCD IN RCRD: CPT | Mod: CPTII,95,, | Performed by: INTERNAL MEDICINE

## 2022-11-30 RX ORDER — TOPIRAMATE 50 MG/1
50 TABLET, FILM COATED ORAL DAILY
Qty: 30 TABLET | Refills: 11 | Status: SHIPPED | OUTPATIENT
Start: 2022-11-30 | End: 2023-06-05 | Stop reason: ALTCHOICE

## 2022-11-30 NOTE — PATIENT INSTRUCTIONS
All of your core healthy metrics are met.      Jorden Coffey,     If you are due for any health screening(s) below please notify me so we can arrange them to be ordered and scheduled to maintain your health. Most healthy patients complete it. Don't lose out on improving your health.     All of your core healthy metrics are met.

## 2022-11-30 NOTE — PROGRESS NOTES
The patient location is: [place]  The chief complaint leading to consultation is: metformin and Obesity    Visit type: Virtual visit with synchronous audio and video  Contact time spent with patient: 22 minutes  Each patient to whom he or she provides medical services by telemedicine is:  (1) informed of the relationship between the physician and patient and the respective role of any other health care provider with respect to management of the patient; and (2) notified that he or she may decline to receive medical services by telemedicine and may withdraw from such care at any time.    Subjective:       Patient ID: Erlinda Rain is a 27 y.o. female who  has a past medical history of Fibromyalgia, Obesity (BMI 30.0-34.9), PCOS (polycystic ovarian syndrome), and Vitamin D deficiency.    Chief Complaint: metformin and Obesity    History was obtained from the patient and supplemented through chart review.  The patient was seen in Urgent Care 2 weeks ago for COVID.    She graduated from [employer] in [place] in biology.  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    HPI    The patient was seen in Urgent Care 2 weeks ago for COVID.  Doing much better. Still with cough, congestion. No difficulty expectorating sputum. Taking Zyrtec, Mucinex D.    Obesity:    Previously declined medical weight loss with bariatric due to cost. On metformin for PCOS; switched to XR to help c med compliance. Helps c BMs.  Doesn't feel that metformin is helping with weight loss.  Inquiring of meds such as Orlistat/Ali and liraglutide.     Gets some HA.  On Wellbutrin for fatigue.     Getting adequate sleep. She works days.    Diet:  Was on a very strict and specific diet, but difficult to keep it up. No sugar, barely salt and carbs.   Doesn't eat breakfast.     Exercise:  Was seeing a  2/week   BMI Readings from Last 3 Encounters:   11/15/22 29.26 kg/m²   08/04/22 29.26 kg/m²   07/08/22  30.73 kg/m²     Lab Results   Component Value Date/Time    HGBA1C 5.1 06/10/2021 12:40 PM    HGBA1C 5.0 10/23/2018 07:31 AM    HGBA1C 5.1 03/21/2017 12:34 PM     No results found for: [id number]    PCOS:  Irregular menstrual cycles. Following with OBGYN. Switched IUD to OCPs. Started metformin as above.  Lab Results   Component Value Date/Time    HGBA1C 5.1 06/10/2021 12:40 PM    HGBA1C 5.0 10/23/2018 07:31 AM    HGBA1C 5.1 03/21/2017 12:34 PM     Lab Results   Component Value Date    TSH 0.649 03/23/2021    [id number] 92 03/21/2017    FREET4 0.84 03/21/2017               Not addressed today.  Dyspareunia:  She is following c Pelvic PT, sex therapist, OBGYN.    Fatigue:  Works 12 hour shifts.  Does not feel refreshed after adequate sleep.  Difficulty falling asleep, staying asleep.  Mild snoring. No apnea. Not much relief with Melatonin.   Following with sleep clinic. PSG neg. Discussed sleep hygiene.  Try to be more consistent with sleep schedule. Rec sleep diary.    H/o FMA. Was on Cymbalta. Mood is ok.     Started Wellbutrin  for fatigue. Some decreased fatigue. Easier to wake up earlier. Has a deeper sleep.  Not much anxiety.    Has dyspareunia as above, difficulty with orgasm.    Sleep:  As above  Anhedonia:  Some burn out with work  Guilt: some towards her BF d/t dyspareunia as below.  Energy: improved  Concentration: some difficulty focusing. Gets distracted  Appetite: varied  Psychomotor agitation: has to push herself.  Improved. Wellbutrin to 300. May titrate to 450mg daily. Could consider fluoxetine as adjunct tx. Caution with SSRI d/t sexual dysfunction.   Lab Results   Component Value Date    TSH 0.649 03/23/2021    D9YHRPE 92 03/21/2017    FREET4 0.84 03/21/2017     FMA:    Was on amitriptyline, Cymbalta 60 in the past.  Saw rheumatology in 2017. Vit D def. Tapered off Cymbalta. Doing well.  Lab Results   Component Value Date    [id number] 39 10/23/2018    [id number] 24 (L) 03/21/2017     [id number] 29 (L) 08/05/2016     Acne vulgaris:  On her back, chest. Associated with IUD. Saw [facility]. Rx'd trifarotene, po doxy.  Consider spironolactone.    Review of Systems   Constitutional:  Negative for activity change.   HENT:  Negative for hearing loss and trouble swallowing.    Eyes:  Negative for discharge.   Respiratory:  Negative for chest tightness and wheezing.    Cardiovascular:  Negative for chest pain and palpitations.   Gastrointestinal:  Negative for constipation, diarrhea and vomiting.   Genitourinary:  Negative for difficulty urinating and hematuria.   Neurological:  Positive for headaches.   Psychiatric/Behavioral:  Positive for dysphoric mood.        I personally reviewed Past Medical History, Past Surgical History, Social History, and Family History.    Objective:      There were no vitals filed for this visit.     Physical Exam  Constitutional:       General: She is not in acute distress.     Appearance: She is well-developed. She is not diaphoretic.   HENT:      Head: Normocephalic and atraumatic.   Eyes:      General:         Right eye: No discharge.         Left eye: No discharge.   Pulmonary:      Effort: Pulmonary effort is normal. No respiratory distress.   Skin:     Coloration: Skin is not pale.      Findings: No erythema.   Neurological:      Mental Status: She is alert.   Psychiatric:         Behavior: Behavior normal.         Lab Results   Component Value Date    WBC 10.28 08/04/2022    HGB 14.8 08/04/2022    HCT 43.1 08/04/2022     08/04/2022    CHOL 194 10/23/2018    TRIG 123 10/23/2018    HDL 51 10/23/2018    ALT 21 08/04/2022    AST 19 08/04/2022     08/04/2022    K 3.9 08/04/2022     08/04/2022    CREATININE 0.9 08/04/2022    BUN 16 08/04/2022    CO2 24 08/04/2022    TSH 0.649 03/23/2021    HGBA1C 5.1 06/10/2021       The ASCVD Risk score (Devika DK, et al., 2019) failed to calculate for the following reasons:    The 2019 ASCVD risk score is  only valid for ages 40 to 79    (Imaging have been independently reviewed)  CT renal s hydro. Nonspecific enteritis.    Assessment:       1. COVID-19 virus infection    2. Obesity (BMI 30.0-34.9)    3. PCOS (polycystic ovarian syndrome)            Plan:       Erlinda was seen today for metformin and obesity.    Diagnoses and all orders for this visit:    COVID-19 virus infection  Comments:  Doing much better! Cont OTC supportive tx.     Obesity (BMI 30.0-34.9)  Comments:  Discussed tx options, SE. Cont Metformin for PCOS, Wellbutrin. Try to eat earlier meals, smaller meals at night, mindful eating. Addtl notes below  Orders:  -     topiramate (TOPAMAX) 50 MG tablet; Take 1 tablet (50 mg total) by mouth once daily.    PCOS (polycystic ovarian syndrome)  Comments:  Following c OBGYN . A1C, TSH wnl. Cont Metformin.         Obesity:  Trial Topamax for weight. Stop if she plans on pregnancy. Avoid long term Orlistat d/t risk of malabsorption.    Side effects of medication(s) were discussed in detail and patient voiced understanding.  Patient will call back for any issues or complications.     I have spent a total of 40 minutes with the patient as well as reviewing the chart/medical record and placing orders on the day of the visit. Discussed weight, tx options, meds, PCOS.     RTC in 1 year for annual or sooner for mental health, weight.

## 2022-11-30 NOTE — TELEPHONE ENCOUNTER
Care Due:                  Date            Visit Type   Department     Provider  --------------------------------------------------------------------------------                                ESTABLISHED                              PATIENT -    [facility] INTERNAL  Last Visit: 11-      [facility]      MEDICINE       Princess Patrick  Next Visit: None Scheduled  None         None Found                                                            Last  Test          Frequency    Reason                     Performed    Due Date  --------------------------------------------------------------------------------    HBA1C.......  6 months...  metFORMIN................  06-   12-    [facility] Embedded Care Gaps. Reference number: [id number]. 11/30/2022   10:50:41 AM CST

## 2022-12-01 RX ORDER — BUPROPION HYDROCHLORIDE 150 MG/1
TABLET ORAL
Qty: 90 TABLET | Refills: 0 | OUTPATIENT
Start: 2022-12-01

## 2022-12-01 NOTE — TELEPHONE ENCOUNTER
Refill Decision Note   Erlinda Rain  is requesting a refill authorization.  Brief Assessment and Rationale for Refill:  Quick Discontinue    -Medication-Related Problems Identified: Requires labs  Medication Therapy Plan:  Labs (a1c) due 12/7/21    Medication Reconciliation Completed: No   Comments:     Provider Staff:     Action is required for this patient.   Please see care gap opportunities below in Care Due Message.     Thanks!  [facility]     Appointments      Date Provider   Last Visit   11/30/2022 Princess Patrick MD   Next Visit   Visit date not found Pirncess Patrick MD     Note composed:10:32 AM 12/01/2022           Note composed:10:32 AM 12/01/2022

## 2022-12-18 ENCOUNTER — PATIENT MESSAGE (OUTPATIENT)
Dept: INTERNAL MEDICINE | Facility: CLINIC | Age: 27
End: 2022-12-18
Payer: COMMERCIAL

## 2022-12-18 DIAGNOSIS — G47.00 INSOMNIA, UNSPECIFIED TYPE: ICD-10-CM

## 2022-12-18 DIAGNOSIS — F41.9 ANXIETY: Primary | ICD-10-CM

## 2022-12-20 ENCOUNTER — PATIENT MESSAGE (OUTPATIENT)
Dept: INTERNAL MEDICINE | Facility: CLINIC | Age: 27
End: 2022-12-20
Payer: COMMERCIAL

## 2022-12-20 RX ORDER — HYDROXYZINE HYDROCHLORIDE 10 MG/1
10 TABLET, FILM COATED ORAL 3 TIMES DAILY PRN
Qty: 20 TABLET | Refills: 3 | Status: SHIPPED | OUTPATIENT
Start: 2022-12-20 | End: 2023-06-06

## 2022-12-25 ENCOUNTER — PATIENT MESSAGE (OUTPATIENT)
Dept: INTERNAL MEDICINE | Facility: CLINIC | Age: 27
End: 2022-12-25
Payer: COMMERCIAL

## 2022-12-25 DIAGNOSIS — E66.9 OBESITY (BMI 30.0-34.9): ICD-10-CM

## 2022-12-25 DIAGNOSIS — R73.9 HYPERGLYCEMIA: Primary | ICD-10-CM

## 2023-01-10 ENCOUNTER — PATIENT MESSAGE (OUTPATIENT)
Dept: INTERNAL MEDICINE | Facility: CLINIC | Age: 28
End: 2023-01-10
Payer: COMMERCIAL

## 2023-01-15 ENCOUNTER — PATIENT MESSAGE (OUTPATIENT)
Dept: INTERNAL MEDICINE | Facility: CLINIC | Age: 28
End: 2023-01-15
Payer: COMMERCIAL

## 2023-01-18 NOTE — TELEPHONE ENCOUNTER
Please offer an appointment to discuss medications for anxiety, sleep. Virtual or in person. Thanks!

## 2023-01-23 ENCOUNTER — PATIENT MESSAGE (OUTPATIENT)
Dept: OBSTETRICS AND GYNECOLOGY | Facility: CLINIC | Age: 28
End: 2023-01-23
Payer: COMMERCIAL

## 2023-01-24 RX ORDER — NORGESTIMATE AND ETHINYL ESTRADIOL 0.25-0.035
1 KIT ORAL DAILY
Qty: 90 TABLET | Refills: 3 | Status: SHIPPED | OUTPATIENT
Start: 2023-01-24 | End: 2023-06-06

## 2023-01-26 ENCOUNTER — PATIENT MESSAGE (OUTPATIENT)
Dept: INTERNAL MEDICINE | Facility: CLINIC | Age: 28
End: 2023-01-26
Payer: COMMERCIAL

## 2023-01-26 DIAGNOSIS — R11.0 NAUSEA: ICD-10-CM

## 2023-01-26 DIAGNOSIS — R73.9 HYPERGLYCEMIA: Primary | ICD-10-CM

## 2023-01-26 DIAGNOSIS — E66.9 OBESITY (BMI 30.0-34.9): ICD-10-CM

## 2023-01-26 RX ORDER — ONDANSETRON 4 MG/1
4 TABLET, ORALLY DISINTEGRATING ORAL EVERY 6 HOURS PRN
Qty: 20 TABLET | Refills: 0 | Status: SHIPPED | OUTPATIENT
Start: 2023-01-26 | End: 2023-06-05 | Stop reason: ALTCHOICE

## 2023-01-30 ENCOUNTER — PATIENT MESSAGE (OUTPATIENT)
Dept: INTERNAL MEDICINE | Facility: CLINIC | Age: 28
End: 2023-01-30
Payer: COMMERCIAL

## 2023-01-30 ENCOUNTER — OFFICE VISIT (OUTPATIENT)
Dept: URGENT CARE | Facility: CLINIC | Age: 28
End: 2023-01-30
Payer: COMMERCIAL

## 2023-01-30 VITALS
DIASTOLIC BLOOD PRESSURE: 79 MMHG | HEART RATE: 88 BPM | HEIGHT: 62 IN | BODY MASS INDEX: 30 KG/M2 | RESPIRATION RATE: 18 BRPM | OXYGEN SATURATION: 100 % | TEMPERATURE: 99 F | WEIGHT: 163 LBS | SYSTOLIC BLOOD PRESSURE: 110 MMHG

## 2023-01-30 DIAGNOSIS — K21.9 GASTROESOPHAGEAL REFLUX DISEASE, UNSPECIFIED WHETHER ESOPHAGITIS PRESENT: ICD-10-CM

## 2023-01-30 DIAGNOSIS — T78.40XA: Primary | ICD-10-CM

## 2023-01-30 PROCEDURE — 1160F RVW MEDS BY RX/DR IN RCRD: CPT | Mod: CPTII,S$GLB,, | Performed by: FAMILY MEDICINE

## 2023-01-30 PROCEDURE — 3008F BODY MASS INDEX DOCD: CPT | Mod: CPTII,S$GLB,, | Performed by: FAMILY MEDICINE

## 2023-01-30 PROCEDURE — 3074F SYST BP LT 130 MM HG: CPT | Mod: CPTII,S$GLB,, | Performed by: FAMILY MEDICINE

## 2023-01-30 PROCEDURE — 3078F PR MOST RECENT DIASTOLIC BLOOD PRESSURE < 80 MM HG: ICD-10-PCS | Mod: CPTII,S$GLB,, | Performed by: FAMILY MEDICINE

## 2023-01-30 PROCEDURE — 1159F MED LIST DOCD IN RCRD: CPT | Mod: CPTII,S$GLB,, | Performed by: FAMILY MEDICINE

## 2023-01-30 PROCEDURE — 1159F PR MEDICATION LIST DOCUMENTED IN MEDICAL RECORD: ICD-10-PCS | Mod: CPTII,S$GLB,, | Performed by: FAMILY MEDICINE

## 2023-01-30 PROCEDURE — 99213 OFFICE O/P EST LOW 20 MIN: CPT | Mod: 25,S$GLB,, | Performed by: FAMILY MEDICINE

## 2023-01-30 PROCEDURE — 3008F PR BODY MASS INDEX (BMI) DOCUMENTED: ICD-10-PCS | Mod: CPTII,S$GLB,, | Performed by: FAMILY MEDICINE

## 2023-01-30 PROCEDURE — 3074F PR MOST RECENT SYSTOLIC BLOOD PRESSURE < 130 MM HG: ICD-10-PCS | Mod: CPTII,S$GLB,, | Performed by: FAMILY MEDICINE

## 2023-01-30 PROCEDURE — 3078F DIAST BP <80 MM HG: CPT | Mod: CPTII,S$GLB,, | Performed by: FAMILY MEDICINE

## 2023-01-30 PROCEDURE — 96372 THER/PROPH/DIAG INJ SC/IM: CPT | Mod: S$GLB,,, | Performed by: FAMILY MEDICINE

## 2023-01-30 PROCEDURE — 99213 PR OFFICE/OUTPT VISIT, EST, LEVL III, 20-29 MIN: ICD-10-PCS | Mod: 25,S$GLB,, | Performed by: FAMILY MEDICINE

## 2023-01-30 PROCEDURE — 96372 PR INJECTION,THERAP/PROPH/DIAG2ST, IM OR SUBCUT: ICD-10-PCS | Mod: S$GLB,,, | Performed by: FAMILY MEDICINE

## 2023-01-30 PROCEDURE — 1160F PR REVIEW ALL MEDS BY PRESCRIBER/CLIN PHARMACIST DOCUMENTED: ICD-10-PCS | Mod: CPTII,S$GLB,, | Performed by: FAMILY MEDICINE

## 2023-01-30 RX ORDER — PREDNISONE 20 MG/1
40 TABLET ORAL DAILY
Qty: 10 TABLET | Refills: 0 | Status: SHIPPED | OUTPATIENT
Start: 2023-01-30 | End: 2023-02-04

## 2023-01-30 RX ORDER — FAMOTIDINE 40 MG/1
40 TABLET, FILM COATED ORAL 2 TIMES DAILY PRN
Qty: 60 TABLET | Refills: 1 | Status: SHIPPED | OUTPATIENT
Start: 2023-01-30 | End: 2023-06-05 | Stop reason: ALTCHOICE

## 2023-01-30 RX ORDER — DEXAMETHASONE SODIUM PHOSPHATE 100 MG/10ML
10 INJECTION INTRAMUSCULAR; INTRAVENOUS ONCE
Status: COMPLETED | OUTPATIENT
Start: 2023-01-30 | End: 2023-01-30

## 2023-01-30 RX ADMIN — DEXAMETHASONE SODIUM PHOSPHATE 10 MG: 100 INJECTION INTRAMUSCULAR; INTRAVENOUS at 04:01

## 2023-01-30 NOTE — PROGRESS NOTES
"Subjective:       Patient ID: Erlinda Rain is a 27 y.o. female.    Vitals:  height is 5' 2" (1.575 m) and weight is 73.9 kg (163 lb). Her oral temperature is 98.6 °F (37 °C). Her blood pressure is 110/79 and her pulse is 88. Her respiration is 18 and oxygen saturation is 100%.     Chief Complaint: Rash    Pt states a rash on her face that she noticed last night which came 48 hours after her first dose of ozempic which she had on Friday.     Rash  This is a new problem. The current episode started yesterday. The problem is unchanged. The affected locations include the face. Rash characteristics: red dots. She was exposed to nothing. Pertinent negatives include no anorexia, congestion, cough, diarrhea, facial edema, fatigue, fever, joint pain, nail changes, rhinorrhea, shortness of breath, sore throat or vomiting. Past treatments include antihistamine. The treatment provided no relief. Her past medical history is significant for allergies. There is no history of asthma, eczema or varicella.     Constitution: Negative for fatigue and fever.   HENT:  Negative for congestion and sore throat.    Respiratory:  Negative for cough and shortness of breath.    Gastrointestinal:  Negative for vomiting and diarrhea.   Skin:  Positive for rash. Negative for erythema.     Objective:      Physical Exam   Constitutional: She is oriented to person, place, and time. She appears well-developed.   HENT:   Head: Normocephalic and atraumatic. Head is without abrasion, without contusion and without laceration.   Ears:   Right Ear: External ear normal.   Left Ear: External ear normal.   Nose: Nose normal.   Mouth/Throat: Oropharynx is clear and moist and mucous membranes are normal.   Eyes: Conjunctivae, EOM and lids are normal. Pupils are equal, round, and reactive to light.   Neck: Trachea normal and phonation normal. Neck supple.   Cardiovascular: Normal rate, regular rhythm and normal heart sounds.   Pulmonary/Chest: Effort normal and " breath sounds normal. No stridor. No respiratory distress.   Musculoskeletal: Normal range of motion.         General: Normal range of motion.   Neurological: She is alert and oriented to person, place, and time.   Skin: Skin is warm, dry, intact, rash and urticarial. Capillary refill takes less than 2 seconds. No abrasion, No burn, No bruising, No erythema and No ecchymosis   Psychiatric: Her speech is normal and behavior is normal. Judgment and thought content normal.   Nursing note and vitals reviewed.      Assessment:       1. 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 for 5 days  Dispense: 10 tablet; Refill: 0    Gastroesophageal reflux disease, unspecified whether esophagitis present  -     famotidine (PEPCID) 40 MG tablet; Take 1 tablet (40 mg total) by mouth 2 (two) times daily as needed for Heartburn.  Dispense: 60 tablet; Refill: 1

## 2023-01-31 ENCOUNTER — CLINICAL SUPPORT (OUTPATIENT)
Dept: OTHER | Facility: CLINIC | Age: 28
End: 2023-01-31

## 2023-01-31 DIAGNOSIS — Z00.8 ENCOUNTER FOR OTHER GENERAL EXAMINATION: ICD-10-CM

## 2023-02-01 VITALS
SYSTOLIC BLOOD PRESSURE: 119 MMHG | WEIGHT: 164 LBS | DIASTOLIC BLOOD PRESSURE: 82 MMHG | BODY MASS INDEX: 30.18 KG/M2 | HEIGHT: 62 IN

## 2023-02-01 LAB
GLUCOSE SERPL-MCNC: 93 MG/DL (ref 60–140)
HDLC SERPL-MCNC: 53 MG/DL
POC CHOLESTEROL, LDL (DOCK): 134 MG/DL
POC CHOLESTEROL, TOTAL: 209 MG/DL
TRIGL SERPL-MCNC: 124 MG/DL

## 2023-02-24 ENCOUNTER — PATIENT MESSAGE (OUTPATIENT)
Dept: INTERNAL MEDICINE | Facility: CLINIC | Age: 28
End: 2023-02-24
Payer: COMMERCIAL

## 2023-03-27 ENCOUNTER — PATIENT MESSAGE (OUTPATIENT)
Dept: INTERNAL MEDICINE | Facility: CLINIC | Age: 28
End: 2023-03-27
Payer: COMMERCIAL

## 2023-05-29 ENCOUNTER — OFFICE VISIT (OUTPATIENT)
Dept: SPORTS MEDICINE | Facility: CLINIC | Age: 28
End: 2023-05-29
Payer: COMMERCIAL

## 2023-05-29 ENCOUNTER — HOSPITAL ENCOUNTER (OUTPATIENT)
Dept: RADIOLOGY | Facility: HOSPITAL | Age: 28
Discharge: HOME | End: 2023-05-29
Attending: ORTHOPAEDIC SURGERY
Payer: COMMERCIAL

## 2023-05-29 VITALS
HEIGHT: 62 IN | WEIGHT: 176 LBS | DIASTOLIC BLOOD PRESSURE: 79 MMHG | SYSTOLIC BLOOD PRESSURE: 114 MMHG | BODY MASS INDEX: 32.39 KG/M2 | HEART RATE: 96 BPM

## 2023-05-29 DIAGNOSIS — M25.562 LEFT KNEE PAIN, UNSPECIFIED CHRONICITY: ICD-10-CM

## 2023-05-29 DIAGNOSIS — M22.42 CHONDROMALACIA OF PATELLOFEMORAL JOINT, LEFT: Primary | ICD-10-CM

## 2023-05-29 PROCEDURE — 3008F PR BODY MASS INDEX (BMI) DOCUMENTED: ICD-10-PCS | Mod: CPTII,S$GLB,, | Performed by: ORTHOPAEDIC SURGERY

## 2023-05-29 PROCEDURE — 3074F PR MOST RECENT SYSTOLIC BLOOD PRESSURE < 130 MM HG: ICD-10-PCS | Mod: CPTII,S$GLB,, | Performed by: ORTHOPAEDIC SURGERY

## 2023-05-29 PROCEDURE — 73564 X-RAY EXAM KNEE 4 OR MORE: CPT | Mod: TC,LT

## 2023-05-29 PROCEDURE — 3008F BODY MASS INDEX DOCD: CPT | Mod: CPTII,S$GLB,, | Performed by: ORTHOPAEDIC SURGERY

## 2023-05-29 PROCEDURE — 3074F SYST BP LT 130 MM HG: CPT | Mod: CPTII,S$GLB,, | Performed by: ORTHOPAEDIC SURGERY

## 2023-05-29 PROCEDURE — 99999 PR PBB SHADOW E&M-EST. PATIENT-LVL III: CPT | Mod: PBBFAC,,, | Performed by: ORTHOPAEDIC SURGERY

## 2023-05-29 PROCEDURE — 3078F DIAST BP <80 MM HG: CPT | Mod: CPTII,S$GLB,, | Performed by: ORTHOPAEDIC SURGERY

## 2023-05-29 PROCEDURE — 99999 PR PBB SHADOW E&M-EST. PATIENT-LVL III: ICD-10-PCS | Mod: PBBFAC,,, | Performed by: ORTHOPAEDIC SURGERY

## 2023-05-29 PROCEDURE — 3078F PR MOST RECENT DIASTOLIC BLOOD PRESSURE < 80 MM HG: ICD-10-PCS | Mod: CPTII,S$GLB,, | Performed by: ORTHOPAEDIC SURGERY

## 2023-05-29 PROCEDURE — 73564 X-RAY EXAM KNEE 4 OR MORE: CPT | Mod: 26,LT,, | Performed by: RADIOLOGY

## 2023-05-29 PROCEDURE — 73564 XR KNEE ORTHO LEFT WITH FLEXION: ICD-10-PCS | Mod: 26,LT,, | Performed by: RADIOLOGY

## 2023-05-29 PROCEDURE — 73562 X-RAY EXAM OF KNEE 3: CPT | Mod: 26,RT,, | Performed by: RADIOLOGY

## 2023-05-29 PROCEDURE — 99203 PR OFFICE/OUTPT VISIT, NEW, LEVL III, 30-44 MIN: ICD-10-PCS | Mod: S$GLB,,, | Performed by: ORTHOPAEDIC SURGERY

## 2023-05-29 PROCEDURE — 73562 XR KNEE ORTHO LEFT WITH FLEXION: ICD-10-PCS | Mod: 26,RT,, | Performed by: RADIOLOGY

## 2023-05-29 PROCEDURE — 99203 OFFICE O/P NEW LOW 30 MIN: CPT | Mod: S$GLB,,, | Performed by: ORTHOPAEDIC SURGERY

## 2023-05-29 RX ORDER — MELOXICAM 15 MG/1
15 TABLET ORAL DAILY
Qty: 30 TABLET | Refills: 0 | Status: SHIPPED | OUTPATIENT
Start: 2023-05-29 | End: 2023-07-21 | Stop reason: SDUPTHER

## 2023-05-29 NOTE — PROGRESS NOTES
NEW PATIENT    HISTORY OF PRESENT ILLNESS   27 y.o. 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 She enjoys exercising in her spare time. She states she has had four different occurences in the medial knee where she has had a shooting pain. She works out with a  twice a week. She does boxing and weightlifting. She states she has not changed any of her activities recently. She states the first episode, she was bent down picking something up and she felt a spasm. She states she has most pain while in a figure 4 position in the medial aspect of her knee. She has been using ice at home and reports minimal relief.     - mechanical symptoms, - instability    Is affecting ADLs.  Pain is 4/10 at it's worst.        PAST MEDICAL HISTORY    Past Medical History:   Diagnosis Date    Fibromyalgia     Obesity (BMI 30.0-34.9)     PCOS (polycystic ovarian syndrome)     Vitamin D deficiency        PAST SURGICAL HISTORY     Past Surgical History:   Procedure Laterality Date    none         FAMILY HISTORY    Family History   Problem Relation Age of Onset    Hyperlipidemia Mother     Arthritis Mother     Hyperlipidemia Father     Heart attacks under age 50 Father     Diabetes type II Father     Diabetes Father     Hypertension Father     Hypothyroidism Sister     Eczema Neg Hx     Lupus Neg Hx     Psoriasis Neg Hx     Melanoma Neg Hx     Breast cancer Neg Hx     Ovarian cancer Neg Hx     Colon cancer Neg Hx        SOCIAL HISTORY    Social History     Socioeconomic History    Marital status: Single    Number of children: 0   Occupational History    Occupation: Student- [employer]    Tobacco Use    Smoking status: Never    Smokeless tobacco: Never   Substance and Sexual Activity    Alcohol use: Yes     Comment: Socially, rarely    Drug use: No    Sexual activity: Yes     Partners: Male     Birth control/protection: OCP   Social History Narrative    Full time college student, part time  employed.     Social Determinants of Health     Financial Resource Strain: Low Risk     Difficulty of Paying Living Expenses: Not hard at all   Food Insecurity: No Food Insecurity    Worried About Running Out of Food in the Last Year: Never true    Ran Out of Food in the Last Year: Never true   Transportation Needs: No Transportation Needs    Lack of Transportation (Medical): No    Lack of Transportation (Non-Medical): No   Physical Activity: Sufficiently Active    Days of Exercise per Week: 4 days    Minutes of Exercise per Session: 60 min   Stress: Stress Concern Present    Feeling of Stress : Rather much   Social Connections: Unknown    Frequency of Communication with Friends and Family: More than three times a week    Frequency of Social Gatherings with Friends and Family: Once a week    Active Member of Clubs or Organizations: No    Attends Club or Organization Meetings: Patient refused    Marital Status: Never    Housing Stability: Low Risk     Unable to Pay for Housing in the Last Year: No    Number of Places Lived in the Last Year: 1    Unstable Housing in the Last Year: No       MEDICATIONS      Current Outpatient Medications:     buPROPion (WELLBUTRIN XL) 300 MG 24 hr tablet, Take 1 tablet (300 mg total) by mouth once daily., Disp: 30 tablet, Rfl: 11    famotidine (PEPCID) 40 MG tablet, Take 1 tablet (40 mg total) by mouth 2 (two) times daily as needed for Heartburn., Disp: 60 tablet, Rfl: 1    hydrOXYzine HCL (ATARAX) 10 MG Tab, Take 1 tablet (10 mg total) by mouth 3 (three) times daily as needed (anxiety)., Disp: 20 tablet, Rfl: 3    metFORMIN (GLUCOPHAGE-XR) 500 MG ER 24hr tablet, Take 1 tablet (500 mg total) by mouth daily with breakfast., Disp: 90 tablet, Rfl: 3    multivitamin capsule, Take 1 capsule by mouth once daily., Disp: , Rfl:     norgestimate-ethinyl estradioL (ORTHO-CYCLEN) 0.25-35 mg-mcg per tablet, Take 1 tablet by mouth once daily., Disp: 90 tablet, Rfl: 3    ondansetron  "(ZOFRAN-ODT) 4 MG TbDL, Take 1 tablet (4 mg total) by mouth every 6 (six) hours as needed (nausea)., Disp: 20 tablet, Rfl: 0    semaglutide (OZEMPIC) 0.25 mg or 0.5 mg(2 mg/1.5 mL) pen injector, Inject 0.25 mg into the skin every 7 days., Disp: 1 pen, Rfl: 5    semaglutide (OZEMPIC) 1 mg/dose (2 mg/1.5 mL) PnIj, Inject 1 mg into the skin every 7 days. (Patient not taking: Reported on 1/30/2023), Disp: 1 pen, Rfl: 11    topiramate (TOPAMAX) 50 MG tablet, Take 1 tablet (50 mg total) by mouth once daily. (Patient not taking: Reported on 1/30/2023), Disp: 30 tablet, Rfl: 11    trifarotene (AKLIEF) 0.005 % Crea, Apply 1 application topically every evening., Disp: 45 g, Rfl: 2    ALLERGIES     Review of patient's allergies indicates:   Allergen Reactions    Kiwi (actinidia chinensis) Itching and Nausea Only         REVIEW OF SYSTEMS   Constitution: Negative. Negative for chills, fever and night sweats.   HENT: Negative for congestion and headaches.    Eyes: Negative for blurred vision, left vision loss and right vision loss.   Cardiovascular: Negative for chest pain and syncope.   Respiratory: Negative for cough and shortness of breath.    Endocrine: Negative for polydipsia, polyphagia and polyuria.   Hematologic/Lymphatic: Negative for bleeding problem. Does not bruise/bleed easily.   Skin: Negative for dry skin, itching and rash.   Musculoskeletal: Negative for falls. Positive for left knee pain  Gastrointestinal: Negative for abdominal pain and bowel incontinence.   Genitourinary: Negative for bladder incontinence and nocturia.   Neurological: Negative for disturbances in coordination, loss of balance and seizures.   Psychiatric/Behavioral: Negative for depression. The patient does not have insomnia.    Allergic/Immunologic: Negative for hives and persistent infections.     PHYSICAL EXAMINATION    Vitals: /79   Pulse 96   Ht 5' 2" (1.575 m)   Wt 79.8 kg (176 lb)   BMI 32.19 kg/m²     General: The patient " appears active and healthy with no apparent physical problems.  No disturbance of mood or affect is demonstrated. Alert and Oriented.    HEENT: Eyes normal, pupils equally round, nose normal.    Resp: Equal and symmetrical chest rises. No wheezing    CV: Regular rate    Neck: Supple; nonpainful range of motion.    Extremities: no cyanosis, clubbing, edema, or diffuse swelling.  Palpable pulses, good capillary refill of the digits.  No coolness, discoloration, edema or obvious varicosities.    Skin: no lesions noted.    Lymphatic: no detected adenopathy in the upper or lower extremities.    Neurologic: normal mental status, normal reflexes, normal gait and balance.  Patient is alert and oriented to person, place and time.  No flaccidity or spasticity is noted.  No motor or sensory deficits are noted.  Light touch is intact    Orthopaedic:     KNEE EXAM - LEFT    Inspection:   Normal skin color and appearance with no scars, no ecchymosis and no effusion.      ROM:   0° - 145°.      Palpation:   There is no tenderness along medial plica, medial collateral ligament, pes bursa, lateral joint line, iliotibial band, lateral collateral ligament, popliteal fossa, patellar tendon, or quadriceps tendon. Tender medial patella, medial joint line    Stability: - anterior drawer, - Lachman, - pivot shift and - posterior drawer.      No instability with varus or valgus stress at 0° or 30°. Negative dial  test at 30° and 90°.    Tests:   - Paiges test.  - patellar compression - grind test, + mild patellofemoral crepitus.  There is no patellar apprehension.     - J Sign. - Cony's. - patellar tilt. - Rudolph. Lateral patella translation  2 quadrants. Medial patella translation 2 quadrants    Motor:   Quadriceps strength is 5/5 and hamstrings strength is 5/5. Hamstrings show no tightness.      Neuro:   Distal neurovascular status is normal    Vascular: Negative Homans and no palpable popliteal cords. 2+ pedal pulse with brisk cap  refill    Gait Normal      IMAGING:    Xrays including standing AP, 45 degree PA notch view, lateral and sunrise radiographs of the left knee are ordered / images reviewed by me:  There is no normal alignment and normal bone quality.  No fracture or dislocations noted. No significant joint space narrowing.       IMPRESSION       ICD-10-CM ICD-9-CM   1. Chondromalacia of patellofemoral joint, left  M22.42 717.7   2. Left knee pain, unspecified chronicity  M25.562 719.46       MEDICATIONS PRESCRIBED      Mobic 15mg    RECOMMENDATIONS     Referral to physical therapy placed today  Activity modifications given to the patient today  RTC in 6 weeks with Say Lo PA-C.  If her symptoms persist I would recommend an MRI of the left knee to evaluate the patellofemoral compartment      All questions were answered, pt will contact us for questions or concerns in the interim.

## 2023-06-01 ENCOUNTER — CLINICAL SUPPORT (OUTPATIENT)
Dept: REHABILITATION | Facility: HOSPITAL | Age: 28
End: 2023-06-01
Attending: ORTHOPAEDIC SURGERY
Payer: COMMERCIAL

## 2023-06-01 DIAGNOSIS — M22.42 CHONDROMALACIA OF PATELLOFEMORAL JOINT, LEFT: ICD-10-CM

## 2023-06-01 DIAGNOSIS — M25.562 ACUTE PAIN OF LEFT KNEE: ICD-10-CM

## 2023-06-01 PROCEDURE — 97161 PT EVAL LOW COMPLEX 20 MIN: CPT

## 2023-06-01 PROCEDURE — 97110 THERAPEUTIC EXERCISES: CPT

## 2023-06-01 NOTE — PLAN OF CARE
[facility]  Physical Therapy Initial Evaluation    Name: Erlinda Rain  Clinic Number: [id number]    Therapy Diagnosis:   Encounter Diagnoses   Name Primary?    Chondromalacia of patellofemoral joint, left     Acute pain of left knee      Physician: Dandre Rose MD    Physician Orders: PT Eval and Treat   Medical Diagnosis from Referral: M22.42 (ICD-10-CM) - Chondromalacia of patellofemoral joint, left  Evaluation Date: 6/1/2023  Authorization Period Expiration: 12/31/23  Plan of Care Expiration: 7/27/23  Visit # / Visits authorized: 1/ 20    Time In: 400 pm  Time Out: 450   Total Billable Time: 50 minutes    Precautions: Standard    Subjective   Date of onset: 1 mos prior  History of current condition - Erlinda reports: she was bending down for a while with knees on the ground and something caused a spasm. It got better, but then 4 other times within a month the same symptoms re-appear. Sitting on the couch with ER of hip and knee flexion flared it up. She was seen this week by Dr. Rose and referred to PT for PFPS.        Past Medical History:   Diagnosis Date    Fibromyalgia     Obesity (BMI 30.0-34.9)     PCOS (polycystic ovarian syndrome)     Vitamin D deficiency      Erlinda Rain  has a past surgical history that includes none.    Erlinda has a current medication list which includes the following prescription(s): bupropion, famotidine, hydroxyzine hcl, meloxicam, metformin, multivitamin, norgestimate-ethinyl estradiol, ondansetron, semaglutide, semaglutide, topiramate, and aklief.    Review of patient's allergies indicates:   Allergen Reactions    Kiwi (actinidia chinensis) Itching and Nausea Only        Imaging, X-ray    Prior Therapy: yes  Social History: she lives alone (lives on second floor)  Occupation: RN in ER @ [employer]   Prior Level of Function: working out 2x a week, boxing, HIIT  Current Level of Function: limited with lunges, squats and stairs    Pain:  Current 0/10,  "worst 8/10, best 0/10   Location: left knee   Description: Throbbing and Sharp  Aggravating Factors: Bending and stairs  Easing Factors: ice and compression    Pts goals: "to be able to get back exercising again without fear of injuring knee"    Objective       Observation: pt appears stated age, NAD      Posture: low arches, increased lordosis      Gait: B hip IR, hip drop    Range of Motion: (PROM):    Knee Left Right   Extension  (+5 hypo) degrees  (+5 hyper) degrees   Flexion  (120) degrees  (120) degrees           Strength:  Hip Left Right   Flexion 4+/5 5/5   Abduction 4-/5 4-/5   Extension 3/5 3/5   External Rot 5/5 5/5     Knee Left Right   Extension 5/5 5/5   Flexion 5/5 5/5         Special Tests:    Knee Left Right   Bridge test Positive Positive   Mini SL squat Positive Positive   Mensicus Negative NT       Joint Mobility: WNL B Patella    Palpation: TTP medial knee L    Sensation: WNL BLE    Flexibility: NA this session      CMS Impairment/Limitation/Restriction for FOTO Knee Survey    Therapist reviewed FOTO scores for Erlinda Rain on 6/1/2023.   FOTO documents entered into [employer] - see Media section.           TREATMENT   Treatment Time In: 425  Treatment Time Out: 450  Total Treatment time separate from Evaluation: 25 minutes    Erlinda received therapeutic exercises to develop strength, endurance, ROM, posture, and core stabilization for 25 minutes including:  Clamshells rtb 2x10 L with 5 sec hold  Bridge rtb for cues 2x10  TrA toe taps 2x10  FOTO  HEP review and education        Home Exercises and Patient Education Provided    Education provided re: HEP to Go    Written Home Exercises Provided: yes.  Exercises were reviewed and Erlinda was able to demonstrate them prior to the end of the session.   Pt received a written copy of exercises to perform at home. Erlinda demonstrated good  understanding of the education provided.     See EMR under patient instructions for exercises given.   Assessment   Erlinda is " a 27 y.o. female referred to outpatient Physical Therapy with a medical diagnosis of L PFPS. Pt presents with WNL B knee ROM, weakness in B hip ext and abd, valgus with SL loading, TTP medial L knee and pain with functional movements.     Pt prognosis is Good.   Pt will benefit from skilled outpatient Physical Therapy to address the deficits stated above and in the chart below, provide pt/family education, and to maximize pt's level of independence.     Plan of care discussed with patient: Yes  Pt's spiritual, cultural and educational needs considered and patient is agreeable to the plan of care and goals as stated below:     Anticipated Barriers for therapy: none    Medical Necessity is demonstrated by the following  History  Co-morbidities and personal factors that may impact the plan of care Co-morbidities:   none    Personal Factors:   no deficits     low   Examination  Body Structures and Functions, activity limitations and participation restrictions that may impact the plan of care Body Regions:   lower extremities    Body Systems:    strength  balance  transitions  motor control    Participation Restrictions:   Squatting, stairs    Activity limitations:   Learning and applying knowledge  no deficits    General Tasks and Commands  no deficits    Communication  no deficits    Mobility  Bending, stairs    Self care  no deficits    Domestic Life  no deficits    Interactions/Relationships  no deficits    Life Areas  no deficits    Community and Social Life  no deficits         low   Clinical Presentation stable and uncomplicated low   Decision Making/ Complexity Score: low     Goals:  Short Term Goals: 4 weeks   - Pt will increase strength to 4/5 B hip abd and ext  - Decrease Pain to 3/10 as worst with all PT interventions  - Pt to self correct posture with minimal cues  - Pt independent with HEP with progressions.     Long Term Goals (8 Weeks):  - Pt will return to boxing and weightlifting classes painfree  - Pt  will increase strength to 5/5 BLE in all planes of hips and knees  - Decrease Pain to 1/10 as worst with ascending and descending 1 flight of stairs  - Pt to return to 90% PLOF      Plan   Plan of care Certification: 6/1/2023 to 7/27/23.    Outpatient Physical Therapy 1 times weekly for 8 weeks to include the following interventions: Manual Therapy, Moist Heat/ Ice, Neuromuscular Re-ed, Patient Education, Therapeutic Activities, and Therapeutic Exercise.     Luanne Rodriguez, PT, DPT, OCS, COMT

## 2023-06-05 PROBLEM — R27.9 LACK OF COORDINATION: Status: RESOLVED | Noted: 2021-07-28 | Resolved: 2023-06-05

## 2023-06-05 RX ORDER — CETIRIZINE HYDROCHLORIDE 10 MG/1
1 TABLET ORAL DAILY
COMMUNITY

## 2023-06-05 RX ORDER — METRONIDAZOLE 10 MG/G
1 GEL TOPICAL 2 TIMES DAILY
COMMUNITY
Start: 2023-03-23 | End: 2023-07-18

## 2023-06-05 RX ORDER — CLINDAMYCIN PHOSPHATE 10 MG/ML
1 SOLUTION TOPICAL 2 TIMES DAILY
COMMUNITY
Start: 2023-02-23 | End: 2023-07-18

## 2023-06-05 RX ORDER — TAZAROTENE 0.45 MG/G
1 LOTION TOPICAL NIGHTLY
COMMUNITY
Start: 2023-02-24

## 2023-06-05 NOTE — PROGRESS NOTES
FAMILY MEDICINE  [facility]    Reason for visit:   Chief Complaint   Patient presents with    Establish Care    Weight Loss    Insomnia         SUBJECTIVE: Erlinda Rain is a 27 y.o. female  - with obesity, fibromyalgia, and PCOS presents as a new patient to establish care and also has concerns for weight and anxiety sleep concerns    Sports Medicine:  Dr. Karlene Larson MD  Gynecology: Dr. Dandre Rose MD    1. Anxiety/Sleep     Today 6/6/23:  Erlinda Rain reports that she does not have anxiety on a daily basis but reports occasionally in the evening she is unable to sleep. Thinking about the day and how she cannot sleep. She has tried OTC Advil PM which she reports works well but she is concerned that she may become dependent on sleep. She reports that her mother has chronic sleep issues and has to rely of prescription medication    Prior notes: NA    Panic attacks: denies  Hopelessness:  denies  Sleep issues:  yes  Suicidal thoughts:  denies  Thoughts of self harm: denies  Thoughts of harm to others:  denies  History of suicide attempts:  denies  Family history of suicide: denies    Psychiatrist: none  Psychologist: none  Counselor : none    Prior medication: hydroxyzine, melatonin, advil PM    Current medications:   buPROPion (WELLBUTRIN XL) 300 MG 24 hr tablet, Take 1 tablet (300 mg total) by mouth once daily., Disp: 30 tablet, Rfl: 11  hydrOXYzine HCL (ATARAX) 10 MG Tab, Take 1 tablet (10 mg total) by mouth 3 (three) times daily as needed (anxiety)., Disp: 20 tablet, Rfl: 3  - not effective    Side effects of current treatment: None    Support system: partner    2. Obesity    Today 6/6/23: Erlinda Rain reports that she continues to struggle with weight. She has tried several diet. She exercises regularly. She has to be so restrictive that she is unable to maintain it. She hired a nutritionist to manage her diet (see below) but she felt it was not sustainable. She lost 10 lbs but  reports that she did gain it back.   She had tried topamax without improved. She has also tried semaglutide but could not tolerate due to nausea and vomiting    Current weight:   176 lbs    Prior weight history:   Wt Readings from Last 10 Encounters:  05/29/23 : 79.8 kg (176 lb)  01/31/23 : 74.4 kg (164 lb)  01/30/23 : 73.9 kg (163 lb)  11/15/22 : 72.6 kg (160 lb)  08/04/22 : 72.6 kg (160 lb)  07/08/22 : 76.2 kg (167 lb 15.9 oz)  06/29/22 : 73.9 kg (163 lb)  06/08/22 : 73.2 kg (161 lb 6 oz)  02/24/22 : 74.4 kg (164 lb)  06/10/21 : 81.2 kg (179 lb 0.2 oz)    Goal weight: 150 lbs     Current diet: see below  Breakfast - coffee then Gym then 2 eggs 1/2 avocado and banana  Lunch - peanut butter jelly on a bagel  Dinner chicken breast broccoli  Snacks: snack  Current exercise: exercising 3-4 days aweek  Current daily activities: [employer]    Prior weight loss program: restrictive diet (see below), Ozempic (nausea, vomiting, diarrhea), Topamax (appetite suppressant but not a good relief)  - Restrictive diet: 6 meals a day egg whites, proteins drinks, vegetables. 1 cheat meal a week. Lost 10 lbs  - [id number] lbs lost    Plan of action: see below    Medications for weight loss:  bupropion (WELLBUTRIN XL) 300 MG 24 hr tablet, Take 1 tablet (300 mg total) by mouth once daily., Disp: 30 tablet, Rfl: 11    Medications that may impede weight loss:   none    Comorbidities:   none                      Review of Systems   All other systems reviewed and are negative.    HEALTH MAINTENANCE:   Health Maintenance   Topic Date Due    Pap Smear  03/23/2024    TETANUS VACCINE  06/10/2031    Hepatitis C Screening  Completed    Lipid Panel  Completed     Health Maintenance Topics with due status: Not Due       Topic Last Completion Date    Pap Smear 03/23/2021    TETANUS VACCINE 06/10/2021     Health Maintenance Due   Topic Date Due    COVID-19 Vaccine (4 - Pfizer series) 12/09/2021       HISTORY:   Past Medical History:   Diagnosis Date     Fibromyalgia     Obesity (BMI 30.0-34.9)     PCOS (polycystic ovarian syndrome)     Vitamin D deficiency        Past Surgical History:   Procedure Laterality Date    none         Family History   Problem Relation Age of Onset    Hyperlipidemia Mother     Arthritis Mother     Hyperlipidemia Father     Heart attacks under age 50 Father     Diabetes type II Father     Diabetes Father     Hypertension Father     Hypothyroidism Sister     Eczema Neg Hx     Lupus Neg Hx     Psoriasis Neg Hx     Melanoma Neg Hx     Breast cancer Neg Hx     Ovarian cancer Neg Hx     Colon cancer Neg Hx        Social History     Tobacco Use    Smoking status: Never     Passive exposure: Never    Smokeless tobacco: Never   Substance Use Topics    Alcohol use: Yes     Comment: Socially, rarely    Drug use: No       Social History     Social History Narrative    Single. Currently in [employer] school       ALLERGIES:   Review of patient's allergies indicates:   Allergen Reactions    Kiwi (actinidia chinensis) Itching and Nausea Only       MEDS:   Outpatient Medications as of 6/6/2023   Medication Sig Dispense Refill    ARAZLO 0.045 % Lotn Apply 1 application topically every evening.      buPROPion (WELLBUTRIN XL) 300 MG 24 hr tablet Take 1 tablet (300 mg total) by mouth once daily. 30 tablet 11    cetirizine (ZYRTEC) 10 MG tablet Take 1 tablet by mouth once daily.      meloxicam (MOBIC) 15 MG tablet Take 1 tablet (15 mg total) by mouth once daily. 30 tablet 0    metFORMIN (GLUCOPHAGE-XR) 500 MG ER 24hr tablet Take 1 tablet (500 mg total) by mouth daily with breakfast. 90 tablet 3    multivitamin capsule Take 1 capsule by mouth once daily.      clindamycin (CLEOCIN T) 1 % Swab Apply 1 each topically 2 (two) times daily.      hydrOXYzine HCL (ATARAX) 10 MG Tab Take 1 tablet (10 mg total) by mouth 3 (three) times daily as needed (anxiety). (Patient not taking: Reported on 6/6/2023) 20 tablet 3    metronidazole 1% (METROGEL) 1 % Gel Apply 1 application  "topically 2 (two) times daily. Apply to affected area       No current facility-administered medications on file as of 6/6/2023.       Vital signs:   Vitals:    06/06/23 1200   BP: 122/78   Pulse: 78   SpO2: 99%   Weight: 78.8 kg (173 lb 11.6 oz)   Height: 5' 2" (1.575 m)     Body mass index is 31.77 kg/m².    PHYSICAL EXAM:     Physical Exam  Constitutional:       General: She is not in acute distress.  Neck:      Thyroid: No thyromegaly.   Cardiovascular:      Rate and Rhythm: Normal rate and regular rhythm.      Heart sounds: Normal heart sounds. No murmur heard.    No friction rub. No gallop.   Pulmonary:      Effort: Pulmonary effort is normal.      Breath sounds: Normal breath sounds. No wheezing, rhonchi or rales.   Musculoskeletal:      Cervical back: Neck supple.      Right lower leg: No edema.      Left lower leg: No edema.   Lymphadenopathy:      Cervical: No cervical adenopathy.   Neurological:      Mental Status: She is alert.           PERTINENT RESULTS:   No visits with results within 1 Week(s) from this visit.   Latest known visit with results is:   Clinical Support on 01/31/2023   Component Date Value Ref Range Status    POC Cholesterol, Total 01/31/2023 209  <240 MG/DL Final    POC Cholesterol, HDL 01/31/2023 53  MG/DL Final    POC Cholesterol, LDL 01/31/2023 134  <160 MG/DL Final    POC Triglycerides 01/31/2023 124  <160 MG/DL Final    POC Glucose 01/31/2023 93  60 - 140 MG/DL Final       ASSESSMENT/PLAN:    1. Obesity (BMI 30.0-34.9)  Overview:  - discussed recommendation for diet and cardiovascular exercise  - counseling on lifestyle modifications for risk factor reduction  - counseling on management options  - she did not tolerate semaglutide  - no improvement with Topamax and she was concerned affect on her OCP  - discussed potential options and encouraged to continue to focus on dietary changes   -recommend focus on getting for colors the rainbow in every meal  -she has a great exercise " regimen and I recommend continuing that  -I recommend focusing on increasing healthy foods in her diet set up focusing on what she can not use  - we discussed potential medications and she would like to try phentermine.  Recommend phentermine that a low dose 8 mg.  Discussed that this is not a long-term treatments   - counseling regarding new medication including expected results, potential side effects, and appropriate use.  - questions elicited and answered  - encouraged to patient to notify me of any questions or concerns      Orders:  -     phentermine (LOMAIRA) 8 mg Tab; Take 1 tablet by mouth Daily.  Dispense: 30 each; Refill: 0    2. Chronic fatigue  Overview:  - chronic  - she reports that she is on Wellbutrin for chronic fatigue by her last provider       3. Paradoxical insomnia  Overview:  - she reports that it waxes and wanes  -she has tried OTC Advil PM it seems to be helpful   -she does not want to be relying on medication  -counseling on sleep hygiene   -recommend diet meditation at bedtime  - discussed that phentermine may affect sleep so the monitor and notify me of concerns      4. PCOS (polycystic ovarian syndrome)  Overview:  Lab Results   Component Value Date    HGBA1C 5.1 06/10/2021     - currently on metformin just started by her prior PCP      5. Screening for metabolic disorder  -     Comprehensive Metabolic Panel; Future; Expected date: 06/06/2023    6. Encounter for lipid screening for cardiovascular disease  -     Cancel: Lipid Panel; Future; Expected date: 06/06/2023    7. Screening for diabetes mellitus (DM)  -     Hemoglobin A1C; Future; Expected date: 06/06/2023    8. Screening, iron deficiency anemia  -     CBC Auto Differential; Future; Expected date: 06/06/2023    9. Screening for thyroid disorder  -     TSH; Future; Expected date: 06/06/2023          ORDERS:   Orders Placed This Encounter    TSH    Hemoglobin A1C    Comprehensive Metabolic Panel    CBC Auto Differential     phentermine (LOMAIRA) 8 mg Tab       Vaccines recommended: covid-19 booster    Follow-up in 1 month or sooner if any concerns.      This note is dictated using the M*Modal Fluency Direct word recognition program. There are word recognition mistakes that are occasionally missed on review.    Dr. Paradise Guidry D.O.   [facility]

## 2023-06-06 ENCOUNTER — OFFICE VISIT (OUTPATIENT)
Dept: PRIMARY CARE CLINIC | Facility: CLINIC | Age: 28
End: 2023-06-06
Attending: FAMILY MEDICINE
Payer: COMMERCIAL

## 2023-06-06 ENCOUNTER — OFFICE VISIT (OUTPATIENT)
Dept: OBSTETRICS AND GYNECOLOGY | Facility: CLINIC | Age: 28
End: 2023-06-06
Payer: COMMERCIAL

## 2023-06-06 VITALS
BODY MASS INDEX: 31.97 KG/M2 | WEIGHT: 173.75 LBS | HEIGHT: 62 IN | OXYGEN SATURATION: 99 % | HEART RATE: 78 BPM | DIASTOLIC BLOOD PRESSURE: 78 MMHG | SYSTOLIC BLOOD PRESSURE: 122 MMHG

## 2023-06-06 VITALS
WEIGHT: 171.94 LBS | SYSTOLIC BLOOD PRESSURE: 122 MMHG | HEIGHT: 62 IN | BODY MASS INDEX: 31.64 KG/M2 | DIASTOLIC BLOOD PRESSURE: 78 MMHG

## 2023-06-06 DIAGNOSIS — Z01.419 WELL WOMAN EXAM WITH ROUTINE GYNECOLOGICAL EXAM: Primary | ICD-10-CM

## 2023-06-06 DIAGNOSIS — Z13.1 SCREENING FOR DIABETES MELLITUS (DM): ICD-10-CM

## 2023-06-06 DIAGNOSIS — Z13.228 SCREENING FOR METABOLIC DISORDER: ICD-10-CM

## 2023-06-06 DIAGNOSIS — E28.2 PCOS (POLYCYSTIC OVARIAN SYNDROME): ICD-10-CM

## 2023-06-06 DIAGNOSIS — Z13.220 ENCOUNTER FOR LIPID SCREENING FOR CARDIOVASCULAR DISEASE: ICD-10-CM

## 2023-06-06 DIAGNOSIS — E66.9 OBESITY (BMI 30.0-34.9): Primary | ICD-10-CM

## 2023-06-06 DIAGNOSIS — F51.03 PARADOXICAL INSOMNIA: ICD-10-CM

## 2023-06-06 DIAGNOSIS — R53.82 CHRONIC FATIGUE: ICD-10-CM

## 2023-06-06 DIAGNOSIS — Z13.6 ENCOUNTER FOR LIPID SCREENING FOR CARDIOVASCULAR DISEASE: ICD-10-CM

## 2023-06-06 DIAGNOSIS — Z13.0 SCREENING, IRON DEFICIENCY ANEMIA: ICD-10-CM

## 2023-06-06 DIAGNOSIS — Z13.29 SCREENING FOR THYROID DISORDER: ICD-10-CM

## 2023-06-06 PROCEDURE — 3078F PR MOST RECENT DIASTOLIC BLOOD PRESSURE < 80 MM HG: ICD-10-PCS | Mod: CPTII,S$GLB,, | Performed by: FAMILY MEDICINE

## 2023-06-06 PROCEDURE — 99204 PR OFFICE/OUTPT VISIT, NEW, LEVL IV, 45-59 MIN: ICD-10-PCS | Mod: S$GLB,,, | Performed by: FAMILY MEDICINE

## 2023-06-06 PROCEDURE — 3078F DIAST BP <80 MM HG: CPT | Mod: CPTII,S$GLB,, | Performed by: FAMILY MEDICINE

## 2023-06-06 PROCEDURE — 99395 PR PREVENTIVE VISIT,EST,18-39: ICD-10-PCS | Mod: S$GLB,,, | Performed by: STUDENT IN AN ORGANIZED HEALTH CARE EDUCATION/TRAINING PROGRAM

## 2023-06-06 PROCEDURE — 1160F RVW MEDS BY RX/DR IN RCRD: CPT | Mod: CPTII,S$GLB,, | Performed by: FAMILY MEDICINE

## 2023-06-06 PROCEDURE — 3074F PR MOST RECENT SYSTOLIC BLOOD PRESSURE < 130 MM HG: ICD-10-PCS | Mod: CPTII,S$GLB,, | Performed by: STUDENT IN AN ORGANIZED HEALTH CARE EDUCATION/TRAINING PROGRAM

## 2023-06-06 PROCEDURE — 1160F PR REVIEW ALL MEDS BY PRESCRIBER/CLIN PHARMACIST DOCUMENTED: ICD-10-PCS | Mod: CPTII,S$GLB,, | Performed by: FAMILY MEDICINE

## 2023-06-06 PROCEDURE — 99395 PREV VISIT EST AGE 18-39: CPT | Mod: S$GLB,,, | Performed by: STUDENT IN AN ORGANIZED HEALTH CARE EDUCATION/TRAINING PROGRAM

## 2023-06-06 PROCEDURE — 99999 PR PBB SHADOW E&M-EST. PATIENT-LVL III: ICD-10-PCS | Mod: PBBFAC,,, | Performed by: FAMILY MEDICINE

## 2023-06-06 PROCEDURE — 3074F SYST BP LT 130 MM HG: CPT | Mod: CPTII,S$GLB,, | Performed by: STUDENT IN AN ORGANIZED HEALTH CARE EDUCATION/TRAINING PROGRAM

## 2023-06-06 PROCEDURE — 1159F MED LIST DOCD IN RCRD: CPT | Mod: CPTII,S$GLB,, | Performed by: STUDENT IN AN ORGANIZED HEALTH CARE EDUCATION/TRAINING PROGRAM

## 2023-06-06 PROCEDURE — 3078F DIAST BP <80 MM HG: CPT | Mod: CPTII,S$GLB,, | Performed by: STUDENT IN AN ORGANIZED HEALTH CARE EDUCATION/TRAINING PROGRAM

## 2023-06-06 PROCEDURE — 3008F BODY MASS INDEX DOCD: CPT | Mod: CPTII,S$GLB,, | Performed by: STUDENT IN AN ORGANIZED HEALTH CARE EDUCATION/TRAINING PROGRAM

## 2023-06-06 PROCEDURE — 3074F SYST BP LT 130 MM HG: CPT | Mod: CPTII,S$GLB,, | Performed by: FAMILY MEDICINE

## 2023-06-06 PROCEDURE — 99999 PR PBB SHADOW E&M-EST. PATIENT-LVL III: CPT | Mod: PBBFAC,,, | Performed by: STUDENT IN AN ORGANIZED HEALTH CARE EDUCATION/TRAINING PROGRAM

## 2023-06-06 PROCEDURE — 99204 OFFICE O/P NEW MOD 45 MIN: CPT | Mod: S$GLB,,, | Performed by: FAMILY MEDICINE

## 2023-06-06 PROCEDURE — 3074F PR MOST RECENT SYSTOLIC BLOOD PRESSURE < 130 MM HG: ICD-10-PCS | Mod: CPTII,S$GLB,, | Performed by: FAMILY MEDICINE

## 2023-06-06 PROCEDURE — 3008F BODY MASS INDEX DOCD: CPT | Mod: CPTII,S$GLB,, | Performed by: FAMILY MEDICINE

## 2023-06-06 PROCEDURE — 3008F PR BODY MASS INDEX (BMI) DOCUMENTED: ICD-10-PCS | Mod: CPTII,S$GLB,, | Performed by: STUDENT IN AN ORGANIZED HEALTH CARE EDUCATION/TRAINING PROGRAM

## 2023-06-06 PROCEDURE — 1159F MED LIST DOCD IN RCRD: CPT | Mod: CPTII,S$GLB,, | Performed by: FAMILY MEDICINE

## 2023-06-06 PROCEDURE — 99999 PR PBB SHADOW E&M-EST. PATIENT-LVL III: ICD-10-PCS | Mod: PBBFAC,,, | Performed by: STUDENT IN AN ORGANIZED HEALTH CARE EDUCATION/TRAINING PROGRAM

## 2023-06-06 PROCEDURE — 1159F PR MEDICATION LIST DOCUMENTED IN MEDICAL RECORD: ICD-10-PCS | Mod: CPTII,S$GLB,, | Performed by: STUDENT IN AN ORGANIZED HEALTH CARE EDUCATION/TRAINING PROGRAM

## 2023-06-06 PROCEDURE — 3078F PR MOST RECENT DIASTOLIC BLOOD PRESSURE < 80 MM HG: ICD-10-PCS | Mod: CPTII,S$GLB,, | Performed by: STUDENT IN AN ORGANIZED HEALTH CARE EDUCATION/TRAINING PROGRAM

## 2023-06-06 PROCEDURE — 99999 PR PBB SHADOW E&M-EST. PATIENT-LVL III: CPT | Mod: PBBFAC,,, | Performed by: FAMILY MEDICINE

## 2023-06-06 PROCEDURE — 1159F PR MEDICATION LIST DOCUMENTED IN MEDICAL RECORD: ICD-10-PCS | Mod: CPTII,S$GLB,, | Performed by: FAMILY MEDICINE

## 2023-06-06 PROCEDURE — 3008F PR BODY MASS INDEX (BMI) DOCUMENTED: ICD-10-PCS | Mod: CPTII,S$GLB,, | Performed by: FAMILY MEDICINE

## 2023-06-06 RX ORDER — NORGESTIMATE AND ETHINYL ESTRADIOL 0.25-0.035
1 KIT ORAL DAILY
COMMUNITY
End: 2023-12-18 | Stop reason: SDUPTHER

## 2023-06-06 RX ORDER — TRAZODONE HYDROCHLORIDE 50 MG/1
50 TABLET ORAL NIGHTLY
Qty: 30 TABLET | Refills: 11 | Status: CANCELLED | OUTPATIENT
Start: 2023-06-06 | End: 2024-06-05

## 2023-06-06 NOTE — PROGRESS NOTES
History & Physical  Gynecology      SUBJECTIVE:     Chief Complaint: Well Woman       History of Present Illness:  Annual exam. No complaints. Pelvic floor tension getting better. Saw PFPT. Does dilators at home. In with a sex therapist now. Has supportive partner  Menstrual History: h/o PCOS. OCP helps menses come  Obstetric Hx: g0  LMP:   STD/STI Hx: Denies any history of STD's  Contraception Hx: OCP  Sexually Active: one male partner  Family history: Denies any personal or family history of GYN/colon cancers   Social: Wears seatbelts. Exercises. Feels safe at home.   Last pap: 3/2021normal  HPV vaccine: utd  covid vaccine yes  Vitamins: yes      Review of patient's allergies indicates:   Allergen Reactions    Kiwi (actinidia chinensis) Itching and Nausea Only       Past Medical History:   Diagnosis Date    Fibromyalgia     Obesity (BMI 30.0-34.9)     PCOS (polycystic ovarian syndrome)     Vitamin D deficiency      Past Surgical History:   Procedure Laterality Date    none       OB History          0    Para   0    Term   0       0    AB   0    Living   0         SAB   0    IAB   0    Ectopic   0    Multiple   0    Live Births                   Family History   Problem Relation Age of Onset    Hyperlipidemia Mother     Arthritis Mother     Hyperlipidemia Father     Heart attacks under age 50 Father     Diabetes type II Father     Diabetes Father     Hypertension Father     Hypothyroidism Sister     Eczema Neg Hx     Lupus Neg Hx     Psoriasis Neg Hx     Melanoma Neg Hx     Breast cancer Neg Hx     Ovarian cancer Neg Hx     Colon cancer Neg Hx      Social History     Tobacco Use    Smoking status: Never     Passive exposure: Never    Smokeless tobacco: Never   Substance Use Topics    Alcohol use: Yes     Comment: Socially, rarely    Drug use: No       Current Outpatient Medications   Medication Sig    ARAZLO 0.045 % Lotn Apply 1 application topically every evening.    buPROPion (WELLBUTRIN XL) 300  MG 24 hr tablet Take 1 tablet (300 mg total) by mouth once daily.    cetirizine (ZYRTEC) 10 MG tablet Take 1 tablet by mouth once daily.    clindamycin (CLEOCIN T) 1 % Swab Apply 1 each topically 2 (two) times daily.    meloxicam (MOBIC) 15 MG tablet Take 1 tablet (15 mg total) by mouth once daily.    metFORMIN (GLUCOPHAGE-XR) 500 MG ER 24hr tablet Take 1 tablet (500 mg total) by mouth daily with breakfast.    metronidazole 1% (METROGEL) 1 % Gel Apply 1 application topically 2 (two) times daily. Apply to affected area    multivitamin capsule Take 1 capsule by mouth once daily.    norgestimate-ethinyl estradioL (ORTHO-CYCLEN) 0.25-35 mg-mcg per tablet Take 1 tablet by mouth once daily.    phentermine (LOMAIRA) 8 mg Tab Take 1 tablet by mouth Daily.     No current facility-administered medications for this visit.         Review of Systems:  Review of Systems   Constitutional:  Negative for activity change, appetite change, fever and unexpected weight change.   Respiratory:  Negative for shortness of breath.    Cardiovascular:  Negative for chest pain.   Gastrointestinal:  Negative for abdominal pain, blood in stool, constipation, diarrhea, nausea and vomiting.   Genitourinary:  Positive for dyspareunia. Negative for dysmenorrhea, dysuria, hematuria, menorrhagia, menstrual problem, pelvic pain, vaginal bleeding, vaginal discharge, vaginal pain, postcoital bleeding and vaginal odor.   Integumentary:  Negative for breast mass.   Breast: Negative for lump and mass     OBJECTIVE:     Physical Exam:  Physical Exam  Vitals reviewed.   Constitutional:       General: She is not in acute distress.     Appearance: She is well-developed. She is not diaphoretic.   HENT:      Head: Normocephalic and atraumatic.   Eyes:      General: No scleral icterus.        Right eye: No discharge.         Left eye: No discharge.      Conjunctiva/sclera: Conjunctivae normal.   Neck:      Thyroid: No thyromegaly.   Cardiovascular:      Rate and  Rhythm: Normal rate.   Pulmonary:      Effort: Pulmonary effort is normal.   Chest:   Breasts:     Breasts are symmetrical.      Right: No inverted nipple, mass, nipple discharge, skin change or tenderness.      Left: No inverted nipple, mass, nipple discharge, skin change or tenderness.   Abdominal:      General: There is no distension.      Palpations: Abdomen is soft.      Tenderness: There is no abdominal tenderness.   Genitourinary:     Labia:         Right: No rash, tenderness, lesion or injury.         Left: No rash, tenderness, lesion or injury.       Vagina: Normal. No signs of injury and foreign body. No vaginal discharge, erythema, tenderness or bleeding.      Cervix: No cervical motion tenderness, discharge or friability.      Uterus: Not deviated, not enlarged, not fixed and not tender.       Adnexa:         Right: No mass, tenderness or fullness.          Left: No mass, tenderness or fullness.     Musculoskeletal:         General: Normal range of motion.      Cervical back: Normal range of motion and neck supple.   Lymphadenopathy:      Cervical: No cervical adenopathy.   Skin:     General: Skin is warm and dry.      Findings: No erythema or rash.   Neurological:      Mental Status: She is alert and oriented to person, place, and time.         ASSESSMENT:       ICD-10-CM ICD-9-CM    1. Well woman exam with routine gynecological exam  Z01.419 V72.31              Plan:      WWE  - Vaccines utd  - Pap utd  - Mammogram age 38  - GC/CT, affirm n/a  - Daily vitamin discussed.  - CBE normal. Physical exam normal. VSS.  - Consider fertility consultation per patient request  - RTC for annual or PRN.    Counseling time: 30 minutes    Karlene Larson

## 2023-06-06 NOTE — PATIENT INSTRUCTIONS
"Headspace pérez and Calm Pérez  - mindfulness exercises  Sleep Specialist Dr. Hudson podcast  Recommend Youtube "Weightless" by [employer]  "

## 2023-06-06 NOTE — PATIENT INSTRUCTIONS
[facility]  [email or web address]  [address] [place] [address]  [phone]  Dr. Mulligan    [facility]  [email or web address]  [address], [place] [address]  [phone]  Dr. Larios

## 2023-06-07 ENCOUNTER — CLINICAL SUPPORT (OUTPATIENT)
Dept: REHABILITATION | Facility: HOSPITAL | Age: 28
End: 2023-06-07
Attending: ORTHOPAEDIC SURGERY
Payer: COMMERCIAL

## 2023-06-07 DIAGNOSIS — M25.562 ACUTE PAIN OF LEFT KNEE: Primary | ICD-10-CM

## 2023-06-07 PROCEDURE — 97110 THERAPEUTIC EXERCISES: CPT | Mod: CQ

## 2023-06-07 PROCEDURE — 97112 NEUROMUSCULAR REEDUCATION: CPT | Mod: CQ

## 2023-06-07 PROCEDURE — 97530 THERAPEUTIC ACTIVITIES: CPT | Mod: CQ

## 2023-06-07 NOTE — PROGRESS NOTES
"[facility]   Physical Therapy Treatment Note     Name: Erlinda Rain  Clinic Number: [id number]    Therapy Diagnosis:   Encounter Diagnosis   Name Primary?    Acute pain of left knee Yes     Physician: Dandre Rose MD    Visit Date: 6/7/2023    FOTO 1st:   FOTO 3rd:  FOTO 10th:    [Physician Orders: PT Eval and Treat   Medical Diagnosis from Referral: M22.42 (ICD-10-CM) - Chondromalacia of patellofemoral joint, left  Evaluation Date: 6/1/2023  Authorization Period Expiration: 12/31/23  Plan of Care Expiration: 7/27/23  Visit # / Visits authorized: 1/ 20     Time In: 400 pm  Time Out: 450   Total Billable Time: 50 minutes     Precautions: Standard    PTA Visit #: 1/5       SUBJECTIVE     Pt reports: HEP is going well. Cycled the other day    She was compliant with home exercise program.  Response to previous treatment: none  Functional change: none    Pain: 0/10  Location: left knee      OBJECTIVE     Objective Measures updated at progress report unless specified.     Treatment       Erlinda received the treatments listed below:      Therapeutic exercises to develop strength, endurance, and core stabilization for 10 minutes including:  SLR 3x10 5" hold B  Bent over EOT hip ext knee bent      Therapeutic activities to improve functional performance for 20  minutes, including:  Bike x 10 min lvl 4  Lateral walks YTB above knee 3 laps at jump line  Shuttle hip ext #25 2x10    Neuromuscular re-education activities to improve: Balance, Coordination, Kinesthetic, and Proprioception for 30 minutes. The following activities were included:  SL hip abd 5" hold 3x8 B   Clamshell GTB 10x10" holds (on wall) B  TrA toe taps 3x10  Bridges 5" hold 3x12      Patient Education and Home Exercises     Home Exercises Provided and Patient Education Provided     Education provided:   - Cont HEP    Written Home Exercises Provided: Patient instructed to cont prior HEP. Exercises were reviewed and Erlinda was able to " demonstrate them prior to the end of the session.  Erlinda demonstrated good  understanding of the education provided. See EMR under Patient Instructions for exercises provided during therapy sessions    ASSESSMENT     Erlinda tolerated session well today w/o adverse reaction. Noted hip and LE fatigue after session. Reported some discomfort in knee after riding bike. Will assess how knee was after this session. May need to modify session next tx    Erlinda Is progressing well towards her goals.     Pt prognosis is Good.     Pt will continue to benefit from skilled outpatient physical therapy to address the deficits listed in the problem list box on initial evaluation, provide pt/family education and to maximize pt's level of independence in the home and community environment.     Pt's spiritual, cultural and educational needs considered and pt agreeable to plan of care and goals.     Anticipated barriers to physical therapy: none    Goals:   Short Term Goals: 4 weeks   - Pt will increase strength to 4/5 B hip abd and ext  - Decrease Pain to 3/10 as worst with all PT interventions  - Pt to self correct posture with minimal cues  - Pt independent with HEP with progressions.      Long Term Goals (8 Weeks):  - Pt will return to boxing and weightlifting classes painfree  - Pt will increase strength to 5/5 BLE in all planes of hips and knees  - Decrease Pain to 1/10 as worst with ascending and descending 1 flight of stairs  - Pt to return to 90% PLOF    PLAN     Cont with POC     Amarilys Dunbar, PTA

## 2023-06-09 ENCOUNTER — PATIENT MESSAGE (OUTPATIENT)
Dept: PRIMARY CARE CLINIC | Facility: CLINIC | Age: 28
End: 2023-06-09
Payer: COMMERCIAL

## 2023-06-09 ENCOUNTER — TELEPHONE (OUTPATIENT)
Dept: SPORTS MEDICINE | Facility: CLINIC | Age: 28
End: 2023-06-09
Payer: COMMERCIAL

## 2023-06-12 ENCOUNTER — TELEPHONE (OUTPATIENT)
Dept: PRIMARY CARE CLINIC | Facility: CLINIC | Age: 28
End: 2023-06-12
Payer: COMMERCIAL

## 2023-06-13 ENCOUNTER — CLINICAL SUPPORT (OUTPATIENT)
Dept: REHABILITATION | Facility: HOSPITAL | Age: 28
End: 2023-06-13
Attending: ORTHOPAEDIC SURGERY
Payer: COMMERCIAL

## 2023-06-13 DIAGNOSIS — M25.562 ACUTE PAIN OF LEFT KNEE: Primary | ICD-10-CM

## 2023-06-13 PROCEDURE — 97110 THERAPEUTIC EXERCISES: CPT

## 2023-06-13 PROCEDURE — 97530 THERAPEUTIC ACTIVITIES: CPT

## 2023-06-13 PROCEDURE — 97112 NEUROMUSCULAR REEDUCATION: CPT

## 2023-06-13 NOTE — PROGRESS NOTES
"[facility]   Physical Therapy Treatment Note     Name: Erlinda Rain  Clinic Number: [id number]    Therapy Diagnosis:   Encounter Diagnosis   Name Primary?    Acute pain of left knee Yes     Physician: Dandre Rose MD    Visit Date: 6/13/2023    FOTO 1st:   FOTO 3rd:  FOTO 10th:    [Physician Orders: PT Eval and Treat   Medical Diagnosis from Referral: M22.42 (ICD-10-CM) - Chondromalacia of patellofemoral joint, left  Evaluation Date: 6/1/2023  Authorization Period Expiration: 12/31/23  Plan of Care Expiration: 7/27/23  Visit # / Visits authorized: 2/ 20     Time In: 200 pm  Time Out: 300 pm  Total Billable Time: 60 minutes     Precautions: Standard    PTA Visit #: 1/5       SUBJECTIVE     Pt reports: feeling tightness in her L knee today.     She was compliant with home exercise program.  Response to previous treatment: none  Functional change: none    Pain: 0/10  Location: left knee      OBJECTIVE     Objective Measures updated at progress report unless specified.     Treatment     Erlinda received the treatments listed below:      Therapeutic exercises to develop strength, endurance, and core stabilization for 20 minutes including:  SLR 3x10 5" hold B-np  Bent over EOT hip ext knee bent-np  Heel slides x3 min  Patella and fat pad mobs   Prone quad stretch 2x 1 min L  IT band rolling x3 min    Therapeutic activities to improve functional performance for 25  minutes, including:  Bike x 10 min lvl 4  Lateral walks YTB at ankles 3 laps at jump line  Shuttle hip ext #25 2x10-np  STS 20" box 3x10  Hammer knee iso 30 sec x5 rounds with 30 sec rest    Neuromuscular re-education activities to improve: Balance, Coordination, Kinesthetic, and Proprioception for 15 minutes. The following activities were included:  SL hip abd 5" hold 3x8 B -np  Clamshell GTB 10x10" holds (on wall) B-np  TrA toe taps 2x 20 with yellow hollow ball  Bridges crossed leg 5" hold 3x10      Patient Education and Home " Exercises     Home Exercises Provided and Patient Education Provided     Education provided:   - Cont HEP    Written Home Exercises Provided: Patient instructed to cont prior HEP. Exercises were reviewed and Erlinda was able to demonstrate them prior to the end of the session.  Erlinda demonstrated good  understanding of the education provided. See EMR under Patient Instructions for exercises provided during therapy sessions    ASSESSMENT     Erlinda treatment focused on minimizing knee symptoms and progressing glut strengthening as tolerated. Educated to add crossed leg bridge and lateral walks to HEP. Plan to progress as tolerated.     Erlinda Is progressing well towards her goals.     Pt prognosis is Good.     Pt will continue to benefit from skilled outpatient physical therapy to address the deficits listed in the problem list box on initial evaluation, provide pt/family education and to maximize pt's level of independence in the home and community environment.     Pt's spiritual, cultural and educational needs considered and pt agreeable to plan of care and goals.     Anticipated barriers to physical therapy: none    Goals:   Short Term Goals: 4 weeks   - Pt will increase strength to 4/5 B hip abd and ext  - Decrease Pain to 3/10 as worst with all PT interventions  - Pt to self correct posture with minimal cues  - Pt independent with HEP with progressions.      Long Term Goals (8 Weeks):  - Pt will return to boxing and weightlifting classes painfree  - Pt will increase strength to 5/5 BLE in all planes of hips and knees  - Decrease Pain to 1/10 as worst with ascending and descending 1 flight of stairs  - Pt to return to 90% PLOF    PLAN     Cont with POC     Luanne Rodriguez, PT

## 2023-06-15 ENCOUNTER — TELEPHONE (OUTPATIENT)
Dept: SPORTS MEDICINE | Facility: CLINIC | Age: 28
End: 2023-06-15
Payer: COMMERCIAL

## 2023-06-15 ENCOUNTER — PATIENT MESSAGE (OUTPATIENT)
Dept: PRIMARY CARE CLINIC | Facility: CLINIC | Age: 28
End: 2023-06-15
Payer: COMMERCIAL

## 2023-06-15 DIAGNOSIS — E66.9 OBESITY (BMI 30.0-34.9): Primary | ICD-10-CM

## 2023-06-15 NOTE — TELEPHONE ENCOUNTER
[facility] for patient Erlinda to let her know that Say will not be in the office on 07/14/23 and wanted to see if we can reschedule your appointment to another day.  We can be reach at [phone].

## 2023-06-16 ENCOUNTER — TELEPHONE (OUTPATIENT)
Dept: SPORTS MEDICINE | Facility: CLINIC | Age: 28
End: 2023-06-16
Payer: COMMERCIAL

## 2023-06-16 NOTE — TELEPHONE ENCOUNTER
Called and spoke with patient to help her reschedule her appointment from 07/148/23 due to Say out of the office on that date.  Reschedule appointment to 07/21/23 at 2 pm.

## 2023-06-21 ENCOUNTER — PATIENT MESSAGE (OUTPATIENT)
Dept: PRIMARY CARE CLINIC | Facility: CLINIC | Age: 28
End: 2023-06-21
Payer: COMMERCIAL

## 2023-06-21 RX ORDER — ACETAZOLAMIDE 125 MG/1
TABLET ORAL
Qty: 12 TABLET | Refills: 0 | Status: SHIPPED | OUTPATIENT
Start: 2023-06-21 | End: 2023-07-18

## 2023-06-21 RX ORDER — SEMAGLUTIDE 0.25 MG/.5ML
0.25 INJECTION, SOLUTION SUBCUTANEOUS
Qty: 2 ML | Refills: 0 | Status: SHIPPED | OUTPATIENT
Start: 2023-06-21 | End: 2023-06-28

## 2023-06-22 ENCOUNTER — TELEPHONE (OUTPATIENT)
Dept: PHARMACY | Facility: CLINIC | Age: 28
End: 2023-06-22
Payer: COMMERCIAL

## 2023-06-22 ENCOUNTER — CLINICAL SUPPORT (OUTPATIENT)
Dept: REHABILITATION | Facility: HOSPITAL | Age: 28
End: 2023-06-22
Attending: ORTHOPAEDIC SURGERY
Payer: COMMERCIAL

## 2023-06-22 DIAGNOSIS — M25.562 ACUTE PAIN OF LEFT KNEE: Primary | ICD-10-CM

## 2023-06-22 PROCEDURE — 97112 NEUROMUSCULAR REEDUCATION: CPT

## 2023-06-22 PROCEDURE — 97530 THERAPEUTIC ACTIVITIES: CPT

## 2023-06-22 NOTE — PROGRESS NOTES
"[facility]   Physical Therapy Treatment Note     Name: Erlinda Rain  Clinic Number: [id number]    Therapy Diagnosis:   Encounter Diagnosis   Name Primary?    Acute pain of left knee Yes     Physician: Dandre Rose MD    Visit Date: 6/22/2023    FOTO 1st:   FOTO 3rd:  FOTO 10th:    [Physician Orders: PT Eval and Treat   Medical Diagnosis from Referral: M22.42 (ICD-10-CM) - Chondromalacia of patellofemoral joint, left  Evaluation Date: 6/1/2023  Authorization Period Expiration: 12/31/23  Plan of Care Expiration: 7/27/23  Visit # / Visits authorized: 3/ 20     Time In: 205 pm  Time Out: 300 pm   Total Billable Time: 55 minutes     Precautions: Standard      SUBJECTIVE     Pt reports:her knee still feels different, but no pain.     She was compliant with home exercise program.  Response to previous treatment: none  Functional change: none    Pain: 0/10  Location: left knee      OBJECTIVE     Objective Measures updated at progress report unless specified.     Treatment     Erlinda received the treatments listed below:      Therapeutic exercises to develop strength, endurance, and core stabilization for 5 minutes including:  SLR 3x10 5" hold B-np  Bent over EOT hip ext knee bent-np  Heel slides x3 min-np  Patella and fat pad mobs -np  Prone quad stretch 10 sec x10 L  IT band rolling x3 min-np    Therapeutic activities to improve functional performance for 25  minutes, including:  Elliptical x 10 min   Lateral walks YTB at ankles 3 laps at jump line-np  STS 20" box 3x10 holding 10#  Hammer knee iso 30 sec x5 rounds with 30 sec rest    Neuromuscular re-education activities to improve: Balance, Coordination, Kinesthetic, and Proprioception for 25 minutes. The following activities were included:  SL hip abd 5" hold 3x8 B -np  Clamshell GTB 10x10" holds (on wall) B-np  TrA toe taps 2x 20 with yellow hollow ball  Bridges crossed/ SL leg 5" hold 3x10  Reformer hip abd/add 2x15 with 2 springs  Shuttle " hip ext #25 2x15 in sidelying B    Patient Education and Home Exercises     Home Exercises Provided and Patient Education Provided     Education provided:   - Cont HEP    Written Home Exercises Provided: Patient instructed to cont prior HEP. Exercises were reviewed and Erlinda was able to demonstrate them prior to the end of the session.  Erlinda demonstrated good  understanding of the education provided. See EMR under Patient Instructions for exercises provided during therapy sessions    ASSESSMENT     Erlinda responded well to treatment and plan to reassess her strength next session.  Pt treatment progressed with more loading this session.     Erlinda Is progressing well towards her goals.     Pt prognosis is Good.     Pt will continue to benefit from skilled outpatient physical therapy to address the deficits listed in the problem list box on initial evaluation, provide pt/family education and to maximize pt's level of independence in the home and community environment.     Pt's spiritual, cultural and educational needs considered and pt agreeable to plan of care and goals.     Anticipated barriers to physical therapy: none    Goals:   Short Term Goals: 4 weeks   - Pt will increase strength to 4/5 B hip abd and ext  - Decrease Pain to 3/10 as worst with all PT interventions  - Pt to self correct posture with minimal cues  - Pt independent with HEP with progressions.      Long Term Goals (8 Weeks):  - Pt will return to boxing and weightlifting classes painfree  - Pt will increase strength to 5/5 BLE in all planes of hips and knees  - Decrease Pain to 1/10 as worst with ascending and descending 1 flight of stairs  - Pt to return to 90% PLOF    PLAN     Cont with POC     Luanne Rodriguez, PT

## 2023-06-28 ENCOUNTER — PATIENT MESSAGE (OUTPATIENT)
Dept: PRIMARY CARE CLINIC | Facility: CLINIC | Age: 28
End: 2023-06-28
Payer: COMMERCIAL

## 2023-06-28 DIAGNOSIS — E66.9 OBESITY (BMI 30.0-34.9): Primary | ICD-10-CM

## 2023-06-28 RX ORDER — PHENTERMINE HYDROCHLORIDE 15 MG/1
15 CAPSULE ORAL EVERY MORNING
Qty: 30 CAPSULE | Refills: 0 | Status: SHIPPED | OUTPATIENT
Start: 2023-06-28 | End: 2023-07-18

## 2023-06-29 ENCOUNTER — CLINICAL SUPPORT (OUTPATIENT)
Dept: REHABILITATION | Facility: HOSPITAL | Age: 28
End: 2023-06-29
Attending: ORTHOPAEDIC SURGERY
Payer: COMMERCIAL

## 2023-06-29 DIAGNOSIS — M25.562 ACUTE PAIN OF LEFT KNEE: Primary | ICD-10-CM

## 2023-06-29 PROCEDURE — 97530 THERAPEUTIC ACTIVITIES: CPT

## 2023-06-29 PROCEDURE — 97112 NEUROMUSCULAR REEDUCATION: CPT

## 2023-06-29 NOTE — PROGRESS NOTES
"[facility]   Physical Therapy Treatment Note     Name: Erlinda Rain  Clinic Number: [id number]    Therapy Diagnosis:   Encounter Diagnosis   Name Primary?    Acute pain of left knee Yes     Physician: Dandre Rose MD    Visit Date: 6/29/2023    FOTO 1st:   FOTO 3rd:  FOTO 10th:    [Physician Orders: PT Eval and Treat   Medical Diagnosis from Referral: M22.42 (ICD-10-CM) - Chondromalacia of patellofemoral joint, left  Evaluation Date: 6/1/2023  Authorization Period Expiration: 12/31/23  Plan of Care Expiration: 7/27/23  Visit # / Visits authorized: 4/ 20     Time In: 900 am  Time Out: 1000 am   Total Billable Time: 60 minutes     Precautions: Standard      SUBJECTIVE     Pt reports:her knee feels heavy and leaving this weekend for [place].     She was compliant with home exercise program.  Response to previous treatment: none  Functional change: none    Pain: 0/10  Location: left knee      OBJECTIVE     Objective Measures updated at progress report unless specified.     Treatment     Erlinda received the treatments listed below:      Therapeutic exercises to develop strength, endurance, and core stabilization for 0 minutes including:  SLR 3x10 5" hold B-np  Bent over EOT hip ext knee bent-np  Heel slides x30 with 5 sec hold  Patella and fat pad mobs -np  IT band rolling x3 min-np    Therapeutic activities to improve functional performance for 20  minutes, including:  bike x 10 min   Lateral walks YTB at ankles 3 laps at jump line-np  STS 20" box 3x10 holding 10#-np  Hammer knee iso 30 sec x5 rounds with 30 sec rest    Neuromuscular re-education activities to improve: Balance, Coordination, Kinesthetic, and Proprioception for 40 minutes. The following activities were included:  SL hip abd 5" hold 3x8 B -np  Clamshell GTB 10x10" holds (on wall) B-np  TrA toe taps 2x 20 with yellow hollow ball  Bridges crossed/ SL leg 5" hold 3x10-np  Reformer hip abd/add 2x15 with 2 springs  Shuttle hip " ext #25 2x15 in sidelying B-np  Wall sits 5x 30 sec hold /30 sec rest  SLS on bosu x2 min B  Prone quad stretch 2x 1 min L    Patient Education and Home Exercises     Home Exercises Provided and Patient Education Provided     Education provided:   - Cont HEP    Written Home Exercises Provided: Patient instructed to cont prior HEP. Exercises were reviewed and Erlinda was able to demonstrate them prior to the end of the session.  Erlinda demonstrated good  understanding of the education provided. See EMR under Patient Instructions for exercises provided during therapy sessions    ASSESSMENT     Erlinda's treatment focused on decreased irritability of L knee. Tolerated SL isometric loading well again this session.  Pt treatment progressed with more loading and [place].     Erlinda Is progressing well towards her goals.     Pt prognosis is Good.     Pt will continue to benefit from skilled outpatient physical therapy to address the deficits listed in the problem list box on initial evaluation, provide pt/family education and to maximize pt's level of independence in the home and community environment.     Pt's spiritual, cultural and educational needs considered and pt agreeable to plan of care and goals.     Anticipated barriers to physical therapy: none    Goals:   Short Term Goals: 4 weeks   - Pt will increase strength to 4/5 B hip abd and ext  - Decrease Pain to 3/10 as worst with all PT interventions  - Pt to self correct posture with minimal cues  - Pt independent with HEP with progressions.      Long Term Goals (8 Weeks):  - Pt will return to boxing and weightlifting classes painfree  - Pt will increase strength to 5/5 BLE in all planes of hips and knees  - Decrease Pain to 1/10 as worst with ascending and descending 1 flight of stairs  - Pt to return to 90% PLOF    PLAN     Cont with POC     Luanne Rodriguez, PT

## 2023-07-06 NOTE — PROGRESS NOTES
FAMILY MEDICINE  [facility]    Reason for visit:   Chief Complaint   Patient presents with    Follow-up         SUBJECTIVE: Erlinda Rain is a 28 y.o. female  - with obesity, fibromyalgia, and PCOS presents follow-up weight    Sports Medicine:  Dr. Karlene Larson MD  Gynecology: Dr. Dandre Rose MD        1. Obesity    Today 7/18/23: Since last visit Erlinda Rain started phentermine 8 mg daily but did not notice any improvement in her cravings. She contacted me and wanted to try Wegovy. The medication was sent but not covered by her insurance. She has since restarted phentermine 15 mg daily and reports that it does decrease her appetite but increasing from 8 mh to 15 mg daily she has noticed increased palpitations with the increase and notes that it is uncomforable with exericse     Prior note6/6/23: Erlinda Rain reports that she continues to struggle with weight. She has tried several diet. She exercises regularly. She has to be so restrictive that she is unable to maintain it. She hired a nutritionist to manage her diet (see below) but she felt it was not sustainable. She lost 10 lbs but reports that she did gain it back.   She had tried topamax without improved. She has also tried semaglutide but could not tolerate due to nausea and vomiting    Current weight:   170 lbs    Prior weight history:   6/28/23 started phentermine 15 mg daily  05/29/23 : 79.8 kg (176 lb) - started [place]  01/31/23 : 74.4 kg (164 lb)  01/30/23 : 73.9 kg (163 lb)  11/15/22 : 72.6 kg (160 lb)  08/04/22 : 72.6 kg (160 lb)  07/08/22 : 76.2 kg (167 lb 15.9 oz)  06/29/22 : 73.9 kg (163 lb)  06/08/22 : 73.2 kg (161 lb 6 oz)  02/24/22 : 74.4 kg (164 lb)  06/10/21 : 81.2 kg (179 lb 0.2 oz)    Goal weight: 150 lbs     Current diet: having difficultly focusing on whole based foods and avoiding processed foods but has avoided UPF. She is trying to diversify her diet but busy with school   Current exercise: exercising 2  days a week with a   Current daily activities: [employer]    Prior weight loss program: restrictive diet (see below), Ozempic (nausea, vomiting, diarrhea), Topamax (appetite suppressant but not a good relief)  - Restrictive diet: 6 meals a day egg whites, proteins drinks, vegetables. 1 cheat meal a week. Lost 10 lbs  - Jhoonmx25 lbs lost  Phentermine 8 mg daily not effective  Wegovy not covered by Insurance    Plan of action: see below    Medications for weight loss:  bupropion (WELLBUTRIN XL) 300 MG 24 hr tablet, Take 1 tablet (300 mg total) by mouth once daily., Disp: 30 tablet, Rfl: 11  metFORMIN (GLUCOPHAGE-XR) 500 MG ER 24hr tablet, Take 1 tablet (500 mg total) by mouth daily with breakfast., Disp: 90 tablet, Rfl: 3  phentermine 15 MG capsule, Take 1 capsule (15 mg total) by mouth every morning., Disp: 30 capsule, Rfl: 0  - palpitations     Medications that may impede weight loss:   none    Comorbidities:   none                      Review of Systems   All other systems reviewed and are negative.    HEALTH MAINTENANCE:   Health Maintenance   Topic Date Due    TETANUS VACCINE  06/10/2031    Hepatitis C Screening  Completed    Lipid Panel  Completed     Health Maintenance Topics with due status: Not Due       Topic Last Completion Date    Cervical Cancer Screening 03/23/2021    TETANUS VACCINE 06/10/2021    Influenza Vaccine 10/05/2022     Health Maintenance Due   Topic Date Due    COVID-19 Vaccine (4 - Pfizer series) 12/09/2021       HISTORY:   Past Medical History:   Diagnosis Date    Fibromyalgia     Obesity (BMI 30.0-34.9)     PCOS (polycystic ovarian syndrome)     Vitamin D deficiency        Past Surgical History:   Procedure Laterality Date    none         Family History   Problem Relation Age of Onset    Hyperlipidemia Mother     Arthritis Mother     Hyperlipidemia Father     Heart attacks under age 50 Father     Diabetes type II Father     Diabetes Father     Hypertension Father     Hypothyroidism  "Sister     Eczema Neg Hx     Lupus Neg Hx     Psoriasis Neg Hx     Melanoma Neg Hx     Breast cancer Neg Hx     Ovarian cancer Neg Hx     Colon cancer Neg Hx        Social History     Tobacco Use    Smoking status: Never     Passive exposure: Never    Smokeless tobacco: Never   Substance Use Topics    Alcohol use: Yes     Comment: Socially, rarely    Drug use: No       Social History     Social History Narrative    Single. Currently in [employer] school       ALLERGIES:   Review of patient's allergies indicates:   Allergen Reactions    Kiwi (actinidia chinensis) Itching and Nausea Only       MEDS:   Outpatient Medications as of 7/18/2023   Medication Sig Dispense Refill    ARAZLO 0.045 % Lotn Apply 1 application topically every evening.      cetirizine (ZYRTEC) 10 MG tablet Take 1 tablet by mouth once daily.      meloxicam (MOBIC) 15 MG tablet Take 1 tablet (15 mg total) by mouth once daily. 30 tablet 0    multivitamin capsule Take 1 capsule by mouth once daily.      norgestimate-ethinyl estradioL (ORTHO-CYCLEN) 0.25-35 mg-mcg per tablet Take 1 tablet by mouth once daily.      buPROPion (WELLBUTRIN XL) 300 MG 24 hr tablet Take 1 tablet (300 mg total) by mouth once daily. 90 tablet 3    metFORMIN (GLUCOPHAGE-XR) 500 MG ER 24hr tablet Take 1 tablet (500 mg total) by mouth daily with breakfast. 90 tablet 3     No current facility-administered medications on file as of 7/18/2023.       Vital signs:   Vitals:    07/18/23 1328   BP: 124/82   Pulse: 93   SpO2: 100%   Weight: 77.4 kg (170 lb 10.2 oz)   Height: 5' 2" (1.575 m)       Body mass index is 31.21 kg/m².    PHYSICAL EXAM:     Physical Exam  Constitutional:       General: She is not in acute distress.  Pulmonary:      Effort: Pulmonary effort is normal. No respiratory distress.   Neurological:      Mental Status: She is alert.   Psychiatric:         Speech: Speech normal.           PERTINENT RESULTS:   No visits with results within 1 Week(s) from this visit.   Latest known " visit with results is:   Lab Visit on 07/07/2023   Component Date Value Ref Range Status    TSH 07/07/2023 0.980  0.400 - 4.000 uIU/mL Final    Hemoglobin A1C 07/07/2023 4.9  4.0 - 5.6 % Final    Comment: ADA Screening Guidelines:  5.7-6.4%  Consistent with prediabetes  >or=6.5%  Consistent with diabetes    High levels of fetal hemoglobin interfere with the HbA1C  assay. Heterozygous hemoglobin variants (HbS, HgC, etc)do  not significantly interfere with this assay.   However, presence of multiple variants may affect accuracy.      Estimated Avg Glucose 07/07/2023 94  68 - 131 mg/dL Final    Sodium 07/07/2023 139  136 - 145 mmol/L Final    Potassium 07/07/2023 3.8  3.5 - 5.1 mmol/L Final    Chloride 07/07/2023 106  95 - 110 mmol/L Final    CO2 07/07/2023 25  23 - 29 mmol/L Final    Glucose 07/07/2023 116 (H)  70 - 110 mg/dL Final    BUN 07/07/2023 8  6 - 20 mg/dL Final    Creatinine 07/07/2023 0.9  0.5 - 1.4 mg/dL Final    Calcium 07/07/2023 9.7  8.7 - 10.5 mg/dL Final    Total Protein 07/07/2023 7.3  6.0 - 8.4 g/dL Final    Albumin 07/07/2023 3.7  3.5 - 5.2 g/dL Final    Total Bilirubin 07/07/2023 0.2  0.1 - 1.0 mg/dL Final    Comment: For infants and newborns, interpretation of results should be based  on gestational age, weight and in agreement with clinical  observations.    Premature Infant recommended reference ranges:  Up to 24 hours.............<8.0 mg/dL  Up to 48 hours............<12.0 mg/dL  3-5 days..................<15.0 mg/dL  6-29 days.................<15.0 mg/dL      Alkaline Phosphatase 07/07/2023 74  55 - 135 U/L Final    AST 07/07/2023 17  10 - 40 U/L Final    ALT 07/07/2023 19  10 - 44 U/L Final    eGFR 07/07/2023 >60  >60 mL/min/1.73 m^2 Final    Anion Gap 07/07/2023 8  8 - 16 mmol/L Final    WBC 07/07/2023 7.73  3.90 - 12.70 K/uL Final    RBC 07/07/2023 4.47  4.00 - 5.40 M/uL Final    Hemoglobin 07/07/2023 13.3  12.0 - 16.0 g/dL Final    Hematocrit 07/07/2023 39.2  37.0 - 48.5 % Final    MCV  07/07/2023 88  82 - 98 fL Final    MCH 07/07/2023 29.8  27.0 - 31.0 pg Final    MCHC 07/07/2023 33.9  32.0 - 36.0 g/dL Final    RDW 07/07/2023 12.1  11.5 - 14.5 % Final    Platelets 07/07/2023 306  150 - 450 K/uL Final    MPV 07/07/2023 10.3  9.2 - 12.9 fL Final    Immature Granulocytes 07/07/2023 0.3  0.0 - 0.5 % Final    Gran # (ANC) 07/07/2023 4.2  1.8 - 7.7 K/uL Final    Immature Grans (Abs) 07/07/2023 0.02  0.00 - 0.04 K/uL Final    Comment: Mild elevation in immature granulocytes is non specific and   can be seen in a variety of conditions including stress response,   acute inflammation, trauma and pregnancy. Correlation with other   laboratory and clinical findings is essential.      Lymph # 07/07/2023 3.0  1.0 - 4.8 K/uL Final    Mono # 07/07/2023 0.4  0.3 - 1.0 K/uL Final    Eos # 07/07/2023 0.1  0.0 - 0.5 K/uL Final    Baso # 07/07/2023 0.03  0.00 - 0.20 K/uL Final    nRBC 07/07/2023 0  0 /100 WBC Final    Gran % 07/07/2023 53.9  38.0 - 73.0 % Final    Lymph % 07/07/2023 38.7  18.0 - 48.0 % Final    Mono % 07/07/2023 5.4  4.0 - 15.0 % Final    Eosinophil % 07/07/2023 1.3  0.0 - 8.0 % Final    Basophil % 07/07/2023 0.4  0.0 - 1.9 % Final    Differential Method 07/07/2023 Automated   Final       ASSESSMENT/PLAN:    1. Chronic fatigue  Overview:  - chronic  - she reports that she is on Wellbutrin for chronic fatigue by her last provider     Orders:  -     buPROPion (WELLBUTRIN XL) 300 MG 24 hr tablet; Take 1 tablet (300 mg total) by mouth once daily.  Dispense: 90 tablet; Refill: 3    2. PCOS (polycystic ovarian syndrome)  Comments:  Following with OBGYN.  A1c, TSH WNL. Switch metformin to XR as above.  Overview:  Lab Results   Component Value Date    HGBA1C 4.9 07/07/2023     - currently on metformin started by her prior PCP  - improved a1C   -she would like to continue medication     Orders:  -     buPROPion (WELLBUTRIN XL) 300 MG 24 hr tablet; Take 1 tablet (300 mg total) by mouth once daily.  Dispense: 90  tablet; Refill: 3  -     metFORMIN (GLUCOPHAGE-XR) 500 MG ER 24hr tablet; Take 1 tablet (500 mg total) by mouth daily with breakfast.  Dispense: 90 tablet; Refill: 3    3. Obesity (BMI 30.0-34.9)  Overview:  - discussed recommendation for diet and cardiovascular exercise  - counseling on lifestyle modifications for risk factor reduction  - counseling on management options  -recommend focus on getting for colors the rainbow in every meal  -she has a great exercise regimen and I recommend continuing that  -I recommend focusing on increasing healthy foods in her diet set up focusing on what she can not use  - having side effects from phentermine and recommend discontinue    Orders:  -     buPROPion (WELLBUTRIN XL) 300 MG 24 hr tablet; Take 1 tablet (300 mg total) by mouth once daily.  Dispense: 90 tablet; Refill: 3  -     metFORMIN (GLUCOPHAGE-XR) 500 MG ER 24hr tablet; Take 1 tablet (500 mg total) by mouth daily with breakfast.  Dispense: 90 tablet; Refill: 3          ORDERS:   Orders Placed This Encounter    buPROPion (WELLBUTRIN XL) 300 MG 24 hr tablet    metFORMIN (GLUCOPHAGE-XR) 500 MG ER 24hr tablet         Vaccines recommended: covid-19 booster    Follow-up in 1 year or sooner if any concerns.      This note is dictated using the M*Modal Fluency Direct word recognition program. There are word recognition mistakes that are occasionally missed on review.    Dr. Paradise Guidry D.O.   Family Medicine

## 2023-07-07 ENCOUNTER — LAB VISIT (OUTPATIENT)
Dept: LAB | Facility: OTHER | Age: 28
End: 2023-07-07
Attending: FAMILY MEDICINE
Payer: COMMERCIAL

## 2023-07-07 DIAGNOSIS — Z13.1 SCREENING FOR DIABETES MELLITUS (DM): ICD-10-CM

## 2023-07-07 DIAGNOSIS — Z13.0 SCREENING, IRON DEFICIENCY ANEMIA: ICD-10-CM

## 2023-07-07 DIAGNOSIS — Z13.228 SCREENING FOR METABOLIC DISORDER: ICD-10-CM

## 2023-07-07 DIAGNOSIS — Z13.29 SCREENING FOR THYROID DISORDER: ICD-10-CM

## 2023-07-07 LAB
ALBUMIN SERPL BCP-MCNC: 3.7 G/DL (ref 3.5–5.2)
ALP SERPL-CCNC: 74 U/L (ref 55–135)
ALT SERPL W/O P-5'-P-CCNC: 19 U/L (ref 10–44)
ANION GAP SERPL CALC-SCNC: 8 MMOL/L (ref 8–16)
AST SERPL-CCNC: 17 U/L (ref 10–40)
BASOPHILS # BLD AUTO: 0.03 K/UL (ref 0–0.2)
BASOPHILS NFR BLD: 0.4 % (ref 0–1.9)
BILIRUB SERPL-MCNC: 0.2 MG/DL (ref 0.1–1)
BUN SERPL-MCNC: 8 MG/DL (ref 6–20)
CALCIUM SERPL-MCNC: 9.7 MG/DL (ref 8.7–10.5)
CHLORIDE SERPL-SCNC: 106 MMOL/L (ref 95–110)
CO2 SERPL-SCNC: 25 MMOL/L (ref 23–29)
CREAT SERPL-MCNC: 0.9 MG/DL (ref 0.5–1.4)
DIFFERENTIAL METHOD: NORMAL
EOSINOPHIL # BLD AUTO: 0.1 K/UL (ref 0–0.5)
EOSINOPHIL NFR BLD: 1.3 % (ref 0–8)
ERYTHROCYTE [DISTWIDTH] IN BLOOD BY AUTOMATED COUNT: 12.1 % (ref 11.5–14.5)
EST. GFR  (NO RACE VARIABLE): >60 ML/MIN/1.73 M^2
ESTIMATED AVG GLUCOSE: 94 MG/DL (ref 68–131)
GLUCOSE SERPL-MCNC: 116 MG/DL (ref 70–110)
HBA1C MFR BLD: 4.9 % (ref 4–5.6)
HCT VFR BLD AUTO: 39.2 % (ref 37–48.5)
HGB BLD-MCNC: 13.3 G/DL (ref 12–16)
IMM GRANULOCYTES # BLD AUTO: 0.02 K/UL (ref 0–0.04)
IMM GRANULOCYTES NFR BLD AUTO: 0.3 % (ref 0–0.5)
LYMPHOCYTES # BLD AUTO: 3 K/UL (ref 1–4.8)
LYMPHOCYTES NFR BLD: 38.7 % (ref 18–48)
MCH RBC QN AUTO: 29.8 PG (ref 27–31)
MCHC RBC AUTO-ENTMCNC: 33.9 G/DL (ref 32–36)
MCV RBC AUTO: 88 FL (ref 82–98)
MONOCYTES # BLD AUTO: 0.4 K/UL (ref 0.3–1)
MONOCYTES NFR BLD: 5.4 % (ref 4–15)
NEUTROPHILS # BLD AUTO: 4.2 K/UL (ref 1.8–7.7)
NEUTROPHILS NFR BLD: 53.9 % (ref 38–73)
NRBC BLD-RTO: 0 /100 WBC
PLATELET # BLD AUTO: 306 K/UL (ref 150–450)
PMV BLD AUTO: 10.3 FL (ref 9.2–12.9)
POTASSIUM SERPL-SCNC: 3.8 MMOL/L (ref 3.5–5.1)
PROT SERPL-MCNC: 7.3 G/DL (ref 6–8.4)
RBC # BLD AUTO: 4.47 M/UL (ref 4–5.4)
SODIUM SERPL-SCNC: 139 MMOL/L (ref 136–145)
TSH SERPL DL<=0.005 MIU/L-ACNC: 0.98 UIU/ML (ref 0.4–4)
WBC # BLD AUTO: 7.73 K/UL (ref 3.9–12.7)

## 2023-07-07 PROCEDURE — 83036 HEMOGLOBIN GLYCOSYLATED A1C: CPT | Performed by: FAMILY MEDICINE

## 2023-07-07 PROCEDURE — 85025 COMPLETE CBC W/AUTO DIFF WBC: CPT | Performed by: FAMILY MEDICINE

## 2023-07-07 PROCEDURE — 80053 COMPREHEN METABOLIC PANEL: CPT | Performed by: FAMILY MEDICINE

## 2023-07-07 PROCEDURE — 36415 COLL VENOUS BLD VENIPUNCTURE: CPT | Performed by: FAMILY MEDICINE

## 2023-07-07 PROCEDURE — 84443 ASSAY THYROID STIM HORMONE: CPT | Performed by: FAMILY MEDICINE

## 2023-07-13 ENCOUNTER — PATIENT MESSAGE (OUTPATIENT)
Dept: REHABILITATION | Facility: HOSPITAL | Age: 28
End: 2023-07-13
Payer: COMMERCIAL

## 2023-07-14 ENCOUNTER — CLINICAL SUPPORT (OUTPATIENT)
Dept: REHABILITATION | Facility: HOSPITAL | Age: 28
End: 2023-07-14
Attending: ORTHOPAEDIC SURGERY
Payer: COMMERCIAL

## 2023-07-14 DIAGNOSIS — M25.562 ACUTE PAIN OF LEFT KNEE: Primary | ICD-10-CM

## 2023-07-14 PROCEDURE — 97112 NEUROMUSCULAR REEDUCATION: CPT

## 2023-07-14 PROCEDURE — 97110 THERAPEUTIC EXERCISES: CPT

## 2023-07-14 PROCEDURE — 97530 THERAPEUTIC ACTIVITIES: CPT

## 2023-07-14 NOTE — PROGRESS NOTES
"[facility]   Physical Therapy Treatment Note     Name: Erlinda Rain  Clinic Number: [id number]    Therapy Diagnosis:   Encounter Diagnosis   Name Primary?    Acute pain of left knee Yes     Physician: Dandre Rose MD    Visit Date: 7/14/2023    FOTO 1st:   FOTO 3rd:  FOTO 10th:    [Physician Orders: PT Eval and Treat   Medical Diagnosis from Referral: M22.42 (ICD-10-CM) - Chondromalacia of patellofemoral joint, left  Evaluation Date: 6/1/2023  Authorization Period Expiration: 12/31/23  Plan of Care Expiration: 7/27/23  Visit # / Visits authorized: 5/ 20     Time In: 900 am  Time Out: 955 am   Total Billable Time: 55 minutes     Precautions: Standard      SUBJECTIVE     Pt reports:she did fine on hikes in [place], but had more symptoms this week.     She was compliant with home exercise program.  Response to previous treatment: none  Functional change: none    Pain: 0/10  Location: left knee      OBJECTIVE    Strength: 7/14/23  Hip Left Right   Flexion 4+/5 5/5   Abduction 4/5 4+/5   Extension 3/5 3/5   External Rot 5/5 5/5        Treatment     Erlinda received the treatments listed below:      Therapeutic exercises to develop strength, endurance, and core stabilization for 15 minutes including:  Prone quad stretch 2x 1 min L  PT reassessment, pt education and HEP review    Therapeutic activities to improve functional performance for 25  minutes, including:  bike x 10 min   Lateral walks GTB at ankles 3 laps at jump line  STS 20" box 3x10 holding 10#  Hammer LAQ 2.5# 3x10    Neuromuscular re-education activities to improve: Balance, Coordination, Kinesthetic, and Proprioception for 15 minutes. The following activities were included:  TrA toe taps 2x 20 with yellow hollow ball  Bridges SL  2x10  Reformer hip abd/add 2x15 with 2 springs-np  Shuttle hip ext #25 2x15 in sidelying B-np  Wall sits 5x 30 sec hold /30 sec rest-np  SLS on bosu x2 min B      Patient Education and Home Exercises     Home " Exercises Provided and Patient Education Provided     Education provided:   - Cont HEP    Written Home Exercises Provided: Patient instructed to cont prior HEP. Exercises were reviewed and Erlinda was able to demonstrate them prior to the end of the session.  Erlinda demonstrated good  understanding of the education provided. See EMR under Patient Instructions for exercises provided during therapy sessions    ASSESSMENT     Upon reassessment, she has made mild improvements with glut med strengthening, but still weak with hip ext. Plan to continue progressing strength in painfree ranges to tolerance.     Erlinda Is progressing well towards her goals.     Pt prognosis is Good.     Pt will continue to benefit from skilled outpatient physical therapy to address the deficits listed in the problem list box on initial evaluation, provide pt/family education and to maximize pt's level of independence in the home and community environment.     Pt's spiritual, cultural and educational needs considered and pt agreeable to plan of care and goals.     Anticipated barriers to physical therapy: none    Goals:   Short Term Goals: 4 weeks   - Pt will increase strength to 4/5 B hip abd and ext (Progressing, not met)  - Decrease Pain to 3/10 as worst with all PT interventions - met  - Pt to self correct posture with minimal cues- met  - Pt independent with HEP with progressions. - met     Long Term Goals (8 Weeks): (Progressing, not met)  - Pt will return to boxing and weightlifting classes painfree  - Pt will increase strength to 5/5 BLE in all planes of hips and knees  - Decrease Pain to 1/10 as worst with ascending and descending 1 flight of stairs  - Pt to return to 90% PLOF    PLAN     Cont with POC     Luanne Rodriguez, PT

## 2023-07-18 ENCOUNTER — CLINICAL SUPPORT (OUTPATIENT)
Dept: REHABILITATION | Facility: HOSPITAL | Age: 28
End: 2023-07-18
Payer: COMMERCIAL

## 2023-07-18 ENCOUNTER — OFFICE VISIT (OUTPATIENT)
Dept: PRIMARY CARE CLINIC | Facility: CLINIC | Age: 28
End: 2023-07-18
Attending: FAMILY MEDICINE
Payer: COMMERCIAL

## 2023-07-18 VITALS
WEIGHT: 170.63 LBS | DIASTOLIC BLOOD PRESSURE: 82 MMHG | HEIGHT: 62 IN | SYSTOLIC BLOOD PRESSURE: 124 MMHG | HEART RATE: 93 BPM | BODY MASS INDEX: 31.4 KG/M2 | OXYGEN SATURATION: 100 %

## 2023-07-18 DIAGNOSIS — E28.2 PCOS (POLYCYSTIC OVARIAN SYNDROME): ICD-10-CM

## 2023-07-18 DIAGNOSIS — M25.562 ACUTE PAIN OF LEFT KNEE: Primary | ICD-10-CM

## 2023-07-18 DIAGNOSIS — E66.9 OBESITY (BMI 30.0-34.9): ICD-10-CM

## 2023-07-18 DIAGNOSIS — R53.82 CHRONIC FATIGUE: Primary | ICD-10-CM

## 2023-07-18 PROCEDURE — 1160F PR REVIEW ALL MEDS BY PRESCRIBER/CLIN PHARMACIST DOCUMENTED: ICD-10-PCS | Mod: CPTII,S$GLB,, | Performed by: FAMILY MEDICINE

## 2023-07-18 PROCEDURE — 99999 PR PBB SHADOW E&M-EST. PATIENT-LVL III: ICD-10-PCS | Mod: PBBFAC,,, | Performed by: FAMILY MEDICINE

## 2023-07-18 PROCEDURE — 97530 THERAPEUTIC ACTIVITIES: CPT

## 2023-07-18 PROCEDURE — 1159F MED LIST DOCD IN RCRD: CPT | Mod: CPTII,S$GLB,, | Performed by: FAMILY MEDICINE

## 2023-07-18 PROCEDURE — 3079F DIAST BP 80-89 MM HG: CPT | Mod: CPTII,S$GLB,, | Performed by: FAMILY MEDICINE

## 2023-07-18 PROCEDURE — 3044F PR MOST RECENT HEMOGLOBIN A1C LEVEL <7.0%: ICD-10-PCS | Mod: CPTII,S$GLB,, | Performed by: FAMILY MEDICINE

## 2023-07-18 PROCEDURE — 3044F HG A1C LEVEL LT 7.0%: CPT | Mod: CPTII,S$GLB,, | Performed by: FAMILY MEDICINE

## 2023-07-18 PROCEDURE — 97112 NEUROMUSCULAR REEDUCATION: CPT

## 2023-07-18 PROCEDURE — 3079F PR MOST RECENT DIASTOLIC BLOOD PRESSURE 80-89 MM HG: ICD-10-PCS | Mod: CPTII,S$GLB,, | Performed by: FAMILY MEDICINE

## 2023-07-18 PROCEDURE — 3074F PR MOST RECENT SYSTOLIC BLOOD PRESSURE < 130 MM HG: ICD-10-PCS | Mod: CPTII,S$GLB,, | Performed by: FAMILY MEDICINE

## 2023-07-18 PROCEDURE — 3008F PR BODY MASS INDEX (BMI) DOCUMENTED: ICD-10-PCS | Mod: CPTII,S$GLB,, | Performed by: FAMILY MEDICINE

## 2023-07-18 PROCEDURE — 99214 OFFICE O/P EST MOD 30 MIN: CPT | Mod: S$GLB,,, | Performed by: FAMILY MEDICINE

## 2023-07-18 PROCEDURE — 3074F SYST BP LT 130 MM HG: CPT | Mod: CPTII,S$GLB,, | Performed by: FAMILY MEDICINE

## 2023-07-18 PROCEDURE — 3008F BODY MASS INDEX DOCD: CPT | Mod: CPTII,S$GLB,, | Performed by: FAMILY MEDICINE

## 2023-07-18 PROCEDURE — 1159F PR MEDICATION LIST DOCUMENTED IN MEDICAL RECORD: ICD-10-PCS | Mod: CPTII,S$GLB,, | Performed by: FAMILY MEDICINE

## 2023-07-18 PROCEDURE — 97110 THERAPEUTIC EXERCISES: CPT

## 2023-07-18 PROCEDURE — 99214 PR OFFICE/OUTPT VISIT, EST, LEVL IV, 30-39 MIN: ICD-10-PCS | Mod: S$GLB,,, | Performed by: FAMILY MEDICINE

## 2023-07-18 PROCEDURE — 1160F RVW MEDS BY RX/DR IN RCRD: CPT | Mod: CPTII,S$GLB,, | Performed by: FAMILY MEDICINE

## 2023-07-18 PROCEDURE — 99999 PR PBB SHADOW E&M-EST. PATIENT-LVL III: CPT | Mod: PBBFAC,,, | Performed by: FAMILY MEDICINE

## 2023-07-18 RX ORDER — METFORMIN HYDROCHLORIDE 500 MG/1
500 TABLET, EXTENDED RELEASE ORAL
Qty: 90 TABLET | Refills: 3 | Status: SHIPPED | OUTPATIENT
Start: 2023-07-18 | End: 2024-07-17

## 2023-07-18 RX ORDER — BUPROPION HYDROCHLORIDE 300 MG/1
300 TABLET ORAL DAILY
Qty: 90 TABLET | Refills: 3 | Status: SHIPPED | OUTPATIENT
Start: 2023-07-18 | End: 2024-07-17

## 2023-07-18 NOTE — PROGRESS NOTES
"[facility]   Physical Therapy Treatment Note     Name: Erlinda Rain  Clinic Number: [id number]    Therapy Diagnosis:   Encounter Diagnosis   Name Primary?    Acute pain of left knee Yes     Physician: Dandre Rose MD    Visit Date: 7/18/2023    FOTO 1st:   FOTO 3rd:  FOTO 10th:    [Physician Orders: PT Eval and Treat   Medical Diagnosis from Referral: M22.42 (ICD-10-CM) - Chondromalacia of patellofemoral joint, left  Evaluation Date: 6/1/2023  Authorization Period Expiration: 12/31/23  Plan of Care Expiration: 7/27/23  Visit # / Visits authorized: 6/ 20     Time In: 1100 am  Time Out: 1200 pm   Total Billable Time: 60 minutes     Precautions: Standard      SUBJECTIVE     Pt reports:her knee is a little better, still feels "heaviness" compared to R.     She was compliant with home exercise program.  Response to previous treatment: none  Functional change: none    Pain: 0/10  Location: left knee      OBJECTIVE    Strength: 7/14/23  Hip Left Right   Flexion 4+/5 5/5   Abduction 4/5 4+/5   Extension 3/5 3/5   External Rot 5/5 5/5        Treatment     Erlinda received the treatments listed below:      Therapeutic exercises to develop strength, endurance, and core stabilization for 10 minutes including:  Prone quad stretch 2x 1 min L-np  FOTO  pt education and HEP review    Therapeutic activities to improve functional performance for 30  minutes, including:  bike x 10 min   Lateral walks BTB at ankles 3 laps at jump line  STS 20" box 3x10 holding 15#  Hammer LAQ 2.5# 3x10  Dynamic cool down (scoops and quad pulls) x1 lap ea    Neuromuscular re-education activities to improve: Balance, Coordination, Kinesthetic, and Proprioception for 20 minutes. The following activities were included:  TrA toe taps 2x 20 with yellow hollow ball  Bridges SL  2x10-np  Tripod clamshells gtb 2x15 B  Reformer hip abd/add 2x15 with 2 springs-np  Shuttle hip ext #25 2x15 in sidelying B  Wall sits 5x 30 sec hold /30 " sec rest-np  SLS on bosu x2 min B      Patient Education and Home Exercises     Home Exercises Provided and Patient Education Provided     Education provided:   - Cont HEP    Written Home Exercises Provided: Patient instructed to cont prior HEP. Exercises were reviewed and Erlinda was able to demonstrate them prior to the end of the session.  Erlinda demonstrated good  understanding of the education provided. See EMR under Patient Instructions for exercises provided during therapy sessions    ASSESSMENT     Pt was able to progress with LLE strengthening this session well. No increase in pain or symptoms noted.  Plan to continue progressing strength in painfree ranges to tolerance.     Erlinda Is progressing well towards her goals.     Pt prognosis is Good.     Pt will continue to benefit from skilled outpatient physical therapy to address the deficits listed in the problem list box on initial evaluation, provide pt/family education and to maximize pt's level of independence in the home and community environment.     Pt's spiritual, cultural and educational needs considered and pt agreeable to plan of care and goals.     Anticipated barriers to physical therapy: none    Goals:   Short Term Goals: 4 weeks   - Pt will increase strength to 4/5 B hip abd and ext (Progressing, not met)  - Decrease Pain to 3/10 as worst with all PT interventions - met  - Pt to self correct posture with minimal cues- met  - Pt independent with HEP with progressions. - met     Long Term Goals (8 Weeks): (Progressing, not met)  - Pt will return to boxing and weightlifting classes painfree  - Pt will increase strength to 5/5 BLE in all planes of hips and knees  - Decrease Pain to 1/10 as worst with ascending and descending 1 flight of stairs  - Pt to return to 90% PLOF    PLAN     Cont with POC     Luanne Rodriguez, PT

## 2023-07-21 ENCOUNTER — OFFICE VISIT (OUTPATIENT)
Dept: SPORTS MEDICINE | Facility: CLINIC | Age: 28
End: 2023-07-21
Payer: COMMERCIAL

## 2023-07-21 VITALS
HEIGHT: 62 IN | BODY MASS INDEX: 31.16 KG/M2 | HEART RATE: 90 BPM | SYSTOLIC BLOOD PRESSURE: 114 MMHG | WEIGHT: 169.31 LBS | DIASTOLIC BLOOD PRESSURE: 80 MMHG

## 2023-07-21 DIAGNOSIS — G89.29 CHRONIC PAIN OF LEFT KNEE: Primary | ICD-10-CM

## 2023-07-21 DIAGNOSIS — M22.42 CHONDROMALACIA OF PATELLOFEMORAL JOINT, LEFT: ICD-10-CM

## 2023-07-21 DIAGNOSIS — M25.562 CHRONIC PAIN OF LEFT KNEE: Primary | ICD-10-CM

## 2023-07-21 PROCEDURE — 3008F PR BODY MASS INDEX (BMI) DOCUMENTED: ICD-10-PCS | Mod: CPTII,S$GLB,, | Performed by: PHYSICIAN ASSISTANT

## 2023-07-21 PROCEDURE — 3074F SYST BP LT 130 MM HG: CPT | Mod: CPTII,S$GLB,, | Performed by: PHYSICIAN ASSISTANT

## 2023-07-21 PROCEDURE — 99213 PR OFFICE/OUTPT VISIT, EST, LEVL III, 20-29 MIN: ICD-10-PCS | Mod: S$GLB,,, | Performed by: PHYSICIAN ASSISTANT

## 2023-07-21 PROCEDURE — 1159F MED LIST DOCD IN RCRD: CPT | Mod: CPTII,S$GLB,, | Performed by: PHYSICIAN ASSISTANT

## 2023-07-21 PROCEDURE — 1160F PR REVIEW ALL MEDS BY PRESCRIBER/CLIN PHARMACIST DOCUMENTED: ICD-10-PCS | Mod: CPTII,S$GLB,, | Performed by: PHYSICIAN ASSISTANT

## 2023-07-21 PROCEDURE — 3044F HG A1C LEVEL LT 7.0%: CPT | Mod: CPTII,S$GLB,, | Performed by: PHYSICIAN ASSISTANT

## 2023-07-21 PROCEDURE — 1160F RVW MEDS BY RX/DR IN RCRD: CPT | Mod: CPTII,S$GLB,, | Performed by: PHYSICIAN ASSISTANT

## 2023-07-21 PROCEDURE — 3079F DIAST BP 80-89 MM HG: CPT | Mod: CPTII,S$GLB,, | Performed by: PHYSICIAN ASSISTANT

## 2023-07-21 PROCEDURE — 3074F PR MOST RECENT SYSTOLIC BLOOD PRESSURE < 130 MM HG: ICD-10-PCS | Mod: CPTII,S$GLB,, | Performed by: PHYSICIAN ASSISTANT

## 2023-07-21 PROCEDURE — 3008F BODY MASS INDEX DOCD: CPT | Mod: CPTII,S$GLB,, | Performed by: PHYSICIAN ASSISTANT

## 2023-07-21 PROCEDURE — 1159F PR MEDICATION LIST DOCUMENTED IN MEDICAL RECORD: ICD-10-PCS | Mod: CPTII,S$GLB,, | Performed by: PHYSICIAN ASSISTANT

## 2023-07-21 PROCEDURE — 99999 PR PBB SHADOW E&M-EST. PATIENT-LVL III: ICD-10-PCS | Mod: PBBFAC,,, | Performed by: PHYSICIAN ASSISTANT

## 2023-07-21 PROCEDURE — 99999 PR PBB SHADOW E&M-EST. PATIENT-LVL III: CPT | Mod: PBBFAC,,, | Performed by: PHYSICIAN ASSISTANT

## 2023-07-21 PROCEDURE — 3044F PR MOST RECENT HEMOGLOBIN A1C LEVEL <7.0%: ICD-10-PCS | Mod: CPTII,S$GLB,, | Performed by: PHYSICIAN ASSISTANT

## 2023-07-21 PROCEDURE — 99213 OFFICE O/P EST LOW 20 MIN: CPT | Mod: S$GLB,,, | Performed by: PHYSICIAN ASSISTANT

## 2023-07-21 PROCEDURE — 3079F PR MOST RECENT DIASTOLIC BLOOD PRESSURE 80-89 MM HG: ICD-10-PCS | Mod: CPTII,S$GLB,, | Performed by: PHYSICIAN ASSISTANT

## 2023-07-21 RX ORDER — MELOXICAM 15 MG/1
15 TABLET ORAL DAILY PRN
Qty: 30 TABLET | Refills: 1 | Status: SHIPPED | OUTPATIENT
Start: 2023-07-21 | End: 2023-09-12

## 2023-07-21 NOTE — PROGRESS NOTES
ESTABLISHED PATIENT    History 7/21/2023:  Erlinda returns here today for follow-up evaluation of her left knee.  She completed the course of physical therapy and prescription for meloxicam.  She reports having improvement in her symptoms but continues to have medial-sided pain and weakness.  She is apprehensive about returning to physical activities due to her continued symptoms.  Her pain is typically exacerbated when the knee is in a flexed position.    Additionally, she complains today of having worsening dorsal sided right wrist pain over the past year.    History 5/29/2023:  28 y.o. 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 She enjoys exercising in her spare time. She states she has had four different occurences in the medial knee where she has had a shooting pain. She works out with a  twice a week. She does boxing and weightlifting. She states she has not changed any of her activities recently. She states the first episode, she was bent down picking something up and she felt a spasm. She states she has most pain while in a figure 4 position in the medial aspect of her knee. She has been using ice at home and reports minimal relief.     - mechanical symptoms, - instability    Is affecting ADLs.  Pain is 4/10 at it's worst.        PAST MEDICAL HISTORY    Past Medical History:   Diagnosis Date    Fibromyalgia     Obesity (BMI 30.0-34.9)     PCOS (polycystic ovarian syndrome)     Vitamin D deficiency        PAST SURGICAL HISTORY     Past Surgical History:   Procedure Laterality Date    none         FAMILY HISTORY    Family History   Problem Relation Age of Onset    Hyperlipidemia Mother     Arthritis Mother     Hyperlipidemia Father     Heart attacks under age 50 Father     Diabetes type II Father     Diabetes Father     Hypertension Father     Hypothyroidism Sister     Eczema Neg Hx     Lupus Neg Hx     Psoriasis Neg Hx     Melanoma Neg Hx     Breast  cancer Neg Hx     Ovarian cancer Neg Hx     Colon cancer Neg Hx        SOCIAL HISTORY    Social History     Socioeconomic History    Marital status: Single    Number of children: 0   Occupational History    Occupation: Student- [employer]    Tobacco Use    Smoking status: Never     Passive exposure: Never    Smokeless tobacco: Never   Substance and Sexual Activity    Alcohol use: Yes     Comment: Socially, rarely    Drug use: No    Sexual activity: Yes     Partners: Male     Birth control/protection: OCP   Social History Narrative    Single. Currently in NP school     Social Determinants of Health     Financial Resource Strain: Low Risk     Difficulty of Paying Living Expenses: Not hard at all   Food Insecurity: No Food Insecurity    Worried About Running Out of Food in the Last Year: Never true    Ran Out of Food in the Last Year: Never true   Transportation Needs: No Transportation Needs    Lack of Transportation (Medical): No    Lack of Transportation (Non-Medical): No   Physical Activity: Sufficiently Active    Days of Exercise per Week: 5 days    Minutes of Exercise per Session: 40 min   Stress: Stress Concern Present    Feeling of Stress : Rather much   Social Connections: Unknown    Frequency of Communication with Friends and Family: More than three times a week    Frequency of Social Gatherings with Friends and Family: Once a week    Active Member of Clubs or Organizations: No    Attends Club or Organization Meetings: Patient refused    Marital Status: Patient refused   Housing Stability: Low Risk     Unable to Pay for Housing in the Last Year: No    Number of Places Lived in the Last Year: 1    Unstable Housing in the Last Year: No       MEDICATIONS      Current Outpatient Medications:     ARAZLO 0.045 % Lotn, Apply 1 application topically every evening., Disp: , Rfl:     buPROPion (WELLBUTRIN XL) 300 MG 24 hr tablet, Take 1 tablet (300 mg total) by mouth once daily., Disp: 90 tablet, Rfl: 3    cetirizine (ZYRTEC)  "10 MG tablet, Take 1 tablet by mouth once daily., Disp: , Rfl:     meloxicam (MOBIC) 15 MG tablet, Take 1 tablet (15 mg total) by mouth daily as needed for Pain., Disp: 30 tablet, Rfl: 1    metFORMIN (GLUCOPHAGE-XR) 500 MG ER 24hr tablet, Take 1 tablet (500 mg total) by mouth daily with breakfast., Disp: 90 tablet, Rfl: 3    multivitamin capsule, Take 1 capsule by mouth once daily., Disp: , Rfl:     norgestimate-ethinyl estradioL (ORTHO-CYCLEN) 0.25-35 mg-mcg per tablet, Take 1 tablet by mouth once daily., Disp: , Rfl:     ALLERGIES     Review of patient's allergies indicates:   Allergen Reactions    Kiwi (actinidia chinensis) Itching and Nausea Only         REVIEW OF SYSTEMS   Constitution: Negative. Negative for chills, fever and night sweats.   HENT: Negative for congestion and headaches.    Eyes: Negative for blurred vision, left vision loss and right vision loss.   Cardiovascular: Negative for chest pain and syncope.   Respiratory: Negative for cough and shortness of breath.    Endocrine: Negative for polydipsia, polyphagia and polyuria.   Hematologic/Lymphatic: Negative for bleeding problem. Does not bruise/bleed easily.   Skin: Negative for dry skin, itching and rash.   Musculoskeletal: Negative for falls. Positive for left knee pain  Gastrointestinal: Negative for abdominal pain and bowel incontinence.   Genitourinary: Negative for bladder incontinence and nocturia.   Neurological: Negative for disturbances in coordination, loss of balance and seizures.   Psychiatric/Behavioral: Negative for depression. The patient does not have insomnia.    Allergic/Immunologic: Negative for hives and persistent infections.     PHYSICAL EXAMINATION    Vitals: /80   Pulse 90   Ht 5' 2" (1.575 m)   Wt 76.8 kg (169 lb 5 oz)   BMI 30.97 kg/m²     General: The patient appears active and healthy with no apparent physical problems.  No disturbance of mood or affect is demonstrated. Alert and Oriented.    HEENT: Eyes " normal, pupils equally round, nose normal.    Resp: Equal and symmetrical chest rises. No wheezing    CV: Regular rate    Neck: Supple; nonpainful range of motion.    Extremities: no cyanosis, clubbing, edema, or diffuse swelling.  Palpable pulses, good capillary refill of the digits.  No coolness, discoloration, edema or obvious varicosities.    Skin: no lesions noted.    Lymphatic: no detected adenopathy in the upper or lower extremities.    Neurologic: normal mental status, normal reflexes, normal gait and balance.  Patient is alert and oriented to person, place and time.  No flaccidity or spasticity is noted.  No motor or sensory deficits are noted.  Light touch is intact    Orthopaedic:     KNEE EXAM - LEFT    Inspection:   Normal skin color and appearance with no scars, no ecchymosis and no effusion.      ROM:   0° - 145°.      Palpation:   There is no tenderness along medial plica, medial collateral ligament, pes bursa, lateral joint line, iliotibial band, lateral collateral ligament, popliteal fossa, patellar tendon, or quadriceps tendon. Tender medial patella, medial joint line    Stability: - anterior drawer, - Lachman, - pivot shift and - posterior drawer.      No instability with varus or valgus stress at 0° or 30°. Negative dial  test at 30° and 90°.    Tests:   - Paiges test.  - patellar compression - grind test, + mild patellofemoral crepitus.  There is no patellar apprehension.     - J Sign. - Cony's. - patellar tilt. - Rudolph. Lateral patella translation  2 quadrants. Medial patella translation 2 quadrants    Motor:   Quadriceps strength is 5/5 and hamstrings strength is 5/5. Hamstrings show no tightness.      Neuro:   Distal neurovascular status is normal    Vascular: Negative Homans and no palpable popliteal cords. 2+ pedal pulse with brisk cap refill    Gait Normal      IMAGING:    Xrays including standing AP, 45 degree PA notch view, lateral and sunrise radiographs of the left knee are  ordered / images reviewed by me:  There is no normal alignment and normal bone quality.  No fracture or dislocations noted. No significant joint space narrowing.       IMPRESSION       ICD-10-CM ICD-9-CM   1. Chronic pain of left knee  M25.562 719.46    G89.29 338.29   2. Chondromalacia of patellofemoral joint, left  M22.42 717.7         MEDICATIONS PRESCRIBED      Mobic 15mg    RECOMMENDATIONS     MRI left knee for evaluation of the patellofemoral compartment  Referral to hand clinic placed today for worsening right wrist pain  Continue physical therapy for knee strengthening  RTC for MRI results      All questions were answered, pt will contact us for questions or concerns in the interim.

## 2023-08-01 ENCOUNTER — CLINICAL SUPPORT (OUTPATIENT)
Dept: REHABILITATION | Facility: HOSPITAL | Age: 28
End: 2023-08-01
Payer: COMMERCIAL

## 2023-08-01 DIAGNOSIS — G89.29 CHRONIC PAIN OF LEFT KNEE: Primary | ICD-10-CM

## 2023-08-01 DIAGNOSIS — M25.562 CHRONIC PAIN OF LEFT KNEE: Primary | ICD-10-CM

## 2023-08-01 PROCEDURE — 97530 THERAPEUTIC ACTIVITIES: CPT

## 2023-08-01 PROCEDURE — 97112 NEUROMUSCULAR REEDUCATION: CPT

## 2023-08-01 NOTE — PROGRESS NOTES
"[facility]   Physical Therapy Treatment Note     Name: Erlinda Rain  Clinic Number: [id number]    Therapy Diagnosis:   Encounter Diagnosis   Name Primary?    Chronic pain of left knee Yes     Physician: Dandre Rose MD    Visit Date: 8/1/2023    FOTO 1st:   FOTO 3rd:  FOTO 10th:    [Physician Orders: PT Eval and Treat   Medical Diagnosis from Referral: M22.42 (ICD-10-CM) - Chondromalacia of patellofemoral joint, left  Evaluation Date: 6/1/2023  Authorization Period Expiration: 12/31/23  Plan of Care Expiration: 8/29/23  Visit # / Visits authorized: 7/ 20     Time In: 910  am  Time Out: 1000 pm   Total Billable Time: 50 minutes     Precautions: Standard      SUBJECTIVE     Pt reports:had a follow up and is getting a MRI tomorrow.     She was compliant with home exercise program.  Response to previous treatment: none  Functional change: none    Pain: 0/10  Location: left knee      OBJECTIVE    Strength: 7/14/23  Hip Left Right   Flexion 4+/5 5/5   Abduction 4/5 4+/5   Extension 3/5 3/5   External Rot 5/5 5/5        Treatment     Erlinda received the treatments listed below:      Therapeutic exercises to develop strength, endurance, and core stabilization for 00 minutes including:    FOTO-np  pt education and HEP review-np    Therapeutic activities to improve functional performance for 25  minutes, including:  bike x 8 min   Lateral walks BTB at ankles 3 laps at jump line-np  STS 20" box 3x10 holding 15#  Hammer LAQ knee isometrics 5 sets with 30 sec hold/30 sec rest  Dynamic cool down (scoops and quad pulls) x1 lap ea-np    Neuromuscular re-education activities to improve: Balance, Coordination, Kinesthetic, and Proprioception for 25 minutes. The following activities were included:  TrA toe taps 2x 20 with yellow hollow ball-np  Bridges SL  2x10-np  Tripod clamshells gtb 2x15 B  Reformer hip abd/add 2x15 with 2 springs  Shuttle hip ext #25 2x15 in sidelying B  Wall sits 5x 30 sec hold /30 " sec rest-np  SLS on bosu x2 min B  Prone quad stretch 2x 1 min L-    Patient Education and Home Exercises     Home Exercises Provided and Patient Education Provided     Education provided:   - Cont HEP    Written Home Exercises Provided: Patient instructed to cont prior HEP. Exercises were reviewed and Erlinda was able to demonstrate them prior to the end of the session.  Erlinda demonstrated good  understanding of the education provided. See EMR under Patient Instructions for exercises provided during therapy sessions    ASSESSMENT     Pt maintained interventions well awaiting upcoming MRI. Plan to continue progressing strength in painfree ranges to tolerance once MRI results have been finalized.     Erlinda Is progressing well towards her goals.     Pt prognosis is Good.     Pt will continue to benefit from skilled outpatient physical therapy to address the deficits listed in the problem list box on initial evaluation, provide pt/family education and to maximize pt's level of independence in the home and community environment.     Pt's spiritual, cultural and educational needs considered and pt agreeable to plan of care and goals.     Anticipated barriers to physical therapy: none    Goals:   Short Term Goals: 4 weeks   - Pt will increase strength to 4/5 B hip abd and ext (Progressing, not met)  - Decrease Pain to 3/10 as worst with all PT interventions - met  - Pt to self correct posture with minimal cues- met  - Pt independent with HEP with progressions. - met     Long Term Goals (8 Weeks): (Progressing, not met)  - Pt will return to boxing and weightlifting classes painfree  - Pt will increase strength to 5/5 BLE in all planes of hips and knees  - Decrease Pain to 1/10 as worst with ascending and descending 1 flight of stairs  - Pt to return to 90% PLOF    PLAN     Cont with POC     Luanne Rodriguez, PT

## 2023-08-02 ENCOUNTER — HOSPITAL ENCOUNTER (OUTPATIENT)
Dept: RADIOLOGY | Facility: OTHER | Age: 28
Discharge: HOME | End: 2023-08-02
Attending: PHYSICIAN ASSISTANT
Payer: COMMERCIAL

## 2023-08-02 DIAGNOSIS — M25.562 CHRONIC PAIN OF LEFT KNEE: ICD-10-CM

## 2023-08-02 DIAGNOSIS — G89.29 CHRONIC PAIN OF LEFT KNEE: ICD-10-CM

## 2023-08-02 DIAGNOSIS — M22.42 CHONDROMALACIA OF PATELLOFEMORAL JOINT, LEFT: ICD-10-CM

## 2023-08-02 PROCEDURE — 73721 MRI JNT OF LWR EXTRE W/O DYE: CPT | Mod: 26,LT,, | Performed by: RADIOLOGY

## 2023-08-02 PROCEDURE — 73721 MRI KNEE WITHOUT CONTRAST LEFT: ICD-10-PCS | Mod: 26,LT,, | Performed by: RADIOLOGY

## 2023-08-02 PROCEDURE — 73721 MRI JNT OF LWR EXTRE W/O DYE: CPT | Mod: TC,LT

## 2023-08-09 ENCOUNTER — OFFICE VISIT (OUTPATIENT)
Dept: SPORTS MEDICINE | Facility: CLINIC | Age: 28
End: 2023-08-09
Payer: COMMERCIAL

## 2023-08-09 ENCOUNTER — CLINICAL SUPPORT (OUTPATIENT)
Dept: REHABILITATION | Facility: HOSPITAL | Age: 28
End: 2023-08-09
Payer: COMMERCIAL

## 2023-08-09 DIAGNOSIS — G89.29 CHRONIC PAIN OF LEFT KNEE: Primary | ICD-10-CM

## 2023-08-09 DIAGNOSIS — M25.562 CHRONIC PAIN OF LEFT KNEE: Primary | ICD-10-CM

## 2023-08-09 PROCEDURE — 99215 PR OFFICE/OUTPT VISIT, EST, LEVL V, 40-54 MIN: ICD-10-PCS | Mod: 95,S$GLB,, | Performed by: PHYSICIAN ASSISTANT

## 2023-08-09 PROCEDURE — 97530 THERAPEUTIC ACTIVITIES: CPT

## 2023-08-09 PROCEDURE — 97112 NEUROMUSCULAR REEDUCATION: CPT

## 2023-08-09 PROCEDURE — 1159F PR MEDICATION LIST DOCUMENTED IN MEDICAL RECORD: ICD-10-PCS | Mod: CPTII,95,, | Performed by: PHYSICIAN ASSISTANT

## 2023-08-09 PROCEDURE — 3044F PR MOST RECENT HEMOGLOBIN A1C LEVEL <7.0%: ICD-10-PCS | Mod: CPTII,95,, | Performed by: PHYSICIAN ASSISTANT

## 2023-08-09 PROCEDURE — 1160F RVW MEDS BY RX/DR IN RCRD: CPT | Mod: CPTII,95,, | Performed by: PHYSICIAN ASSISTANT

## 2023-08-09 PROCEDURE — 3044F HG A1C LEVEL LT 7.0%: CPT | Mod: CPTII,95,, | Performed by: PHYSICIAN ASSISTANT

## 2023-08-09 PROCEDURE — 99215 OFFICE O/P EST HI 40 MIN: CPT | Mod: 95,S$GLB,, | Performed by: PHYSICIAN ASSISTANT

## 2023-08-09 PROCEDURE — 1159F MED LIST DOCD IN RCRD: CPT | Mod: CPTII,95,, | Performed by: PHYSICIAN ASSISTANT

## 2023-08-09 PROCEDURE — 1160F PR REVIEW ALL MEDS BY PRESCRIBER/CLIN PHARMACIST DOCUMENTED: ICD-10-PCS | Mod: CPTII,95,, | Performed by: PHYSICIAN ASSISTANT
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
## 2023-08-09 NOTE — PROGRESS NOTES
"[facility]   Physical Therapy Treatment Note     Name: Erlinda Rain  Clinic Number: [id number]    Therapy Diagnosis:   Encounter Diagnosis   Name Primary?    Chronic pain of left knee Yes     Physician: Dandre Rose MD    Visit Date: 8/9/2023      [Physician Orders: PT Eval and Treat   Medical Diagnosis from Referral: M22.42 (ICD-10-CM) - Chondromalacia of patellofemoral joint, left  Evaluation Date: 6/1/2023  Authorization Period Expiration: 12/31/23  Plan of Care Expiration: 8/29/23  Visit # / Visits authorized: 8/ 20     Time In: 910  am  Time Out: 1000 pm   Total Billable Time: 50 minutes     Precautions: Standard      SUBJECTIVE     Pt reports:has a follow up with PA today. Doing better knee wise with less pain/symptoms noted. Only aggravating factor is still prolonged flexion.     She was compliant with home exercise program.  Response to previous treatment: none  Functional change: none    Pain: 0/10  Location: left knee      OBJECTIVE    Strength: 7/14/23  Hip Left Right   Flexion 4+/5 5/5   Abduction 4/5 4+/5   Extension 3/5 3/5   External Rot 5/5 5/5        Treatment     Erlinda received the treatments listed below:      Therapeutic exercises to develop strength, endurance, and core stabilization for 00 minutes including:    FOTO-np  pt education and HEP review-np    Therapeutic activities to improve functional performance for 25  minutes, including:  Elliptical x10 min   Lateral walks BTB at ankles 3 laps at jump line-np  STS 20" box 3x10 holding 15#  Hammer LAQ knee isometrics 5 sets with 30 sec hold/30 sec rest  Dynamic cool down (scoops and quad pulls) x1 lap ea-np    Neuromuscular re-education activities to improve: Balance, Coordination, Kinesthetic, and Proprioception for 25 minutes. The following activities were included:  TrA toe taps 2x 20 with yellow hollow ball-np  Bridges SL  2x10-np  Tripod clamshells gtb 2x15 B-np  Reformer hip abd/add 2x15 with 2 " springs-np  Shuttle hip ext #25 2x15 in sidelying B  Wall sits 5x 30 sec hold /30 sec rest-np  SLS on bosu RDL with OH punch 2x10 B  Prone quad stretch 2x 1 min L-    Patient Education and Home Exercises     Home Exercises Provided and Patient Education Provided     Education provided:   - Cont HEP    Written Home Exercises Provided: Patient instructed to cont prior HEP. Exercises were reviewed and Erlinda was able to demonstrate them prior to the end of the session.  Erilnda demonstrated good  understanding of the education provided. See EMR under Patient Instructions for exercises provided during therapy sessions    ASSESSMENT     Pt had no adverse effects during treatment and progressed to dynamic balance interventions on SL. Will update POC after PA visit to review MRI findings.      Erlinda Is progressing well towards her goals.     Pt prognosis is Good.     Pt will continue to benefit from skilled outpatient physical therapy to address the deficits listed in the problem list box on initial evaluation, provide pt/family education and to maximize pt's level of independence in the home and community environment.     Pt's spiritual, cultural and educational needs considered and pt agreeable to plan of care and goals.     Anticipated barriers to physical therapy: none    Goals:   Short Term Goals: 4 weeks   - Pt will increase strength to 4/5 B hip abd and ext (Progressing, not met)  - Decrease Pain to 3/10 as worst with all PT interventions - met  - Pt to self correct posture with minimal cues- met  - Pt independent with HEP with progressions. - met     Long Term Goals (8 Weeks): (Progressing, not met)  - Pt will return to boxing and weightlifting classes painfree  - Pt will increase strength to 5/5 BLE in all planes of hips and knees  - Decrease Pain to 1/10 as worst with ascending and descending 1 flight of stairs  - Pt to return to 90% PLOF    PLAN     Cont with POC     Luanne Rodriguez, PT

## 2023-08-16 ENCOUNTER — CLINICAL SUPPORT (OUTPATIENT)
Dept: REHABILITATION | Facility: HOSPITAL | Age: 28
End: 2023-08-16
Payer: COMMERCIAL

## 2023-08-16 DIAGNOSIS — G89.29 CHRONIC PAIN OF LEFT KNEE: Primary | ICD-10-CM

## 2023-08-16 DIAGNOSIS — M25.562 CHRONIC PAIN OF LEFT KNEE: Primary | ICD-10-CM

## 2023-08-16 PROCEDURE — 97530 THERAPEUTIC ACTIVITIES: CPT

## 2023-08-16 PROCEDURE — 97112 NEUROMUSCULAR REEDUCATION: CPT

## 2023-08-16 NOTE — PROGRESS NOTES
"[facility]   Physical Therapy Treatment Note     Name: Erlinda Rain  Clinic Number: [id number]    Therapy Diagnosis:   Encounter Diagnosis   Name Primary?    Chronic pain of left knee Yes     Physician: Dandre Rose MD    Visit Date: 8/16/2023      [Physician Orders: PT Eval and Treat   Medical Diagnosis from Referral: M22.42 (ICD-10-CM) - Chondromalacia of patellofemoral joint, left  Evaluation Date: 6/1/2023  Authorization Period Expiration: 12/31/23  Plan of Care Expiration: 8/29/23  Visit # / Visits authorized: 9/ 20     Time In: 900  am  Time Out: 1000 am   Total Billable Time:  60 minutes     Precautions: Standard      SUBJECTIVE     Pt reports:she was recommended for a knee scope by PA, but has a follow up with ortho next week.      She was compliant with home exercise program.  Response to previous treatment: none  Functional change: none    Pain: 0/10  Location: left knee      OBJECTIVE    Strength: 7/14/23  Hip Left Right   Flexion 4+/5 5/5   Abduction 4/5 4+/5   Extension 3/5 3/5   External Rot 5/5 5/5        Treatment     Erlinda received the treatments listed below:      Therapeutic exercises to develop strength, endurance, and core stabilization for 00 minutes including:    FOTO-np  pt education and HEP review-np    Therapeutic activities to improve functional performance for 40  minutes, including:  Elliptical x10 min   Step ups 12" box 3x10 (L leading)  Lateral walks GTB at ankles 3 laps at jump line-  STS 20" box 3x10 holding 15#  Hammer LAQ knee isometrics 5 sets with 45 sec hold/45 sec rest  Dynamic cool down (scoops and quad pulls) x1 lap ea-np    Neuromuscular re-education activities to improve: Balance, Coordination, Kinesthetic, and Proprioception for 20 minutes. The following activities were included:  TrA toe taps 2x 20 with yellow hollow ball  Bridges SL  2x10-np  Tripod clamshells gtb 2x15 B-np  Side plank clams gtb 2x15 B  Reformer hip abd/add 2x15 with 2 " springs-np  Shuttle hip ext #25 2x15 in sidelying B-np  Wall sits 5x 30 sec hold /30 sec rest-np  SLS on bosu RDL with OH punch 2x10 B-np  Prone quad stretch 2x 1 min L-np    Patient Education and Home Exercises     Home Exercises Provided and Patient Education Provided     Education provided:   - Cont HEP    Written Home Exercises Provided: Patient instructed to cont prior HEP. Exercises were reviewed and Erlinda was able to demonstrate them prior to the end of the session.  Erlinda demonstrated good  understanding of the education provided. See EMR under Patient Instructions for exercises provided during therapy sessions    ASSESSMENT     Pt was able to increase patellar tendon loading this session for longer durations well. Plan to continue strengthening per pt tolerance.       Erlinda Is progressing well towards her goals.     Pt prognosis is Good.     Pt will continue to benefit from skilled outpatient physical therapy to address the deficits listed in the problem list box on initial evaluation, provide pt/family education and to maximize pt's level of independence in the home and community environment.     Pt's spiritual, cultural and educational needs considered and pt agreeable to plan of care and goals.     Anticipated barriers to physical therapy: none    Goals:   Short Term Goals: 4 weeks   - Pt will increase strength to 4/5 B hip abd and ext (Progressing, not met)  - Decrease Pain to 3/10 as worst with all PT interventions - met  - Pt to self correct posture with minimal cues- met  - Pt independent with HEP with progressions. - met     Long Term Goals (8 Weeks): (Progressing, not met)  - Pt will return to boxing and weightlifting classes painfree  - Pt will increase strength to 5/5 BLE in all planes of hips and knees  - Decrease Pain to 1/10 as worst with ascending and descending 1 flight of stairs  - Pt to return to 90% PLOF    PLAN     Cont with POC     Luanne Rodriguez, PT

## 2023-08-17 NOTE — PROGRESS NOTES
ESTABLISHED PATIENT    History 8/21/2023:  Erlinda returns to clinic today for follow-up of left knee pain. She has been treated by Say Lo PA-C. Today she would like to discuss surgical options.    History 5/29/2023:   28 y.o. 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 She enjoys exercising in her spare time. She states she has had four different occurences in the medial knee where she has had a shooting pain. She works out with a  twice a week. She does boxing and weightlifting. She states she has not changed any of her activities recently. She states the first episode, she was bent down picking something up and she felt a spasm. She states she has most pain while in a figure 4 position in the medial aspect of her knee. She has been using ice at home and reports minimal relief.     - mechanical symptoms, - instability    Is affecting ADLs.  Pain is 4/10 at it's worst.      REVIEW OF SYSTEMS   Constitution: Negative. Negative for chills, fever and night sweats.   HENT: Negative for congestion and headaches.    Eyes: Negative for blurred vision, left vision loss and right vision loss.   Cardiovascular: Negative for chest pain and syncope.   Respiratory: Negative for cough and shortness of breath.    Endocrine: Negative for polydipsia, polyphagia and polyuria.   Hematologic/Lymphatic: Negative for bleeding problem. Does not bruise/bleed easily.   Skin: Negative for dry skin, itching and rash.   Musculoskeletal: Negative for falls. Positive for left knee pain  Gastrointestinal: Negative for abdominal pain and bowel incontinence.   Genitourinary: Negative for bladder incontinence and nocturia.   Neurological: Negative for disturbances in coordination, loss of balance and seizures.   Psychiatric/Behavioral: Negative for depression. The patient does not have insomnia.    Allergic/Immunologic: Negative for hives and persistent infections.     PHYSICAL  EXAMINATION    Vitals: /84   Pulse 102   Wt 78.8 kg (173 lb 13.3 oz)   BMI 31.79 kg/m²     General: The patient appears active and healthy with no apparent physical problems.  No disturbance of mood or affect is demonstrated. Alert and Oriented.    HEENT: Eyes normal, pupils equally round, nose normal.    Resp: Equal and symmetrical chest rises. No wheezing    CV: Regular rate    Neck: Supple; nonpainful range of motion.    Extremities: no cyanosis, clubbing, edema, or diffuse swelling.  Palpable pulses, good capillary refill of the digits.  No coolness, discoloration, edema or obvious varicosities.    Skin: no lesions noted.    Lymphatic: no detected adenopathy in the upper or lower extremities.    Neurologic: normal mental status, normal reflexes, normal gait and balance.  Patient is alert and oriented to person, place and time.  No flaccidity or spasticity is noted.  No motor or sensory deficits are noted.  Light touch is intact    Orthopaedic:     KNEE EXAM - LEFT    Inspection:   Normal skin color and appearance with no scars, no ecchymosis and no effusion.      ROM:   0° - 145°.      Palpation:   There is no tenderness along medial plica, medial collateral ligament, pes bursa, lateral joint line, iliotibial band, lateral collateral ligament, popliteal fossa, patellar tendon, or quadriceps tendon. Tender medial patella, medial joint line    Stability: - anterior drawer, - Lachman, - pivot shift and - posterior drawer.      No instability with varus or valgus stress at 0° or 30°. Negative dial  test at 30° and 90°.    Tests:   - Paiges test.  - patellar compression - grind test, + mild patellofemoral crepitus.  There is no patellar apprehension.     - J Sign. - Cony's. - patellar tilt. - Rudolph. Lateral patella translation  2 quadrants. Medial patella translation 2 quadrants    Motor:   Quadriceps strength is 5/5 and hamstrings strength is 5/5. Hamstrings show no tightness.      Neuro:   Distal  neurovascular status is normal    Vascular: Negative Homans and no palpable popliteal cords. 2+ pedal pulse with brisk cap refill    Gait Normal      IMAGING:    MRI Knee Without Contrast Left  Narrative: EXAMINATION:  MRI KNEE WITHOUT CONTRAST LEFT    CLINICAL HISTORY:  Knee pain, chronic, negative xray (Age >= 5y);Please evaluate the patellofemoral compartment for chondromalacia and medial plica;Pain in left knee    TECHNIQUE:  Multiplanar, multisequence images were performed about the LEFT knee.    COMPARISON:  05/29/2023    FINDINGS:  **MENISCI:    Medial meniscus: Intact anterior horn, body, and posterior horn.    Lateral meniscus: Intact anterior horn, body, and posterior horn.    Meniscal root attachment: Intact    Popliteal hiatus, superior and inferior meniscal fascicles:Intact and visualized.    **LIGAMENTS:    Anterior cruciate ligament: Continuous, with normal signal.    Posterior cruciate ligament: Continuous, with normal signal.    Medial collateral ligament: Intact.    Lateral collateral ligament and  biceps femoris: Intact.    Iliotibial band (ITB): No evidence for ITB syndrome.    Popliteus tendon and popliteofibular ligament: Intact.    Posterior medial and posterior lateral corners: Intact.    **TENDONS:    Quadriceps tendon: Intact.    Patella tendon: Intact.    Joint fluid: Physiologic.    Hoffa's fat pad: Normal.    Intact medial retinaculum/ MPFL.    Intact lateral retinaculum.    **CARTILAGE:    Patellofemoral:Preserved medial and  lateral patellar facet cartilage.Median ridge demonstrates no focal abnormality.  Preserved trochlear groove cartilage.  Preservedanterior medial and anterior lateral femoral trochlea facet cartilage.    Medial tibiofemoral: Articular cartilage is preserved without focal defects or subchondral marrow edema.Internal aspect of medial femoral condyle cartilage is intact.    Lateral tibiofemoral: Articular cartilage is preserved without focal defects or subchondral  marrow edema.Cartilage at posterior medial aspect of lateral tibial plateau is intact.    **OTHER:    Bone marrow: No fracture or marrow replacing process.    Miscellaneous: The gastrocnemius muscles are normal.No evident plica thickening.  Impression: No evidence for meniscal tear, cruciate ligament injury or occult osseous injury.  No evidence for joint effusion.    Electronically signed by: Akshat Eisenberg MD  Date:    08/02/2023  Time:    10:36      IMPRESSION       ICD-10-CM ICD-9-CM   1. Chondromalacia of patellofemoral joint, left  M22.42 717.7       MEDICATIONS PRESCRIBED      Celebrex 200mg    RECOMMENDATIONS     Activity modifications given to the patient today  RTC in 1 month  If her symptoms do not improve, we did discuss an intra-articular corticosteroid injection.  I advised her that I do not see significant intra-articular pathology other than possibly some patellar fat pad impingement as well as lateral tracking of her patella with an elevated TT TG interval.      All questions were answered, pt will contact us for questions or concerns in the interim.

## 2023-08-21 ENCOUNTER — PATIENT MESSAGE (OUTPATIENT)
Dept: ORTHOPEDICS | Facility: CLINIC | Age: 28
End: 2023-08-21
Payer: COMMERCIAL

## 2023-08-21 ENCOUNTER — OFFICE VISIT (OUTPATIENT)
Dept: SPORTS MEDICINE | Facility: CLINIC | Age: 28
End: 2023-08-21
Payer: COMMERCIAL

## 2023-08-21 VITALS
BODY MASS INDEX: 31.79 KG/M2 | SYSTOLIC BLOOD PRESSURE: 123 MMHG | HEART RATE: 102 BPM | WEIGHT: 173.81 LBS | DIASTOLIC BLOOD PRESSURE: 84 MMHG

## 2023-08-21 DIAGNOSIS — M25.531 RIGHT WRIST PAIN: Primary | ICD-10-CM

## 2023-08-21 DIAGNOSIS — M22.42 CHONDROMALACIA OF PATELLOFEMORAL JOINT, LEFT: Primary | ICD-10-CM

## 2023-08-21 PROCEDURE — 3044F HG A1C LEVEL LT 7.0%: CPT | Mod: CPTII,S$GLB,, | Performed by: ORTHOPAEDIC SURGERY

## 2023-08-21 PROCEDURE — 3079F DIAST BP 80-89 MM HG: CPT | Mod: CPTII,S$GLB,, | Performed by: ORTHOPAEDIC SURGERY

## 2023-08-21 PROCEDURE — 3008F PR BODY MASS INDEX (BMI) DOCUMENTED: ICD-10-PCS | Mod: CPTII,S$GLB,, | Performed by: ORTHOPAEDIC SURGERY

## 2023-08-21 PROCEDURE — 99999 PR PBB SHADOW E&M-EST. PATIENT-LVL III: ICD-10-PCS | Mod: PBBFAC,,, | Performed by: ORTHOPAEDIC SURGERY

## 2023-08-21 PROCEDURE — 3074F SYST BP LT 130 MM HG: CPT | Mod: CPTII,S$GLB,, | Performed by: ORTHOPAEDIC SURGERY

## 2023-08-21 PROCEDURE — 99214 OFFICE O/P EST MOD 30 MIN: CPT | Mod: S$GLB,,, | Performed by: ORTHOPAEDIC SURGERY

## 2023-08-21 PROCEDURE — 3008F BODY MASS INDEX DOCD: CPT | Mod: CPTII,S$GLB,, | Performed by: ORTHOPAEDIC SURGERY

## 2023-08-21 PROCEDURE — 3074F PR MOST RECENT SYSTOLIC BLOOD PRESSURE < 130 MM HG: ICD-10-PCS | Mod: CPTII,S$GLB,, | Performed by: ORTHOPAEDIC SURGERY

## 2023-08-21 PROCEDURE — 99214 PR OFFICE/OUTPT VISIT, EST, LEVL IV, 30-39 MIN: ICD-10-PCS | Mod: S$GLB,,, | Performed by: ORTHOPAEDIC SURGERY

## 2023-08-21 PROCEDURE — 3044F PR MOST RECENT HEMOGLOBIN A1C LEVEL <7.0%: ICD-10-PCS | Mod: CPTII,S$GLB,, | Performed by: ORTHOPAEDIC SURGERY

## 2023-08-21 PROCEDURE — 1159F PR MEDICATION LIST DOCUMENTED IN MEDICAL RECORD: ICD-10-PCS | Mod: CPTII,S$GLB,, | Performed by: ORTHOPAEDIC SURGERY

## 2023-08-21 PROCEDURE — 99999 PR PBB SHADOW E&M-EST. PATIENT-LVL III: CPT | Mod: PBBFAC,,, | Performed by: ORTHOPAEDIC SURGERY

## 2023-08-21 PROCEDURE — 1159F MED LIST DOCD IN RCRD: CPT | Mod: CPTII,S$GLB,, | Performed by: ORTHOPAEDIC SURGERY

## 2023-08-21 PROCEDURE — 3079F PR MOST RECENT DIASTOLIC BLOOD PRESSURE 80-89 MM HG: ICD-10-PCS | Mod: CPTII,S$GLB,, | Performed by: ORTHOPAEDIC SURGERY

## 2023-08-21 RX ORDER — CELECOXIB 200 MG/1
200 CAPSULE ORAL DAILY
Qty: 30 CAPSULE | Refills: 0 | Status: SHIPPED | OUTPATIENT
Start: 2023-08-21 | End: 2023-10-09

## 2023-08-22 ENCOUNTER — OFFICE VISIT (OUTPATIENT)
Dept: ORTHOPEDICS | Facility: CLINIC | Age: 28
End: 2023-08-22
Payer: COMMERCIAL

## 2023-08-22 ENCOUNTER — HOSPITAL ENCOUNTER (OUTPATIENT)
Dept: RADIOLOGY | Facility: HOSPITAL | Age: 28
Discharge: HOME | End: 2023-08-22
Attending: PHYSICIAN ASSISTANT
Payer: COMMERCIAL

## 2023-08-22 DIAGNOSIS — M25.531 RIGHT WRIST PAIN: ICD-10-CM

## 2023-08-22 DIAGNOSIS — R20.0 NUMBNESS OF RIGHT HAND: ICD-10-CM

## 2023-08-22 DIAGNOSIS — G89.29 CHRONIC PAIN OF RIGHT WRIST: Primary | ICD-10-CM

## 2023-08-22 DIAGNOSIS — M25.531 CHRONIC PAIN OF RIGHT WRIST: Primary | ICD-10-CM

## 2023-08-22 PROCEDURE — 73110 XR WRIST COMPLETE 3 VIEWS RIGHT: ICD-10-PCS | Mod: 26,RT,, | Performed by: RADIOLOGY

## 2023-08-22 PROCEDURE — 3044F PR MOST RECENT HEMOGLOBIN A1C LEVEL <7.0%: ICD-10-PCS | Mod: CPTII,S$GLB,, | Performed by: PHYSICIAN ASSISTANT

## 2023-08-22 PROCEDURE — 99999 PR PBB SHADOW E&M-EST. PATIENT-LVL II: ICD-10-PCS | Mod: PBBFAC,,, | Performed by: PHYSICIAN ASSISTANT

## 2023-08-22 PROCEDURE — 99999 PR PBB SHADOW E&M-EST. PATIENT-LVL II: CPT | Mod: PBBFAC,,, | Performed by: PHYSICIAN ASSISTANT

## 2023-08-22 PROCEDURE — 73110 X-RAY EXAM OF WRIST: CPT | Mod: TC,RT

## 2023-08-22 PROCEDURE — 73110 X-RAY EXAM OF WRIST: CPT | Mod: 26,RT,, | Performed by: RADIOLOGY

## 2023-08-22 PROCEDURE — 3044F HG A1C LEVEL LT 7.0%: CPT | Mod: CPTII,S$GLB,, | Performed by: PHYSICIAN ASSISTANT

## 2023-08-22 PROCEDURE — 99204 OFFICE O/P NEW MOD 45 MIN: CPT | Mod: S$GLB,,, | Performed by: PHYSICIAN ASSISTANT

## 2023-08-22 PROCEDURE — 99204 PR OFFICE/OUTPT VISIT, NEW, LEVL IV, 45-59 MIN: ICD-10-PCS | Mod: S$GLB,,, | Performed by: PHYSICIAN ASSISTANT

## 2023-08-22 NOTE — PROGRESS NOTES
[facility]  History & Physical  Orthopedics    SUBJECTIVE:      Chief Complaint: Right wrist pain    Referring Provider: Say Lo PA-C Dr. Dunbar is the supervising physician for this encounter/patient    History of Present Illness:  Patient is a 28 y.o. right hand dominant female who presents today with complaints of right wrist pain, ongoing for about 2 years, no trauma/injury. Initially pain was more off/on, but has become more consistent. She gets pain with pushing through the wrist, this limits pushups or doing yoga. She gets pain consistently with writing. She wears a wrist brace for comfort. When asked, she does report some numbness in the hand, this has become progressive in the past year. No surgical history on the hands.     Onset of symptoms/DOI was 2 years.    Symptoms are aggravated by activity.    Symptoms are alleviated by rest.    Symptoms consist of pain and numbness/tingling.    The patient rates their pain as a 2/10.    Attempted treatment(s) and/or interventions include activity modifications, rest, immobilization.     The patient denies any fevers, chills, N/V, D/C and presents for evaluation.       Past Medical History:   Diagnosis Date    Fibromyalgia     Obesity (BMI 30.0-34.9)     PCOS (polycystic ovarian syndrome)     Vitamin D deficiency      Past Surgical History:   Procedure Laterality Date    none       Review of patient's allergies indicates:   Allergen Reactions    Kiwi (actinidia chinensis) Itching and Nausea Only     Social History     Social History Narrative    Single. Currently in NP school     Family History   Problem Relation Age of Onset    Hyperlipidemia Mother     Arthritis Mother     Hyperlipidemia Father     Heart attacks under age 50 Father     Diabetes type II Father     Diabetes Father     Hypertension Father     Hypothyroidism Sister     Eczema Neg Hx     Lupus Neg Hx     Psoriasis Neg Hx     Melanoma Neg Hx     Breast cancer Neg Hx      Ovarian cancer Neg Hx     Colon cancer Neg Hx          Current Outpatient Medications:     ARAZLO 0.045 % Lotn, Apply 1 application topically every evening., Disp: , Rfl:     buPROPion (WELLBUTRIN XL) 300 MG 24 hr tablet, Take 1 tablet (300 mg total) by mouth once daily., Disp: 90 tablet, Rfl: 3    celecoxib (CELEBREX) 200 MG capsule, Take 1 capsule (200 mg total) by mouth once daily., Disp: 30 capsule, Rfl: 0    cetirizine (ZYRTEC) 10 MG tablet, Take 1 tablet by mouth once daily., Disp: , Rfl:     meloxicam (MOBIC) 15 MG tablet, Take 1 tablet (15 mg total) by mouth daily as needed for Pain., Disp: 30 tablet, Rfl: 1    metFORMIN (GLUCOPHAGE-XR) 500 MG ER 24hr tablet, Take 1 tablet (500 mg total) by mouth daily with breakfast., Disp: 90 tablet, Rfl: 3    multivitamin capsule, Take 1 capsule by mouth once daily., Disp: , Rfl:     norgestimate-ethinyl estradioL (ORTHO-CYCLEN) 0.25-35 mg-mcg per tablet, Take 1 tablet by mouth once daily., Disp: , Rfl:       Review of Systems:  Constitutional: no fever or chills  Eyes: no visual changes  ENT: no nasal congestion or sore throat  Respiratory: no cough or shortness of breath  Cardiovascular: no chest pain  Gastrointestinal: no nausea or vomiting, tolerating diet  Musculoskeletal: pain and soreness    OBJECTIVE:      Vital Signs (Most Recent):  There were no vitals filed for this visit.  There is no height or weight on file to calculate BMI.      Physical Exam:  Constitutional: The patient appears well-developed and well-nourished. No distress.   Skin: No lesions appreciated  Head: Normocephalic and atraumatic.   Nose: Nose normal.   Ears: No deformities seen  Eyes: Conjunctivae and EOM are normal.   Neck: No tracheal deviation present.   Cardiovascular: Normal rate and intact distal pulses.    Pulmonary/Chest: Effort normal. No respiratory distress.   Abdominal: There is no guarding.   Neurological: The patient is alert.   Psychiatric: The patient has a normal mood and  affect.     Right Hand/Wrist Examination:    Observation/Inspection:  Swelling  none    Deformity  none  Discoloration  none     Scars   none    Atrophy  none    HAND/WRIST EXAMINATION:  Finkelstein's Test   Neg  WHAT Test    Neg  Snuff box tenderness   Neg  Schuler's Test    Neg  Hook of Hamate Tenderness  Neg  CMC grind    Neg  Circumduction test   Neg  Mild TTP to the radiocarpal joint and DRUJ, no notable cyst present.    Neurovascular Exam:  Digits WWP, brisk CR < 3s throughout  NVI motor/LTS to M/R/U nerves, radial pulse 2+  Tinel's Test - Carpal Tunnel  Neg  Tinel's Test - Cubital Tunnel  Neg  Phalen's Test    Pos  Median Nerve Compression Test Pos    ROM hand full, painless    ROM wrist full, pain to dorsal wrist with end range extension.    ROM elbow full, painless    Abdomen not guarded  Respirations nonlabored  Perfusion intact    Diagnostic Results:     Imaging - I independently viewed the patient's imaging as well as the radiology report.      FINDINGS:  Three views right wrist.     No acute displaced fracture or dislocation of the wrist.  No radiopaque foreign body.  No significant edema.     Impression:     1. No acute displaced fracture or dislocation of the right wrist.    EMG - none    ASSESSMENT/PLAN:      28 y.o. yo female with chronic right wrist pain, concern for deep ganglion. Possible carpal tunnel syndrome    Plan: The patient and I had a thorough discussion today.  We discussed the working diagnosis as well as several other potential alternative diagnoses.  Treatment options were discussed, both conservative and surgical.  Conservative treatment options would include things such as activity modifications, workplace modifications, a period of rest, oral vs topical OTC and prescription anti-inflammatory medications, occupational therapy, splinting/bracing, immobilization, corticosteroid injections, and others.  Surgical options were discussed as well.     At this time, the patient would like  to proceed with a trial of Right wrist MRI for further evaluation, along with EMG. We discuss brace. I will message her with MRI results and bring her back into clinic after EMG for results and treatment options.    Should the patient's symptoms worsen, persist, or fail to improve they should return for reevaluation and I would be happy to see them back anytime.           Please do not hesitate to reach out to us via email, phone, or MyChart with any questions, concerns, or feedback.

## 2023-08-23 ENCOUNTER — CLINICAL SUPPORT (OUTPATIENT)
Dept: REHABILITATION | Facility: HOSPITAL | Age: 28
End: 2023-08-23
Payer: COMMERCIAL

## 2023-08-23 DIAGNOSIS — G89.29 CHRONIC PAIN OF LEFT KNEE: Primary | ICD-10-CM

## 2023-08-23 DIAGNOSIS — M25.562 CHRONIC PAIN OF LEFT KNEE: Primary | ICD-10-CM

## 2023-08-23 PROCEDURE — 97530 THERAPEUTIC ACTIVITIES: CPT

## 2023-08-23 PROCEDURE — 97112 NEUROMUSCULAR REEDUCATION: CPT

## 2023-08-23 NOTE — PROGRESS NOTES
"[facility]   Physical Therapy Treatment Note     Name: Erlinda Rain  Clinic Number: [id number]    Therapy Diagnosis:   Encounter Diagnosis   Name Primary?    Chronic pain of left knee Yes     Physician: Dandre Rose MD    Visit Date: 8/23/2023      [Physician Orders: PT Eval and Treat   Medical Diagnosis from Referral: M22.42 (ICD-10-CM) - Chondromalacia of patellofemoral joint, left  Evaluation Date: 6/1/2023  Authorization Period Expiration: 12/31/23  Plan of Care Expiration: 8/29/23  Visit # / Visits authorized: 10/ 20     Time In: 1000  am  Time Out: 1100 am   Total Billable Time:  60 minutes (PT extender used at line of sight)     Precautions: Standard      SUBJECTIVE     Pt reports:instructed by MD to continue PT for 1 more month and then try injection.      She was compliant with home exercise program.  Response to previous treatment: none  Functional change: none    Pain: 0/10  Location: left knee      OBJECTIVE    Strength: 7/14/23  Hip Left Right   Flexion 4+/5 5/5   Abduction 4/5 4+/5   Extension 3/5 3/5   External Rot 5/5 5/5        Treatment     Erlinda received the treatments listed below:      Therapeutic exercises to develop strength, endurance, and core stabilization for 00 minutes including:    FOTO-np  pt education and HEP review-np    Therapeutic activities to improve functional performance for 40  minutes, including:  Elliptical x10 min   Step ups 12" box 3x10 (L leading) 3# OH press  Lateral walks GTB at ankles 3 laps at jump line-  STS 20" box 3x10 holding 20#  Hammer LAQ knee isometrics 5 sets with 45 sec hold/45 sec rest  Dynamic cool down (scoops and quad pulls) x1 lap ea-np    Neuromuscular re-education activities to improve: Balance, Coordination, Kinesthetic, and Proprioception for 20 minutes. The following activities were included:  TrA toe taps 2x 20 with yellow hollow ball  Bridges SL  2x10-np  Tripod clamshells gtb 2x15 B-np  Side plank clams gtb 2x15 " B-np  Reformer hip abd/add 2x15 with 2 springs  Shuttle hip ext #25 2x15 in sidelying B-np  Wall sits 5x 30 sec hold /30 sec rest-np  SLS on bosu RDL with OH punch 2x10 B-np  Prone quad stretch 2x 1 min L-np    Patient Education and Home Exercises     Home Exercises Provided and Patient Education Provided     Education provided:   - Cont HEP    Written Home Exercises Provided: Patient instructed to cont prior HEP. Exercises were reviewed and Erlinda was able to demonstrate them prior to the end of the session.  Erlinda demonstrated good  understanding of the education provided. See EMR under Patient Instructions for exercises provided during therapy sessions    ASSESSMENT     Pt making good progress toward functional goals and strengthening. Pt given some cervical isometrics and will follow up.  Plan to continue strengthening per pt tolerance.       Erlinda Is progressing well towards her goals.     Pt prognosis is Good.     Pt will continue to benefit from skilled outpatient physical therapy to address the deficits listed in the problem list box on initial evaluation, provide pt/family education and to maximize pt's level of independence in the home and community environment.     Pt's spiritual, cultural and educational needs considered and pt agreeable to plan of care and goals.     Anticipated barriers to physical therapy: none    Goals:   Short Term Goals: 4 weeks   - Pt will increase strength to 4/5 B hip abd and ext (Progressing, not met)  - Decrease Pain to 3/10 as worst with all PT interventions - met  - Pt to self correct posture with minimal cues- met  - Pt independent with HEP with progressions. - met     Long Term Goals (8 Weeks): (Progressing, not met)  - Pt will return to boxing and weightlifting classes painfree  - Pt will increase strength to 5/5 BLE in all planes of hips and knees  - Decrease Pain to 1/10 as worst with ascending and descending 1 flight of stairs  - Pt to return to 90% PLOF    PLAN      Cont with POC     Luanne Rodriguez, PT

## 2023-08-31 ENCOUNTER — CLINICAL SUPPORT (OUTPATIENT)
Dept: REHABILITATION | Facility: HOSPITAL | Age: 28
End: 2023-08-31
Payer: COMMERCIAL

## 2023-08-31 DIAGNOSIS — M25.562 CHRONIC PAIN OF LEFT KNEE: Primary | ICD-10-CM

## 2023-08-31 DIAGNOSIS — G89.29 CHRONIC PAIN OF LEFT KNEE: Primary | ICD-10-CM

## 2023-08-31 PROCEDURE — 97530 THERAPEUTIC ACTIVITIES: CPT

## 2023-08-31 PROCEDURE — 97112 NEUROMUSCULAR REEDUCATION: CPT

## 2023-08-31 NOTE — PROGRESS NOTES
"[facility]   Physical Therapy Treatment Note     Name: Erlinda Rain  Clinic Number: [id number]    Therapy Diagnosis:   Encounter Diagnosis   Name Primary?    Chronic pain of left knee Yes     Physician: Dandre Rose MD    Visit Date: 8/31/2023      [Physician Orders: PT Eval and Treat   Medical Diagnosis from Referral: M22.42 (ICD-10-CM) - Chondromalacia of patellofemoral joint, left  Evaluation Date: 6/1/2023  Authorization Period Expiration: 12/31/23  Plan of Care Expiration: 9/27/23  Visit # / Visits authorized: 11/ 20     Time In: 1000  am  Time Out: 1100 am   Total Billable Time:  60 minutes (PT extender used at line of sight)     Precautions: Standard      SUBJECTIVE     Pt reports:noticing some of the patellar lateral tracking.     She was compliant with home exercise program.  Response to previous treatment: none  Functional change: none    Pain: 0/10  Location: left knee      OBJECTIVE    Strength: 7/14/23  Hip Left Right   Flexion 4+/5 5/5   Abduction 4/5 4+/5   Extension 3/5 3/5   External Rot 5/5 5/5        Treatment     Erlinda received the treatments listed below:      Therapeutic exercises to develop strength, endurance, and core stabilization for 00 minutes including:    FOTO-np  pt education and HEP review-np    Therapeutic activities to improve functional performance for 50  minutes, including:  Elliptical x10 min   Step ups 12" box 3x10 (L leading) 3# OH press-np  Step ups pilates chair 4 high springs L 2x15  Lateral walks BTB at ankles 2 laps at jump line-  STS 20" box 3x10 holding SL B  Hammer LAQ knee 3x fatigue 2.5#  Dynamic warm up (scoops and quad pulls) x1 lap ea    Neuromuscular re-education activities to improve: Balance, Coordination, Kinesthetic, and Proprioception for 10 minutes. The following activities were included:  TrA toe taps 2x 20 with yellow hollow ball-np  SL paloff press ytb tube x20 B  Tripod clamshells gtb 2x15 B-np  Side plank clams gtb 2x15 " B-np  Reformer hip abd/add 2x15 with 2 springs-np  Shuttle hip ext #25 2x15 in sidelying B-np  Wall sits 5x 30 sec hold /30 sec rest-np  SLS on bosu RDL with OH punch 2x10 B-np  Prone quad stretch 2x 1 min L    Patient Education and Home Exercises     Home Exercises Provided and Patient Education Provided     Education provided:   - Cont HEP    Written Home Exercises Provided: Patient instructed to cont prior HEP. Exercises were reviewed and Erlinda was able to demonstrate them prior to the end of the session.  Erlinda demonstrated good  understanding of the education provided. See EMR under Patient Instructions for exercises provided during therapy sessions    ASSESSMENT     Pt tolerated quad progressions on L eccentrically and concentrically with load well and appropriate fatigue noted.  Plan to continue strengthening per pt tolerance.       Erlinda Is progressing well towards her goals.     Pt prognosis is Good.     Pt will continue to benefit from skilled outpatient physical therapy to address the deficits listed in the problem list box on initial evaluation, provide pt/family education and to maximize pt's level of independence in the home and community environment.     Pt's spiritual, cultural and educational needs considered and pt agreeable to plan of care and goals.     Anticipated barriers to physical therapy: none    Goals:   Short Term Goals: 4 weeks   - Pt will increase strength to 4/5 B hip abd and ext (Progressing, not met)  - Decrease Pain to 3/10 as worst with all PT interventions - met  - Pt to self correct posture with minimal cues- met  - Pt independent with HEP with progressions. - met     Long Term Goals (8 Weeks): (Progressing, not met)  - Pt will return to boxing and weightlifting classes painfree  - Pt will increase strength to 5/5 BLE in all planes of hips and knees  - Decrease Pain to 1/10 as worst with ascending and descending 1 flight of stairs  - Pt to return to 90% PLOF    PLAN     Cont with  POC     Luanne Rodriguez, PT

## 2023-09-06 ENCOUNTER — CLINICAL SUPPORT (OUTPATIENT)
Dept: REHABILITATION | Facility: HOSPITAL | Age: 28
End: 2023-09-06
Payer: COMMERCIAL

## 2023-09-06 DIAGNOSIS — G89.29 CHRONIC PAIN OF LEFT KNEE: Primary | ICD-10-CM

## 2023-09-06 DIAGNOSIS — M25.562 CHRONIC PAIN OF LEFT KNEE: Primary | ICD-10-CM

## 2023-09-06 PROCEDURE — 97112 NEUROMUSCULAR REEDUCATION: CPT

## 2023-09-06 PROCEDURE — 97530 THERAPEUTIC ACTIVITIES: CPT

## 2023-09-06 NOTE — PROGRESS NOTES
"[facility]   Physical Therapy Treatment Note     Name: Erlinda Rain  Clinic Number: [id number]    Therapy Diagnosis:   Encounter Diagnosis   Name Primary?    Chronic pain of left knee Yes     Physician: Dandre Rose MD    Visit Date: 9/6/2023      [Physician Orders: PT Eval and Treat   Medical Diagnosis from Referral: M22.42 (ICD-10-CM) - Chondromalacia of patellofemoral joint, left  Evaluation Date: 6/1/2023  Authorization Period Expiration: 12/31/23  Plan of Care Expiration: 9/27/23  Visit # / Visits authorized: 12/ 20     Time In: 1000  am  Time Out: 1100 am   Total Billable Time:  60 minutes (PT extender used at line of sight)     Precautions: Standard      SUBJECTIVE     Pt reports: was walking in [place] and is having some soreness.     She was compliant with home exercise program.  Response to previous treatment: none  Functional change: none    Pain: 0/10  Location: left knee      OBJECTIVE    Strength: 7/14/23  Hip Left Right   Flexion 4+/5 5/5   Abduction 4/5 4+/5   Extension 3/5 3/5   External Rot 5/5 5/5        Treatment     Erlinda received the treatments listed below:      Therapeutic exercises to develop strength, endurance, and core stabilization for 00 minutes including:    FOTO-np  pt education and HEP review-np    Therapeutic activities to improve functional performance for 50  minutes, including:  Elliptical x10 min   Step ups 12" box 3x10 (L leading) 3# OH press-np  Step ups pilates chair 4 high springs L 2x15  Lateral walks BTB at ankles 3 laps at jump line-  STS 20" box 3x10 holding SL B  Hammer LAQ knee isometrics 5x 45 sec ea  Dynamic warm up (scoops, kicks and quad pulls) x1 lap ea    Neuromuscular re-education activities to improve: Balance, Coordination, Kinesthetic, and Proprioception for 10 minutes. The following activities were included:  TrA toe taps 2x 20 with yellow hollow ball-np  SL paloff press ytb tube x20 B-np  Tripod clamshells gtb 2x15 B-np  Side plank " clams gtb 2x15 B-np  Reformer hip abd/add 2x15 with 2 springs-np  Shuttle hip ext #25 2x15 in sidelying B-np  Wall sits 5x 30 sec hold /30 sec rest-np  SLS on bosu x2 min B  Prone quad stretch 2x 1 min L    Patient Education and Home Exercises     Home Exercises Provided and Patient Education Provided     Education provided:   - Cont HEP    Written Home Exercises Provided: Patient instructed to cont prior HEP. Exercises were reviewed and Erlinda was able to demonstrate them prior to the end of the session.  Erlinda demonstrated good  understanding of the education provided. See EMR under Patient Instructions for exercises provided during therapy sessions    ASSESSMENT     Pt responded well to interventions to decrease current knee mild flare up and responded well.  Plan to continue strengthening per pt tolerance.       Erlinda Is progressing well towards her goals.     Pt prognosis is Good.     Pt will continue to benefit from skilled outpatient physical therapy to address the deficits listed in the problem list box on initial evaluation, provide pt/family education and to maximize pt's level of independence in the home and community environment.     Pt's spiritual, cultural and educational needs considered and pt agreeable to plan of care and goals.     Anticipated barriers to physical therapy: none    Goals:   Short Term Goals: 4 weeks   - Pt will increase strength to 4/5 B hip abd and ext (Progressing, not met)  - Decrease Pain to 3/10 as worst with all PT interventions - met  - Pt to self correct posture with minimal cues- met  - Pt independent with HEP with progressions. - met     Long Term Goals (8 Weeks): (Progressing, not met)  - Pt will return to boxing and weightlifting classes painfree  - Pt will increase strength to 5/5 BLE in all planes of hips and knees  - Decrease Pain to 1/10 as worst with ascending and descending 1 flight of stairs  - Pt to return to 90% PLOF    PLAN     Cont with POC     Luanne Rodriguez,  PT

## 2023-09-08 ENCOUNTER — PATIENT MESSAGE (OUTPATIENT)
Dept: PRIMARY CARE CLINIC | Facility: CLINIC | Age: 28
End: 2023-09-08
Payer: COMMERCIAL

## 2023-09-08 DIAGNOSIS — L68.0 HIRSUTISM: Primary | ICD-10-CM

## 2023-09-08 DIAGNOSIS — N91.5 OLIGOMENORRHEA, UNSPECIFIED TYPE: ICD-10-CM

## 2023-09-11 ENCOUNTER — TELEPHONE (OUTPATIENT)
Dept: ORTHOPEDICS | Facility: CLINIC | Age: 28
End: 2023-09-11
Payer: COMMERCIAL

## 2023-09-11 DIAGNOSIS — M50.30 DDD (DEGENERATIVE DISC DISEASE), CERVICAL: Primary | ICD-10-CM

## 2023-09-11 NOTE — TELEPHONE ENCOUNTER
I left patient a message that she has an appointment and a x-ray on tomorrow. If she has any question to please callus.

## 2023-09-12 ENCOUNTER — HOSPITAL ENCOUNTER (OUTPATIENT)
Dept: RADIOLOGY | Facility: HOSPITAL | Age: 28
Discharge: HOME | End: 2023-09-12
Attending: PHYSICIAN ASSISTANT
Payer: COMMERCIAL

## 2023-09-12 ENCOUNTER — OFFICE VISIT (OUTPATIENT)
Dept: ORTHOPEDICS | Facility: CLINIC | Age: 28
End: 2023-09-12
Payer: COMMERCIAL

## 2023-09-12 VITALS — HEIGHT: 62 IN | WEIGHT: 174.19 LBS | BODY MASS INDEX: 32.05 KG/M2

## 2023-09-12 DIAGNOSIS — M54.12 CERVICAL RADICULOPATHY: Primary | ICD-10-CM

## 2023-09-12 DIAGNOSIS — M50.30 DDD (DEGENERATIVE DISC DISEASE), CERVICAL: ICD-10-CM

## 2023-09-12 PROCEDURE — 3044F HG A1C LEVEL LT 7.0%: CPT | Mod: CPTII,S$GLB,, | Performed by: PHYSICIAN ASSISTANT

## 2023-09-12 PROCEDURE — 99999 PR PBB SHADOW E&M-EST. PATIENT-LVL III: CPT | Mod: PBBFAC,,, | Performed by: PHYSICIAN ASSISTANT

## 2023-09-12 PROCEDURE — 72050 X-RAY EXAM NECK SPINE 4/5VWS: CPT | Mod: 26,,, | Performed by: RADIOLOGY

## 2023-09-12 PROCEDURE — 72050 XR CERVICAL SPINE AP LAT WITH FLEX EXTEN: ICD-10-PCS | Mod: 26,,, | Performed by: RADIOLOGY

## 2023-09-12 PROCEDURE — 1159F PR MEDICATION LIST DOCUMENTED IN MEDICAL RECORD: ICD-10-PCS | Mod: CPTII,S$GLB,, | Performed by: PHYSICIAN ASSISTANT

## 2023-09-12 PROCEDURE — 1159F MED LIST DOCD IN RCRD: CPT | Mod: CPTII,S$GLB,, | Performed by: PHYSICIAN ASSISTANT

## 2023-09-12 PROCEDURE — 99214 PR OFFICE/OUTPT VISIT, EST, LEVL IV, 30-39 MIN: ICD-10-PCS | Mod: S$GLB,,, | Performed by: PHYSICIAN ASSISTANT

## 2023-09-12 PROCEDURE — 72050 X-RAY EXAM NECK SPINE 4/5VWS: CPT | Mod: TC

## 2023-09-12 PROCEDURE — 99214 OFFICE O/P EST MOD 30 MIN: CPT | Mod: S$GLB,,, | Performed by: PHYSICIAN ASSISTANT

## 2023-09-12 PROCEDURE — 3044F PR MOST RECENT HEMOGLOBIN A1C LEVEL <7.0%: ICD-10-PCS | Mod: CPTII,S$GLB,, | Performed by: PHYSICIAN ASSISTANT

## 2023-09-12 PROCEDURE — 3008F BODY MASS INDEX DOCD: CPT | Mod: CPTII,S$GLB,, | Performed by: PHYSICIAN ASSISTANT

## 2023-09-12 PROCEDURE — 99999 PR PBB SHADOW E&M-EST. PATIENT-LVL III: ICD-10-PCS | Mod: PBBFAC,,, | Performed by: PHYSICIAN ASSISTANT

## 2023-09-12 PROCEDURE — 3008F PR BODY MASS INDEX (BMI) DOCUMENTED: ICD-10-PCS | Mod: CPTII,S$GLB,, | Performed by: PHYSICIAN ASSISTANT
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
## 2023-09-13 ENCOUNTER — CLINICAL SUPPORT (OUTPATIENT)
Dept: REHABILITATION | Facility: HOSPITAL | Age: 28
End: 2023-09-13
Payer: COMMERCIAL

## 2023-09-13 DIAGNOSIS — G89.29 CHRONIC PAIN OF LEFT KNEE: Primary | ICD-10-CM

## 2023-09-13 DIAGNOSIS — M25.562 CHRONIC PAIN OF LEFT KNEE: Primary | ICD-10-CM

## 2023-09-13 PROCEDURE — 97112 NEUROMUSCULAR REEDUCATION: CPT

## 2023-09-13 PROCEDURE — 97530 THERAPEUTIC ACTIVITIES: CPT

## 2023-09-13 NOTE — PROGRESS NOTES
"[facility]   Physical Therapy Treatment Note     Name: Erlinda Rain  Clinic Number: [id number]    Therapy Diagnosis:   Encounter Diagnosis   Name Primary?    Chronic pain of left knee Yes     Physician: Dandre Rose MD    Visit Date: 9/13/2023      [Physician Orders: PT Eval and Treat   Medical Diagnosis from Referral: M22.42 (ICD-10-CM) - Chondromalacia of patellofemoral joint, left  Evaluation Date: 6/1/2023  Authorization Period Expiration: 12/31/23  Plan of Care Expiration: 9/27/23  Visit # / Visits authorized: 13/ 20     Time In: 1000  am  Time Out: 1100 am   Total Billable Time:  60 minutes (PT extender used at line of sight)     Precautions: Standard      SUBJECTIVE     Pt reports: feeling better since last session.     She was compliant with home exercise program.  Response to previous treatment: none  Functional change: none    Pain: 0/10  Location: left knee      OBJECTIVE    Strength: 7/14/23  Hip Left Right   Flexion 4+/5 5/5   Abduction 4/5 4+/5   Extension 3/5 3/5   External Rot 5/5 5/5        Treatment     Erlinda received the treatments listed below:      Therapeutic exercises to develop strength, endurance, and core stabilization for 00 minutes including:    FOTO-np  pt education and HEP review-np    Therapeutic activities to improve functional performance for 45  minutes, including:  Elliptical x10 min   Step ups 12" box 3x10 B  Step ups pilates chair 4 high [place] L 2x15  Lateral walks BTB at ankles 3 laps at jump line-  STS 20" box 3x10 holding SL B-np  Hammer LAQ knee isometrics 3 sec up, 3 sec hold, 3 sec down 3x10/fatigue 2.5# L  Dynamic warm up (quad pulls) x1 lap ea    Neuromuscular re-education activities to improve: Balance, Coordination, Kinesthetic, and Proprioception for 15 minutes. The following activities were included:  TrA toe taps 2x 20 with yellow hollow ball-np  SL paloff press ytb tube x20 B  Tripod clamshells gtb 2x15 B-np  Side plank clams gtb " 2x15 B-np  Reformer hip abd/add 2x15 with 2 springs-np  Shuttle hip ext #25 2x15 in sidelying B-np  SLS on bosu rebounder red ball toss x30 B  Prone quad stretch 2x 1 min L-np    Patient Education and Home Exercises     Home Exercises Provided and Patient Education Provided     Education provided:   - Cont HEP    Written Home Exercises Provided: Patient instructed to cont prior HEP. Exercises were reviewed and Erlinda was able to demonstrate them prior to the end of the session.  Erlinda demonstrated good  understanding of the education provided. See EMR under Patient Instructions for exercises provided during therapy sessions    ASSESSMENT     Pt returned to loading intervention to improve patellar tendon.   Plan to continue strengthening per pt tolerance.       Erlinda Is progressing well towards her goals.     Pt prognosis is Good.     Pt will continue to benefit from skilled outpatient physical therapy to address the deficits listed in the problem list box on initial evaluation, provide pt/family education and to maximize pt's level of independence in the home and community environment.     Pt's spiritual, cultural and educational needs considered and pt agreeable to plan of care and goals.     Anticipated barriers to physical therapy: none    Goals:   Short Term Goals: 4 weeks   - Pt will increase strength to 4/5 B hip abd and ext (Progressing, not met)  - Decrease Pain to 3/10 as worst with all PT interventions - met  - Pt to self correct posture with minimal cues- met  - Pt independent with HEP with progressions. - met     Long Term Goals (8 Weeks): (Progressing, not met)  - Pt will return to boxing and weightlifting classes painfree  - Pt will increase strength to 5/5 BLE in all planes of hips and knees  - Decrease Pain to 1/10 as worst with ascending and descending 1 flight of stairs  - Pt to return to 90% PLOF    PLAN     Cont with POC     Luanne Rodriguez, PT

## 2023-09-14 ENCOUNTER — HOSPITAL ENCOUNTER (OUTPATIENT)
Dept: RADIOLOGY | Facility: HOSPITAL | Age: 28
Discharge: HOME | End: 2023-09-14
Attending: PHYSICIAN ASSISTANT
Payer: COMMERCIAL

## 2023-09-14 DIAGNOSIS — M25.531 RIGHT WRIST PAIN: ICD-10-CM

## 2023-09-14 DIAGNOSIS — M25.531 CHRONIC PAIN OF RIGHT WRIST: ICD-10-CM

## 2023-09-14 DIAGNOSIS — G89.29 CHRONIC PAIN OF RIGHT WRIST: ICD-10-CM

## 2023-09-14 PROCEDURE — 73221 MRI JOINT UPR EXTREM W/O DYE: CPT | Mod: TC,RT

## 2023-09-14 PROCEDURE — 73221 MRI JOINT UPR EXTREM W/O DYE: CPT | Mod: 26,RT,, | Performed by: RADIOLOGY

## 2023-09-14 PROCEDURE — 73221 MRI WRIST WITHOUT CONTRAST RIGHT: ICD-10-PCS | Mod: 26,RT,, | Performed by: RADIOLOGY

## 2023-09-15 ENCOUNTER — PATIENT MESSAGE (OUTPATIENT)
Dept: ORTHOPEDICS | Facility: CLINIC | Age: 28
End: 2023-09-15
Payer: COMMERCIAL

## 2023-09-20 ENCOUNTER — CLINICAL SUPPORT (OUTPATIENT)
Dept: REHABILITATION | Facility: HOSPITAL | Age: 28
End: 2023-09-20
Payer: COMMERCIAL

## 2023-09-20 DIAGNOSIS — G89.29 CHRONIC PAIN OF LEFT KNEE: Primary | ICD-10-CM

## 2023-09-20 DIAGNOSIS — M25.562 CHRONIC PAIN OF LEFT KNEE: Primary | ICD-10-CM

## 2023-09-20 PROCEDURE — 97112 NEUROMUSCULAR REEDUCATION: CPT

## 2023-09-20 PROCEDURE — 97530 THERAPEUTIC ACTIVITIES: CPT

## 2023-09-20 NOTE — PROGRESS NOTES
"[facility]   Physical Therapy Treatment Note     Name: Erlinda Rain  Clinic Number: [id number]    Therapy Diagnosis:   Encounter Diagnosis   Name Primary?    Chronic pain of left knee Yes     Physician: Dandre Rose MD    Visit Date: 9/20/2023      [Physician Orders: PT Eval and Treat   Medical Diagnosis from Referral: M22.42 (ICD-10-CM) - Chondromalacia of patellofemoral joint, left  Evaluation Date: 6/1/2023  Authorization Period Expiration: 12/31/23  Plan of Care Expiration: 9/27/23  Visit # / Visits authorized: 14/ 20     Time In: 1000  am  Time Out: 1100 am   Total Billable Time:  60 minutes (PT extender used at line of sight)     Precautions: Standard      SUBJECTIVE     Pt reports: little more soreness in L knee today.     She was compliant with home exercise program.  Response to previous treatment: none  Functional change: none    Pain: 0/10  Location: left knee      OBJECTIVE    Strength: 7/14/23  Hip Left Right   Flexion 4+/5 5/5   Abduction 4/5 4+/5   Extension 3/5 3/5   External Rot 5/5 5/5        Treatment     Erlinda received the treatments listed below:      Therapeutic exercises to develop strength, endurance, and core stabilization for 00 minutes including:    FOTO-np  pt education and HEP review-np    Therapeutic activities to improve functional performance for 45  minutes, including:  Elliptical x10 min   Step ups 12" box 3x10 B-np  SL RDL holding 5# B 2x10  Step ups pilates chair 4 high [place] L 2x15  Lateral walks BTB at ankles 3 laps at jump line-  STS 20" box 3x10 holding SL B-np  Hammer LAQ knee isometrics (45 sec hold x5 sets) B  Dynamic warm up (quad pulls) x1 lap ea-np    Neuromuscular re-education activities to improve: Balance, Coordination, Kinesthetic, and Proprioception for 15 minutes. The following activities were included:  TrA toe taps 2x 20 with yellow hollow ball-np  Deadbug with SB 2x10  SL paloff press ytb tube x20 B  Tripod clamshells gtb 2x15 " B-np  Side plank clams gtb 2x15 B-np  Reformer hip abd/add 2x15 with 2 springs-np  Shuttle hip ext #25 2x15 in sidelying B-np  SLS on bosu rebounder red ball toss x30 B-np  Prone quad stretch 2x 1 min L    Patient Education and Home Exercises     Home Exercises Provided and Patient Education Provided     Education provided:   - Cont HEP    Written Home Exercises Provided: Patient instructed to cont prior HEP. Exercises were reviewed and Erlinda was able to demonstrate them prior to the end of the session.  Erlinda demonstrated good  understanding of the education provided. See EMR under Patient Instructions for exercises provided during therapy sessions    ASSESSMENT     Pt add some new interventions well for dynamic balance and core control with light loading.  Plan to continue strengthening per pt tolerance.       Erlinda Is progressing well towards her goals.     Pt prognosis is Good.     Pt will continue to benefit from skilled outpatient physical therapy to address the deficits listed in the problem list box on initial evaluation, provide pt/family education and to maximize pt's level of independence in the home and community environment.     Pt's spiritual, cultural and educational needs considered and pt agreeable to plan of care and goals.     Anticipated barriers to physical therapy: none    Goals:   Short Term Goals: 4 weeks   - Pt will increase strength to 4/5 B hip abd and ext (Progressing, not met)  - Decrease Pain to 3/10 as worst with all PT interventions - met  - Pt to self correct posture with minimal cues- met  - Pt independent with HEP with progressions. - met     Long Term Goals (8 Weeks): (Progressing, not met)  - Pt will return to boxing and weightlifting classes painfree  - Pt will increase strength to 5/5 BLE in all planes of hips and knees  - Decrease Pain to 1/10 as worst with ascending and descending 1 flight of stairs  - Pt to return to 90% PLOF    PLAN     Cont with POC     Luanne Rodriguez, PT

## 2023-09-22 ENCOUNTER — PATIENT MESSAGE (OUTPATIENT)
Dept: REHABILITATION | Facility: HOSPITAL | Age: 28
End: 2023-09-22
Payer: COMMERCIAL

## 2023-09-22 ENCOUNTER — OFFICE VISIT (OUTPATIENT)
Dept: SPORTS MEDICINE | Facility: CLINIC | Age: 28
End: 2023-09-22
Payer: COMMERCIAL

## 2023-09-22 VITALS
SYSTOLIC BLOOD PRESSURE: 118 MMHG | HEART RATE: 95 BPM | HEIGHT: 62 IN | WEIGHT: 170 LBS | BODY MASS INDEX: 31.28 KG/M2 | DIASTOLIC BLOOD PRESSURE: 74 MMHG

## 2023-09-22 DIAGNOSIS — M22.42 CHONDROMALACIA OF PATELLOFEMORAL JOINT, LEFT: Primary | ICD-10-CM

## 2023-09-22 PROCEDURE — 20610 LARGE JOINT ASPIRATION/INJECTION: L KNEE: ICD-10-PCS | Mod: LT,S$GLB,, | Performed by: ORTHOPAEDIC SURGERY

## 2023-09-22 PROCEDURE — 3044F HG A1C LEVEL LT 7.0%: CPT | Mod: CPTII,S$GLB,, | Performed by: ORTHOPAEDIC SURGERY

## 2023-09-22 PROCEDURE — 3044F PR MOST RECENT HEMOGLOBIN A1C LEVEL <7.0%: ICD-10-PCS | Mod: CPTII,S$GLB,, | Performed by: ORTHOPAEDIC SURGERY

## 2023-09-22 PROCEDURE — 3074F SYST BP LT 130 MM HG: CPT | Mod: CPTII,S$GLB,, | Performed by: ORTHOPAEDIC SURGERY

## 2023-09-22 PROCEDURE — 99999 PR PBB SHADOW E&M-EST. PATIENT-LVL III: CPT | Mod: PBBFAC,,, | Performed by: ORTHOPAEDIC SURGERY

## 2023-09-22 PROCEDURE — 3078F PR MOST RECENT DIASTOLIC BLOOD PRESSURE < 80 MM HG: ICD-10-PCS | Mod: CPTII,S$GLB,, | Performed by: ORTHOPAEDIC SURGERY

## 2023-09-22 PROCEDURE — 99214 OFFICE O/P EST MOD 30 MIN: CPT | Mod: 25,S$GLB,, | Performed by: ORTHOPAEDIC SURGERY

## 2023-09-22 PROCEDURE — 99999 PR PBB SHADOW E&M-EST. PATIENT-LVL III: ICD-10-PCS | Mod: PBBFAC,,, | Performed by: ORTHOPAEDIC SURGERY

## 2023-09-22 PROCEDURE — 3008F PR BODY MASS INDEX (BMI) DOCUMENTED: ICD-10-PCS | Mod: CPTII,S$GLB,, | Performed by: ORTHOPAEDIC SURGERY

## 2023-09-22 PROCEDURE — 1159F PR MEDICATION LIST DOCUMENTED IN MEDICAL RECORD: ICD-10-PCS | Mod: CPTII,S$GLB,, | Performed by: ORTHOPAEDIC SURGERY

## 2023-09-22 PROCEDURE — 99214 PR OFFICE/OUTPT VISIT, EST, LEVL IV, 30-39 MIN: ICD-10-PCS | Mod: 25,S$GLB,, | Performed by: ORTHOPAEDIC SURGERY

## 2023-09-22 PROCEDURE — 20610 DRAIN/INJ JOINT/BURSA W/O US: CPT | Mod: LT,S$GLB,, | Performed by: ORTHOPAEDIC SURGERY

## 2023-09-22 PROCEDURE — 3074F PR MOST RECENT SYSTOLIC BLOOD PRESSURE < 130 MM HG: ICD-10-PCS | Mod: CPTII,S$GLB,, | Performed by: ORTHOPAEDIC SURGERY

## 2023-09-22 PROCEDURE — 3078F DIAST BP <80 MM HG: CPT | Mod: CPTII,S$GLB,, | Performed by: ORTHOPAEDIC SURGERY

## 2023-09-22 PROCEDURE — 1159F MED LIST DOCD IN RCRD: CPT | Mod: CPTII,S$GLB,, | Performed by: ORTHOPAEDIC SURGERY

## 2023-09-22 PROCEDURE — 3008F BODY MASS INDEX DOCD: CPT | Mod: CPTII,S$GLB,, | Performed by: ORTHOPAEDIC SURGERY

## 2023-09-22 RX ORDER — TRIAMCINOLONE ACETONIDE 40 MG/ML
40 INJECTION, SUSPENSION INTRA-ARTICULAR; INTRAMUSCULAR
Status: DISCONTINUED | OUTPATIENT
Start: 2023-09-22 | End: 2023-09-22 | Stop reason: HOSPADM

## 2023-09-22 RX ADMIN — TRIAMCINOLONE ACETONIDE 40 MG: 40 INJECTION, SUSPENSION INTRA-ARTICULAR; INTRAMUSCULAR at 10:09

## 2023-09-22 NOTE — PROGRESS NOTES
ESTABLISHED PATIENT    History 9/22/2023:  Erlinda returns to clinic today for follow-up of left knee pain. She states her pain has continued.  She has still been in physical therapy rehabbing but really does not feel like she is improved enough.    History 8/21/2023:  Erlinda returns to clinic today for follow-up of left knee pain. She has been treated by Say Lo PA-C. Today she would like to discuss surgical options.    History 5/29/2023:   28 y.o. 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 She enjoys exercising in her spare time. She states she has had four different occurences in the medial knee where she has had a shooting pain. She works out with a  twice a week. She does boxing and weightlifting. She states she has not changed any of her activities recently. She states the first episode, she was bent down picking something up and she felt a spasm. She states she has most pain while in a figure 4 position in the medial aspect of her knee. She has been using ice at home and reports minimal relief.     - mechanical symptoms, - instability    Is affecting ADLs.  Pain is 4/10 at it's worst.      REVIEW OF SYSTEMS   Constitution: Negative. Negative for chills, fever and night sweats.   HENT: Negative for congestion and headaches.    Eyes: Negative for blurred vision, left vision loss and right vision loss.   Cardiovascular: Negative for chest pain and syncope.   Respiratory: Negative for cough and shortness of breath.    Endocrine: Negative for polydipsia, polyphagia and polyuria.   Hematologic/Lymphatic: Negative for bleeding problem. Does not bruise/bleed easily.   Skin: Negative for dry skin, itching and rash.   Musculoskeletal: Negative for falls. Positive for left knee pain  Gastrointestinal: Negative for abdominal pain and bowel incontinence.   Genitourinary: Negative for bladder incontinence and nocturia.   Neurological: Negative for  "disturbances in coordination, loss of balance and seizures.   Psychiatric/Behavioral: Negative for depression. The patient does not have insomnia.    Allergic/Immunologic: Negative for hives and persistent infections.     PHYSICAL EXAMINATION    Vitals: /74   Pulse 95   Ht 5' 2" (1.575 m)   Wt 77.1 kg (170 lb)   BMI 31.09 kg/m²     General: The patient appears active and healthy with no apparent physical problems.  No disturbance of mood or affect is demonstrated. Alert and Oriented.    HEENT: Eyes normal, pupils equally round, nose normal.    Resp: Equal and symmetrical chest rises. No wheezing    CV: Regular rate    Neck: Supple; nonpainful range of motion.    Extremities: no cyanosis, clubbing, edema, or diffuse swelling.  Palpable pulses, good capillary refill of the digits.  No coolness, discoloration, edema or obvious varicosities.    Skin: no lesions noted.    Lymphatic: no detected adenopathy in the upper or lower extremities.    Neurologic: normal mental status, normal reflexes, normal gait and balance.  Patient is alert and oriented to person, place and time.  No flaccidity or spasticity is noted.  No motor or sensory deficits are noted.  Light touch is intact    Orthopaedic:     KNEE EXAM - LEFT    Inspection:   Normal skin color and appearance with no scars, no ecchymosis and no effusion.      ROM:   0° - 145°.      Palpation:   There is no tenderness along medial plica, medial collateral ligament, pes bursa, lateral joint line, iliotibial band, lateral collateral ligament, popliteal fossa, patellar tendon, or quadriceps tendon. Tender medial patella, medial joint line    Stability: - anterior drawer, - Lachman, - pivot shift and - posterior drawer.      No instability with varus or valgus stress at 0° or 30°. Negative dial  test at 30° and 90°.    Tests:   - Paiges test.  - patellar compression - grind test, + mild patellofemoral crepitus.  There is no patellar apprehension.     - J Sign. " - Cony's. - patellar tilt. - Rudolph. Lateral patella translation  2 quadrants. Medial patella translation 2 quadrants    Motor:   Quadriceps strength is 5/5 and hamstrings strength is 5/5. Hamstrings show no tightness.      Neuro:   Distal neurovascular status is normal    Vascular: Negative Homans and no palpable popliteal cords. 2+ pedal pulse with brisk cap refill    Gait Normal    IMPRESSION       ICD-10-CM ICD-9-CM   1. Chondromalacia of patellofemoral joint, left  M22.42 717.7       MEDICATIONS PRESCRIBED      None    RECOMMENDATIONS     CSI of left knee performed today in clinic  RTC PRN    Large Joint Aspiration/Injection: L knee    Date/Time: 9/22/2023 10:30 AM    Performed by: Dandre Rose MD  Authorized by: Dandre Rose MD    Consent Done?:  Yes (Verbal)  Indications:  Pain  Site marked: the procedure site was marked    Timeout: prior to procedure the correct patient, procedure, and site was verified    Prep: patient was prepped and draped in usual sterile fashion      Local anesthesia used?: Yes    Local anesthetic:  Co-phenylcaine spray (0.2% Naropin)  Anesthetic total (ml):  4      Details:  Needle Size:  22 G  Ultrasonic Guidance for needle placement?: No    Approach:  Anterolateral  Location:  Knee  Site:  L knee  Medications:  40 mg triamcinolone acetonide 40 mg/mL  Medications comment:  5 mL LIDOcaine HCL 10 mg/ml (1%) 10 mg/mL (1 %)  Patient tolerance:  Patient tolerated the procedure well with no immediate complications          All questions were answered, pt will contact us for questions or concerns in the interim.

## 2023-09-25 ENCOUNTER — HOSPITAL ENCOUNTER (OUTPATIENT)
Dept: RADIOLOGY | Facility: HOSPITAL | Age: 28
Discharge: HOME | End: 2023-09-25
Attending: PHYSICIAN ASSISTANT
Payer: COMMERCIAL

## 2023-09-25 DIAGNOSIS — M54.12 CERVICAL RADICULOPATHY: ICD-10-CM

## 2023-09-25 PROCEDURE — 72141 MRI CERVICAL SPINE WITHOUT CONTRAST: ICD-10-PCS | Mod: 26,,, | Performed by: RADIOLOGY

## 2023-09-25 PROCEDURE — 72141 MRI NECK SPINE W/O DYE: CPT | Mod: TC

## 2023-09-25 PROCEDURE — 72141 MRI NECK SPINE W/O DYE: CPT | Mod: 26,,, | Performed by: RADIOLOGY

## 2023-09-26 ENCOUNTER — PROCEDURE VISIT (OUTPATIENT)
Dept: NEUROLOGY | Facility: CLINIC | Age: 28
End: 2023-09-26
Payer: COMMERCIAL

## 2023-09-26 ENCOUNTER — OFFICE VISIT (OUTPATIENT)
Dept: ORTHOPEDICS | Facility: CLINIC | Age: 28
End: 2023-09-26
Payer: COMMERCIAL

## 2023-09-26 DIAGNOSIS — M54.12 CERVICAL RADICULOPATHY: Primary | ICD-10-CM

## 2023-09-26 DIAGNOSIS — R20.0 NUMBNESS OF RIGHT HAND: ICD-10-CM

## 2023-09-26 PROCEDURE — 3044F HG A1C LEVEL LT 7.0%: CPT | Mod: CPTII,95,, | Performed by: PHYSICIAN ASSISTANT

## 2023-09-26 PROCEDURE — 95911 PR NERVE CONDUCTION STUDY; 9-10 STUDIES: ICD-10-PCS | Mod: S$GLB,,, | Performed by: PHYSICAL MEDICINE & REHABILITATION

## 2023-09-26 PROCEDURE — 95886 PR EMG COMPLETE, W/ NERVE CONDUCTION STUDIES, 5+ MUSCLES: ICD-10-PCS | Mod: S$GLB,,, | Performed by: PHYSICAL MEDICINE & REHABILITATION

## 2023-09-26 PROCEDURE — 3044F PR MOST RECENT HEMOGLOBIN A1C LEVEL <7.0%: ICD-10-PCS | Mod: CPTII,95,, | Performed by: PHYSICIAN ASSISTANT

## 2023-09-26 PROCEDURE — 99213 OFFICE O/P EST LOW 20 MIN: CPT | Mod: 95,,, | Performed by: PHYSICIAN ASSISTANT

## 2023-09-26 PROCEDURE — 99213 PR OFFICE/OUTPT VISIT, EST, LEVL III, 20-29 MIN: ICD-10-PCS | Mod: 95,,, | Performed by: PHYSICIAN ASSISTANT

## 2023-09-26 PROCEDURE — 95911 NRV CNDJ TEST 9-10 STUDIES: CPT | Mod: S$GLB,,, | Performed by: PHYSICAL MEDICINE & REHABILITATION

## 2023-09-26 PROCEDURE — 95886 MUSC TEST DONE W/N TEST COMP: CPT | Mod: S$GLB,,, | Performed by: PHYSICAL MEDICINE & REHABILITATION

## 2023-09-26 NOTE — PROGRESS NOTES
Established Patient - Audio Only Telehealth Visit     The patient location is: home  The chief complaint leading to consultation is: MRI results  Visit type: Virtual visit with audio only (telephone)  Total time spent with patient: 15 minutes       The reason for the audio only service rather than synchronous audio and video virtual visit was related to technical difficulties or patient preference/necessity.     Each patient to whom I provide medical services by telemedicine is:  (1) informed of the relationship between the physician and patient and the respective role of any other health care provider with respect to management of the patient; and (2) notified that they may decline to receive medical services by telemedicine and may withdraw from such care at any time. Patient verbally consented to receive this service via voice-only telephone call.       HPI: the patient presents for MRI results.    MRI cervical spine demonstrates no significant spinal canal or foraminal narrowing     Assessment and plan:      There is no surgical intervention indicated.  She is scheduled for an EMG today.  I will wait to see the results of that.                        This service was not originating from a related E/M service provided within the previous 7 days nor will  to an E/M service or procedure within the next 24 hours or my soonest available appointment.  Prevailing standard of care was able to be met in this audio-only visit.

## 2023-09-26 NOTE — PROCEDURES
Test Date:  2023    Patient:  erlinda fisher : 1995 Physician: Boubacar Echevarria D.O.   ID#: [id number] SEX: Female Ref. Phys: Russo-Digeorge, Jamie L*     HPI: Erlinda Fisher is a 28 y.o.female who presents for NCS/EMG to evaluate for right>left CTS.      NCV & EMG Findings:  All nerve conduction studies (as indicated in the following tables) were within normal limits.  All examined muscles (as indicated in the following table) showed no evidence of electrical instability.    Impression:  This is a normal electrophysiologic study of the right upper extremity.  She has multiple trigger points in the bilateral levator scap, bilateral upper trapezius, bilateral supraspinatus, and left rhomboid that reproduce her symptoms when palpated.  I suspect this (myofascial sensory symptoms) to be the cause of her numbness and tingling.  Physical Therapy to work on scapular strengthening, posture, and ergonomics will be helpful if this is the cause.            ___________________________  Boubacar Echevarria D.O.        NCS+  Motor Nerve Results      Latency Amplitude F-Lat Segment Distance CV Comment   Site (ms) Norm (mV) Norm (ms)  (cm) (m/s) Norm    Left Median (APB)   Wrist 2.9  < 4.4 13.5  > 5.9  Wrist-Palm - - -    Elbow 6.7 - 11.5 -  Elbow-Wrist 23 61  > 53    Right Median (APB)   Wrist 3.2  < 4.4 8.0  > 5.9  Wrist-Palm - - -    Elbow 7.0 - 9.2 -  Elbow-Wrist 23.5 62  > 53    Left Ulnar (ADM)   Wrist 2.3  < 3.7 10.0  > 3.0         Bel Elbow 5.8 - 6.1 -  Bel Elbow-Wrist 22 63  > 52    Abv Elbow 6.9 - 6.3 -  Abv Elbow-Bel Elbow 8 73  > 43    Right Ulnar (ADM)   Wrist 2.4  < 3.7 9.0  > 3.0         Bel Elbow 6.1 - 8.1 -  Bel Elbow-Wrist 24 65  > 52    Abv Elbow 7.1 - 8.9 -  Abv Elbow-Bel Elbow 8 80  > 43      Sensory Nerve Results      Latency (Peak) Amplitude (P-P) Segment Distance CV Comment   Site (ms) Norm (µV) Norm  (cm) (m/s) Norm    Left Median   Wrist-Dig I 2.2 - 165 - Wrist-Dig I 10 45 -    Wrist-Dig II 2.9   < 4.0 155  > 13 Wrist-Dig II 14 48  > 39    Palm-Wrist 1.55 - 171 - Palm-Wrist - - -    Right Median   Wrist-Dig I 2.5 - 84 - Wrist-Dig I 10 40 -    Wrist-Dig II 2.9  < 4.0 109  > 13 Wrist-Dig II 14 48  > 39    Palm-Wrist 1.60 - 80 - Palm-Wrist - - -    Left Ulnar   Palm-Wrist 1.60 - 50 - Palm-Wrist - - -    Right Ulnar   Palm-Wrist 1.50 - 23 - Palm-Wrist - - -    Left Radial   Wrist-Dig I 2.5 - 25 - Wrist-Dig I 10 40 -    Right Radial   Wrist-Dig I 2.6 - 14 - Wrist-Dig I 10 38 -      Inter-Nerve Comparisons     Nerve 1 Value 1 Nerve 2 Value 2 Parameter Result Normal   Sensory Sites   R Median Wrist-Dig I 2.5 ms R Radial Wrist-Dig I 2.6 ms Peak Lat Diff 0.10 ms <0.40   L Median Wrist-Dig I 2.2 ms L Radial Wrist-Dig I 2.5 ms Peak Lat Diff 0.30 ms <0.40   R Median Palm-Wrist 1.6 ms R Ulnar Palm-Wrist 1.5 ms Peak Lat Diff 0.10 ms <0.30   L Median Palm-Wrist 1.6 ms L Ulnar Palm-Wrist 1.6 ms Peak Lat Diff 0.05 ms <0.30     EMG+     Side Muscle Nerve Root Ins Act Fibs Psw Amp Dur Poly Recrt Int Pat Comment   Right Deltoid Axillary C5-C6 Nml Nml Nml Nml Nml 0 Nml Nml    Right Biceps Musculocut C5-C6 Nml Nml Nml Nml Nml 0 Nml Nml    Right Triceps Radial C6-C8 Nml Nml Nml Nml Nml 0 Nml Nml    Right Pronator Teres Median C6-C7 Nml Nml Nml Nml Nml 0 Nml Nml    Right FDI Ulnar C8-T1 Nml Nml Nml Nml Nml 0 Nml Nml            Waveforms:    Motor              Sensory

## 2023-09-28 ENCOUNTER — OFFICE VISIT (OUTPATIENT)
Dept: ORTHOPEDICS | Facility: CLINIC | Age: 28
End: 2023-09-28
Payer: COMMERCIAL

## 2023-09-28 DIAGNOSIS — M24.131 DEGENERATIVE TFCC TEAR, RIGHT: Primary | ICD-10-CM

## 2023-09-28 PROCEDURE — 99214 OFFICE O/P EST MOD 30 MIN: CPT | Mod: 25,S$GLB,, | Performed by: ORTHOPAEDIC SURGERY

## 2023-09-28 PROCEDURE — 99999 PR PBB SHADOW E&M-EST. PATIENT-LVL III: CPT | Mod: PBBFAC,,, | Performed by: ORTHOPAEDIC SURGERY

## 2023-09-28 PROCEDURE — 1159F MED LIST DOCD IN RCRD: CPT | Mod: CPTII,S$GLB,, | Performed by: ORTHOPAEDIC SURGERY

## 2023-09-28 PROCEDURE — 1159F PR MEDICATION LIST DOCUMENTED IN MEDICAL RECORD: ICD-10-PCS | Mod: CPTII,S$GLB,, | Performed by: ORTHOPAEDIC SURGERY

## 2023-09-28 PROCEDURE — 99999 PR PBB SHADOW E&M-EST. PATIENT-LVL III: ICD-10-PCS | Mod: PBBFAC,,, | Performed by: ORTHOPAEDIC SURGERY

## 2023-09-28 PROCEDURE — 3044F HG A1C LEVEL LT 7.0%: CPT | Mod: CPTII,S$GLB,, | Performed by: ORTHOPAEDIC SURGERY

## 2023-09-28 PROCEDURE — 20605 INTERMEDIATE JOINT ASPIRATION/INJECTION: ICD-10-PCS | Mod: RT,S$GLB,, | Performed by: ORTHOPAEDIC SURGERY

## 2023-09-28 PROCEDURE — 99214 PR OFFICE/OUTPT VISIT, EST, LEVL IV, 30-39 MIN: ICD-10-PCS | Mod: 25,S$GLB,, | Performed by: ORTHOPAEDIC SURGERY

## 2023-09-28 PROCEDURE — 3044F PR MOST RECENT HEMOGLOBIN A1C LEVEL <7.0%: ICD-10-PCS | Mod: CPTII,S$GLB,, | Performed by: ORTHOPAEDIC SURGERY

## 2023-09-28 PROCEDURE — 20605 DRAIN/INJ JOINT/BURSA W/O US: CPT | Mod: RT,S$GLB,, | Performed by: ORTHOPAEDIC SURGERY

## 2023-09-28 RX ORDER — TRIAMCINOLONE ACETONIDE 40 MG/ML
40 INJECTION, SUSPENSION INTRA-ARTICULAR; INTRAMUSCULAR
Status: DISCONTINUED | OUTPATIENT
Start: 2023-09-28 | End: 2023-09-28 | Stop reason: HOSPADM

## 2023-09-28 RX ADMIN — TRIAMCINOLONE ACETONIDE 40 MG: 40 INJECTION, SUSPENSION INTRA-ARTICULAR; INTRAMUSCULAR at 08:09

## 2023-09-28 NOTE — PROCEDURES
Intermediate Joint Aspiration/Injection    Date/Time: 9/28/2023 8:45 AM    Performed by: Von Lopez MD  Authorized by: Von Lopez MD    Consent Done?:  Yes (Verbal)  Indications:  Pain  Site marked: The procedure site was marked    Timeout: Prior to procedure the correct patient, procedure, and site was verified      Location:  Wrist  Wrist joint: right ulnocarpal.  Prep: Patient was prepped and draped in usual sterile fashion    Ultrasonic Guidance for needle placement: No  Needle size:  25 G  Approach:  Dorsal  Medications:  40 mg triamcinolone acetonide 40 mg/mL  Patient tolerance:  Patient tolerated the procedure well with no immediate complications

## 2023-09-28 NOTE — PROGRESS NOTES
[facility]  History & Physical  Orthopedics    SUBJECTIVE:      Chief Complaint: Right wrist pain    Referring Provider: No ref. provider found     Dr. Lopez is the supervising physician for this encounter/patient    History of Present Illness:  Patient is a 28 y.o. right hand dominant female who presents today with complaints of right wrist pain, ongoing for about 2 years, no trauma/injury. Initially pain was more off/on, but has become more consistent. She gets pain with pushing through the wrist, this limits pushups or doing yoga. She gets pain consistently with writing. She wears a wrist brace for comfort. When asked, she does report some numbness in the hand, this has become progressive in the past year. No surgical history on the hands.     Interval history September 28, 2023: The patient returns today for re-evaluation.  She is previously been seen and treated by Jamie Russo DiGeorge for sort of a chronic right ulnar-sided wrist pain.  She does some boxing for fitness but otherwise has no known injuries to the wrist.  She takes Celebrex but otherwise has not had much in the way of treatment for her wrist pain.  She presents for re-evaluation and MRI results.    Onset of symptoms/DOI was 2 years.    Symptoms are aggravated by activity.    Symptoms are alleviated by rest.    Symptoms consist of pain and numbness/tingling.    The patient rates their pain as 4/10    Attempted treatment(s) and/or interventions include activity modifications, rest, immobilization.     The patient denies any fevers, chills, N/V, D/C and presents for evaluation.       Past Medical History:   Diagnosis Date    Fibromyalgia     Obesity (BMI 30.0-34.9)     PCOS (polycystic ovarian syndrome)     Vitamin D deficiency      Past Surgical History:   Procedure Laterality Date    none       Review of patient's allergies indicates:   Allergen Reactions    Kiwi (actinidia chinensis) Itching and Nausea Only     Social  History     Social History Narrative    Single. Currently in NP school     Family History   Problem Relation Age of Onset    Hyperlipidemia Mother     Arthritis Mother     Hyperlipidemia Father     Heart attacks under age 50 Father     Diabetes type II Father     Diabetes Father     Hypertension Father     Hypothyroidism Sister     Eczema Neg Hx     Lupus Neg Hx     Psoriasis Neg Hx     Melanoma Neg Hx     Breast cancer Neg Hx     Ovarian cancer Neg Hx     Colon cancer Neg Hx          Current Outpatient Medications:     ARAZLO 0.045 % Lotn, Apply 1 application topically every evening., Disp: , Rfl:     buPROPion (WELLBUTRIN XL) 300 MG 24 hr tablet, Take 1 tablet (300 mg total) by mouth once daily., Disp: 90 tablet, Rfl: 3    celecoxib (CELEBREX) 200 MG capsule, Take 1 capsule (200 mg total) by mouth once daily., Disp: 30 capsule, Rfl: 0    cetirizine (ZYRTEC) 10 MG tablet, Take 1 tablet by mouth once daily., Disp: , Rfl:     metFORMIN (GLUCOPHAGE-XR) 500 MG ER 24hr tablet, Take 1 tablet (500 mg total) by mouth daily with breakfast., Disp: 90 tablet, Rfl: 3    multivitamin capsule, Take 1 capsule by mouth once daily., Disp: , Rfl:     norgestimate-ethinyl estradioL (ORTHO-CYCLEN) 0.25-35 mg-mcg per tablet, Take 1 tablet by mouth once daily., Disp: , Rfl:       Review of Systems:  Constitutional: no fever or chills  Eyes: no visual changes  ENT: no nasal congestion or sore throat  Respiratory: no cough or shortness of breath  Cardiovascular: no chest pain  Gastrointestinal: no nausea or vomiting, tolerating diet  Musculoskeletal: pain and soreness    OBJECTIVE:      Vital Signs (Most Recent):  There were no vitals filed for this visit.  There is no height or weight on file to calculate BMI.      Physical Exam:  Constitutional: The patient appears well-developed and well-nourished. No distress.   Skin: No lesions appreciated  Head: Normocephalic and atraumatic.   Nose: Nose normal.   Ears: No deformities seen  Eyes:  Conjunctivae and EOM are normal.   Neck: No tracheal deviation present.   Cardiovascular: Normal rate and intact distal pulses.    Pulmonary/Chest: Effort normal. No respiratory distress.   Abdominal: There is no guarding.   Neurological: The patient is alert.   Psychiatric: The patient has a normal mood and affect.     Right Hand/Wrist Examination:    Observation/Inspection:  Swelling  none    Deformity  none  Discoloration  none     Scars   none    Atrophy  None  DRUJ stable  Pain in the ulnar fovea/TFCC  Pain over the course of the ECU    HAND/WRIST EXAMINATION:  Finkelstein's Test   Neg  WHAT Test    Neg  Snuff box tenderness   Neg  Schuler's Test    Neg  Hook of Hamate Tenderness  Neg  CMC grind    Neg  Circumduction test   Neg      Neurovascular Exam:  Digits WWP, brisk CR < 3s throughout  NVI motor/LTS to M/R/U nerves, radial pulse 2+  Tinel's Test - Carpal Tunnel  Neg  Tinel's Test - Cubital Tunnel  Neg  Phalen's Test    Pos  Median Nerve Compression Test Pos    ROM hand full, painless    ROM wrist full, with some pain    ROM elbow full, painless    Abdomen not guarded  Respirations nonlabored  Perfusion intact    Diagnostic Results:     MRI right wrist -   Probable small (5-6 mm) ganglion cysts along the volar aspect of the radioscaphoid articulation, as above.  Central TFCC tear.    Imaging - I independently viewed the patient's imaging as well as the radiology report.      FINDINGS:  Three views right wrist.     No acute displaced fracture or dislocation of the wrist.  No radiopaque foreign body.  No significant edema.     Impression:     1. No acute displaced fracture or dislocation of the right wrist.    EMG - none    ASSESSMENT/PLAN:      28 y.o. yo female with central right TFCC tear       Plan: The patient and I had a thorough discussion today.  We discussed the working diagnosis as well as several other potential alternative diagnoses.  Treatment options were discussed, both conservative and  surgical.  Conservative treatment options would include things such as activity modifications, workplace modifications, a period of rest, oral vs topical OTC and prescription anti-inflammatory medications, occupational therapy, splinting/bracing, immobilization, corticosteroid injections, and others.  Surgical options were discussed as well.     At this time, the patient would like to attempt a course of a corticosteroid injection as well as a course of occupational therapy and activity modifications.  Recommendation made to limit high impact activities such as boxing.  Follow-up in 2-3 months depending on symptoms on an as needed/if needed basis.        Should the patient's symptoms worsen, persist, or fail to improve they should return for reevaluation and I would be happy to see them back anytime.        Please do not hesitate to reach out to us via email, phone, or MyChart with any questions, concerns, or feedback.

## 2023-10-07 DIAGNOSIS — M22.42 CHONDROMALACIA OF PATELLOFEMORAL JOINT, LEFT: ICD-10-CM

## 2023-10-09 RX ORDER — CELECOXIB 200 MG/1
200 CAPSULE ORAL
Qty: 30 CAPSULE | Refills: 0 | Status: SHIPPED | OUTPATIENT
Start: 2023-10-09 | End: 2023-11-07

## 2023-10-10 ENCOUNTER — CLINICAL SUPPORT (OUTPATIENT)
Dept: REHABILITATION | Facility: HOSPITAL | Age: 28
End: 2023-10-10
Payer: COMMERCIAL

## 2023-10-10 ENCOUNTER — PATIENT MESSAGE (OUTPATIENT)
Dept: ORTHOPEDICS | Facility: CLINIC | Age: 28
End: 2023-10-10
Payer: COMMERCIAL

## 2023-10-10 DIAGNOSIS — M25.562 CHRONIC PAIN OF LEFT KNEE: Primary | ICD-10-CM

## 2023-10-10 DIAGNOSIS — G89.29 CHRONIC PAIN OF LEFT KNEE: Primary | ICD-10-CM

## 2023-10-10 PROCEDURE — 97110 THERAPEUTIC EXERCISES: CPT

## 2023-10-10 PROCEDURE — 97530 THERAPEUTIC ACTIVITIES: CPT

## 2023-10-10 NOTE — PROGRESS NOTES
[facility]   Physical Therapy Treatment Note     Name: Erlinda Rain  Clinic Number: [id number]    Therapy Diagnosis:   Encounter Diagnosis   Name Primary?    Chronic pain of left knee Yes     Physician: Dandre Rose MD    Visit Date: 10/10/2023      [Physician Orders: PT Eval and Treat   Medical Diagnosis from Referral: M22.42 (ICD-10-CM) - Chondromalacia of patellofemoral joint, left  Evaluation Date: 6/1/2023  Authorization Period Expiration: 12/31/23  Plan of Care Expiration: 10/10/23  Visit # / Visits authorized: 15/ 20     Time In: 200 pm  Time Out: 235 pm   Total Billable Time:  35 minutes      Precautions: Standard      SUBJECTIVE     Pt reports: feeling some relief from knee injection.     She was compliant with home exercise program.  Response to previous treatment: none  Functional change: none    Pain: 0/10  Location: left knee      OBJECTIVE    Strength: 10/10/23  Hip Left Right   Flexion 5/5 5/5   Abduction 4+/5 5/5   Extension 5/5 5/5   External Rot 5/5 5/5      ROM: B 5-0-110 degrees    Treatment     Erlinda received the treatments listed below:      Therapeutic exercises to develop strength, endurance, and core stabilization for 25 minutes including:  PT reassessment  FOTO-  pt education and HEP review-    Therapeutic activities to improve functional performance for 10  minutes, including:  Elliptical x10 min       Patient Education and Home Exercises     Home Exercises Provided and Patient Education Provided     Education provided:   - Cont HEP    Written Home Exercises Provided: Patient instructed to cont prior HEP. Exercises were reviewed and Erlinda was able to demonstrate them prior to the end of the session.  Erlinda demonstrated good  understanding of the education provided. See EMR under Patient Instructions for exercises provided during therapy sessions    ASSESSMENT     Upon reassessment, pt has shown improvements in all LE strengthening. She has attempted running,  but stopped due to getting sick and having some R knee pain. Pt demonstrated good understanding of updated HEP to maintain functional gains. Pt will being starting OT for wrist, so plan to hold off on PT unless knee flare up occurs. Pt encouraged to call/email in the meantime with any additional questions regarding care.      Erlinda Is progressing well towards her goals.     Pt prognosis is Good.     Pt will continue to benefit from skilled outpatient physical therapy to address the deficits listed in the problem list box on initial evaluation, provide pt/family education and to maximize pt's level of independence in the home and community environment.     Pt's spiritual, cultural and educational needs considered and pt agreeable to plan of care and goals.     Anticipated barriers to physical therapy: none    Goals:   Short Term Goals: 4 weeks   - Pt will increase strength to 4/5 B hip abd and ext (met)  - Decrease Pain to 3/10 as worst with all PT interventions - met  - Pt to self correct posture with minimal cues- met  - Pt independent with HEP with progressions. - met     Long Term Goals (8 Weeks): (Progressing, not met)  - Pt will return to boxing and weightlifting classes painfree  - Pt will increase strength to 5/5 BLE in all planes of hips and knees  - Decrease Pain to 1/10 as worst with ascending and descending 1 flight of stairs  - Pt to return to 90% PLOF    PLAN     Discontinue PT unless flare up in L knee over the next month while doing OT for wrist.    Luanne Rodriguez, PT

## 2023-10-11 ENCOUNTER — PATIENT MESSAGE (OUTPATIENT)
Dept: ORTHOPEDICS | Facility: CLINIC | Age: 28
End: 2023-10-11
Payer: COMMERCIAL

## 2023-10-18 ENCOUNTER — PATIENT MESSAGE (OUTPATIENT)
Dept: ORTHOPEDICS | Facility: CLINIC | Age: 28
End: 2023-10-18
Payer: COMMERCIAL

## 2023-10-19 ENCOUNTER — CLINICAL SUPPORT (OUTPATIENT)
Dept: REHABILITATION | Facility: HOSPITAL | Age: 28
End: 2023-10-19
Attending: ORTHOPAEDIC SURGERY
Payer: COMMERCIAL

## 2023-10-19 DIAGNOSIS — M24.131 DEGENERATIVE TFCC TEAR, RIGHT: ICD-10-CM

## 2023-10-19 DIAGNOSIS — R29.898 DECREASED GRIP STRENGTH OF RIGHT HAND: ICD-10-CM

## 2023-10-19 PROCEDURE — 97165 OT EVAL LOW COMPLEX 30 MIN: CPT

## 2023-10-20 ENCOUNTER — PATIENT MESSAGE (OUTPATIENT)
Dept: PRIMARY CARE CLINIC | Facility: CLINIC | Age: 28
End: 2023-10-20
Payer: COMMERCIAL

## 2023-10-25 ENCOUNTER — PATIENT MESSAGE (OUTPATIENT)
Dept: REHABILITATION | Facility: HOSPITAL | Age: 28
End: 2023-10-25
Payer: COMMERCIAL

## 2023-10-27 ENCOUNTER — CLINICAL SUPPORT (OUTPATIENT)
Dept: REHABILITATION | Facility: HOSPITAL | Age: 28
End: 2023-10-27
Payer: COMMERCIAL

## 2023-10-27 DIAGNOSIS — R29.898 DECREASED GRIP STRENGTH OF RIGHT HAND: Primary | ICD-10-CM

## 2023-10-27 PROCEDURE — 97140 MANUAL THERAPY 1/> REGIONS: CPT

## 2023-10-27 PROCEDURE — 97035 APP MDLTY 1+ULTRASOUND EA 15: CPT

## 2023-10-27 PROCEDURE — 97110 THERAPEUTIC EXERCISES: CPT

## 2023-10-27 PROCEDURE — 97022 WHIRLPOOL THERAPY: CPT

## 2023-10-27 NOTE — PROGRESS NOTES
"  [facility]  Occupational Therapy Treatment Note     Date: 10/27/2023  Name: Erlinda Rain  Clinic Number: [id number]    Therapy Diagnosis:   Encounter Diagnosis   Name Primary?    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   Response to previous treatment:first tx  Functional change: none noted to this date     Pain: 3/10  Location: right wrists      Objective     Objective Measures updated at progress report unless specified.  Observation/Appearance:  Skin intact          Edema. Measured in centimeters.    10/19/2023 10/19/2023     Right  Left    Proximal Wrist Crease 15.8 cm  16.0 cm         Hand ROM. Measured in degrees.    10/19/2023 10/19/2023     Right  Left      Able to make full composite fist              Thumb: MP 0/52 0/53                IP 0/58 0/71       Rad ADD/ABD WNL WNL       Pal ADD/ABD WNL WNL           Opposition DPC DPC         Elbow and Wrist ROM. Measured in degrees.    11/1/2023 11/1/2023     Left Right   Supination/Pronation 61/ WNL  61/WNL   Wrist Ext/Flex 70/55 70/59   Wrist RD/UD 24/36 15/29            Sensation  NT Distribution 10/19/2023 10/19/2023     Right  Left    [place] Magali       Normal 1.65-2.83       Diminished Light Touch 3.22-3.61       Diminished Protective 3.84-4.31       Loss of Protective 4.56-6.65       Untestable >6.65       2 Point Discrimination       Static       Dynamic     "         Special Tests:   Right    10/19/2023   Thumb CMC Grind Test     Finkelstein's Test     Phalen's Test     Tinel's Test     Louis's Test     Extrinsic Tightness Test     Intrinsic Tightness Test     ORL Test     Froment's Sign     Lnida's Sign      Egawa Sign      Clamp Sign      Scaphoid Thrust Test     Linscheid's Test     Metacarpal Stress Test     Piano Key Test     ECU Synergy Test Pos    Ulnar Compression Test     TFCC Load Test     Ulnocarpal Stress Test pos   Midcarpal Shift Test     Pisiform Boost Test pos             Strength (Dyanmometer) and Pinch Strength (Pinch Gauge)  Measured in pounds and psi. Average of three trials.       10/19/2023 10/19/2023     Right  Left    Rung II       Pronation 18 25   Neutral  24 27   Supination  30 19.5   Key Pinch 9.5 10.5    3pt Pinch 7.5 7   2pt Pinch 5 3            Intake Outcome Measure for FOTO initial eval; wrist  Survey     Therapist reviewed FOTO scores for Erlinda Rain on 10/19/2023.   FOTO report - see Media section or FOTO account episode details.     Intake Score: 17.5%       Treatment     Erlinda received the treatments listed below:      therapeutic exercises to develop strength and endurance for 15 minutes including:  - forearm in supination, isometric gripping with RED flexbar x 10 reps each way (2 sets)   - forearm in supination, isospheres (2 min)   - ECU stretch holding 30s x 3 reps   - forearm in supination, light squeezing blue sponge x 10 reps       manual therapy techniques: Soft tissue Mobilization were applied to the: R for 10 minutes, including:  ECU STM hawk's        direct contact modalities after being cleared for contraindications: Ultrasound:  Erlinda received ultrasound to manage pain and inflammation at 100 % duty cycle applied to the 1.1 at an intensity of  number entry box W/cm2  for a duration of 8 minutes. Patient tolerated treatment well without adverse effects. Therapist was in attendance throughout  intervention.    supervised modalities after being cleared for contradictions: Fluido Fluidotherapy: To R wrist  for 10 min, continuous air, 115 deg, air speed 50 to decrease pain, edema & scar tissue, sensory re- education, and increased tissue extensibility prior to therex            Patient Education and Home Exercises     Education provided:   - cont to avoid painful activities   - Progress towards goals     Written Home Exercises Provided: Patient instructed to cont prior HEP.  Exercises were reviewed and Erlinda was able to demonstrate them prior to the end of the session.  Erlinda demonstrated good  understanding of the home exercise program provided. See electronic medical record under Patient Instructions for exercises provided during therapy sessions.       Assessment     Good hollis with isometrics      Erlinda is progressing well towards her goals and there are no updates to goals at this time. Pt prognosis is Fair.     Patient will continue to benefit from skilled outpatient occupational therapy to address the deficits listed in the problem list on initial evaluation provide patient/family education and to maximize patient's level of independence in the home and community environment.     Patient's spiritual, cultural and educational needs considered and patient agreeable to plan of care and goals.    Anticipated barriers to occupational therapy:     Goals:  Long Term Goals (LTGs); to be met by discharge.  Pt will demo improved  strength by 5 pounds   Pt will report improved FOTO score by 10 points   Pt will report pain level no greater than 1/10 during functional daily work and community activities   Pt will demo improved pinch strength by 3 pounds      Short Term Goals (STGs); to be met within 4 weeks ().  Pt will demo improved  strength by 2-3 pounds   Pt will improve RD/UD ROM by 3-5 degrees   Pt will report pain level no greater than 3/10 during light activity   Pt will demo improved pinch strength  by 2 pounds     Plan     Updates/Grading for next session:     Cira Fernandes, OT   10/27/2023

## 2023-10-30 ENCOUNTER — PATIENT MESSAGE (OUTPATIENT)
Dept: PRIMARY CARE CLINIC | Facility: CLINIC | Age: 28
End: 2023-10-30
Payer: COMMERCIAL

## 2023-10-30 ENCOUNTER — CLINICAL SUPPORT (OUTPATIENT)
Dept: REHABILITATION | Facility: HOSPITAL | Age: 28
End: 2023-10-30
Payer: COMMERCIAL

## 2023-10-30 DIAGNOSIS — R29.898 DECREASED GRIP STRENGTH OF RIGHT HAND: Primary | ICD-10-CM

## 2023-10-30 PROCEDURE — 97110 THERAPEUTIC EXERCISES: CPT

## 2023-10-30 PROCEDURE — 97022 WHIRLPOOL THERAPY: CPT

## 2023-10-30 PROCEDURE — 97035 APP MDLTY 1+ULTRASOUND EA 15: CPT

## 2023-10-30 PROCEDURE — 97140 MANUAL THERAPY 1/> REGIONS: CPT

## 2023-10-30 NOTE — PROGRESS NOTES
"  [facility]  Occupational Therapy Treatment Note     Date: 10/30/2023  Name: Erlinda Rain  Clinic Number: [id number]    Therapy Diagnosis:   Encounter Diagnosis   Name Primary?    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   Response to previous treatment:first tx  Functional change: none noted to this date     Pain: 3/10  Location: right wrists      Objective     Objective Measures updated at progress report unless specified.  Observation/Appearance:  Skin intact          Edema. Measured in centimeters.    10/19/2023 10/19/2023     Right  Left    Proximal Wrist Crease 15.8 cm  16.0 cm         Hand ROM. Measured in degrees.    10/19/2023 10/19/2023     Right  Left      Able to make full composite fist              Thumb: MP 0/52 0/53                IP 0/58 0/71       Rad ADD/ABD WNL WNL       Pal ADD/ABD WNL WNL           Opposition DPC DPC         Elbow and Wrist ROM. Measured in degrees.    11/1/2023 11/1/2023     Left Right   Supination/Pronation 61/ WNL  61/WNL   Wrist Ext/Flex 70/55 70/59   Wrist RD/UD 24/36 15/29            Sensation  NT Distribution 10/19/2023 10/19/2023     Right  Left    [place] Magali       Normal 1.65-2.83       Diminished Light Touch 3.22-3.61       Diminished Protective 3.84-4.31       Loss of Protective 4.56-6.65       Untestable >6.65       2 Point Discrimination       Static       Dynamic  "            Special Tests:   Right    10/19/2023   Thumb CMC Grind Test     Finkelstein's Test     Phalen's Test     Tinel's Test     Louis's Test     Extrinsic Tightness Test     Intrinsic Tightness Test     ORL Test     Froment's Sign     Linda's Sign      [place] Sign      Clamp Sign      Scaphoid Thrust Test     Linscheid's Test     Metacarpal Stress Test     Piano Key Test     ECU Synergy Test Pos    Ulnar Compression Test     TFCC Load Test     Ulnocarpal Stress Test pos   Midcarpal Shift Test     Pisiform Boost Test pos             Strength (Dyanmometer) and Pinch Strength (Pinch Gauge)  Measured in pounds and psi. Average of three trials.       10/19/2023 10/19/2023     Right  Left    Rung II       Pronation 18 25   Neutral  24 27   Supination  30 19.5   Key Pinch 9.5 10.5    3pt Pinch 7.5 7   2pt Pinch 5 3            Intake Outcome Measure for FOTO initial eval; wrist  Survey     Therapist reviewed FOTO scores for Erlinda Rain on 10/19/2023.   FOTO report - see Media section or FOTO account episode details.     Intake Score: 17.5%       Treatment     Erlinda received the treatments listed below:      therapeutic exercises to develop strength and endurance for 15 minutes including:  - forearm in supination, isometric gripping with RED flexbar x 10 reps each way (2 sets)   - forearm in supination, isospheres (2 min)   - ECU stretch holding 30s x 3 reps   - forearm in supination, light squeezing blue sponge x 10 reps       manual therapy techniques: Soft tissue Mobilization were applied to the: R for 10 minutes, including:  ECU STM hawk's        direct contact modalities after being cleared for contraindications: Ultrasound:  Erlinda received ultrasound to manage pain and inflammation at 100 % duty cycle applied to the 1.1 at an intensity of  number entry box W/cm2  for a duration of 8 minutes. Patient tolerated treatment well without adverse effects. Therapist was in attendance throughout  intervention.    supervised modalities after being cleared for contradictions: Fluido Fluidotherapy: To R wrist  for 10 min, continuous air, 115 deg, air speed 50 to decrease pain, edema & scar tissue, sensory re- education, and increased tissue extensibility prior to therex            Patient Education and Home Exercises     Education provided:   - cont to avoid painful activities   - Progress towards goals     Written Home Exercises Provided: Patient instructed to cont prior HEP.  Exercises were reviewed and Erlinda was able to demonstrate them prior to the end of the session.  Erlinda demonstrated good  understanding of the home exercise program provided. See electronic medical record under Patient Instructions for exercises provided during therapy sessions.       Assessment     Good hollis with isometrics. Cont to report decreased pain with R wrist     Erlinda is progressing well towards her goals and there are no updates to goals at this time. Pt prognosis is Fair.     Patient will continue to benefit from skilled outpatient occupational therapy to address the deficits listed in the problem list on initial evaluation provide patient/family education and to maximize patient's level of independence in the home and community environment.     Patient's spiritual, cultural and educational needs considered and patient agreeable to plan of care and goals.    Anticipated barriers to occupational therapy:     Goals:  Long Term Goals (LTGs); to be met by discharge.  Pt will demo improved  strength by 5 pounds   Pt will report improved FOTO score by 10 points   Pt will report pain level no greater than 1/10 during functional daily work and community activities   Pt will demo improved pinch strength by 3 pounds      Short Term Goals (STGs); to be met within 4 weeks ().  Pt will demo improved  strength by 2-3 pounds   Pt will improve RD/UD ROM by 3-5 degrees   Pt will report pain level no greater than 3/10 during light  activity   Pt will demo improved pinch strength by 2 pounds     Plan     Updates/Grading for next session:     Cira Fernandes, MICHAEL   10/30/2023   49

## 2023-11-01 PROBLEM — R29.898 DECREASED GRIP STRENGTH OF RIGHT HAND: Status: ACTIVE | Noted: 2023-11-01

## 2023-11-01 NOTE — PLAN OF CARE
[facility] OUTPATIENT THERAPY AND WELLNESS  Occupational Therapy Initial Evaluation     Name: Erlinda Rain  Clinic Number: [id number]    Therapy Diagnosis:   Encounter Diagnoses   Name Primary?    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 Past year she has not been able to weight bear on her R wrist. . 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  steroid injection 2 weeks ago, 9/28/23. Denies popping     Falls: 0    Involved Side: Right  Dominant Side: Right      Imaging: MRI studies   EXAMINATION:  MRI WRIST WITHOUT CONTRAST RIGHT     CLINICAL HISTORY:  Wrist pain, persistent, neg xray;dorsal wrist pain, chronic, concern for ganglion. please eval;  Pain in right wrist     TECHNIQUE:  Routine multisequence multiplanar MRI right wrist protocol performed without IV contrast.     COMPARISON:  None     FINDINGS:  Joints: No cartilage defects or subchondral marrow changes.  No joint effusion.     There are two small cystic areas along the volar aspect of the radioscaphoid articulation measuring 0.6 cm (series 8, image 10) and 0.5 cm (series 8, image 9), probable ganglion cyst.  No surrounding inflammatory changes.     Bones: The scaphoid, hook of the hamate, tubercle of the trapezium, and pisiform are intact.  No fractures.  No marrow replacement process.     Tendons: The flexor and  extensor tendons demonstrate normal size and signal.  No tenosynovitis.     The visualized portion of the median ulnar nerves are unremarkable.     Ligaments.  There is increased signal in the dorsal aspect of the scapholunate, possibly chronic.  No full-thickness tear/displacement identified.  The lunate triquetral ligament is intact.  The extrinsic volar radio lunate and radio scaphoid ligaments are intact.     There is a central TFCC tear (series 6, image 9).     The muscle signal is within normal limits.  No atrophy or strain.       Impression:  Probable small (5-6 mm) ganglion cysts along the volar aspect of the radioscaphoid articulation, as above.       Central TFCC tear.  Electronically signed by: Shaun Padilla MD  Date:                                            09/14/2023    Prior Therapy: No     Pain:  Functional Pain Scale Rating 0-10:   5/10 on average  1/10 at best  8/10 at worst  Location: Dorsal Ulnar aspect - ulnar styloid radiates proximally   Description: Aching, Throbbing, Tingling, and Sharp  Aggravating Factors: Night Time, Extension, and weight bearing   Easing Factors: wearing wrist widget       Occupation:  ER Nurse   Working presently: employed  Duties:     Functional Limitations/Social History:    Previous functional status includes: Independent with all ADLs.     Current Functional Status   Home/Living environment: lives alone          Limitation of Functional Status as follows:   ADLs/IADLs:     - Feeding: I    - Bathing: I    - Dressing/Grooming: I    - Home Management: pain after prolonged scrubbing     - Driving: pain when using just R      Leisure: boxing, reading, TV, pottery     Patient's Goals for Therapy:     Past Medical History/Physical Systems Review:   Erlinda Rain  has a past medical history of Fibromyalgia, Obesity (BMI 30.0-34.9), PCOS (polycystic ovarian syndrome), and Vitamin D deficiency.    Erlinda Rain  has a past surgical history that includes none.    Erlinda has a  current medication list which includes the following prescription(s): arazlo, bupropion, celecoxib, cetirizine, metformin, multivitamin, and norgestimate-ethinyl estradiol.    Review of patient's allergies indicates:   Allergen Reactions    Kiwi (actinidia chinensis) Itching and Nausea Only        Objective     Observation/Appearance:  Skin intact        Edema. Measured in centimeters.   10/19/2023 10/19/2023    Right  Left    Proximal Wrist Crease 15.8 cm  16.0 cm       Hand ROM. Measured in degrees.   10/19/2023 10/19/2023    Right  Left     Able to make full composite fist          Thumb: MP 0/52 0/53                IP 0/58 0/71       Rad ADD/ABD WNL WNL       Pal ADD/ABD WNL WNL           Opposition DPC DPC       Elbow and Wrist ROM. Measured in degrees.   11/1/2023 11/1/2023    Left Right   Supination/Pronation 61/ WNL  61/WNL   Wrist Ext/Flex 70/55 70/59   Wrist RD/UD 24/36 15/29         Sensation  NT Distribution 10/19/2023 10/19/2023    Right  Left    [place] Magali     Normal 1.65-2.83     Diminished Light Touch 3.22-3.61     Diminished Protective 3.84-4.31     Loss of Protective 4.56-6.65     Untestable >6.65     2 Point Discrimination     Static     Dynamic         Special Tests:   Right   10/19/2023   Thumb CMC Grind Test    Finkelstein's Test    Phalen's Test    Tinel's Test    Louis's Test    Extrinsic Tightness Test    Intrinsic Tightness Test    ORL Test    Froment's Sign    Linda's Sign     [place] Sign     Clamp Sign     Scaphoid Thrust Test    Linscheid's Test    Metacarpal Stress Test    Piano Key Test    ECU Synergy Test Pos    Ulnar Compression Test    TFCC Load Test    Ulnocarpal Stress Test pos   Midcarpal Shift Test    Pisiform Boost Test pos          Strength (Dyanmometer) and Pinch Strength (Pinch Gauge)  Measured in pounds and psi. Average of three trials.     10/19/2023 10/19/2023    Right  Left    Rung II     Pronation 18 25   Neutral  24 27   Supination  30 19.5   [place] Pinch 9.5  10.5    3pt Pinch 7.5 7   2pt Pinch 5 3         Intake Outcome Measure for FOTO initial eval; wrist  Survey    Therapist reviewed FOTO scores for Erlinda Rain on 10/19/2023.   FOTO report - see Media section or FOTO account episode details.    Intake Score: 17.5%         Treatment     Total Treatment time separate from Evaluation time:15    Erlinda received the treatments listed below:      Therapeutic activities  for 5 minutes including:  Activities to avoid        hot pack for 10 minutes to R wrist .        Patient Education and Home Exercises      Education provided:   -role of OT, goals for OT, scheduling/cancellations, insurance limitations with patient.  -Additional Education provided:     Written Home Exercises Provided: yes.  Exercises were reviewed and Erlinda was able to demonstrate them prior to the end of the session.    Erlinda demonstrated good  understanding of the education provided.     Pt was advised to perform these exercises free of pain, and to stop performing them if pain occurs.    See EMR under Patient Instructions for exercises provided 10/19/2023.    Assessment     Erlinda Rain is a 28 y.o. female referred to outpatient occupational therapy and presents with a medical diagnosis of degenerative tear R wrist pain.    Following medical record review it is determined that pt will benefit from occupational therapy services in order to maximize pain free and/or functional use of right wrist. The following goals were discussed with the patient and patient is in agreement with them as to be addressed in the treatment plan. The patient's rehab potential is Good.     Anticipated barriers to occupational therapy:     Plan of care discussed with patient: Yes  Patient's spiritual, cultural and educational needs considered and patient is agreeable to the plan of care and goals as stated below:     Medical Necessity is demonstrated by the following  Occupational Profile/History  Co-morbidities and personal  factors that may impact the plan of care [x] LOW: Brief chart review  [x] MODERATE: Expanded chart review   [] HIGH: Extensive chart review    Moderate / High Support Documentation:      Examination  Performance deficits relating to physical, cognitive or psychosocial skills that result in activity limitations and/or participation restrictions  [x] LOW: addressing 1-3 Performance deficits  [] MODERATE: 3-5 Performance deficits  [] HIGH: 5+ Performance deficits (please support below)    Moderate / High Support Documentation:    Physical:  Joint Stability  Edema   Strength  Pinch Strength  Pain    Cognitive:  No Deficits    Psychosocial:    Habits  Routines  Rituals     Treatment Options [x] LOW: Limited options  [] MODERATE: Several options  [] HIGH: Multiple options      Decision Making/ Complexity Score: low       The following goals were discussed with the patient and patient is in agreement with them as to be addressed in the treatment plan.     Goals:   Long Term Goals (LTGs); to be met by discharge.  Pt will demo improved  strength by 5 pounds   Pt will report improved FOTO score by 10 points   Pt will report pain level no greater than 1/10 during functional daily work and community activities   Pt will demo improved pinch strength by 3 pounds     Short Term Goals (STGs); to be met within 4 weeks ().  Pt will demo improved  strength by 2-3 pounds   Pt will improve RD/UD ROM by 3-5 degrees   Pt will report pain level no greater than 3/10 during light activity   Pt will demo improved pinch strength by 2 pounds         Plan   Certification Period/Plan of care expiration: 10/19/2023 to 12/15/2023.    Outpatient Occupational Therapy 2 times weekly for 8 weeks to include the following interventions: Paraffin, Fluidotherapy, Manual therapy/joint mobilizations, Modalities for pain management, US 3 mhz, Therapeutic exercises/activities., Iontophoresis with 2.0 cc Dexamethasone, Strengthening, Orthotic  Fabrication/Fit/Training, Edema Control, Joint Protection, and Energy Conservation.    Cira Fernandes OT        Physician's Signature: _________________________________________ Date: ________________

## 2023-11-02 ENCOUNTER — LAB VISIT (OUTPATIENT)
Dept: LAB | Facility: OTHER | Age: 28
End: 2023-11-02
Attending: FAMILY MEDICINE
Payer: COMMERCIAL

## 2023-11-02 DIAGNOSIS — L68.0 HIRSUTISM: ICD-10-CM

## 2023-11-02 DIAGNOSIS — N91.5 OLIGOMENORRHEA, UNSPECIFIED TYPE: ICD-10-CM

## 2023-11-02 LAB
FSH SERPL-ACNC: 4.75 MIU/ML
LH SERPL-ACNC: 4.4 MIU/ML
PROLACTIN SERPL IA-MCNC: 9.9 NG/ML (ref 5.2–26.5)
TESTOST SERPL-MCNC: 20 NG/DL (ref 5–73)

## 2023-11-02 PROCEDURE — 84146 ASSAY OF PROLACTIN: CPT | Performed by: FAMILY MEDICINE

## 2023-11-02 PROCEDURE — 83001 ASSAY OF GONADOTROPIN (FSH): CPT | Performed by: FAMILY MEDICINE

## 2023-11-02 PROCEDURE — 83498 ASY HYDROXYPROGESTERONE 17-D: CPT | Performed by: FAMILY MEDICINE

## 2023-11-02 PROCEDURE — 83002 ASSAY OF GONADOTROPIN (LH): CPT | Performed by: FAMILY MEDICINE

## 2023-11-02 PROCEDURE — 84403 ASSAY OF TOTAL TESTOSTERONE: CPT | Performed by: FAMILY MEDICINE

## 2023-11-02 PROCEDURE — 82627 DEHYDROEPIANDROSTERONE: CPT | Performed by: FAMILY MEDICINE

## 2023-11-03 LAB — DHEA-S SERPL-MCNC: 160.2 UG/DL (ref 95.8–511.7)

## 2023-11-06 LAB — 17OHP SERPL-MCNC: 34 NG/DL (ref 35–413)

## 2023-11-07 ENCOUNTER — PATIENT MESSAGE (OUTPATIENT)
Dept: OBSTETRICS AND GYNECOLOGY | Facility: CLINIC | Age: 28
End: 2023-11-07
Payer: COMMERCIAL

## 2023-11-07 DIAGNOSIS — M22.42 CHONDROMALACIA OF PATELLOFEMORAL JOINT, LEFT: ICD-10-CM

## 2023-11-07 RX ORDER — CELECOXIB 200 MG/1
200 CAPSULE ORAL
Qty: 30 CAPSULE | Refills: 0 | Status: SHIPPED | OUTPATIENT
Start: 2023-11-07 | End: 2023-12-14

## 2023-11-10 ENCOUNTER — CLINICAL SUPPORT (OUTPATIENT)
Dept: REHABILITATION | Facility: HOSPITAL | Age: 28
End: 2023-11-10
Payer: COMMERCIAL

## 2023-11-10 DIAGNOSIS — R29.898 DECREASED GRIP STRENGTH OF RIGHT HAND: Primary | ICD-10-CM

## 2023-11-10 PROCEDURE — 97140 MANUAL THERAPY 1/> REGIONS: CPT

## 2023-11-10 PROCEDURE — 97033 APP MDLTY 1+IONTPHRSIS EA 15: CPT

## 2023-11-10 PROCEDURE — 97110 THERAPEUTIC EXERCISES: CPT

## 2023-11-13 ENCOUNTER — CLINICAL SUPPORT (OUTPATIENT)
Dept: REHABILITATION | Facility: HOSPITAL | Age: 28
End: 2023-11-13
Payer: COMMERCIAL

## 2023-11-13 ENCOUNTER — PATIENT MESSAGE (OUTPATIENT)
Dept: PRIMARY CARE CLINIC | Facility: CLINIC | Age: 28
End: 2023-11-13
Payer: COMMERCIAL

## 2023-11-13 DIAGNOSIS — R29.898 DECREASED GRIP STRENGTH OF RIGHT HAND: Primary | ICD-10-CM

## 2023-11-13 PROCEDURE — 97110 THERAPEUTIC EXERCISES: CPT

## 2023-11-13 PROCEDURE — 97022 WHIRLPOOL THERAPY: CPT

## 2023-11-13 PROCEDURE — 97140 MANUAL THERAPY 1/> REGIONS: CPT

## 2023-11-13 PROCEDURE — 97033 APP MDLTY 1+IONTPHRSIS EA 15: CPT

## 2023-11-13 NOTE — PROGRESS NOTES
[facility]  Occupational Therapy Treatment Note     Date: 11/13/2023  Name: Erlinda Rain  Clinic Number: [id number]    Therapy Diagnosis:   Encounter Diagnosis   Name Primary?    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   Response to previous treatment:first tx  Functional change: none noted to this date     Pain: 3/10  Location: right wrists      Objective     Objective Measures updated at progress report unless specified.  Observation/Appearance:  Skin intact          Edema. Measured in centimeters.    10/19/2023 10/19/2023     Right  Left    Proximal Wrist Crease 15.8 cm  16.0 cm         Hand ROM. Measured in degrees.    10/19/2023 10/19/2023     Right  Left      Able to make full composite fist              Thumb: MP 0/52 0/53                IP 0/58 0/71       Rad ADD/ABD WNL WNL       Pal ADD/ABD WNL WNL           Opposition DPC DPC         Elbow and Wrist ROM. Measured in degrees.    11/1/2023 11/1/2023     Left Right   Supination/Pronation 61/ WNL  61/WNL   Wrist Ext/Flex 70/55 70/59   Wrist RD/UD 24/36 15/29            Sensation  NT Distribution 10/19/2023 10/19/2023     Right  Left    [place] Magali       Normal 1.65-2.83       Diminished Light Touch 3.22-3.61       Diminished Protective 3.84-4.31       Loss of Protective 4.56-6.65       Untestable >6.65       2 Point Discrimination       Static       Dynamic              Special Tests:   Right    10/19/2023   Thumb CMC Grind Test     Finkelstein's Test     Phalen's Test     Tinel's Test     Louis's Test     Extrinsic Tightness Test     Intrinsic Tightness Test     ORL Test     Froment's Sign     Linda's Sign      Egawa Sign      Clamp Sign      Scaphoid Thrust Test     Linscheid's Test     Metacarpal Stress Test     Piano Key Test     ECU Synergy Test Pos    Ulnar Compression Test     TFCC Load Test     Ulnocarpal Stress Test pos   Midcarpal Shift Test     Pisiform Boost Test pos             Strength (Dyanmometer) and Pinch Strength (Pinch Gauge)  Measured in pounds and psi. Average of three trials.       10/19/2023 10/19/2023     Right  Left    Rung II       Pronation 18 25   Neutral  24 27   Supination  30 19.5   Key Pinch 9.5 10.5    3pt Pinch 7.5 7   2pt Pinch 5 3            Intake Outcome Measure for FOTO initial eval; wrist  Survey     Therapist reviewed FOTO scores for Erlinda Rain on 10/19/2023.   FOTO report - see Media section or FOTO account episode details.     Intake Score: 17.5%       Treatment     Erlinda received the treatments listed below:        therapeutic exercises to develop strength and endurance for 15 minutes including:  - forearm in supination, isometric flexion gripping with RED flexbar x 10 reps  (2 sets)   - forearm in supination, isospheres (2 min)   - ECU stretch holding 30s x 3 reps   - forearm in supination, light squeezing blue sponge x 10 reps       manual therapy techniques: Soft tissue Mobilization were applied to the: R for 8 minutes, including:  ECU STM hawk's          direct contact modalities after being cleared for contraindications: Ultrasound:  Erlinda received ultrasound to manage pain and inflammation at 100 % duty cycle applied to the 1.1 at an intensity of  number entry box W/cm2  for a duration of 8 minutes. Patient tolerated treatment well without adverse effects. Therapist was in attendance throughout intervention ~15 min.   (2/6)        supervised modalities after being cleared for contradictions: Fluido Fluidotherapy: To R wrist  for 10 min, continuous air, 115 deg, air speed 50 to decrease pain, edema & scar tissue, sensory re- education, and increased tissue extensibility prior to therex            Patient Education and Home Exercises     Education provided:   - cont to avoid painful activities   - Progress towards goals     Written Home Exercises Provided: Patient instructed to cont prior HEP.  Exercises were reviewed and Erlinda was able to demonstrate them prior to the end of the session.  Erlinda demonstrated good  understanding of the home exercise program provided. See electronic medical record under Patient Instructions for exercises provided during therapy sessions.       Assessment     Able to hollis exercises with forearm in supination, pain-free. Second tx of ionto today. Will progress as hollis.     Erlinda is progressing well towards her goals and there are no updates to goals at this time. Pt prognosis is Fair.     Patient will continue to benefit from skilled outpatient occupational therapy to address the deficits listed in the problem list on initial evaluation provide patient/family education and to maximize patient's level of independence in the home and community environment.     Patient's spiritual, cultural and educational needs considered and patient agreeable to plan of care and goals.    Anticipated barriers to occupational therapy:     Goals:  Long Term Goals (LTGs); to be met by discharge.  Pt will demo improved  strength by 5 pounds   Pt will report improved FOTO score by 10 points   Pt will report pain level no greater than 1/10 during functional daily work and community activities   Pt will demo improved pinch strength by 3 pounds      Short Term Goals (STGs); to be met within 4 weeks ().  Pt will demo improved  strength by 2-3 pounds   Pt will improve RD/UD ROM by 3-5 degrees   Pt will report pain level no  greater than 3/10 during light activity   Pt will demo improved pinch strength by 2 pounds     Plan     Updates/Grading for next session:     Cira Fernandes, MICHAEL   11/13/2023

## 2023-11-13 NOTE — PROGRESS NOTES
[facility]  Occupational Therapy Treatment Note     Date: 11/10/2023  Name: Erlinda Rain  Clinic Number: [id number]    Therapy Diagnosis:   Encounter Diagnosis   Name Primary?    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   Response to previous treatment:first tx  Functional change: none noted to this date     Pain: 3/10  Location: right wrists      Objective     Objective Measures updated at progress report unless specified.  Observation/Appearance:  Skin intact          Edema. Measured in centimeters.    10/19/2023 10/19/2023     Right  Left    Proximal Wrist Crease 15.8 cm  16.0 cm         Hand ROM. Measured in degrees.    10/19/2023 10/19/2023     Right  Left      Able to make full composite fist              Thumb: MP 0/52 0/53                IP 0/58 0/71       Rad ADD/ABD WNL WNL       Pal ADD/ABD WNL WNL           Opposition DPC DPC         Elbow and Wrist ROM. Measured in degrees.    11/1/2023 11/1/2023     Left Right   Supination/Pronation 61/ WNL  61/WNL   Wrist Ext/Flex 70/55 70/59   Wrist RD/UD 24/36 15/29            Sensation  NT Distribution 10/19/2023 10/19/2023     Right  Left    [place] Magali       Normal 1.65-2.83       Diminished Light Touch 3.22-3.61       Diminished Protective 3.84-4.31       Loss of Protective 4.56-6.65       Untestable >6.65       2 Point Discrimination       Static       Dynamic              Special Tests:   Right    10/19/2023   Thumb CMC Grind Test     Finkelstein's Test     Phalen's Test     Tinel's Test     Louis's Test     Extrinsic Tightness Test     Intrinsic Tightness Test     ORL Test     Froment's Sign     Linda's Sign      Egawa Sign      Clamp Sign      Scaphoid Thrust Test     Linscheid's Test     Metacarpal Stress Test     Piano Key Test     ECU Synergy Test Pos    Ulnar Compression Test     TFCC Load Test     Ulnocarpal Stress Test pos   Midcarpal Shift Test     Pisiform Boost Test pos             Strength (Dyanmometer) and Pinch Strength (Pinch Gauge)  Measured in pounds and psi. Average of three trials.       10/19/2023 10/19/2023     Right  Left    Rung II       Pronation 18 25   Neutral  24 27   Supination  30 19.5   Key Pinch 9.5 10.5    3pt Pinch 7.5 7   2pt Pinch 5 3            Intake Outcome Measure for FOTO initial eval; wrist  Survey     Therapist reviewed FOTO scores for Erlinda Rain on 10/19/2023.   FOTO report - see Media section or FOTO account episode details.     Intake Score: 17.5%       Treatment     Erlinda received the treatments listed below:      therapeutic exercises to develop strength and endurance for 15 minutes including:  - forearm in supination, isometric gripping with RED flexbar x 10 reps each way (2 sets)   - forearm in supination, isospheres (2 min)   - ECU stretch holding 30s x 3 reps   - forearm in supination, light squeezing blue sponge x 10 reps       manual therapy techniques: Soft tissue Mobilization were applied to the: R for 10 minutes, including:  ECU STM hawk's          direct contact modalities after being cleared for contraindications: Ultrasound:  Erlinda received ultrasound to manage pain and inflammation at 100 % duty cycle applied to the 1.1 at an intensity of  number entry box W/cm2  for a duration of 8 minutes. Patient tolerated treatment well without adverse effects. Therapist was in attendance throughout intervention. NT              supervised modalities after being cleared for contradictions: Fluido Fluidotherapy: To R wrist  for 10 min, continuous air, 115 deg, air speed 50 to decrease pain, edema & scar tissue, sensory re- education, and increased tissue extensibility prior to therex            Patient Education and Home Exercises     Education provided:   - cont to avoid painful activities   - Progress towards goals     Written Home Exercises Provided: Patient instructed to cont prior HEP.  Exercises were reviewed and Erlinda was able to demonstrate them prior to the end of the session.  Erlinda demonstrated good  understanding of the home exercise program provided. See electronic medical record under Patient Instructions for exercises provided during therapy sessions.       Assessment     Good hollis with isometrics. Cont to report decreased pain with R wrist     Erlinda is progressing well towards her goals and there are no updates to goals at this time. Pt prognosis is Fair.     Patient will continue to benefit from skilled outpatient occupational therapy to address the deficits listed in the problem list on initial evaluation provide patient/family education and to maximize patient's level of independence in the home and community environment.     Patient's spiritual, cultural and educational needs considered and patient agreeable to plan of care and goals.    Anticipated barriers to occupational therapy:     Goals:  Long Term Goals (LTGs); to be met by discharge.  Pt will demo improved  strength by 5 pounds   Pt will report improved FOTO score by 10 points   Pt will report pain level no greater than 1/10 during functional daily work and community activities   Pt will demo improved pinch strength by 3 pounds      Short Term Goals (STGs); to be met within 4 weeks ().  Pt will demo improved  strength by 2-3 pounds   Pt will improve RD/UD ROM by 3-5 degrees   Pt will report pain level no greater than 3/10 during light activity    Pt will demo improved pinch strength by 2 pounds     Plan     Updates/Grading for next session:     Cira Fernandes, MICHAEL   11/10/2023

## 2023-11-29 ENCOUNTER — CLINICAL SUPPORT (OUTPATIENT)
Dept: REHABILITATION | Facility: HOSPITAL | Age: 28
End: 2023-11-29
Payer: COMMERCIAL

## 2023-11-29 DIAGNOSIS — R29.898 DECREASED GRIP STRENGTH OF RIGHT HAND: Primary | ICD-10-CM

## 2023-11-29 PROCEDURE — 97110 THERAPEUTIC EXERCISES: CPT

## 2023-11-29 PROCEDURE — 97022 WHIRLPOOL THERAPY: CPT

## 2023-11-29 PROCEDURE — 97530 THERAPEUTIC ACTIVITIES: CPT

## 2023-11-29 PROCEDURE — 97140 MANUAL THERAPY 1/> REGIONS: CPT

## 2023-11-29 NOTE — PROGRESS NOTES
"    [facility]  Occupational Therapy Treatment Note     Date: 11/29/2023  Name: Erlinda Rain  Clinic Number: [id number]    Therapy Diagnosis:   Encounter Diagnosis   Name Primary?    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 My wrist doesn't feel anymore sore though"   She was compliant with home exercise program given last session.   Response to previous treatment:first tx  Functional change: none noted to this date     Pain: 1-2/10  Location: right wrists      Objective     Objective Measures updated at progress report unless specified.  Observation/Appearance:  Skin intact          Edema. Measured in centimeters.    10/19/2023 10/19/2023     Right  Left    Proximal Wrist Crease 15.8 cm  16.0 cm         Hand ROM. Measured in degrees.    10/19/2023 10/19/2023 11/29/2023     Right  Left  Right     Able to make full composite fist                Thumb: MP 0/52 0/53 53                IP 0/58 0/71 57       Rad ADD/ABD WNL WNL WNL       Pal ADD/ABD WNL WNL  WNL          Opposition DPC DPC DPC          Elbow and Wrist ROM. Measured in degrees.    11/1/2023 11/1/2023 11/29/2023     Left Right Right   Supination/Pronation 61/ WNL  61/WNL 61/   Wrist Ext/Flex 70/55 70/59 70/64   Wrist RD/UD 24/36 15/29 15/30            Sensation  NT Distribution 10/19/2023 10/19/2023     Right  Left    [place] Magali       Normal 1.65-2.83       Diminished Light Touch " 3.22-3.61       Diminished Protective 3.84-4.31       Loss of Protective 4.56-6.65       Untestable >6.65       2 Point Discrimination       Static       Dynamic             Special Tests:   Right    10/19/2023   Thumb CMC Grind Test     Finkelstein's Test     Phalen's Test     Tinel's Test     Louis's Test     Extrinsic Tightness Test     Intrinsic Tightness Test     ORL Test     Froment's Sign     Linda's Sign      Egawa Sign      Clamp Sign      Scaphoid Thrust Test     Linscheid's Test     Metacarpal Stress Test     Piano Key Test     ECU Synergy Test Pos    Ulnar Compression Test     TFCC Load Test     Ulnocarpal Stress Test pos   Midcarpal Shift Test     Pisiform Boost Test pos            Strength (Dyanmometer) and Pinch Strength (Pinch Gauge)  Measured in pounds and psi. Average of three trials.       10/19/2023 10/19/2023 11/29/2023     Right  Left  Right   Rung II        Pronation 18 25 35.5    Neutral  24 27 37   Supination  30 19.5 42.5   [place] Pinch 9.5 10.5  16   3pt Pinch 7.5 7 11.5   2pt Pinch 5 3 9           Intake Outcome Measure for FOTO initial eval; wrist  Survey     Therapist reviewed FOTO scores for Erlinda Rain on 10/19/2023.   FOTO report - see Media section or FOTO account episode details.     Intake Score: 17.5%       Treatment     Erlinda received the treatments listed below:            therapeutic exercises to develop strength and endurance for 15 minutes including:  - forearm in supination, isometric flexion gripping with RED flexbar x 10 reps  (2 sets)   - forearm in supination, isospheres (2 min)   - ECU stretch holding 30s x 3 reps   - forearm in supination, light squeezing blue sponge x 10 reps   - rhythmic stabilization green flexbar       manual therapy techniques: Soft tissue Mobilization were applied to the: R for 8 minutes, including:  ECU STM hawk's        direct contact modalities after being cleared for contraindications: Ultrasound:  Erlinda received ultrasound to manage  pain and inflammation at 100 % duty cycle applied to the 1.1 at an intensity of  number entry box W/cm2  for a duration of 8 minutes. Patient tolerated treatment well without adverse effects. Therapist was in attendance throughout intervention ~15 min.  (2/6)        supervised modalities after being cleared for contradictions: Fluido Fluidotherapy: To R wrist  for 10 min, continuous air, 115 deg, air speed 50 to decrease pain, edema & scar tissue, sensory re- education, and increased tissue extensibility prior to therex            Patient Education and Home Exercises     Education provided:   - cont to avoid painful activities   - Progress towards goals     Written Home Exercises Provided: Patient instructed to cont prior HEP.  Exercises were reviewed and Erlinda was able to demonstrate them prior to the end of the session.  Erlinda demonstrated good  understanding of the home exercise program provided. See electronic medical record under Patient Instructions for exercises provided during therapy sessions.       Assessment     Able to hollis exercises with forearm in supination, pain-free. Second tx of ionto today. Will progress as hollis.     Erlinda is progressing well towards her goals and there are no updates to goals at this time. Pt prognosis is Fair.     Patient will continue to benefit from skilled outpatient occupational therapy to address the deficits listed in the problem list on initial evaluation provide patient/family education and to maximize patient's level of independence in the home and community environment.     Patient's spiritual, cultural and educational needs considered and patient agreeable to plan of care and goals.    Anticipated barriers to occupational therapy:     Goals:  Long Term Goals (LTGs); to be met by discharge.  Pt will demo improved  strength by 5 pounds   Pt will report improved FOTO score by 10 points   Pt will report pain level no greater than 1/10 during functional daily work and  community activities   Pt will demo improved pinch strength by 3 pounds      Short Term Goals (STGs); to be met within 4 weeks ().  Pt will demo improved  strength by 2-3 pounds   Pt will improve RD/UD ROM by 3-5 degrees   Pt will report pain level no greater than 3/10 during light activity   Pt will demo improved pinch strength by 2 pounds     Plan     Updates/Grading for next session:     Cira Fernandes OT   11/29/2023

## 2023-12-02 ENCOUNTER — PATIENT MESSAGE (OUTPATIENT)
Dept: ORTHOPEDICS | Facility: CLINIC | Age: 28
End: 2023-12-02
Payer: COMMERCIAL

## 2023-12-02 DIAGNOSIS — M54.12 CERVICAL RADICULOPATHY: Primary | ICD-10-CM

## 2023-12-06 ENCOUNTER — CLINICAL SUPPORT (OUTPATIENT)
Dept: REHABILITATION | Facility: HOSPITAL | Age: 28
End: 2023-12-06
Payer: COMMERCIAL

## 2023-12-06 DIAGNOSIS — R29.898 DECREASED GRIP STRENGTH OF RIGHT HAND: Primary | ICD-10-CM

## 2023-12-06 PROCEDURE — 97110 THERAPEUTIC EXERCISES: CPT

## 2023-12-06 PROCEDURE — 97140 MANUAL THERAPY 1/> REGIONS: CPT

## 2023-12-06 PROCEDURE — 97112 NEUROMUSCULAR REEDUCATION: CPT

## 2023-12-12 ENCOUNTER — CLINICAL SUPPORT (OUTPATIENT)
Dept: REHABILITATION | Facility: HOSPITAL | Age: 28
End: 2023-12-12
Payer: COMMERCIAL

## 2023-12-12 ENCOUNTER — TELEPHONE (OUTPATIENT)
Dept: ELECTROPHYSIOLOGY | Facility: CLINIC | Age: 28
End: 2023-12-12
Payer: COMMERCIAL

## 2023-12-12 DIAGNOSIS — G89.29 CHRONIC PAIN OF LEFT KNEE: Primary | ICD-10-CM

## 2023-12-12 DIAGNOSIS — M25.562 CHRONIC PAIN OF LEFT KNEE: Primary | ICD-10-CM

## 2023-12-12 DIAGNOSIS — M54.2 NECK PAIN: ICD-10-CM

## 2023-12-12 DIAGNOSIS — M54.12 CERVICAL RADICULOPATHY: ICD-10-CM

## 2023-12-12 DIAGNOSIS — M54.2 NECK PAIN: Primary | ICD-10-CM

## 2023-12-12 PROBLEM — R00.0 TACHYCARDIA: Status: ACTIVE | Noted: 2023-12-12

## 2023-12-12 PROBLEM — R00.2 PALPITATIONS: Status: ACTIVE | Noted: 2023-12-12

## 2023-12-12 PROCEDURE — 97140 MANUAL THERAPY 1/> REGIONS: CPT

## 2023-12-12 PROCEDURE — 97161 PT EVAL LOW COMPLEX 20 MIN: CPT

## 2023-12-12 PROCEDURE — 97110 THERAPEUTIC EXERCISES: CPT

## 2023-12-12 PROCEDURE — 97112 NEUROMUSCULAR REEDUCATION: CPT

## 2023-12-12 NOTE — PROGRESS NOTES
"      [facility]  Occupational Therapy Treatment Note     Date: 12/12/2023  Name: Erlinda Rain  Clinic Number: [id number]    Therapy Diagnosis:   Encounter Diagnosis   Name Primary?    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 I have been trying to soak it epsom salt and massage the area its helped"   She was compliant with home exercise program given last session.   Response to previous treatment: decreased soreness  Functional change: able to perform work outs - advised to avoid boxing     Pain: 1-2/10  Location: right wrists      Objective     Objective Measures updated at progress report unless specified.  Observation/Appearance:  Skin intact          Edema. Measured in centimeters.    10/19/2023 10/19/2023     Right  Left    Proximal Wrist Crease 15.8 cm  16.0 cm         Hand ROM. Measured in degrees.    10/19/2023 10/19/2023 11/29/2023     Right  Left  Right     Able to make full composite fist                Thumb: MP 0/52 0/53 53                IP 0/58 0/71 57       Rad ADD/ABD WNL WNL WNL       Pal ADD/ABD WNL WNL  WNL          Opposition DPC DPC DPC          Elbow and Wrist ROM. Measured in degrees.    11/1/2023 11/1/2023 11/29/2023     Left Right Right   Supination/Pronation 61/ WNL  61/WNL 61/   Wrist Ext/Flex 70/55 70/59 70/64   Wrist RD/UD 24/36 15/29 15/30            Sensation  NT Distribution 10/19/2023 10/19/2023     Right  Left    [place] Magali       Normal 1.65-2.83     "   Diminished Light Touch 3.22-3.61       Diminished Protective 3.84-4.31       Loss of Protective 4.56-6.65       Untestable >6.65       2 Point Discrimination       Static       Dynamic             Special Tests:   Right    10/19/2023   Thumb CMC Grind Test     Finkelstein's Test     Phalen's Test     Tinel's Test     Louis's Test     Extrinsic Tightness Test     Intrinsic Tightness Test     ORL Test     Froment's Sign     Linda's Sign      Egawa Sign      Clamp Sign      Scaphoid Thrust Test     Linscheid's Test     Metacarpal Stress Test     Piano Key Test     ECU Synergy Test Pos    Ulnar Compression Test     TFCC Load Test     Ulnocarpal Stress Test pos   Midcarpal Shift Test     Pisiform Boost Test pos            Strength (Dyanmometer) and Pinch Strength (Pinch Gauge)  Measured in pounds and psi. Average of three trials.       10/19/2023 10/19/2023 11/29/2023     Right  Left  Right   Rung II        Pronation 18 25 35.5    Neutral  24 27 37   Supination  30 19.5 42.5   Greenberg Pinch 9.5 10.5  16   3pt Pinch 7.5 7 11.5   2pt Pinch 5 3 9           Intake Outcome Measure for FOTO initial eval; wrist  Survey     Therapist reviewed FOTO scores for Erlinda Rain on 10/19/2023.   FOTO report - see Media section or FOTO account episode details.     Intake Score: 17.5%       Treatment     Erlinda received the treatments listed below:            therapeutic exercises to develop strength and endurance for 25 minutes including:  - forearm in supination, isometric flexion gripping with RED flexbar x 10 reps  (2 sets)   - forearm in supination, isospheres (2 min)   - ECU stretch holding 30s x 3 reps   - forearm in supination, light squeezing blue sponge x 10 reps NT  - rhythmic stabilization green flexbar NT   - marble maze FCU (3 sets)   - 4 corners red flexbar x 10 reps   - yellow theraband FCU/FCR x 10 reps       manual therapy techniques: Soft tissue Mobilization were applied to the: R for 15 minutes, including:  ECU/FCU  STM tissue tool and medial epi           NT direct contact modalities after being cleared for contraindications: Ultrasound:  Erlinda received ultrasound to manage pain and inflammation at 100 % duty cycle applied to the 1.1 at an intensity of  number entry box W/cm2  for a duration of 8 minutes. Patient tolerated treatment well without adverse effects. Therapist was in attendance throughout intervention ~15 min.  (2/6)        NT supervised modalities after being cleared for contradictions: Fluido Fluidotherapy: To R wrist  for 10 min, continuous air, 115 deg, air speed 50 to decrease pain, edema & scar tissue, sensory re- education, and increased tissue extensibility prior to therex        Paraffin bath with MHP for 10 minutes         Patient Education and Home Exercises     Education provided:   - cont to avoid painful activities   - Progress towards goals     Written Home Exercises Provided: Patient instructed to cont prior HEP.  Exercises were reviewed and Erlinda was able to demonstrate them prior to the end of the session.  Erlinda demonstrated good  understanding of the home exercise program provided. See electronic medical record under Patient Instructions for exercises provided during therapy sessions.       Assessment     Able to hollis exercises.  Slight discomfort with 4 corner flexbar with rotation. Advised to not allow resistance greater than pain threshold. Will progress as hollis.     Erlinda is progressing well towards her goals and there are no updates to goals at this time. Pt prognosis is Fair.     Patient will continue to benefit from skilled outpatient occupational therapy to address the deficits listed in the problem list on initial evaluation provide patient/family education and to maximize patient's level of independence in the home and community environment.     Patient's spiritual, cultural and educational needs considered and patient agreeable to plan of care and goals.    Anticipated barriers to  occupational therapy:     Goals:  Long Term Goals (LTGs); to be met by discharge.  Pt will demo improved  strength by 5 pounds   Pt will report improved FOTO score by 10 points   Pt will report pain level no greater than 1/10 during functional daily work and community activities   Pt will demo improved pinch strength by 3 pounds      Short Term Goals (STGs); to be met within 4 weeks ().  Pt will demo improved  strength by 2-3 pounds   Pt will improve RD/UD ROM by 3-5 degrees   Pt will report pain level no greater than 3/10 during light activity   Pt will demo improved pinch strength by 2 pounds     Plan     Updates/Grading for next session:     Cira Fernandes OT   12/12/2023
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
## 2023-12-14 DIAGNOSIS — M22.42 CHONDROMALACIA OF PATELLOFEMORAL JOINT, LEFT: ICD-10-CM

## 2023-12-14 RX ORDER — CELECOXIB 200 MG/1
200 CAPSULE ORAL
Qty: 30 CAPSULE | Refills: 0 | Status: SHIPPED | OUTPATIENT
Start: 2023-12-14 | End: 2024-01-16

## 2023-12-16 ENCOUNTER — PATIENT MESSAGE (OUTPATIENT)
Dept: OBSTETRICS AND GYNECOLOGY | Facility: CLINIC | Age: 28
End: 2023-12-16
Payer: COMMERCIAL

## 2023-12-18 ENCOUNTER — CLINICAL SUPPORT (OUTPATIENT)
Dept: REHABILITATION | Facility: HOSPITAL | Age: 28
End: 2023-12-18
Payer: COMMERCIAL

## 2023-12-18 DIAGNOSIS — R29.898 DECREASED GRIP STRENGTH OF RIGHT HAND: Primary | ICD-10-CM

## 2023-12-18 PROCEDURE — 97140 MANUAL THERAPY 1/> REGIONS: CPT

## 2023-12-18 PROCEDURE — 97110 THERAPEUTIC EXERCISES: CPT

## 2023-12-18 PROCEDURE — 97018 PARAFFIN BATH THERAPY: CPT

## 2023-12-18 RX ORDER — NORGESTIMATE AND ETHINYL ESTRADIOL 0.25-0.035
1 KIT ORAL DAILY
Qty: 90 TABLET | Refills: 3 | Status: SHIPPED | OUTPATIENT
Start: 2023-12-18 | End: 2023-12-26

## 2023-12-18 NOTE — PROGRESS NOTES
"      [facility]  Occupational Therapy Treatment Note     Date: 12/6/2023  Name: Erlinda Rain  Clinic Number: [id number]    Therapy Diagnosis:   Encounter Diagnosis   Name Primary?    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 My wrist doesn't feel anymore sore though"   She was compliant with home exercise program given last session.   Response to previous treatment:first tx  Functional change: none noted to this date     Pain: 1-2/10  Location: right wrists      Objective     Objective Measures updated at progress report unless specified.  Observation/Appearance:  Skin intact          Edema. Measured in centimeters.    10/19/2023 10/19/2023     Right  Left    Proximal Wrist Crease 15.8 cm  16.0 cm         Hand ROM. Measured in degrees.    10/19/2023 10/19/2023 11/29/2023     Right  Left  Right     Able to make full composite fist                Thumb: MP 0/52 0/53 53                IP 0/58 0/71 57       Rad ADD/ABD WNL WNL WNL       Pal ADD/ABD WNL WNL  WNL          Opposition DPC DPC DPC          Elbow and Wrist ROM. Measured in degrees.    11/1/2023 11/1/2023 11/29/2023     Left Right Right   Supination/Pronation 61/ WNL  61/WNL 61/   Wrist Ext/Flex 70/55 70/59 70/64   Wrist RD/UD 24/36 15/29 15/30            Sensation  NT Distribution 10/19/2023 10/19/2023     Right  Left    [place] Magali       Normal 1.65-2.83       Diminished Light Touch " 3.22-3.61       Diminished Protective 3.84-4.31       Loss of Protective 4.56-6.65       Untestable >6.65       2 Point Discrimination       Static       Dynamic             Special Tests:   Right    10/19/2023   Thumb CMC Grind Test     Finkelstein's Test     Phalen's Test     Tinel's Test     Louis's Test     Extrinsic Tightness Test     Intrinsic Tightness Test     ORL Test     Froment's Sign     Linda's Sign      Egawa Sign      Clamp Sign      Scaphoid Thrust Test     Linscheid's Test     Metacarpal Stress Test     Piano Key Test     ECU Synergy Test Pos    Ulnar Compression Test     TFCC Load Test     Ulnocarpal Stress Test pos   Midcarpal Shift Test     Pisiform Boost Test pos            Strength (Dyanmometer) and Pinch Strength (Pinch Gauge)  Measured in pounds and psi. Average of three trials.       10/19/2023 10/19/2023 11/29/2023     Right  Left  Right   Rung II        Pronation 18 25 35.5    Neutral  24 27 37   Supination  30 19.5 42.5   Greenberg Pinch 9.5 10.5  16   3pt Pinch 7.5 7 11.5   2pt Pinch 5 3 9           Intake Outcome Measure for FOTO initial eval; wrist  Survey     Therapist reviewed FOTO scores for Erlinda Rain on 10/19/2023.   FOTO report - see Media section or FOTO account episode details.     Intake Score: 17.5%       Treatment     Erlinda received the treatments listed below:          therapeutic exercises to develop strength and endurance for 15 minutes including:  - forearm in supination, isometric flexion gripping with RED flexbar x 10 reps  (2 sets)   - forearm in supination, isospheres (2 min)   - ECU stretch holding 30s x 3 reps   - forearm in supination, light squeezing blue sponge x 10 reps   - rhythmic stabilization green flexbar       manual therapy techniques: Soft tissue Mobilization were applied to the: R for 10 minutes, including:  ECU STM hawk's        Neuro Re-ed: 10 minutes   - cupping anterior shoulder, lateral elbow        supervised modalities after being cleared  for contradictions: Fluido Fluidotherapy: To R wrist  for 10 min, continuous air, 115 deg, air speed 50 to decrease pain, edema & scar tissue, sensory re- education, and increased tissue extensibility prior to therex NT   Paraffin with MHP for 10 minutes       Patient Education and Home Exercises     Education provided:   - cont to avoid painful activities   - Progress towards goals     Written Home Exercises Provided: Patient instructed to cont prior HEP.  Exercises were reviewed and Erlinda was able to demonstrate them prior to the end of the session.  Erlinda demonstrated good  understanding of the home exercise program provided. See electronic medical record under Patient Instructions for exercises provided during therapy sessions.       Assessment     Proximal tightness in RUE.  Will progress as hollis.     Erlinda is progressing well towards her goals and there are no updates to goals at this time. Pt prognosis is Fair.     Patient will continue to benefit from skilled outpatient occupational therapy to address the deficits listed in the problem list on initial evaluation provide patient/family education and to maximize patient's level of independence in the home and community environment.     Patient's spiritual, cultural and educational needs considered and patient agreeable to plan of care and goals.    Anticipated barriers to occupational therapy:       Goals:  Long Term Goals (LTGs); to be met by discharge.  Pt will demo improved  strength by 5 pounds   Pt will report improved FOTO score by 10 points   Pt will report pain level no greater than 1/10 during functional daily work and community activities   Pt will demo improved pinch strength by 3 pounds      Short Term Goals (STGs); to be met within 4 weeks ().  Pt will demo improved  strength by 2-3 pounds   Pt will improve RD/UD ROM by 3-5 degrees   Pt will report pain level no greater than 3/10 during light activity   Pt will demo improved pinch strength  by 2 pounds     Plan     Updates/Grading for next session:     Cira Fernandes OT   12/6/2023

## 2023-12-18 NOTE — PROGRESS NOTES
"      [facility]  Occupational Therapy Treatment Note     Date: 12/18/2023  Name: Erlinda Rain  Clinic Number: [id number]    Therapy Diagnosis:   Encounter Diagnosis   Name Primary?    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   Response to previous treatment: no change   Functional change: able to perform work outs     Pain: 4/10  Location: right wrists      Objective     Objective Measures updated at progress report unless specified.  Observation/Appearance:  Skin intact          Edema. Measured in centimeters.    10/19/2023 10/19/2023     Right  Left    Proximal Wrist Crease 15.8 cm  16.0 cm         Hand ROM. Measured in degrees.    10/19/2023 10/19/2023 11/29/2023     Right  Left  Right     Able to make full composite fist                Thumb: MP 0/52 0/53 53                IP 0/58 0/71 57       Rad ADD/ABD WNL WNL WNL       Pal ADD/ABD WNL WNL  WNL          Opposition DPC DPC DPC          Elbow and Wrist ROM. Measured in degrees.    11/1/2023 11/1/2023 11/29/2023     Left Right Right   Supination/Pronation 61/ WNL  61/WNL 61/   Wrist Ext/Flex 70/55 70/59 70/64   Wrist RD/UD 24/36 15/29 15/30            Sensation  NT Distribution 10/19/2023 10/19/2023     Right  Left    [place]       Normal 1.65-2.83       Diminished Light Touch 3.22-3.61  "      Diminished Protective 3.84-4.31       Loss of Protective 4.56-6.65       Untestable >6.65       2 Point Discrimination       Static       Dynamic             Special Tests:   Right    10/19/2023   Thumb CMC Grind Test     Finkelstein's Test     Phalen's Test     Tinel's Test     Louis's Test     Extrinsic Tightness Test     Intrinsic Tightness Test     ORL Test     Froment's Sign     Linda's Sign      Egawa Sign      Clamp Sign      Scaphoid Thrust Test     Linscheid's Test     Metacarpal Stress Test     Piano Key Test     ECU Synergy Test Pos    Ulnar Compression Test     TFCC Load Test     Ulnocarpal Stress Test pos   Midcarpal Shift Test     Pisiform Boost Test pos            Strength (Dyanmometer) and Pinch Strength (Pinch Gauge)  Measured in pounds and psi. Average of three trials.       10/19/2023 10/19/2023 11/29/2023     Right  Left  Right   Rung II        Pronation 18 25 35.5    Neutral  24 27 37   Supination  30 19.5 42.5   Greenberg Pinch 9.5 10.5  16   3pt Pinch 7.5 7 11.5   2pt Pinch 5 3 9           Intake Outcome Measure for FOTO initial eval; wrist  Survey     Therapist reviewed FOTO scores for Erlinda Rain on 10/19/2023.   FOTO report - see Media section or FOTO account episode details.     Intake Score: 17.5%       Treatment     Erlinda received the treatments listed below:            Therapeutic exercises to develop strength and endurance for 30 minutes including:  Brachialis with yellow theraband x 10 reps   FCU yellow theraband x 10 reps   ECU yellow theraband x 10 reps   4 corners red flexbar x 10 reps       manual therapy techniques: Soft tissue Mobilization were applied to the: R for 15 minutes, including:  ECU STM hawk's    Hypothenar soft tissue mobilization   KT tape applied; pisiform boost, distal fascial glide to offload ulnar styloid tension         NT supervised modalities after being cleared for contradictions: Fluido Fluidotherapy: To R wrist  for 10 min, continuous air, 115  deg, air speed 50 to decrease pain, edema & scar tissue, sensory re- education, and increased tissue extensibility prior to therex      Paraffin bath with MHP for 10 minutes         Patient Education and Home Exercises     Education provided:   - cont to avoid painful activities   - Progress towards goals     Written Home Exercises Provided: Patient instructed to cont prior HEP.  Exercises were reviewed and Erlinda was able to demonstrate them prior to the end of the session.  Erlinda demonstrated good  understanding of the home exercise program provided. See electronic medical record under Patient Instructions for exercises provided during therapy sessions.       Assessment     Added new isolated theraband exercises.     Erlinda is progressing well towards her goals and there are no updates to goals at this time. Pt prognosis is Fair.     Patient will continue to benefit from skilled outpatient occupational therapy to address the deficits listed in the problem list on initial evaluation provide patient/family education and to maximize patient's level of independence in the home and community environment.     Patient's spiritual, cultural and educational needs considered and patient agreeable to plan of care and goals.    Anticipated barriers to occupational therapy:     Goals:  Long Term Goals (LTGs); to be met by discharge.  Pt will demo improved  strength by 5 pounds   Pt will report improved FOTO score by 10 points   Pt will report pain level no greater than 1/10 during functional daily work and community activities   Pt will demo improved pinch strength by 3 pounds      Short Term Goals (STGs); to be met within 4 weeks ().  Pt will demo improved  strength by 2-3 pounds   Pt will improve RD/UD ROM by 3-5 degrees   Pt will report pain level no greater than 3/10 during light activity   Pt will demo improved pinch strength by 2 pounds     Plan     Updates/Grading for next session:     Cira Fernandes OT    12/18/2023

## 2023-12-19 ENCOUNTER — CLINICAL SUPPORT (OUTPATIENT)
Dept: REHABILITATION | Facility: HOSPITAL | Age: 28
End: 2023-12-19
Payer: COMMERCIAL

## 2023-12-19 DIAGNOSIS — M54.2 NECK PAIN: Primary | ICD-10-CM

## 2023-12-19 PROCEDURE — 97112 NEUROMUSCULAR REEDUCATION: CPT

## 2023-12-19 PROCEDURE — 97110 THERAPEUTIC EXERCISES: CPT

## 2023-12-19 NOTE — PROGRESS NOTES
"                          Physical Therapy Daily Treatment Note     Name: Erlinda Rain  Clinic Number: [id number]    Therapy Diagnosis:   Encounter Diagnosis   Name Primary?    Neck pain Yes     Physician: Von Lopez MD    Visit Date: 2023    Physician Orders: PT Eval and Treat   Medical Diagnosis from Referral: M54.12 (ICD-10-CM) - Cervical radiculopathy   Evaluation Date: 2023  Authorization Period Expiration: 12/3/24  Plan of Care Expiration: 24  Visit # / Visits authorized:      Time In: 1100 am  Time Out: 1155   Total Billable Time: 55 minutes     Precautions: Standard    Subjective      Pt reports:she is doing well with her HEP.   she was compliant with home exercise program given last session.   Response to previous treatment:NA  Functional change: NA    Pain: not quantified this visit/10  Location: right neck      Objective     Erlinda received therapeutic exercises to develop ROM, flexibility, and posture for 15 minutes includin/2 foam pec stretch x5 min  Thoracic ext over foam roller x3 min  FOTO-np  Pt education and HEP review      Erlinda participated in neuromuscular re-education activities to improve: Coordination, Kinesthetic, Sense, Proprioception, and Posture for 40 minutes. The following activities were included:  Chin tuck 20 sec x5  Chin tuck rotation 2x10  Median and ulnar nerve glides 2x10 R/ Median B  No money rtb 2x15 with 5 sec hold  Shoulder ext otb 2x15 B with 5 sec hold  Scapular retraction 10 sec x10-np          Home Exercises Provided and Patient Education Provided     Education provided:   - HEP to Go    Written Home Exercises Provided: Patient instructed to cont prior HEP.  Exercises were reviewed and Erlinda was able to demonstrate them prior to the end of the session.  Erlinda demonstrated good  understanding of the education provided.     See EMR under Patient Instructions for exercises provided {Blank single:50033::"2023","prior visit"    Assessment     Pt " was able to progress DNF stability interventions well, but did require some cues to avoid compensatory SCM activation. Plan to continue progressing with neural glides, scapular strengthening and cervical stability.  Erlinda Is progressing well towards her goals.   Pt prognosis is Good.     Pt will continue to benefit from skilled outpatient physical therapy to address the deficits listed in the problem list box on initial evaluation, provide pt/family education and to maximize pt's level of independence in the home and community environment.     Pt's spiritual, cultural and educational needs considered and pt agreeable to plan of care and goals.    Anticipated barriers to physical therapy: none    Goals:   Short Term Goals: 4 weeks (Progressing, not met)  - Pt will increase strength to 4/5 B mid and lower trap.   - Decrease Pain to 3/10 as worst with all PT interventions  - Pt to self correct posture with minimal cues  - Pt independent with HEP with progressions.      Long Term Goals (8 Weeks): (Progressing, not met)  - Pt will improve DNF endurance to 40 sec hold  - Pt will increase strength to 5/5 B mid and lower trap  - Decrease Pain to 1/10 as worst at the end of the day  - Pt to return to 90% PLOF          Plan     Continue POC per pt tolerance.    Luanne Rodriguez, PT

## 2023-12-26 ENCOUNTER — OFFICE VISIT (OUTPATIENT)
Dept: OBSTETRICS AND GYNECOLOGY | Facility: CLINIC | Age: 28
End: 2023-12-26
Payer: COMMERCIAL

## 2023-12-26 DIAGNOSIS — E28.2 PCOS (POLYCYSTIC OVARIAN SYNDROME): Primary | ICD-10-CM

## 2023-12-26 DIAGNOSIS — E66.9 OBESITY (BMI 30.0-34.9): ICD-10-CM

## 2023-12-26 PROCEDURE — 99213 PR OFFICE/OUTPT VISIT, EST, LEVL III, 20-29 MIN: ICD-10-PCS | Mod: 95,,, | Performed by: STUDENT IN AN ORGANIZED HEALTH CARE EDUCATION/TRAINING PROGRAM

## 2023-12-26 PROCEDURE — 3044F PR MOST RECENT HEMOGLOBIN A1C LEVEL <7.0%: ICD-10-PCS | Mod: CPTII,95,, | Performed by: STUDENT IN AN ORGANIZED HEALTH CARE EDUCATION/TRAINING PROGRAM

## 2023-12-26 PROCEDURE — 99213 OFFICE O/P EST LOW 20 MIN: CPT | Mod: 95,,, | Performed by: STUDENT IN AN ORGANIZED HEALTH CARE EDUCATION/TRAINING PROGRAM

## 2023-12-26 PROCEDURE — 3044F HG A1C LEVEL LT 7.0%: CPT | Mod: CPTII,95,, | Performed by: STUDENT IN AN ORGANIZED HEALTH CARE EDUCATION/TRAINING PROGRAM

## 2023-12-26 RX ORDER — DESOGESTREL AND ETHINYL ESTRADIOL 0.15-0.03
1 KIT ORAL DAILY
Qty: 90 TABLET | Refills: 3 | Status: SHIPPED | OUTPATIENT
Start: 2023-12-26 | End: 2024-12-25

## 2023-12-26 NOTE — PATIENT INSTRUCTIONS
Two of the primary ways that diet affects PCOS are weight management and insulin production and resistance.    However, insulin plays a significant role in PCOS, so managing insulin levels with a PCOS diet is one of the best steps people can take to manage the condition.    Many people with PCOS have insulin resistance. In fact, more than 50 percent of those with PCOS develop diabetes or pre-diabetes before the age of 40. Diabetes is directly related to how the body processes insulin.    Following a diet that meets a persons nutritional needs, maintains a healthy weight, and promotes good insulin levels can help people with PCOS feel better.    Foods to eat  Research has found that what people eat has a significant effect on PCOS. That said, there is currently no standard diet for PCOS.    However, there is widespread agreement about which foods are beneficial and seem to help people manage their condition, and which foods to avoid.    Three diets that may help people with PCOS manage their symptoms are:    A low glycemic index (GI) diet: The body digests foods with a low GI more slowly, meaning they do not cause insulin levels to rise as much or as quickly as other foods, such as some carbohydrates. Foods in a low GI diet include whole grains, legumes, nuts, seeds, fruits, starchy vegetables, and other unprocessed, low-carbohydrate foods.  An anti-inflammatory diet: Anti-inflammatory foods, such as berries, fatty fish, leafy greens, and extra virgin olive oil, may reduce inflammation-related symptoms, such as fatigue.  The DASH diet: Doctors often recommend the Dietary Approaches to Stop Hypertension (DASH) diet to reduce the risk or impact of heart disease. It may also help manage PCOS symptoms. A DASH diet is rich in fish, poultry, fruits, vegetables whole grain, and low-fat dairy produce. The diet discourages foods that are high in saturated fat and sugar.  A 2015 study found that obese women who followed a  specially-designed DASH diet for 8 weeks saw a reduction in insulin resistance and belly fat compared to those that did not follow the same diet.    A healthful PCOS diet can also include the following foods:    natural, unprocessed foods  high-fiber foods  fatty fish, including salmon, tuna, sardines, and mackerel  kale, spinach, and other dark, leafy greens  dark red fruits, such as red grapes, blueberries, blackberries, and cherries  broccoli and cauliflower  dried beans, lentils, and other legumes  healthful fats, such as olive oil, as well as avocados and coconuts  nuts, including pine nuts, walnuts, almonds, and pistachios  dark chocolate in moderation  spices, such as turmeric and cinnamon  Researchers looking at a range of healthful diet plans found the following slight differences. For example:    Individuals lost more weight with a diet emphasizing mono-unsaturated fats rather than saturated fats. An example of this kind of diet is the anti-inflammatory diet, which encourages people to eat plant-based fats, such as olive and other vegetable oils.  People who followed a low-carbohydrate or a low-GI diet saw improved insulin metabolism and lower cholesterol levels. People with PCOS who followed a low-GI diet also reported a better quality of life and more regular periods.  In general, studies have found that losing weight helps women with PCOS, regardless of which specific kind of diet they follow.

## 2023-12-26 NOTE — PROGRESS NOTES
The patient location is:  Patient Home   The chief complaint leading to consultation is: PCOS  Visit type: Virtual visit with synchronous audio and video  Total time spent with patient: 20 min  Each patient to whom he or she provides medical services by telemedicine is:  (1) informed of the relationship between the physician and patient and the respective role of any other health care provider with respect to management of the patient; and (2) notified that he or she may decline to receive medical services by telemedicine and may withdraw from such care at any time.      Pt presents to follow up on PCOS diagnosis  She was diagnosed earlier in life due to oligomenorrhea  She came off of OCP and her primary ordered hormonal panel which was all normal.  She felt the same both on and off pill, tolerating it well  She is also taking metformin, tolerating it well  She is concerned about future fertility in setting of PCOS diagnosis. She is not wanting to get pregnant at this time  Having a hard time losing weight and is having a hard time tolerating weight loss medications      Reviewed PCOS and fertility as well as weight  Recommend changing to desogestrel-containing pill to help with weight loss  Reassured her of measures to improve ovulation when the time comes  PCOS info on AVS    Karlene Larson M.D.

## 2024-01-09 ENCOUNTER — CLINICAL SUPPORT (OUTPATIENT)
Dept: REHABILITATION | Facility: HOSPITAL | Age: 29
End: 2024-01-09
Payer: COMMERCIAL

## 2024-01-09 DIAGNOSIS — M54.2 NECK PAIN: Primary | ICD-10-CM

## 2024-01-09 PROCEDURE — 97140 MANUAL THERAPY 1/> REGIONS: CPT

## 2024-01-09 PROCEDURE — 97112 NEUROMUSCULAR REEDUCATION: CPT

## 2024-01-09 NOTE — PROGRESS NOTES
Physical Therapy Daily Treatment Note     Name: Erlinda Rain  Clinic Number: [id number]    Therapy Diagnosis:   Encounter Diagnosis   Name Primary?    Neck pain Yes     Physician: Mildred Pierre P*    Visit Date: 2024    Physician Orders: PT Eval and Treat   Medical Diagnosis from Referral: M54.12 (ICD-10-CM) - Cervical radiculopathy   Evaluation Date: 2023  Authorization Period Expiration: 12/3/24  Plan of Care Expiration: 24  Visit # / Visits authorized:      Time In: 1000 am  Time Out: 1100 am  Total Billable Time: 60 minutes     Precautions: Standard    Subjective      Pt reports:doing a little better.    she was compliant with home exercise program given last session.   Response to previous treatment:NA  Functional change: NA    Pain: not quantified this visit/10  Location: right neck      Objective     Erlinda received therapeutic exercises to develop ROM, flexibility, and posture for 10 minutes includin/2 foam pec stretch x5 min  Thoracic ext over foam roller x3 min-np  FOTO-np  Pt education and HEP review      Erlinda received the following manual therapy techniques: Joint mobilizations, Manual traction, and Soft tissue Mobilization were applied to the: cervical spine for 10 minutes, including:  Suboccipital release x5 bouts  STM cervical paraspinals      Erlinda participated in neuromuscular re-education activities to improve: Coordination, Kinesthetic, Sense, Proprioception, and Posture for 40 minutes. The following activities were included  Chin tuck rotation 2x10 prone  Prone cervical retraction 10 sec x10  4 way cervical isometrics with hollow ball 3x30 sec ea  Cheerleaders rtb 2x10  Median and ulnar nerve glides 2x10 R/ Median B  No money rtb 2x15 with 5 sec hold-np  Shoulder ext gtb 2x15 B with 5 sec hold      Home Exercises Provided and Patient Education Provided     Education provided:   - HEP to Go    Written Home Exercises Provided: Patient instructed  Medicare Preventive Visit Patient Instructions  Thank you for completing your Welcome to Medicare Visit or Medicare Annual Wellness Visit today  Your next wellness visit will be due in one year (10/24/2020)  The screening/preventive services that you may require over the next 5-10 years are detailed below  Some tests may not apply to you based off risk factors and/or age  Screening tests ordered at today's visit but not completed yet may show as past due  Also, please note that scanned in results may not display below  Preventive Screenings:  Service Recommendations Previous Testing/Comments   Colorectal Cancer Screening  · Colonoscopy    · Fecal Occult Blood Test (FOBT)/Fecal Immunochemical Test (FIT)  · Fecal DNA/Cologuard Test  · Flexible Sigmoidoscopy Age: 54-65 years old   Colonoscopy: every 10 years (May be performed more frequently if at higher risk)  OR  FOBT/FIT: every 1 year  OR  Cologuard: every 3 years  OR  Sigmoidoscopy: every 5 years  Screening may be recommended earlier than age 48 if at higher risk for colorectal cancer  Also, an individualized decision between you and your healthcare provider will decide whether screening between the ages of 74-80 would be appropriate   Colonoscopy: 02/06/2012  FOBT/FIT: Not on file  Cologuard: Not on file  Sigmoidoscopy: Not on file    Screening Current     Prostate Cancer Screening Individualized decision between patient and health care provider in men between ages of 53-78   Medicare will cover every 12 months beginning on the day after your 50th birthday PSA: No results in last 5 years          Hepatitis C Screening Once for adults born between 1945 and 1965  More frequently in patients at high risk for Hepatitis C Hep C Antibody: 09/27/2018    Screening Current   Diabetes Screening 1-2 times per year if you're at risk for diabetes or have pre-diabetes Fasting glucose: No results in last 5 years   A1C: No results in last 5 years    Screening Current "to cont prior HEP.  Exercises were reviewed and Erlinda was able to demonstrate them prior to the end of the session.  Erlinda demonstrated good  understanding of the education provided.     See EMR under Patient Instructions for exercises provided {Anita single:96118::"1/9/2024","prior visit"    Assessment     Pt making good progress with additional scapular stability interventions and more challenging cervical stability. Plan to continue progressing with neural glides, scapular strengthening and cervical stability.  Erlinda Is progressing well towards her goals.   Pt prognosis is Good.     Pt will continue to benefit from skilled outpatient physical therapy to address the deficits listed in the problem list box on initial evaluation, provide pt/family education and to maximize pt's level of independence in the home and community environment.     Pt's spiritual, cultural and educational needs considered and pt agreeable to plan of care and goals.    Anticipated barriers to physical therapy: none    Goals:   Short Term Goals: 4 weeks (Progressing, not met)  - Pt will increase strength to 4/5 B mid and lower trap.   - Decrease Pain to 3/10 as worst with all PT interventions  - Pt to self correct posture with minimal cues  - Pt independent with HEP with progressions.      Long Term Goals (8 Weeks): (Progressing, not met)  - Pt will improve DNF endurance to 40 sec hold  - Pt will increase strength to 5/5 B mid and lower trap  - Decrease Pain to 1/10 as worst at the end of the day  - Pt to return to 90% PLOF          Plan     Continue POC per pt tolerance.    Luanne Rodriguez, PT       " Cholesterol Screening Once every 5 years if you don't have a lipid disorder  May order more often based on risk factors  Lipid panel: 10/10/2019    Screening Current      Other Preventive Screenings Covered by Medicare:  1  Abdominal Aortic Aneurysm (AAA) Screening: covered once if your at risk  You're considered to be at risk if you have a family history of AAA or a male between the age of 73-68 who smoking at least 100 cigarettes in your lifetime  2  Lung Cancer Screening: covers low dose CT scan once per year if you meet all of the following conditions: (1) Age 50-69; (2) No signs or symptoms of lung cancer; (3) Current smoker or have quit smoking within the last 15 years; (4) You have a tobacco smoking history of at least 30 pack years (packs per day x number of years you smoked); (5) You get a written order from a healthcare provider  3  Glaucoma Screening: covered annually if you're considered high risk: (1) You have diabetes OR (2) Family history of glaucoma OR (3)  aged 48 and older OR (3)  American aged 72 and older  3  Osteoporosis Screening: covered every 2 years if you meet one of the following conditions: (1) Have a vertebral abnormality; (2) On glucocorticoid therapy for more than 3 months; (3) Have primary hyperparathyroidism; (4) On osteoporosis medications and need to assess response to drug therapy  5  HIV Screening: covered annually if you're between the age of 12-76  Also covered annually if you are younger than 13 and older than 72 with risk factors for HIV infection  For pregnant patients, it is covered up to 3 times per pregnancy      Immunizations:  Immunization Recommendations   Influenza Vaccine Annual influenza vaccination during flu season is recommended for all persons aged >= 6 months who do not have contraindications   Pneumococcal Vaccine (Prevnar and Pneumovax)  * Prevnar = PCV13  * Pneumovax = PPSV23 Adults 25-60 years old: 1-3 doses may be recommended based on certain risk factors  Adults 72 years old: Prevnar (PCV13) vaccine recommended followed by Pneumovax (PPSV23) vaccine  If already received PPSV23 since turning 65, then PCV13 recommended at least one year after PPSV23 dose  Hepatitis B Vaccine 3 dose series if at intermediate or high risk (ex: diabetes, end stage renal disease, liver disease)   Tetanus (Td) Vaccine - COST NOT COVERED BY MEDICARE PART B Following completion of primary series, a booster dose should be given every 10 years to maintain immunity against tetanus  Td may also be given as tetanus wound prophylaxis  Tdap Vaccine - COST NOT COVERED BY MEDICARE PART B Recommended at least once for all adults  For pregnant patients, recommended with each pregnancy  Shingles Vaccine (Shingrix) - COST NOT COVERED BY MEDICARE PART B  2 shot series recommended in those aged 48 and above     Health Maintenance Due:      Topic Date Due    CRC Screening: Colonoscopy  02/06/2017    Hepatitis C Screening  Completed     Immunizations Due:      Topic Date Due    INFLUENZA VACCINE  07/01/2019     Advance Directives   What are advance directives? Advance directives are legal documents that state your wishes and plans for medical care  These plans are made ahead of time in case you lose your ability to make decisions for yourself  Advance directives can apply to any medical decision, such as the treatments you want, and if you want to donate organs  What are the types of advance directives? There are many types of advance directives, and each state has rules about how to use them  You may choose a combination of any of the following:  · Living will: This is a written record of the treatment you want  You can also choose which treatments you do not want, which to limit, and which to stop at a certain time  This includes surgery, medicine, IV fluid, and tube feedings  · Durable power of  for healthcare [facility]):   This is a written record that states who you want to make healthcare choices for you when you are unable to make them for yourself  This person, called a proxy, is usually a family member or a friend  You may choose more than 1 proxy  · Do not resuscitate (DNR) order:  A DNR order is used in case your heart stops beating or you stop breathing  It is a request not to have certain forms of treatment, such as CPR  A DNR order may be included in other types of advance directives  · Medical directive: This covers the care that you want if you are in a coma, near death, or unable to make decisions for yourself  You can list the treatments you want for each condition  Treatment may include pain medicine, surgery, blood transfusions, dialysis, IV or tube feedings, and a ventilator (breathing machine)  · Values history: This document has questions about your views, beliefs, and how you feel and think about life  This information can help others choose the care that you would choose  Why are advance directives important? An advance directive helps you control your care  Although spoken wishes may be used, it is better to have your wishes written down  Spoken wishes can be misunderstood, or not followed  Treatments may be given even if you do not want them  An advance directive may make it easier for your family to make difficult choices about your care  Weight Management   Why it is important to manage your weight:  Being overweight increases your risk of health conditions such as heart disease, high blood pressure, type 2 diabetes, and certain types of cancer  It can also increase your risk for osteoarthritis, sleep apnea, and other respiratory problems  Aim for a slow, steady weight loss  Even a small amount of weight loss can lower your risk of health problems  How to lose weight safely:  A safe and healthy way to lose weight is to eat fewer calories and get regular exercise   You can lose up about 1 pound a week by decreasing the number of calories you eat by 500 calories each day  Healthy meal plan for weight management:  A healthy meal plan includes a variety of foods, contains fewer calories, and helps you stay healthy  A healthy meal plan includes the following:  · Eat whole-grain foods more often  A healthy meal plan should contain fiber  Fiber is the part of grains, fruits, and vegetables that is not broken down by your body  Whole-grain foods are healthy and provide extra fiber in your diet  Some examples of whole-grain foods are whole-wheat breads and pastas, oatmeal, brown rice, and bulgur  · Eat a variety of vegetables every day  Include dark, leafy greens such as spinach, kale, jens greens, and mustard greens  Eat yellow and orange vegetables such as carrots, sweet potatoes, and winter squash  · Eat a variety of fruits every day  Choose fresh or canned fruit (canned in its own juice or light syrup) instead of juice  Fruit juice has very little or no fiber  · Eat low-fat dairy foods  Drink fat-free (skim) milk or 1% milk  Eat fat-free yogurt and low-fat cottage cheese  Try low-fat cheeses such as mozzarella and other reduced-fat cheeses  · Choose meat and other protein foods that are low in fat  Choose beans or other legumes such as split peas or lentils  Choose fish, skinless poultry (chicken or turkey), or lean cuts of red meat (beef or pork)  Before you cook meat or poultry, cut off any visible fat  · Use less fat and oil  Try baking foods instead of frying them  Add less fat, such as margarine, sour cream, regular salad dressing and mayonnaise to foods  Eat fewer high-fat foods  Some examples of high-fat foods include french fries, doughnuts, ice cream, and cakes  · Eat fewer sweets  Limit foods and drinks that are high in sugar  This includes candy, cookies, regular soda, and sweetened drinks  Exercise:  Exercise at least 30 minutes per day on most days of the week   Some examples of exercise include walking, biking, dancing, and swimming  You can also fit in more physical activity by taking the stairs instead of the elevator or parking farther away from stores  Ask your healthcare provider about the best exercise plan for you  © Copyright [employer] 2018 Information is for End User's use only and may not be sold, redistributed or otherwise used for commercial purposes  All illustrations and images included in CareNotes® are the copyrighted property of [employer]  or [facility] current medications  Improve her diet on the cholesterol  I would recheck this in 6 months because it is elevated  Cut down on the total carbs, the refined sugars and alcohol  Follow-up with cardiologist   Ebonie Sharma have declined shingles vaccines  You have agreed to do  ColoGuard which will have sent to the house  This would be in lieu of colonoscopy which is probably the best thing for you to have

## 2024-01-10 ENCOUNTER — PATIENT MESSAGE (OUTPATIENT)
Dept: REHABILITATION | Facility: HOSPITAL | Age: 29
End: 2024-01-10
Payer: COMMERCIAL

## 2024-01-11 ENCOUNTER — CLINICAL SUPPORT (OUTPATIENT)
Dept: REHABILITATION | Facility: HOSPITAL | Age: 29
End: 2024-01-11
Payer: COMMERCIAL

## 2024-01-11 ENCOUNTER — PATIENT MESSAGE (OUTPATIENT)
Dept: ORTHOPEDICS | Facility: CLINIC | Age: 29
End: 2024-01-11
Payer: COMMERCIAL

## 2024-01-11 DIAGNOSIS — R29.898 DECREASED GRIP STRENGTH OF RIGHT HAND: Primary | ICD-10-CM

## 2024-01-11 PROCEDURE — 97530 THERAPEUTIC ACTIVITIES: CPT

## 2024-01-11 PROCEDURE — 97110 THERAPEUTIC EXERCISES: CPT

## 2024-01-11 NOTE — PROGRESS NOTES
"      [facility]  Occupational Therapy Treatment Note     Date: 1/11/2024  Name: Erlinda Rain  Clinic Number: [id number]    Therapy Diagnosis:   Encounter Diagnosis   Name Primary?    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   Response to previous treatment: no change   Functional change: able to perform work outs     Pain: 2/10  Location: right wrists      Objective     Objective Measures updated at progress report unless specified.  Observation/Appearance:  Skin intact          Edema. Measured in centimeters.    10/19/2023 10/19/2023     Right  Left    Proximal Wrist Crease 15.8 cm  16.0 cm         Hand ROM. Measured in degrees.    10/19/2023 10/19/2023 11/29/2023     Right  Left  Right     Able to make full composite fist                Thumb: MP 0/52 0/53 53                IP 0/58 0/71 57       Rad ADD/ABD WNL WNL WNL       Pal ADD/ABD WNL WNL  WNL          Opposition DPC DPC DPC          Elbow and Wrist ROM. Measured in degrees.    11/1/2023 11/1/2023 11/29/2023     Left Right Right   Supination/Pronation 61/ WNL  61/WNL 61/   Wrist Ext/Flex 70/55 70/59 70/64   Wrist RD/UD 24/36 15/29 15/30            Sensation  NT Distribution 10/19/2023 10/19/2023     Right  Left    Thompson Magali       Normal 1.65-2.83       Diminished " Light Touch 3.22-3.61       Diminished Protective 3.84-4.31       Loss of Protective 4.56-6.65       Untestable >6.65       2 Point Discrimination       Static       Dynamic             Special Tests:   Right    10/19/2023   Thumb CMC Grind Test     Metacarpal Stress Test     Piano Key Test     ECU Synergy Test Pos    Ulnar Compression Test     TFCC Load Test     Ulnocarpal Stress Test pos   Midcarpal Shift Test     Pisiform Boost Test pos          Strength (Dyanmometer) and Pinch Strength (Pinch Gauge)  Measured in pounds and psi. Average of three trials.       10/19/2023 10/19/2023 11/29/2023 1/11/2024     Right  Left  Right Right   Rung II         Pronation 18 25 35.5  35/ 42/38   Neutral  24 27 37 40/41/45   Supination  30 19.5 42.5 42/41/40   Greenberg Pinch 9.5 10.5  16 19.5   3pt Pinch 7.5 7 11.5 15/11   2pt Pinch 5 3 9 11           Intake Outcome Measure for FOTO initial eval; wrist  Survey     Therapist reviewed FOTO scores for Erlinda Rain on 10/19/2023.   FOTO report - see Media section or FOTO account episode details.     Intake Score: 17.5%       Treatment     Erlinda received the treatments listed below:          Therapeutic exercises to develop strength and endurance for 30 minutes including:  Brachialis with yellow theraband x 10 reps   FCU yellow theraband x 10 reps   ECU yellow theraband x 10 reps   4 corners red flexbar x 10 reps   Dart throwers with red flexbar x 10 reps   Walking around 2x laps with marbles holding in each quadrant         manual therapy techniques: Soft tissue Mobilization were applied to the: R for 15 minutes, including:  ECU STM hawk's    Hypothenar soft tissue mobilization   KT tape applied; pisiform boost, distal fascial glide to offload ulnar styloid tension       NT supervised modalities after being cleared for contradictions: Fluido Fluidotherapy: To R wrist  for 10 min, continuous air, 115 deg, air speed 50 to decrease pain, edema & scar tissue, sensory re- education,  and increased tissue extensibility prior to therex      Paraffin bath with MHP for 10 minutes         Patient Education and Home Exercises     Education provided:   - cont to avoid painful activities   - Progress towards goals     Written Home Exercises Provided: Patient instructed to cont prior HEP.  Exercises were reviewed and Erlinda was able to demonstrate them prior to the end of the session.  Erlinda demonstrated good  understanding of the home exercise program provided. See electronic medical record under Patient Instructions for exercises provided during therapy sessions.       Assessment     Added new isolated theraband exercises.     Erlinda is progressing well towards her goals and there are no updates to goals at this time. Pt prognosis is Fair.     Patient will continue to benefit from skilled outpatient occupational therapy to address the deficits listed in the problem list on initial evaluation provide patient/family education and to maximize patient's level of independence in the home and community environment.     Patient's spiritual, cultural and educational needs considered and patient agreeable to plan of care and goals.    Anticipated barriers to occupational therapy:     Goals:  Long Term Goals (LTGs); to be met by discharge.  Pt will demo improved  strength by 5 pounds   Pt will report improved FOTO score by 10 points   Pt will report pain level no greater than 1/10 during functional daily work and community activities   Pt will demo improved pinch strength by 3 pounds      Short Term Goals (STGs); to be met within 4 weeks ().  Pt will demo improved  strength by 2-3 pounds   Pt will improve RD/UD ROM by 3-5 degrees   Pt will report pain level no greater than 3/10 during light activity   Pt will demo improved pinch strength by 2 pounds     Plan     Updates/Grading for next session:     Cira Fernandes OT   1/11/2024

## 2024-01-14 DIAGNOSIS — M22.42 CHONDROMALACIA OF PATELLOFEMORAL JOINT, LEFT: ICD-10-CM

## 2024-01-16 RX ORDER — CELECOXIB 200 MG/1
200 CAPSULE ORAL
Qty: 30 CAPSULE | Refills: 0 | Status: SHIPPED | OUTPATIENT
Start: 2024-01-16 | End: 2024-02-19

## 2024-01-17 ENCOUNTER — CLINICAL SUPPORT (OUTPATIENT)
Dept: REHABILITATION | Facility: HOSPITAL | Age: 29
End: 2024-01-17
Payer: COMMERCIAL

## 2024-01-17 DIAGNOSIS — R29.898 DECREASED GRIP STRENGTH OF RIGHT HAND: Primary | ICD-10-CM

## 2024-01-17 PROCEDURE — 97530 THERAPEUTIC ACTIVITIES: CPT

## 2024-01-17 PROCEDURE — 97018 PARAFFIN BATH THERAPY: CPT

## 2024-01-17 PROCEDURE — 97110 THERAPEUTIC EXERCISES: CPT

## 2024-01-17 PROCEDURE — 97112 NEUROMUSCULAR REEDUCATION: CPT

## 2024-01-17 NOTE — PROGRESS NOTES
"      [facility]  Occupational Therapy Treatment Note     Date: 1/17/2024  Name: Erlinda Rain  Clinic Number: [id number]    Therapy Diagnosis:   Encounter Diagnosis   Name Primary?    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   Response to previous treatment: no change   Functional change: able to perform work outs     Pain: 1/10  Location: right wrists      Objective     Objective Measures updated at progress report unless specified.  Observation/Appearance:  Skin intact          Edema. Measured in centimeters.    10/19/2023 10/19/2023     Right  Left    Proximal Wrist Crease 15.8 cm  16.0 cm         Hand ROM. Measured in degrees.    10/19/2023 10/19/2023 11/29/2023     Right  Left  Right     Able to make full composite fist                Thumb: MP 0/52 0/53 53                IP 0/58 0/71 57       Rad ADD/ABD WNL WNL WNL       Pal ADD/ABD WNL WNL  WNL          Opposition DPC DPC DPC          Elbow and Wrist ROM. Measured in degrees.    11/1/2023 11/1/2023 11/29/2023     Left Right Right   Supination/Pronation 61/ WNL  61/WNL 61/   Wrist Ext/Flex 70/55 70/59 70/64   Wrist RD/UD 24/36 15/29 15/30            Sensation  NT Distribution 10/19/2023 10/19/2023     Right  Left    [place] Magali       Normal 1.65-2.83       Diminished Light Touch 3.22-3.61       Diminished " Protective 3.84-4.31       Loss of Protective 4.56-6.65       Untestable >6.65       2 Point Discrimination       Static       Dynamic             Special Tests:   Right    10/19/2023   Thumb CMC Grind Test     Metacarpal Stress Test     Piano Key Test     ECU Synergy Test Pos    Ulnar Compression Test     TFCC Load Test     Ulnocarpal Stress Test pos   Midcarpal Shift Test     Pisiform Boost Test pos          Strength (Dyanmometer) and Pinch Strength (Pinch Gauge)  Measured in pounds and psi. Average of three trials.       10/19/2023 10/19/2023 11/29/2023 1/11/2024     Right  Left  Right Right   Rung II         Pronation 18 25 35.5  39   Neutral  24 27 37 42   Supination  30 19.5 42.5 41   Greenberg Pinch 9.5 10.5  16 19.5   3pt Pinch 7.5 7 11.5 13   2pt Pinch 5 3 9 11           Intake Outcome Measure for FOTO initial eval; wrist  Survey     Therapist reviewed FOTO scores for Erlinda Rain on 10/19/2023.   FOTO report - see Media section or FOTO account episode details.     Intake Score: 17.5%       Treatment     Erlinda received the treatments listed below:          Therapeutic exercises to develop strength and endurance for 10 minutes including:  3pt pinch red chip clip x2  Lateral pinch red chip clip x2   Wrist PRE 2# ext/flex x 10 reps       Therapeutic activities: 10 min   Putty ciser tool (5 min) with pink putty   FCU marble maze x 3    manual therapy techniques: Soft tissue Mobilization were applied to the: R for 15 minutes, including: NT   ECU STM hawk's    Hypothenar soft tissue mobilization   KT tape applied; pisiform boost, distal fascial glide to offload ulnar styloid tension       Neuro Re-ed: 15 min  KT tape ulnar sided wrist pain; pisiform boost, thenar adjustment       Paraffin bath with MHP for 10 minutes         Patient Education and Home Exercises     Education provided:   - cont to avoid painful activities   - Progress towards goals     Written Home Exercises Provided: Patient instructed to cont  prior HEP.  Exercises were reviewed and Erlinda was able to demonstrate them prior to the end of the session.  Erlinad demonstrated good  understanding of the home exercise program provided. See electronic medical record under Patient Instructions for exercises provided during therapy sessions.       Assessment     Fatigue following resistive exercise.     Erlinda is progressing well towards her goals and there are no updates to goals at this time. Pt prognosis is Fair.     Patient will continue to benefit from skilled outpatient occupational therapy to address the deficits listed in the problem list on initial evaluation provide patient/family education and to maximize patient's level of independence in the home and community environment.     Patient's spiritual, cultural and educational needs considered and patient agreeable to plan of care and goals.    Anticipated barriers to occupational therapy:     Goals:  Long Term Goals (LTGs); to be met by discharge.  Pt will demo improved  strength by 5 pounds   Pt will report improved FOTO score by 10 points   Pt will report pain level no greater than 1/10 during functional daily work and community activities   Pt will demo improved pinch strength by 3 pounds      Short Term Goals (STGs); to be met within 4 weeks ().  Pt will demo improved  strength by 2-3 pounds   Pt will improve RD/UD ROM by 3-5 degrees   Pt will report pain level no greater than 3/10 during light activity   Pt will demo improved pinch strength by 2 pounds     Plan     Updates/Grading for next session:     Cira Fernandes OT   1/17/2024

## 2024-01-23 ENCOUNTER — CLINICAL SUPPORT (OUTPATIENT)
Dept: REHABILITATION | Facility: HOSPITAL | Age: 29
End: 2024-01-23
Payer: COMMERCIAL

## 2024-01-23 DIAGNOSIS — M54.2 NECK PAIN: Primary | ICD-10-CM

## 2024-01-23 DIAGNOSIS — R29.898 DECREASED GRIP STRENGTH OF RIGHT HAND: Primary | ICD-10-CM

## 2024-01-23 PROCEDURE — 97112 NEUROMUSCULAR REEDUCATION: CPT

## 2024-01-23 PROCEDURE — 97530 THERAPEUTIC ACTIVITIES: CPT

## 2024-01-23 PROCEDURE — 97110 THERAPEUTIC EXERCISES: CPT

## 2024-01-23 PROCEDURE — 97140 MANUAL THERAPY 1/> REGIONS: CPT

## 2024-01-23 NOTE — PROGRESS NOTES
Physical Therapy Daily Treatment Note     Name: Erlinda Rain  Clinic Number: [id number]    Therapy Diagnosis:   Encounter Diagnosis   Name Primary?    Neck pain Yes     Physician: Mildred Pierre P*    Visit Date: 2024    Physician Orders: PT Eval and Treat   Medical Diagnosis from Referral: M54.12 (ICD-10-CM) - Cervical radiculopathy   Evaluation Date: 2023  Authorization Period Expiration: 12/3/24  Plan of Care Expiration: 24  Visit # / Visits authorized:      Time In: 1000 am  Time Out: 1050 am  Total Billable Time: 50 minutes     Precautions: Standard    Subjective      Pt reports:tired and neck/upper thoracic spine is stiff today.   she was compliant with home exercise program given last session.   Response to previous treatment:NA  Functional change: NA    Pain: not quantified this visit/10  Location: right neck      Objective     Erlinda received therapeutic exercises to develop ROM, flexibility, and posture for 10 minutes includin/2 foam pec stretch x5 min  Thoracic ext over foam roller x3 min-  FOTO-np  Pt education and HEP review      Erlinda received the following manual therapy techniques: Joint mobilizations, Manual traction, and Soft tissue Mobilization were applied to the: cervical spine for 10 minutes, including:  Suboccipital release x5 bouts  STM cervical paraspinals  Manual distraction cervical spine x3 bouts      Erlinda participated in neuromuscular re-education activities to improve: Coordination, Kinesthetic, Sense, Proprioception, and Posture for 30 minutes. The following activities were included  Chin tuck rotation 2x10 prone-np  Prone cervical retraction 20 sec x5  Open books ytb 2x10 with 5 sec hold B  4 way cervical isometrics with hollow ball 2x45 sec ea  Cheerleaders rtb 2x10  Median and ulnar nerve glides 2x10 R/ Median B  No money rtb 2x15 with 5 sec hold-  Shoulder ext gtb 2x15 B with 5 sec hold      Home Exercises Provided and Patient  "Education Provided     Education provided:   - HEP to Go    Written Home Exercises Provided: Patient instructed to cont prior HEP.  Exercises were reviewed and Erlinda was able to demonstrate them prior to the end of the session.  Erlinda demonstrated good  understanding of the education provided.     See EMR under Patient Instructions for exercises provided {Blank single:79880::"1/23/2024","prior visit"    Assessment     Pt responding well with manual techniques applied to improver upper cervical tension and CT stabilty and strengthening. Plan to continue progressing with neural glides, scapular strengthening and cervical stability.  Erlinda Is progressing well towards her goals.   Pt prognosis is Good.     Pt will continue to benefit from skilled outpatient physical therapy to address the deficits listed in the problem list box on initial evaluation, provide pt/family education and to maximize pt's level of independence in the home and community environment.     Pt's spiritual, cultural and educational needs considered and pt agreeable to plan of care and goals.    Anticipated barriers to physical therapy: none    Goals:   Short Term Goals: 4 weeks (Progressing, not met)  - Pt will increase strength to 4/5 B mid and lower trap.   - Decrease Pain to 3/10 as worst with all PT interventions  - Pt to self correct posture with minimal cues  - Pt independent with HEP with progressions.      Long Term Goals (8 Weeks): (Progressing, not met)  - Pt will improve DNF endurance to 40 sec hold  - Pt will increase strength to 5/5 B mid and lower trap  - Decrease Pain to 1/10 as worst at the end of the day  - Pt to return to 90% PLOF          Plan     Continue POC per pt tolerance.    Luanne Rodriguez, PT       "

## 2024-01-23 NOTE — PROGRESS NOTES
"        [facility]  Occupational Therapy Treatment Note     Date: 1/23/2024  Name: Erlinda Rain  Clinic Number: [id number]    Therapy Diagnosis:   Encounter Diagnosis   Name Primary?    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   Response to previous treatment: no change   Functional change: able to perform work outs     Pain: 1/10  Location: right wrists      Objective     Objective Measures updated at progress report unless specified.  Observation/Appearance:  Skin intact          Edema. Measured in centimeters.    10/19/2023 10/19/2023     Right  Left    Proximal Wrist Crease 15.8 cm  16.0 cm         Hand ROM. Measured in degrees.    10/19/2023 10/19/2023 11/29/2023     Right  Left  Right     Able to make full composite fist                Thumb: MP 0/52 0/53 53                IP 0/58 0/71 57       Rad ADD/ABD WNL WNL WNL       Pal ADD/ABD WNL WNL  WNL          Opposition DPC DPC DPC          Elbow and Wrist ROM. Measured in degrees.    11/1/2023 11/1/2023 11/29/2023     Left Right Right   Supination/Pronation 61/ WNL  61/WNL 61/   Wrist Ext/Flex 70/55 70/59 70/64   Wrist RD/UD 24/36 15/29 15/30            Sensation  NT Distribution 10/19/2023 10/19/2023     Right  Left    [place] Magali       Normal 1.65-2.83       Diminished Light Touch 3.22-3.61       Diminished Protective " 3.84-4.31       Loss of Protective 4.56-6.65       Untestable >6.65       2 Point Discrimination       Static       Dynamic             Special Tests:   Right    10/19/2023   Thumb CMC Grind Test     Metacarpal Stress Test     Piano Key Test     ECU Synergy Test Pos    Ulnar Compression Test     TFCC Load Test     Ulnocarpal Stress Test pos   Midcarpal Shift Test     Pisiform Boost Test pos          Strength (Dyanmometer) and Pinch Strength (Pinch Gauge)  Measured in pounds and psi. Average of three trials.       10/19/2023 10/19/2023 11/29/2023 1/11/2024 1/23/2024     Right  Left  Right Right Right   Rung II          Pronation 18 25 35.5  39 42   Neutral  24 27 37 42 38   Supination  30 19.5 42.5 41 39.5   Greenberg Pinch 9.5 10.5  16 19.5 19.5   3pt Pinch 7.5 7 11.5 13 15.5   2pt Pinch 5 3 9 11 14.5            Intake Outcome Measure for FOTO initial eval; wrist  Survey     Therapist reviewed FOTO scores for Erlinda Rain on 10/19/2023.   FOTO report - see Media section or FOTO account episode details.     Intake Score: 17.5%       Treatment     Erlinda received the treatments listed below:          Therapeutic exercises to develop strength and endurance for 30 minutes including:  Updated objective measurements, please see above   Blue sponges digit adduction x 15 reps (2 sets)   3pt pinch red chip clip x2  Lateral pinch red chip clip x2   Wrist PRE 2# ext/flex x 10 reps   4 corners red flexbar x 10 reps (2 sets)   Dart throwers green flexbar x 10 reps (2 sets)   Isometric wrist ext/flex with forearm supinated x 10 reps (2 sets)      Therapeutic activities: 10 min   Gripper 3rd setting large and small pegs with varying forearm rotation      Neuro Re-ed: 15 min  KT tape ulnar sided wrist pain; pisiform boost, thenar adjustment       Paraffin bath with MHP for 10 minutes         Patient Education and Home Exercises     Education provided:   - cont to avoid painful activities   - Progress towards goals     Written Home  Exercises Provided: Patient instructed to cont prior HEP.  Exercises were reviewed and Erlinda was able to demonstrate them prior to the end of the session.  Erlinda demonstrated good  understanding of the home exercise program provided. See electronic medical record under Patient Instructions for exercises provided during therapy sessions.       Assessment     Minor discomfort dorsal ulnar wrist with resistive exercise. Reminded pt to cont to perform HEP to address strength and endurance deficits.     Erlinda is progressing well towards her goals and there are no updates to goals at this time. Pt prognosis is Fair.     Patient will continue to benefit from skilled outpatient occupational therapy to address the deficits listed in the problem list on initial evaluation provide patient/family education and to maximize patient's level of independence in the home and community environment.     Patient's spiritual, cultural and educational needs considered and patient agreeable to plan of care and goals.    Anticipated barriers to occupational therapy:     Goals:  Long Term Goals (LTGs); to be met by discharge.  Pt will demo improved  strength by 5 pounds   Pt will report improved FOTO score by 10 points   Pt will report pain level no greater than 1/10 during functional daily work and community activities   Pt will demo improved pinch strength by 3 pounds      Short Term Goals (STGs); to be met within 4 weeks ().  Pt will demo improved  strength by 2-3 pounds  MET   Pt will improve RD/UD ROM by 3-5 degrees   Pt will report pain level no greater than 3/10 during light activity   Pt will demo improved pinch strength by 2 pounds MET    Plan     Updates/Grading for next session:     Cira Fernandes, MICHAEL   1/23/2024

## 2024-01-26 ENCOUNTER — PATIENT MESSAGE (OUTPATIENT)
Dept: PRIMARY CARE CLINIC | Facility: CLINIC | Age: 29
End: 2024-01-26
Payer: COMMERCIAL

## 2024-01-30 ENCOUNTER — CLINICAL SUPPORT (OUTPATIENT)
Dept: REHABILITATION | Facility: HOSPITAL | Age: 29
End: 2024-01-30
Payer: COMMERCIAL

## 2024-01-30 DIAGNOSIS — M54.2 NECK PAIN: Primary | ICD-10-CM

## 2024-01-30 DIAGNOSIS — R29.898 DECREASED GRIP STRENGTH OF RIGHT HAND: Primary | ICD-10-CM

## 2024-01-30 PROCEDURE — 97110 THERAPEUTIC EXERCISES: CPT

## 2024-01-30 PROCEDURE — 97112 NEUROMUSCULAR REEDUCATION: CPT

## 2024-01-30 PROCEDURE — 97140 MANUAL THERAPY 1/> REGIONS: CPT

## 2024-01-30 PROCEDURE — 97530 THERAPEUTIC ACTIVITIES: CPT

## 2024-01-30 NOTE — PROGRESS NOTES
Physical Therapy Daily Treatment Note     Name: Erlinda Rain  Clinic Number: [id number]    Therapy Diagnosis:   Encounter Diagnosis   Name Primary?    Neck pain Yes     Physician: Mildred Pierre P*    Visit Date: 2024    Physician Orders: PT Eval and Treat   Medical Diagnosis from Referral: M54.12 (ICD-10-CM) - Cervical radiculopathy   Evaluation Date: 2023  Authorization Period Expiration: 12/3/24  Plan of Care Expiration: 24  Visit # / Visits authorized: 3/20     Time In: 900 am  Time Out: 955 am  Total Billable Time: 55 minutes     Precautions: Standard    Subjective      Pt reports:no new complaints, back in school.    she was compliant with home exercise program given last session.   Response to previous treatment:NA  Functional change: NA    Pain: not quantified this visit/10  Location: right neck      Objective     Erlinda received therapeutic exercises to develop ROM, flexibility, and posture for 00 minutes includin/2 foam pec stretch x5 min-np  Thoracic ext over foam roller x3 min-np  FOTO-np  Pt education and HEP review      Erlinda received the following manual therapy techniques: Joint mobilizations, Manual traction, and Soft tissue Mobilization were applied to the: cervical spine for 10 minutes, including:  Suboccipital release x5 bouts  STM cervical paraspinals  Manual distraction cervical spine x3 bouts      Erlinda participated in neuromuscular re-education activities to improve: Coordination, Kinesthetic, Sense, Proprioception, and Posture for 45 minutes. The following activities were included  UBE 3'/3'  Chin tuck rotation 2x10 prone-np  Prone cervical retraction 30 sec x3  Prone swimmer BUE 2x10  Open books rtb 2x10 with 5 sec hold B  4 way cervical isometrics with hollow ball 2x45 sec ea  Cheerleaders gtb 2x10  Median and ulnar nerve glides 2x10 R/ Median B-np  No money gtb 2x15 with 5 sec hold-np  Shoulder ext gtb 2x15 B with 5 sec hold      Home  "Exercises Provided and Patient Education Provided     Education provided:   - HEP to Go    Written Home Exercises Provided: Patient instructed to cont prior HEP.  Exercises were reviewed and Erlinda was able to demonstrate them prior to the end of the session.  Erlinda demonstrated good  understanding of the education provided.     See EMR under Patient Instructions for exercises provided {Blank single:29987::"1/30/2024","prior visit"    Assessment     Pt able to progress toward thoracic mobility and cervical stability with no adverse effects. Plan to continue progressing with neural glides, scapular strengthening and cervical stability.  Erlinda Is progressing well towards her goals.   Pt prognosis is Good.     Pt will continue to benefit from skilled outpatient physical therapy to address the deficits listed in the problem list box on initial evaluation, provide pt/family education and to maximize pt's level of independence in the home and community environment.     Pt's spiritual, cultural and educational needs considered and pt agreeable to plan of care and goals.    Anticipated barriers to physical therapy: none    Goals:   Short Term Goals: 4 weeks (Progressing, not met)  - Pt will increase strength to 4/5 B mid and lower trap.   - Decrease Pain to 3/10 as worst with all PT interventions  - Pt to self correct posture with minimal cues  - Pt independent with HEP with progressions.      Long Term Goals (8 Weeks): (Progressing, not met)  - Pt will improve DNF endurance to 40 sec hold  - Pt will increase strength to 5/5 B mid and lower trap  - Decrease Pain to 1/10 as worst at the end of the day  - Pt to return to 90% PLOF          Plan     Continue POC per pt tolerance.    Luanne Rodriguez, PT       "

## 2024-02-05 NOTE — PROGRESS NOTES
"          [facility]  Occupational Therapy Treatment Note     Date: 1/30/2024  Name: Erlinda Rain  Clinic Number: [id number]    Therapy Diagnosis:   Encounter Diagnosis   Name Primary?    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   Response to previous treatment: no change   Functional change: able to perform work outs     Pain: 1/10  Location: right wrists      Objective     Objective Measures updated at progress report unless specified.  Observation/Appearance:  Skin intact          Edema. Measured in centimeters.    10/19/2023 10/19/2023     Right  Left    Proximal Wrist Crease 15.8 cm  16.0 cm         Hand ROM. Measured in degrees.    10/19/2023 10/19/2023 11/29/2023     Right  Left  Right     Able to make full composite fist                Thumb: MP 0/52 0/53 53                IP 0/58 0/71 57       Rad ADD/ABD WNL WNL WNL       Pal ADD/ABD WNL WNL  WNL          Opposition DPC DPC DPC          Elbow and Wrist ROM. Measured in degrees.    11/1/2023 11/1/2023 11/29/2023     Left Right Right   Supination/Pronation 61/ WNL  61/WNL 61/   Wrist Ext/Flex 70/55 70/59 70/64   Wrist RD/UD 24/36 15/29 15/30            Sensation  NT Distribution 10/19/2023 10/19/2023     Right  Left    Severance Magali       Normal 1.65-2.83       Diminished Light Touch 3.22-3.61       Diminished Protective " 3.84-4.31       Loss of Protective 4.56-6.65       Untestable >6.65       2 Point Discrimination       Static       Dynamic             Special Tests:   Right    10/19/2023   Thumb CMC Grind Test     Metacarpal Stress Test     Piano Key Test     ECU Synergy Test Pos    Ulnar Compression Test     TFCC Load Test     Ulnocarpal Stress Test pos   Midcarpal Shift Test     Pisiform Boost Test pos          Strength (Dyanmometer) and Pinch Strength (Pinch Gauge)  Measured in pounds and psi. Average of three trials.       10/19/2023 10/19/2023 11/29/2023 1/11/2024 1/23/2024     Right  Left  Right Right Right   Rung II          Pronation 18 25 35.5  39 42   Neutral  24 27 37 42 38   Supination  30 19.5 42.5 41 39.5   Greenberg Pinch 9.5 10.5  16 19.5 19.5   3pt Pinch 7.5 7 11.5 13 15.5   2pt Pinch 5 3 9 11 14.5            Intake Outcome Measure for FOTO initial eval; wrist  Survey     Therapist reviewed FOTO scores for Erlinda Rain on 10/19/2023.   FOTO report - see Media section or FOTO account episode details.     Intake Score: 17.5%       Treatment     Erlinda received the treatments listed below:          Therapeutic exercises to develop strength and endurance for 30 minutes including:  Updated objective measurements, please see above   Blue sponges digit adduction x 15 reps (2 sets)   3pt pinch red chip clip x2  Lateral pinch red chip clip x2   Wrist PRE 2# ext/flex x 10 reps   4 corners red flexbar x 10 reps (2 sets)   Dart throwers green flexbar x 10 reps (2 sets)   Isometric wrist ext/flex with forearm supinated x 10 reps (2 sets)      Therapeutic activities: 10 min   Gripper 3rd setting large and small pegs with varying forearm rotation      Neuro Re-ed: 15 min NT   KT tape ulnar sided wrist pain; pisiform boost, thenar adjustment       Paraffin bath with MHP for 10 minutes         Patient Education and Home Exercises     Education provided:   - cont to avoid painful activities   - Progress towards goals     Written Home  Exercises Provided: Patient instructed to cont prior HEP.  Exercises were reviewed and Erlinda was able to demonstrate them prior to the end of the session.  Erlinda demonstrated good  understanding of the home exercise program provided. See electronic medical record under Patient Instructions for exercises provided during therapy sessions.       Assessment       Preparing for discharge. Discussion with patient about minimal progression and would be able  cont HEP for strengthening.     Erlinda is progressing well towards her goals and there are no updates to goals at this time. Pt prognosis is Fair.     Patient will continue to benefit from skilled outpatient occupational therapy to address the deficits listed in the problem list on initial evaluation provide patient/family education and to maximize patient's level of independence in the home and community environment.     Patient's spiritual, cultural and educational needs considered and patient agreeable to plan of care and goals.    Anticipated barriers to occupational therapy:     Goals:  Long Term Goals (LTGs); to be met by discharge.  Pt will demo improved  strength by 5 pounds   Pt will report improved FOTO score by 10 points   Pt will report pain level no greater than 1/10 during functional daily work and community activities   Pt will demo improved pinch strength by 3 pounds      Short Term Goals (STGs); to be met within 4 weeks ().  Pt will demo improved  strength by 2-3 pounds  MET   Pt will improve RD/UD ROM by 3-5 degrees   Pt will report pain level no greater than 3/10 during light activity   Pt will demo improved pinch strength by 2 pounds MET    Plan     Updates/Grading for next session:     Cira Fernandes OT   1/30/2024

## 2024-02-16 ENCOUNTER — CLINICAL SUPPORT (OUTPATIENT)
Dept: REHABILITATION | Facility: HOSPITAL | Age: 29
End: 2024-02-16
Payer: COMMERCIAL

## 2024-02-16 DIAGNOSIS — M54.2 NECK PAIN: Primary | ICD-10-CM

## 2024-02-16 PROCEDURE — 97110 THERAPEUTIC EXERCISES: CPT

## 2024-02-16 PROCEDURE — 97112 NEUROMUSCULAR REEDUCATION: CPT

## 2024-02-16 NOTE — PROGRESS NOTES
Physical Therapy Daily Treatment Note     Name: Erlinda Rain  Clinic Number: [id number]    Therapy Diagnosis:   Encounter Diagnosis   Name Primary?    Neck pain Yes     Physician: Mildred Pierre P*    Visit Date: 2024    Physician Orders: PT Eval and Treat   Medical Diagnosis from Referral: M54.12 (ICD-10-CM) - Cervical radiculopathy   Evaluation Date: 2023  Authorization Period Expiration: 12/3/24  Plan of Care Expiration: 3/30/24  Visit # / Visits authorized:      Time In: 100 pm  Time Out: 155 pm  Total Billable Time: 55 minutes     Precautions: Standard    Subjective      Pt reports:may have overdone it during . Has returned to gym.    she was compliant with home exercise program given last session.   Response to previous treatment:NA  Functional change: NA    Pain: not quantified this visit/10  Location: right neck      Objective   Cervical Range of Motion: 24    Degrees Pain   Flexion 60       Extension 70        Right Rotation 75        Left Rotation 70        Right Side Bending 50     Left Side Bending 50       Upper Extremity Strength  (R) UE   (L) UE     Shoulder flexion: 5/5 Shoulder flexion: 5/5   Shoulder Abduction: 5/5 Shoulder abduction: 5/5   Shoulder ER 5/5 Shoulder ER 5/5   Shoulder IR 5/5 Shoulder IR 5/5   Elbow flexion: 5/5 Elbow flexion: 5/5   Elbow extension: 5/5 Elbow extension: 5/5   Lower Trap 4/5 Lower Trap 4/5   Middle Trap 4/5 Middle Trap 4/5      Erlinda received therapeutic exercises to develop ROM, flexibility, and posture for 25 minutes includin/2 foam pec stretch x5 min-np  Thoracic ext over foam roller x3 min  FOTO  PT reassessment  Pt education and HEP review      Erlinda received the following manual therapy techniques: Joint mobilizations, Manual traction, and Soft tissue Mobilization were applied to the: cervical spine for 5 minutes, including:  Suboccipital release x5 bouts  STM cervical paraspinals  Manual  "distraction cervical spine x3 bouts      Erlinda participated in neuromuscular re-education activities to improve: Coordination, Kinesthetic, Sense, Proprioception, and Posture for 25 minutes. The following activities were included  UBE 3'/3'-np  Chin tuck rotation 3x10 prone-  Prone cervical retraction 30 sec x3  Prone swimmer BUE 2x10  Open books rtb 2x10 with 5 sec hold B-np  4 way cervical isometrics with hollow ball 2x45 sec ea  Cheerleaders gtb 3x10  Median and ulnar nerve glides 2x10 R/ Median B-np  No money gtb 2x15 with 5 sec hold-np  Shoulder ext gtb 2x15 B with 5 sec hold-np      Home Exercises Provided and Patient Education Provided     Education provided:   - HEP to Go    Written Home Exercises Provided: Patient instructed to cont prior HEP.  Exercises were reviewed and Erlinda was able to demonstrate them prior to the end of the session.  Erlinda demonstrated good  understanding of the education provided.     See EMR under Patient Instructions for exercises provided {Blank single:[id number]::"2/16/2024","prior visit"    Assessment   Upon reassessment, pt making good progress toward ROM of cervical spine and strengthening of scapular stabilizers. Plan to continue progressing with neural glides, scapular strengthening and cervical stability.  Erlinda Is progressing well towards her goals.   Pt prognosis is Good.     Pt will continue to benefit from skilled outpatient physical therapy to address the deficits listed in the problem list box on initial evaluation, provide pt/family education and to maximize pt's level of independence in the home and community environment.     Pt's spiritual, cultural and educational needs considered and pt agreeable to plan of care and goals.    Anticipated barriers to physical therapy: none    Goals:   Short Term Goals: 4 weeks ( met)  - Pt will increase strength to 4/5 B mid and lower trap.   - Decrease Pain to 3/10 as worst with all PT interventions  - Pt to self correct posture with " minimal cues  - Pt independent with HEP with progressions.      Long Term Goals (8 Weeks): (Progressing, not met)  - Pt will improve DNF endurance to 40 sec hold  - Pt will increase strength to 5/5 B mid and lower trap  - Decrease Pain to 1/10 as worst at the end of the day  - Pt to return to 90% PLOF          Plan     Continue POC per pt tolerance.    Luanne Rodriguez, PT

## 2024-02-18 DIAGNOSIS — M22.42 CHONDROMALACIA OF PATELLOFEMORAL JOINT, LEFT: ICD-10-CM

## 2024-02-19 ENCOUNTER — CLINICAL SUPPORT (OUTPATIENT)
Dept: REHABILITATION | Facility: HOSPITAL | Age: 29
End: 2024-02-19
Payer: COMMERCIAL

## 2024-02-19 ENCOUNTER — OFFICE VISIT (OUTPATIENT)
Dept: ORTHOPEDICS | Facility: CLINIC | Age: 29
End: 2024-02-19
Payer: COMMERCIAL

## 2024-02-19 VITALS — BODY MASS INDEX: 31.28 KG/M2 | HEIGHT: 62 IN | WEIGHT: 170 LBS

## 2024-02-19 DIAGNOSIS — M25.531 RIGHT WRIST PAIN: ICD-10-CM

## 2024-02-19 DIAGNOSIS — R29.898 DECREASED GRIP STRENGTH OF RIGHT HAND: Primary | ICD-10-CM

## 2024-02-19 DIAGNOSIS — M24.131 DEGENERATIVE TFCC TEAR, RIGHT: Primary | ICD-10-CM

## 2024-02-19 PROCEDURE — 99214 OFFICE O/P EST MOD 30 MIN: CPT | Mod: S$GLB,,, | Performed by: ORTHOPAEDIC SURGERY

## 2024-02-19 PROCEDURE — 97110 THERAPEUTIC EXERCISES: CPT

## 2024-02-19 PROCEDURE — 97018 PARAFFIN BATH THERAPY: CPT

## 2024-02-19 PROCEDURE — 3008F BODY MASS INDEX DOCD: CPT | Mod: CPTII,S$GLB,, | Performed by: ORTHOPAEDIC SURGERY

## 2024-02-19 PROCEDURE — 99999 PR PBB SHADOW E&M-EST. PATIENT-LVL III: CPT | Mod: PBBFAC,,, | Performed by: ORTHOPAEDIC SURGERY

## 2024-02-19 PROCEDURE — 1159F MED LIST DOCD IN RCRD: CPT | Mod: CPTII,S$GLB,, | Performed by: ORTHOPAEDIC SURGERY

## 2024-02-19 RX ORDER — CEFAZOLIN SODIUM 2 G/50ML
2 SOLUTION INTRAVENOUS
Status: CANCELLED | OUTPATIENT
Start: 2024-02-19

## 2024-02-19 RX ORDER — CELECOXIB 200 MG/1
200 CAPSULE ORAL
Qty: 30 CAPSULE | Refills: 0 | Status: SHIPPED | OUTPATIENT
Start: 2024-02-19 | End: 2024-03-05 | Stop reason: HOSPADM

## 2024-02-19 RX ORDER — MUPIROCIN 20 MG/G
OINTMENT TOPICAL
Status: CANCELLED | OUTPATIENT
Start: 2024-02-19

## 2024-02-19 NOTE — H&P (VIEW-ONLY)
[facility]  History & Physical  Orthopedics     SUBJECTIVE:       Chief Complaint: Right wrist pain     Referring Provider: No ref. provider found      Dr. Lopez is the supervising physician for this encounter/patient     History of Present Illness:  Patient is a 28 y.o. right hand dominant female who presents today with complaints of right wrist pain, ongoing for about 2 years, no trauma/injury. Initially pain was more off/on, but has become more consistent. She gets pain with pushing through the wrist, this limits pushups or doing yoga. She gets pain consistently with writing. She wears a wrist brace for comfort. When asked, she does report some numbness in the hand, this has become progressive in the past year. No surgical history on the hands.      Interval history September 28, 2023: The patient returns today for re-evaluation.  She is previously been seen and treated by Jamie Russo DiGeorge for sort of a chronic right ulnar-sided wrist pain.  She does some boxing for fitness but otherwise has no known injuries to the wrist.  She takes Celebrex but otherwise has not had much in the way of treatment for her wrist pain.  She presents for re-evaluation and MRI results.     Interval history February 19, 2024: The patient returns today for re-evaluation.  She notes that her right wrist continues to be a problem.  She is reporting persistent ulnar-sided wrist pain despite extensive conservative treatment including therapy, bracing, and a corticosteroid injection, and NSAIDs.  She notes that she is unable to utilize her right upper extremity secondary to symptoms and has not been able to do any yoga nor any weight-bearing or pushups on the right wrist secondary to pain and debility.  She would like to discuss additional options today and has no other complaints.     Onset of symptoms/DOI was 2 years.     Symptoms are aggravated by activity.     Symptoms are alleviated by rest.     Symptoms  consist of pain and numbness/tingling.     The patient rates their pain as 4/10     Attempted treatment(s) and/or interventions include activity modifications, rest, immobilization.     The patient denies any fevers, chills, N/V, D/C and presents for evaluation.             Past Medical History:   Diagnosis Date    Fibromyalgia      Obesity (BMI 30.0-34.9)      PCOS (polycystic ovarian syndrome)      Vitamin D deficiency              Past Surgical History:   Procedure Laterality Date    none               Review of patient's allergies indicates:   Allergen Reactions    Kiwi (actinidia chinensis) Itching and Nausea Only      Social History          Social History Narrative     Single. Currently in [employer] school            Family History   Problem Relation Age of Onset    Hyperlipidemia Mother      Arthritis Mother      Hyperlipidemia Father      Heart attacks under age 50 Father      Diabetes type II Father      Diabetes Father      Hypertension Father      Hypothyroidism Sister      Eczema Neg Hx      Lupus Neg Hx      Psoriasis Neg Hx      Melanoma Neg Hx      Breast cancer Neg Hx      Ovarian cancer Neg Hx      Colon cancer Neg Hx              Current Outpatient Medications:     ARAZLO 0.045 % Lotn, Apply 1 application topically every evening., Disp: , Rfl:     buPROPion (WELLBUTRIN XL) 300 MG 24 hr tablet, Take 1 tablet (300 mg total) by mouth once daily., Disp: 90 tablet, Rfl: 3    celecoxib (CELEBREX) 200 MG capsule, TAKE 1 CAPSULE BY MOUTH EVERY DAY, Disp: 30 capsule, Rfl: 0    cetirizine (ZYRTEC) 10 MG tablet, Take 1 tablet by mouth once daily., Disp: , Rfl:     desogestreL-ethinyl estradioL (APRI) 0.15-0.03 mg per tablet, Take 1 tablet by mouth once daily., Disp: 90 tablet, Rfl: 3    metFORMIN (GLUCOPHAGE-XR) 500 MG ER 24hr tablet, Take 1 tablet (500 mg total) by mouth daily with breakfast., Disp: 90 tablet, Rfl: 3    multivitamin capsule, Take 1 capsule by mouth once daily., Disp: , Rfl:         Review of  "Systems:  Constitutional: no fever or chills  Eyes: no visual changes  ENT: no nasal congestion or sore throat  Respiratory: no cough or shortness of breath  Cardiovascular: no chest pain  Gastrointestinal: no nausea or vomiting, tolerating diet  Musculoskeletal: pain and soreness     OBJECTIVE:       Vital Signs (Most Recent):  Vitals       Vitals:     02/19/24 1410   Weight: 77.1 kg (170 lb)   Height: 5' 2" (1.575 m)         Body mass index is 31.09 kg/m².        Physical Exam:  Constitutional: The patient appears well-developed and well-nourished. No distress.   Skin: No lesions appreciated  Head: Normocephalic and atraumatic.   Nose: Nose normal.   Ears: No deformities seen  Eyes: Conjunctivae and EOM are normal.   Neck: No tracheal deviation present.   Cardiovascular: Normal rate and intact distal pulses.    Pulmonary/Chest: Effort normal. No respiratory distress.   Abdominal: There is no guarding.   Neurological: The patient is alert.   Psychiatric: The patient has a normal mood and affect.      Right Hand/Wrist Examination:     Observation/Inspection:  Swelling                       none                  Deformity                     none  Discoloration               none                  Scars                           none                  Atrophy                        None  DRUJ stable  Pain in the ulnar fovea/TFCC  Pain over the course of the ECU     HAND/WRIST EXAMINATION:  Finkelstein's Test                                Neg  WHAT Test                                         Neg  Snuff box tenderness                          Neg  Schuler's Test                                     Neg  Hook of Hamate Tenderness              Neg  CMC grind                                           Neg  Circumduction test                              Neg        Neurovascular Exam:  Digits WWP, brisk CR < 3s throughout  NVI motor/LTS to M/R/U nerves, radial pulse 2+  Tinel's Test - Carpal Tunnel                Neg  Tinel's " Test - Cubital Tunnel               Neg  Phalen's Test                                      negative  Median Nerve Compression Test       negative     ROM hand full, painless     ROM wrist full, with some pain    ROM elbow full, painless     Abdomen not guarded  Respirations nonlabored  Perfusion intact     Diagnostic Results:     MRI right wrist -   Probable small (5-6 mm) ganglion cysts along the volar aspect of the radioscaphoid articulation, as above.  Central TFCC tear.     Imaging - x-rays of the patient's right wrist demonstrate no acute fractures or dislocations; there appears to be some slightly positive ulnar variance     EMG - none     ASSESSMENT/PLAN:       28 y.o. yo female with central right TFCC tear         Plan: The patient and I had a thorough discussion today.  We discussed the working diagnosis as well as several other potential alternative diagnoses.  Treatment options were discussed, both conservative and surgical.  Conservative treatment options would include things such as activity modifications, workplace modifications, a period of rest, oral vs topical OTC and prescription anti-inflammatory medications, occupational therapy, splinting/bracing, immobilization, corticosteroid injections, and others.  Surgical options were discussed as well.      At this time, the patient would like to proceed with surgery given her persistent symptoms refractory to extensive conservative treatment.  She would like to proceed with a right wrist arthroscopy with possible TFCC repair versus debridement with possible arthroscopic wafer procedure and any other indicated procedures at the time of surgery on March 6, 2024 which I feel is reasonable.  I will get this set up.     The patient has not responded to adequate non operative treatment at this time and/or non operative treatment is not indicated. Thus, the risks, benefits and alternatives to surgery were discussed with the patient in detail.  Specific risks  include but are not limited to bleeding, infection, vessel and/or nerve damage, pain, numbness, tingling, compartment syndrome, need for additional surgery, failure to return to pre-injury and/or preoperative functional status, inability to return to work, scar sensitivity, delayed healing, complex regional pain syndrome, weakness, pulley injury, tendon injury, bowstringing, partial and/or incomplete relief of symptoms, persistence of and/or worsening of symptoms, hardware and/or surgical failure, prominent and/or symptomatic hardware possibly necessitating future removal, osteomyelitis, amputation, loss of function, stiffness, rotational malalignment, functional debility, dysfunction, decreased  strength, need for prolonged postoperative rehabilitation, malunion, nonunion, deep venous thrombosis, pulmonary embolism, arthritis and death.  The patient states an understanding and wishes to proceed with surgery.   All questions were answered.  No guarantees were implied or stated.  Written informed consent was obtained.        Please do not hesitate to reach out to us via email, phone, or MyChart with any questions, concerns, or feedback.

## 2024-02-19 NOTE — PROGRESS NOTES
"  [facility]  Occupational Therapy Discharge Summary      Date: 2/19/2024  Name: Erlinda Rain  Clinic Number: [id number]    Therapy Diagnosis:   Encounter Diagnosis   Name Primary?    Decreased  strength of right hand Yes       Physician: Von Lpoez MD      Physician Orders: Eval and Treat  Medical Diagnosis: M24.131 (ICD-10-CM) - Degenerative TFCC tear, right  Evaluation Date: 10/19/2023  Insurance Authorization Period Expiration: 9/27/2024  Plan of Care Certification Period: 8 weeks;   Date of Return to MD: TBD  Visit # / Visits authorized: 12 / 20  FOTO: 1/ 3       Precautions:  Standard         Time In:  11:05  AM   Time Out:   11:50       AM   Total Billable Time:   45  minutes    Subjective       Patient reports:   "not wearing brace; only when working out. Sometimes its sore but better"  She was compliant with home exercise program given last session.   Response to previous treatment: less pain  Functional change: able to perform work outs with wrist brace     Pain: 1/10  Location: right wrists      Objective     Objective Measures updated at progress report unless specified.  Observation/Appearance:  Skin intact          Edema. Measured in centimeters.    10/19/2023 10/19/2023     Right  Left    Proximal Wrist Crease 15.8 cm  16.0 cm         Hand ROM. Measured in degrees.    10/19/2023 10/19/2023 11/29/2023     Right  Left  Right     Able to make full composite fist                Thumb: MP 0/52 0/53 53                IP 0/58 0/71 57       Rad ADD/ABD WNL WNL WNL       Pal ADD/ABD WNL WNL  WNL          Opposition DPC DPC DPC          Elbow and Wrist ROM. Measured in degrees.    11/1/2023 11/1/2023 11/29/2023     Left Right Right   Supination/Pronation 61/ WNL  61/WNL 61/   Wrist Ext/Flex 70/55 70/59 70/64   Wrist RD/UD 24/36 15/29 15/30            Sensation  NT Distribution 10/19/2023 10/19/2023     Right  Left    [place]       Normal 1.65-2.83       Diminished " Light Touch 3.22-3.61       Diminished Protective 3.84-4.31       Loss of Protective 4.56-6.65       Untestable >6.65       2 Point Discrimination       Static       Dynamic             Special Tests:   Right    10/19/2023   Thumb CMC Grind Test     Metacarpal Stress Test     Piano Key Test     ECU Synergy Test Pos    Ulnar Compression Test     TFCC Load Test     Ulnocarpal Stress Test pos   Midcarpal Shift Test     Pisiform Boost Test pos          Strength (Dyanmometer) and Pinch Strength (Pinch Gauge)  Measured in pounds and psi. Average of three trials.       10/19/2023 10/19/2023 11/29/2023 1/11/2024 1/23/2024 2/19/2024     Right  Left  Right Right Right Right    Rung II           Pronation 18 25 35.5  39 42 49   Neutral  24 27 37 42 38 43   Supination  30 19.5 42.5 41 39.5 48   Greenberg Pinch 9.5 10.5  16 19.5 19.5 17   3pt Pinch 7.5 7 11.5 13 15.5 13   2pt Pinch 5 3 9 11 14.5  12           Intake Outcome Measure for FOTO initial eval; wrist  Survey     Therapist reviewed FOTO scores for Erlinda Rain on 10/19/2023.   FOTO report - see Media section or FOTO account episode details.     Intake Score: 17.5%       Treatment     Erlinda received the treatments listed below:          Therapeutic exercises to develop strength and endurance for 30 minutes including:  Updated objective measurements, please see above   Blue sponges digit adduction x 15 reps (2 sets)   3pt pinch red chip clip x2  Lateral pinch red chip clip x2   Wrist PRE 2# ext/flex x 10 reps   4 corners red flexbar x 10 reps (2 sets)   Dart throwers green flexbar x 10 reps (2 sets)   Isometric wrist ext/flex with forearm supinated x 10 reps (2 sets)      Therapeutic activities: 10 min   Gripper 3rd setting large and small pegs with varying forearm rotation      Neuro Re-ed: 15 min NT   KT tape ulnar sided wrist pain; pisiform boost, thenar adjustment       Paraffin bath with MHP for 10 minutes         Patient Education and Home Exercises     Education  provided:   - cont to avoid painful activities   - Progress towards goals     Written Home Exercises Provided: Patient instructed to cont prior HEP.  Exercises were reviewed and Erlinda was able to demonstrate them prior to the end of the session.  Erlinda demonstrated good  understanding of the home exercise program provided. See electronic medical record under Patient Instructions for exercises provided during therapy sessions.       Assessment           Erlinda is ready for discharge. She is educated on HEP to continue for wrist stability and  strengthening.     Patient's spiritual, cultural and educational needs considered and patient agreeable to plan of care and goals.    Anticipated barriers to occupational therapy:       Goals:  Long Term Goals (LTGs); to be met by discharge.  Pt will demo improved  strength by 5 pounds   Pt will report improved FOTO score by 10 points   Pt will report pain level no greater than 1/10 during functional daily work and community activities   Pt will demo improved pinch strength by 3 pounds      Short Term Goals (STGs); to be met within 4 weeks ().  Pt will demo improved  strength by 2-3 pounds  MET   Pt will improve RD/UD ROM by 3-5 degrees   Pt will report pain level no greater than 3/10 during light activity   Pt will demo improved pinch strength by 2 pounds MET    Plan     Updates/Grading for next session:     Cira Fernandes, MICHAEL   2/19/2024

## 2024-02-19 NOTE — PROGRESS NOTES
[facility]  History & Physical  Orthopedics    SUBJECTIVE:      Chief Complaint: Right wrist pain    Referring Provider: No ref. provider found     Dr. Lopez is the supervising physician for this encounter/patient    History of Present Illness:  Patient is a 28 y.o. right hand dominant female who presents today with complaints of right wrist pain, ongoing for about 2 years, no trauma/injury. Initially pain was more off/on, but has become more consistent. She gets pain with pushing through the wrist, this limits pushups or doing yoga. She gets pain consistently with writing. She wears a wrist brace for comfort. When asked, she does report some numbness in the hand, this has become progressive in the past year. No surgical history on the hands.     Interval history September 28, 2023: The patient returns today for re-evaluation.  She is previously been seen and treated by Jamie Russo DiGeorge for sort of a chronic right ulnar-sided wrist pain.  She does some boxing for fitness but otherwise has no known injuries to the wrist.  She takes Celebrex but otherwise has not had much in the way of treatment for her wrist pain.  She presents for re-evaluation and MRI results.    Interval history February 19, 2024: The patient returns today for re-evaluation.  She notes that her right wrist continues to be a problem.  She is reporting persistent ulnar-sided wrist pain despite extensive conservative treatment including therapy, bracing, and a corticosteroid injection, and NSAIDs.  She notes that she is unable to utilize her right upper extremity secondary to symptoms and has not been able to do any yoga nor any weight-bearing or pushups on the right wrist secondary to pain and debility.  She would like to discuss additional options today and has no other complaints.    Onset of symptoms/DOI was 2 years.    Symptoms are aggravated by activity.    Symptoms are alleviated by rest.    Symptoms consist of pain  and numbness/tingling.    The patient rates their pain as 4/10    Attempted treatment(s) and/or interventions include activity modifications, rest, immobilization.     The patient denies any fevers, chills, N/V, D/C and presents for evaluation.       Past Medical History:   Diagnosis Date    Fibromyalgia     Obesity (BMI 30.0-34.9)     PCOS (polycystic ovarian syndrome)     Vitamin D deficiency      Past Surgical History:   Procedure Laterality Date    none       Review of patient's allergies indicates:   Allergen Reactions    Kiwi (actinidia chinensis) Itching and Nausea Only     Social History     Social History Narrative    Single. Currently in [employer] school     Family History   Problem Relation Age of Onset    Hyperlipidemia Mother     Arthritis Mother     Hyperlipidemia Father     Heart attacks under age 50 Father     Diabetes type II Father     Diabetes Father     Hypertension Father     Hypothyroidism Sister     Eczema Neg Hx     Lupus Neg Hx     Psoriasis Neg Hx     Melanoma Neg Hx     Breast cancer Neg Hx     Ovarian cancer Neg Hx     Colon cancer Neg Hx          Current Outpatient Medications:     ARAZLO 0.045 % Lotn, Apply 1 application topically every evening., Disp: , Rfl:     buPROPion (WELLBUTRIN XL) 300 MG 24 hr tablet, Take 1 tablet (300 mg total) by mouth once daily., Disp: 90 tablet, Rfl: 3    celecoxib (CELEBREX) 200 MG capsule, TAKE 1 CAPSULE BY MOUTH EVERY DAY, Disp: 30 capsule, Rfl: 0    cetirizine (ZYRTEC) 10 MG tablet, Take 1 tablet by mouth once daily., Disp: , Rfl:     desogestreL-ethinyl estradioL (APRI) 0.15-0.03 mg per tablet, Take 1 tablet by mouth once daily., Disp: 90 tablet, Rfl: 3    metFORMIN (GLUCOPHAGE-XR) 500 MG ER 24hr tablet, Take 1 tablet (500 mg total) by mouth daily with breakfast., Disp: 90 tablet, Rfl: 3    multivitamin capsule, Take 1 capsule by mouth once daily., Disp: , Rfl:       Review of Systems:  Constitutional: no fever or chills  Eyes: no visual changes  ENT: no  "nasal congestion or sore throat  Respiratory: no cough or shortness of breath  Cardiovascular: no chest pain  Gastrointestinal: no nausea or vomiting, tolerating diet  Musculoskeletal: pain and soreness    OBJECTIVE:      Vital Signs (Most Recent):  Vitals:    02/19/24 1410   Weight: 77.1 kg (170 lb)   Height: 5' 2" (1.575 m)     Body mass index is 31.09 kg/m².      Physical Exam:  Constitutional: The patient appears well-developed and well-nourished. No distress.   Skin: No lesions appreciated  Head: Normocephalic and atraumatic.   Nose: Nose normal.   Ears: No deformities seen  Eyes: Conjunctivae and EOM are normal.   Neck: No tracheal deviation present.   Cardiovascular: Normal rate and intact distal pulses.    Pulmonary/Chest: Effort normal. No respiratory distress.   Abdominal: There is no guarding.   Neurological: The patient is alert.   Psychiatric: The patient has a normal mood and affect.     Right Hand/Wrist Examination:    Observation/Inspection:  Swelling  none    Deformity  none  Discoloration  none     Scars   none    Atrophy  None  DRUJ stable  Pain in the ulnar fovea/TFCC  Pain over the course of the ECU    HAND/WRIST EXAMINATION:  Finkelstein's Test   Neg  WHAT Test    Neg  Snuff box tenderness   Neg  Schuler's Test    Neg  Hook of Hamate Tenderness  Neg  CMC grind    Neg  Circumduction test   Neg      Neurovascular Exam:  Digits WWP, brisk CR < 3s throughout  NVI motor/LTS to M/R/U nerves, radial pulse 2+  Tinel's Test - Carpal Tunnel  Neg  Tinel's Test - Cubital Tunnel  Neg  Phalen's Test    negative  Median Nerve Compression Test negative    ROM hand full, painless    ROM wrist full, with some pain    ROM elbow full, painless    Abdomen not guarded  Respirations nonlabored  Perfusion intact    Diagnostic Results:     MRI right wrist -   Probable small (5-6 mm) ganglion cysts along the volar aspect of the radioscaphoid articulation, as above.  Central TFCC tear.    Imaging - x-rays of the patient's " right wrist demonstrate no acute fractures or dislocations; there appears to be some slightly positive ulnar variance     EMG - none    ASSESSMENT/PLAN:      28 y.o. yo female with central right TFCC tear       Plan: The patient and I had a thorough discussion today.  We discussed the working diagnosis as well as several other potential alternative diagnoses.  Treatment options were discussed, both conservative and surgical.  Conservative treatment options would include things such as activity modifications, workplace modifications, a period of rest, oral vs topical OTC and prescription anti-inflammatory medications, occupational therapy, splinting/bracing, immobilization, corticosteroid injections, and others.  Surgical options were discussed as well.     At this time, the patient would like to proceed with surgery given her persistent symptoms refractory to extensive conservative treatment.  She would like to proceed with a right wrist arthroscopy with possible TFCC repair versus debridement with possible arthroscopic wafer procedure and any other indicated procedures at the time of surgery on March 6, 2024 which I feel is reasonable.  I will get this set up.    The patient has not responded to adequate non operative treatment at this time and/or non operative treatment is not indicated. Thus, the risks, benefits and alternatives to surgery were discussed with the patient in detail.  Specific risks include but are not limited to bleeding, infection, vessel and/or nerve damage, pain, numbness, tingling, compartment syndrome, need for additional surgery, failure to return to pre-injury and/or preoperative functional status, inability to return to work, scar sensitivity, delayed healing, complex regional pain syndrome, weakness, pulley injury, tendon injury, bowstringing, partial and/or incomplete relief of symptoms, persistence of and/or worsening of symptoms, hardware and/or surgical failure, prominent and/or  symptomatic hardware possibly necessitating future removal, osteomyelitis, amputation, loss of function, stiffness, rotational malalignment, functional debility, dysfunction, decreased  strength, need for prolonged postoperative rehabilitation, malunion, nonunion, deep venous thrombosis, pulmonary embolism, arthritis and death.  The patient states an understanding and wishes to proceed with surgery.   All questions were answered.  No guarantees were implied or stated.  Written informed consent was obtained.      Please do not hesitate to reach out to us via email, phone, or MyChart with any questions, concerns, or feedback.

## 2024-02-23 ENCOUNTER — PATIENT MESSAGE (OUTPATIENT)
Dept: ORTHOPEDICS | Facility: CLINIC | Age: 29
End: 2024-02-23
Payer: COMMERCIAL

## 2024-02-25 ENCOUNTER — PATIENT MESSAGE (OUTPATIENT)
Dept: REHABILITATION | Facility: HOSPITAL | Age: 29
End: 2024-02-25
Payer: COMMERCIAL

## 2024-02-27 ENCOUNTER — PATIENT MESSAGE (OUTPATIENT)
Dept: REHABILITATION | Facility: HOSPITAL | Age: 29
End: 2024-02-27
Payer: COMMERCIAL

## 2024-03-04 ENCOUNTER — ANESTHESIA EVENT (OUTPATIENT)
Dept: SURGERY | Facility: HOSPITAL | Age: 29
End: 2024-03-04
Payer: COMMERCIAL

## 2024-03-04 DIAGNOSIS — M25.531 RIGHT WRIST PAIN: Primary | ICD-10-CM

## 2024-03-05 ENCOUNTER — TELEPHONE (OUTPATIENT)
Dept: ORTHOPEDICS | Facility: CLINIC | Age: 29
End: 2024-03-05
Payer: COMMERCIAL

## 2024-03-05 ENCOUNTER — PATIENT MESSAGE (OUTPATIENT)
Dept: ORTHOPEDICS | Facility: CLINIC | Age: 29
End: 2024-03-05
Payer: COMMERCIAL

## 2024-03-05 RX ORDER — IBUPROFEN 600 MG/1
600 TABLET ORAL 3 TIMES DAILY
Qty: 30 TABLET | Refills: 0 | Status: SHIPPED | OUTPATIENT
Start: 2024-03-05 | End: 2024-03-16

## 2024-03-05 RX ORDER — TRAMADOL HYDROCHLORIDE 50 MG/1
50 TABLET ORAL EVERY 6 HOURS
Qty: 20 TABLET | Refills: 0 | Status: SHIPPED | OUTPATIENT
Start: 2024-03-05 | End: 2024-04-23

## 2024-03-05 RX ORDER — ACETAMINOPHEN 500 MG
1000 TABLET ORAL 3 TIMES DAILY
Qty: 60 TABLET | Refills: 0 | Status: SHIPPED | OUTPATIENT
Start: 2024-03-05 | End: 2024-03-05

## 2024-03-05 RX ORDER — ACETAMINOPHEN 500 MG
1000 TABLET ORAL 3 TIMES DAILY
Qty: 60 TABLET | Refills: 0 | Status: SHIPPED | OUTPATIENT
Start: 2024-03-05 | End: 2024-03-16

## 2024-03-05 NOTE — TELEPHONE ENCOUNTER
Spoke with the patient. Notified of 6 AM arrival time to the [facility].  Informed of current visitor policy.  Reminded of NPO and need for transportation. Patient verbalized understanding to all.    Phoebe Donohue MS, OTC  Clinical Assistant to Dr. Ross Dunbar [facility]

## 2024-03-06 ENCOUNTER — ANESTHESIA (OUTPATIENT)
Dept: SURGERY | Facility: HOSPITAL | Age: 29
End: 2024-03-06
Payer: COMMERCIAL

## 2024-03-06 ENCOUNTER — HOSPITAL ENCOUNTER (OUTPATIENT)
Facility: HOSPITAL | Age: 29
Discharge: HOME | End: 2024-03-06
Attending: ORTHOPAEDIC SURGERY | Admitting: ORTHOPAEDIC SURGERY
Payer: COMMERCIAL

## 2024-03-06 VITALS
TEMPERATURE: 98 F | HEART RATE: 79 BPM | DIASTOLIC BLOOD PRESSURE: 68 MMHG | HEIGHT: 62 IN | OXYGEN SATURATION: 97 % | WEIGHT: 170 LBS | RESPIRATION RATE: 26 BRPM | BODY MASS INDEX: 31.28 KG/M2 | SYSTOLIC BLOOD PRESSURE: 112 MMHG

## 2024-03-06 DIAGNOSIS — M25.531 RIGHT WRIST PAIN: ICD-10-CM

## 2024-03-06 DIAGNOSIS — M24.131 DEGENERATIVE TFCC TEAR, RIGHT: Primary | ICD-10-CM

## 2024-03-06 LAB
B-HCG UR QL: NEGATIVE
CTP QC/QA: YES

## 2024-03-06 PROCEDURE — 37000008 HC ANESTHESIA 1ST 15 MINUTES: Performed by: ORTHOPAEDIC SURGERY

## 2024-03-06 PROCEDURE — 36000711: Performed by: ORTHOPAEDIC SURGERY

## 2024-03-06 PROCEDURE — 29999 UNLISTED PX ARTHROSCOPY: CPT | Mod: ,,, | Performed by: ORTHOPAEDIC SURGERY

## 2024-03-06 PROCEDURE — D9220A PRA ANESTHESIA: Mod: CRNA,,, | Performed by: NURSE ANESTHETIST, CERTIFIED REGISTERED

## 2024-03-06 PROCEDURE — 25000003 PHARM REV CODE 250: Performed by: NURSE ANESTHETIST, CERTIFIED REGISTERED

## 2024-03-06 PROCEDURE — D9220A PRA ANESTHESIA: Mod: ANES,,, | Performed by: ANESTHESIOLOGY

## 2024-03-06 PROCEDURE — 36000710: Performed by: ORTHOPAEDIC SURGERY

## 2024-03-06 PROCEDURE — 29846 WRIST ARTHROSCOPY/SURGERY: CPT | Mod: RT,,, | Performed by: ORTHOPAEDIC SURGERY

## 2024-03-06 PROCEDURE — 71000033 HC RECOVERY, INTIAL HOUR: Performed by: ORTHOPAEDIC SURGERY

## 2024-03-06 PROCEDURE — 37000009 HC ANESTHESIA EA ADD 15 MINS: Performed by: ORTHOPAEDIC SURGERY

## 2024-03-06 PROCEDURE — 99900035 HC TECH TIME PER 15 MIN (STAT)

## 2024-03-06 PROCEDURE — 63600175 PHARM REV CODE 636 W HCPCS: Performed by: ANESTHESIOLOGY

## 2024-03-06 PROCEDURE — 94761 N-INVAS EAR/PLS OXIMETRY MLT: CPT

## 2024-03-06 PROCEDURE — 81025 URINE PREGNANCY TEST: CPT | Performed by: ORTHOPAEDIC SURGERY

## 2024-03-06 PROCEDURE — 63600175 PHARM REV CODE 636 W HCPCS: Performed by: NURSE ANESTHETIST, CERTIFIED REGISTERED

## 2024-03-06 PROCEDURE — 25000003 PHARM REV CODE 250: Performed by: SURGERY

## 2024-03-06 PROCEDURE — 64415 NJX AA&/STRD BRCH PLXS IMG: CPT | Mod: 59 | Performed by: ANESTHESIOLOGY

## 2024-03-06 PROCEDURE — 63600175 PHARM REV CODE 636 W HCPCS: Performed by: SURGERY

## 2024-03-06 PROCEDURE — 71000015 HC POSTOP RECOV 1ST HR: Performed by: ORTHOPAEDIC SURGERY

## 2024-03-06 PROCEDURE — 27201423 OPTIME MED/SURG SUP & DEVICES STERILE SUPPLY: Performed by: ORTHOPAEDIC SURGERY

## 2024-03-06 PROCEDURE — 25000003 PHARM REV CODE 250: Performed by: ORTHOPAEDIC SURGERY

## 2024-03-06 RX ORDER — FENTANYL CITRATE 50 UG/ML
25-200 INJECTION, SOLUTION INTRAMUSCULAR; INTRAVENOUS
Status: DISCONTINUED | OUTPATIENT
Start: 2024-03-06 | End: 2024-03-06 | Stop reason: HOSPADM

## 2024-03-06 RX ORDER — SODIUM CHLORIDE 0.9 % (FLUSH) 0.9 %
3 SYRINGE (ML) INJECTION
Status: DISCONTINUED | OUTPATIENT
Start: 2024-03-06 | End: 2024-03-06 | Stop reason: HOSPADM

## 2024-03-06 RX ORDER — CELECOXIB 200 MG/1
400 CAPSULE ORAL ONCE
Status: COMPLETED | OUTPATIENT
Start: 2024-03-06 | End: 2024-03-06

## 2024-03-06 RX ORDER — CELECOXIB 200 MG/1
200 CAPSULE ORAL DAILY
Status: ON HOLD | COMMUNITY
End: 2024-03-06 | Stop reason: HOSPADM

## 2024-03-06 RX ORDER — ROPIVACAINE HYDROCHLORIDE 5 MG/ML
INJECTION, SOLUTION EPIDURAL; INFILTRATION; PERINEURAL
Status: COMPLETED | OUTPATIENT
Start: 2024-03-06 | End: 2024-03-06

## 2024-03-06 RX ORDER — CEFAZOLIN SODIUM 1 G/3ML
INJECTION, POWDER, FOR SOLUTION INTRAMUSCULAR; INTRAVENOUS
Status: DISCONTINUED | OUTPATIENT
Start: 2024-03-06 | End: 2024-03-06

## 2024-03-06 RX ORDER — LIDOCAINE HYDROCHLORIDE 10 MG/ML
1 INJECTION, SOLUTION EPIDURAL; INFILTRATION; INTRACAUDAL; PERINEURAL ONCE
Status: DISCONTINUED | OUTPATIENT
Start: 2024-03-06 | End: 2024-03-06 | Stop reason: HOSPADM

## 2024-03-06 RX ORDER — ONDANSETRON HYDROCHLORIDE 2 MG/ML
INJECTION, SOLUTION INTRAVENOUS
Status: DISCONTINUED | OUTPATIENT
Start: 2024-03-06 | End: 2024-03-06

## 2024-03-06 RX ORDER — MUPIROCIN 20 MG/G
OINTMENT TOPICAL
Status: DISCONTINUED | OUTPATIENT
Start: 2024-03-06 | End: 2024-03-06 | Stop reason: HOSPADM

## 2024-03-06 RX ORDER — PROPOFOL 10 MG/ML
VIAL (ML) INTRAVENOUS
Status: DISCONTINUED | OUTPATIENT
Start: 2024-03-06 | End: 2024-03-06

## 2024-03-06 RX ORDER — DEXMEDETOMIDINE HYDROCHLORIDE 100 UG/ML
INJECTION, SOLUTION INTRAVENOUS
Status: DISCONTINUED | OUTPATIENT
Start: 2024-03-06 | End: 2024-03-06

## 2024-03-06 RX ORDER — PROPOFOL 10 MG/ML
VIAL (ML) INTRAVENOUS CONTINUOUS PRN
Status: DISCONTINUED | OUTPATIENT
Start: 2024-03-06 | End: 2024-03-06

## 2024-03-06 RX ORDER — LIDOCAINE HYDROCHLORIDE 20 MG/ML
INJECTION INTRAVENOUS
Status: DISCONTINUED | OUTPATIENT
Start: 2024-03-06 | End: 2024-03-06

## 2024-03-06 RX ORDER — DEXAMETHASONE SODIUM PHOSPHATE 4 MG/ML
INJECTION, SOLUTION INTRA-ARTICULAR; INTRALESIONAL; INTRAMUSCULAR; INTRAVENOUS; SOFT TISSUE
Status: DISCONTINUED | OUTPATIENT
Start: 2024-03-06 | End: 2024-03-06

## 2024-03-06 RX ORDER — HALOPERIDOL 5 MG/ML
0.5 INJECTION INTRAMUSCULAR EVERY 10 MIN PRN
Status: DISCONTINUED | OUTPATIENT
Start: 2024-03-06 | End: 2024-03-06 | Stop reason: HOSPADM

## 2024-03-06 RX ORDER — ACETAMINOPHEN 500 MG
1000 TABLET ORAL
Status: COMPLETED | OUTPATIENT
Start: 2024-03-06 | End: 2024-03-06

## 2024-03-06 RX ORDER — FAMOTIDINE 10 MG/ML
INJECTION INTRAVENOUS
Status: DISCONTINUED | OUTPATIENT
Start: 2024-03-06 | End: 2024-03-06

## 2024-03-06 RX ORDER — MIDAZOLAM HYDROCHLORIDE 1 MG/ML
.5-4 INJECTION INTRAMUSCULAR; INTRAVENOUS
Status: DISCONTINUED | OUTPATIENT
Start: 2024-03-06 | End: 2024-03-06 | Stop reason: HOSPADM

## 2024-03-06 RX ADMIN — FENTANYL CITRATE 50 MCG: 50 INJECTION INTRAMUSCULAR; INTRAVENOUS at 07:03

## 2024-03-06 RX ADMIN — FAMOTIDINE 20 MG: 10 INJECTION, SOLUTION INTRAVENOUS at 08:03

## 2024-03-06 RX ADMIN — LIDOCAINE HYDROCHLORIDE 50 MG: 20 INJECTION INTRAVENOUS at 08:03

## 2024-03-06 RX ADMIN — DEXMEDETOMIDINE 8 MCG: 100 INJECTION, SOLUTION, CONCENTRATE INTRAVENOUS at 08:03

## 2024-03-06 RX ADMIN — DEXMEDETOMIDINE 4 MCG: 100 INJECTION, SOLUTION, CONCENTRATE INTRAVENOUS at 08:03

## 2024-03-06 RX ADMIN — CEFAZOLIN 2 G: 330 INJECTION, POWDER, FOR SOLUTION INTRAMUSCULAR; INTRAVENOUS at 08:03

## 2024-03-06 RX ADMIN — MIDAZOLAM HYDROCHLORIDE 2 MG: 1 INJECTION, SOLUTION INTRAMUSCULAR; INTRAVENOUS at 07:03

## 2024-03-06 RX ADMIN — ACETAMINOPHEN 1000 MG: 500 TABLET ORAL at 06:03

## 2024-03-06 RX ADMIN — SODIUM CHLORIDE, SODIUM GLUCONATE, SODIUM ACETATE, POTASSIUM CHLORIDE, MAGNESIUM CHLORIDE, SODIUM PHOSPHATE, DIBASIC, AND POTASSIUM PHOSPHATE: .53; .5; .37; .037; .03; .012; .00082 INJECTION, SOLUTION INTRAVENOUS at 09:03

## 2024-03-06 RX ADMIN — ONDANSETRON 4 MG: 2 INJECTION INTRAMUSCULAR; INTRAVENOUS at 09:03

## 2024-03-06 RX ADMIN — PROPOFOL 40 MG: 10 INJECTION, EMULSION INTRAVENOUS at 08:03

## 2024-03-06 RX ADMIN — PROPOFOL 50 MCG/KG/MIN: 10 INJECTION, EMULSION INTRAVENOUS at 08:03

## 2024-03-06 RX ADMIN — SODIUM CHLORIDE: 9 INJECTION, SOLUTION INTRAVENOUS at 06:03

## 2024-03-06 RX ADMIN — CELECOXIB 400 MG: 200 CAPSULE ORAL at 06:03

## 2024-03-06 RX ADMIN — MUPIROCIN: 20 OINTMENT TOPICAL at 06:03

## 2024-03-06 RX ADMIN — ROPIVACAINE HYDROCHLORIDE 30 ML: 5 INJECTION EPIDURAL; INFILTRATION; PERINEURAL at 07:03

## 2024-03-06 RX ADMIN — DEXAMETHASONE SODIUM PHOSPHATE 4 MG: 4 INJECTION, SOLUTION INTRAMUSCULAR; INTRAVENOUS at 08:03

## 2024-03-06 NOTE — ANESTHESIA POSTPROCEDURE EVALUATION
Anesthesia Post Evaluation    Patient: Erlinda Rain    Procedure(s) Performed: Procedure(s) (LRB):  ARTHROSCOPY, WRIST - right with TFCC repair vs debridement, possible Wafer and AIP (Right)  EXCISION, ULNA, DISTAL (Right)    Final Anesthesia Type: general      Patient location during evaluation: PACU  Patient participation: Yes- Able to Participate  Level of consciousness: awake and alert  Post-procedure vital signs: reviewed and stable  Pain management: adequate  Airway patency: patent  GEETA mitigation strategies: Multimodal analgesia  PONV status at discharge: No PONV  Anesthetic complications: no      Cardiovascular status: blood pressure returned to baseline  Respiratory status: unassisted  Hydration status: euvolemic  Follow-up not needed.              Vitals Value Taken Time   /70 03/06/24 1017   Temp 36.5 °C (97.7 °F) 03/06/24 1012   Pulse 79 03/06/24 1027   Resp 34 03/06/24 1026   SpO2 92 % 03/06/24 1027   Vitals shown include unvalidated device data.      No case tracking events are documented in the log.      Pain/Urmila Score: Pain Rating Prior to Med Admin: 4 (3/6/2024  6:37 AM)  Urmila Score: 9 (3/6/2024 10:07 AM)

## 2024-03-06 NOTE — TRANSFER OF CARE
"Anesthesia Transfer of Care Note    Patient: Erlinda Rain    Procedure(s) Performed: Procedure(s) (LRB):  ARTHROSCOPY, WRIST - right with TFCC repair vs debridement, possible Wafer and AIP (Right)  EXCISION, ULNA, DISTAL (Right)    Patient location: PACU    Anesthesia Type: general and regional    Transport from OR: Transported from OR on 100% O2 by closed face mask with adequate spontaneous ventilation    Post pain: adequate analgesia    Post assessment: no apparent anesthetic complications    Post vital signs: stable    Level of consciousness: awake, alert and oriented    Nausea/Vomiting: no nausea/vomiting    Complications: none    Transfer of care protocol was followed    Last vitals: Visit Vitals  /65 (BP Location: Left arm, Patient Position: Lying)   Pulse 82   Temp 36.2 °C (97.2 °F) (Oral)   Resp 16   Ht 5' 2" (1.575 m)   Wt 77.1 kg (170 lb)   LMP 02/24/2024 (Exact Date)   SpO2 100%   Breastfeeding No   BMI 31.09 kg/m²     "

## 2024-03-06 NOTE — PLAN OF CARE
VSS.  Patient tolerating oral liquids without difficulty.   No apparent s&s of distress noted at this time, no complaints voiced at this time.   Discharge instructions reviewed with patient and mom with good verbal feedback received.   Post op medications delivered to bedside, DME arm sling intact..  Patient ready for discharge.

## 2024-03-06 NOTE — ANESTHESIA PREPROCEDURE EVALUATION
"                                                                                                             03/06/2024  Erlinda Rain is a 28 y.o., female.    Pre-operative evaluation for Procedure(s) (LRB):  ARTHROSCOPY, WRIST - right with TFCC repair vs debridement, possible Wafer and AIP (Right)  EXCISION, ULNA, DISTAL (Right)    Erlinda Rain is a 28 y.o. female     Patient Active Problem List   Diagnosis    Dysmenorrhea    Constipation    Obesity (BMI 30.0-34.9)    PCOS (polycystic ovarian syndrome)    Acne vulgaris    Chronic fatigue    Dyspareunia in female    Pelvic pain in female    Chronic pain of left knee    Paradoxical insomnia    Decreased  strength of right hand    Palpitations    Tachycardia    Neck pain       Review of patient's allergies indicates:   Allergen Reactions    Kiwi (actinidia chinensis) Itching and Nausea Only       No current facility-administered medications on file prior to encounter.     Current Outpatient Medications on File Prior to Encounter   Medication Sig Dispense Refill    buPROPion (WELLBUTRIN XL) 300 MG 24 hr tablet Take 1 tablet (300 mg total) by mouth once daily. 90 tablet 3    cetirizine (ZYRTEC) 10 MG tablet Take 1 tablet by mouth once daily.      desogestreL-ethinyl estradioL (APRI) 0.15-0.03 mg per tablet Take 1 tablet by mouth once daily. 90 tablet 3    metFORMIN (GLUCOPHAGE-XR) 500 MG ER 24hr tablet Take 1 tablet (500 mg total) by mouth daily with breakfast. 90 tablet 3    multivitamin capsule Take 1 capsule by mouth once daily.      ARAZLO 0.045 % Lotn Apply 1 application topically every evening.         Past Surgical History:   Procedure Laterality Date    none           CBC:  No results found for: "WBC", "RBC", "HGB", "HCT", "PLT", "MCV", "MCH", "MCHC"    CMP:   No results found for: "NA", "K", "CL", "CO2", "BUN", "CREATININE", "GLU", "MG", "PHOS", "CALCIUM", "ALBUMIN", "PROT", "ALKPHOS", "ALT", "AST", "BILITOT"    INR:  No results found for: "PT", "INR", " ""PROTIME", "APTT"      Diagnostic Studies:      EKG:   Results for orders placed or performed in visit on 11/14/19   IN OFFICE EKG 12-LEAD (to [place])    Collection Time: 11/14/19  5:11 PM    Narrative    Test Reason : R55,    Vent. Rate : 109 BPM     Atrial Rate : 109 BPM     P-R Int : 152 ms          QRS Dur : 076 ms      QT Int : 310 ms       P-R-T Axes : 029 061 038 degrees     QTc Int : 417 ms    Sinus tachycardia  Otherwise normal ECG  When compared with ECG of 23-OCT-2018 12:50,  No significant change was found  Confirmed by Zelda Vargas MD ([id number]) on 11/20/2019 2:35:42 PM    Referred By:  LADONNA STAPLES           Confirmed By:Zelda Vargas MD        Pre-op Vitals [03/06/24 0629]   BP Pulse Resp Temp SpO2   116/70 90 17 36.2 °C (97.2 °F) 98 %      Height Weight BMI (Calculated)     5' 2" 170 lb 31.1            Pre-op Assessment    I have reviewed the Patient Summary Reports.     I have reviewed the Nursing Notes. I have reviewed the NPO Status.      Review of Systems  Anesthesia Hx:  No problems with previous Anesthesia                Social:  Non-Smoker, No Alcohol Use       Cardiovascular:  Exercise tolerance: good                  RN at [facility]                         Hepatic/GI:      Denies GERD.                 Physical Exam  General: Well nourished and Cooperative    Airway:  Mallampati: II   Mouth Opening: Normal  TM Distance: Normal    Dental:  Intact    Chest/Lungs:  Clear to auscultation, Normal Respiratory Rate    Heart:  Rate: Normal  Rhythm: Regular Rhythm        Anesthesia Plan  Type of Anesthesia, risks & benefits discussed:    Anesthesia Type: Gen Natural Airway, Regional  Intra-op Monitoring Plan: Standard ASA Monitors  Post Op Pain Control Plan: IV/PO Opioids PRN, multimodal analgesia and peripheral nerve block  Induction:  IV  Informed Consent: Informed consent signed with the Patient and all parties understand the risks and agree with anesthesia plan.  All questions answered.   ASA Score: " 1    Ready For Surgery From Anesthesia Perspective.     .

## 2024-03-06 NOTE — ANESTHESIA PROCEDURE NOTES
Supraclavicular single shot    Patient location during procedure: pre-op   Block not for primary anesthetic.  Reason for block: at surgeon's request and post-op pain management   Post-op Pain Location: right hand pain   Start time: 3/6/2024 7:39 AM  Timeout: 3/6/2024 7:37 AM   End time: 3/6/2024 7:41 AM    Staffing  Authorizing Provider: Xiao Carr MD  Performing Provider: Xiao Carr MD    Staffing  Performed by: Xiao Carr MD  Authorized by: Xiao Carr MD    Preanesthetic Checklist  Completed: patient identified, IV checked, site marked, risks and benefits discussed, surgical consent, monitors and equipment checked, pre-op evaluation and timeout performed  Peripheral Block  Patient position: supine  Prep: ChloraPrep  Patient monitoring: heart rate, cardiac monitor, continuous pulse ox, continuous capnometry and frequent blood pressure checks  Block type: supraclavicular  Laterality: right  Injection technique: single shot  Needle  Needle type: Stimuplex   Needle gauge: 22 G  Needle length: 2 in  Needle localization: anatomical landmarks and ultrasound guidance   -ultrasound image captured on disc.  Assessment  Injection assessment: negative aspiration, negative parasthesia and local visualized surrounding nerve  Paresthesia pain: none  Heart rate change: no  Slow fractionated injection: yes  Pain Tolerance: comfortable throughout block  Medications:    Medications: ropivacaine (NAROPIN) injection 0.5% - Perineural   30 mL - 3/6/2024 7:40:00 AM    Additional Notes  VSS.  [facility] RN monitoring vitals throughout procedure.  Patient tolerated procedure well.

## 2024-03-06 NOTE — DISCHARGE INSTRUCTIONS
AFTER HAND SURGERY    DOS:  Keep affected wrist elevated above the level of your heart  Check circulation frequently in fingers by pressing on the nail bed. Nail bed should turn white and then pink when released.  Exercise fingers to promote circulation.  Advance diet as tolerated  Resume home medications today.  Keep sling on until you regain your feeling.      DONT:  No driving for 24 hours or while taking narcotic pain medication      CALL PHYSICIAN FOR:  Obvious bleeding  Excessive swelling  Drainage (pus) from the wound  Pain unrelieved by pain medication.

## 2024-03-06 NOTE — DISCHARGE SUMMARY
[place] - Surgery (Hospital)  Discharge Note  Short Stay    Procedure(s) (LRB):  ARTHROSCOPY, WRIST - right with TFCC repair vs debridement, possible Wafer and AIP (Right)  EXCISION, ULNA, DISTAL (Right)      OUTCOME: Patient tolerated treatment/procedure well without complication and is now ready for discharge.    DISPOSITION: Home or Self Care    FINAL DIAGNOSIS:  Right TFCC Tear     FOLLOWUP: In clinic    DISCHARGE INSTRUCTIONS:    Discharge Procedure Orders   Diet general     Call MD for:  temperature >100.4     Call MD for:  persistent nausea and vomiting     Call MD for:  severe uncontrolled pain     Call MD for:  difficulty breathing, headache or visual disturbances     Call MD for:  redness, tenderness, or signs of infection (pain, swelling, redness, odor or green/yellow discharge around incision site)     Call MD for:  hives     Call MD for:  persistent dizziness or light-headedness     Call MD for:  extreme fatigue     Leave dressing on - Keep it clean, dry, and intact until clinic visit        TIME SPENT ON DISCHARGE: 5 minutes

## 2024-03-07 ENCOUNTER — PATIENT MESSAGE (OUTPATIENT)
Dept: ORTHOPEDICS | Facility: CLINIC | Age: 29
End: 2024-03-07
Payer: COMMERCIAL

## 2024-03-07 DIAGNOSIS — M24.131 DEGENERATIVE TFCC TEAR, RIGHT: Primary | ICD-10-CM

## 2024-03-07 RX ORDER — HYDROCODONE BITARTRATE AND ACETAMINOPHEN 7.5; 325 MG/1; MG/1
1 TABLET ORAL EVERY 6 HOURS PRN
Qty: 28 TABLET | Refills: 0 | Status: SHIPPED | OUTPATIENT
Start: 2024-03-07 | End: 2024-04-23

## 2024-03-07 NOTE — OP NOTE
DATE OF PROCEDURE: 03/07/2024 COVID-19 attestation:  Due to the nature of this patient's condition and/or the presence of pain, debilitty, and/or dysfunction, proceeding with surgery was indicated.  Furthermore, this patient was treated during the COVID-19 pandemic.  This was discussed with the patient, they are aware of our current policies and procedures, were given the option of delaying their surgery when applicable and if acceptable, and they elect to proceed.  Delaying this patient's surgery greater than 30 days would be detrimental to their care and functional outcome and/or cause additional suffering, pain, discomfort, and/or dysfunction/debility.  This was confirmed and we thus proceeded.       SERVICE:  Orthopedic Surgery.     SURGEON:  Von Lopez M.D.     FIRST ASSISTANT:  MD NICOLE Kennedy     PREOPERATIVE DIAGNOSIS:    Right wrist pain  TFCC tear right wrist  Ulnar impaction syndrome right wrist     POSTOPERATIVE DIAGNOSIS:    Same    PROCEDURE(S) PERFORMED:    Arthroscopic distal ulna wafer excision right wrist, CPT code 30770   Right wrist arthroscopy with TFCC debridement and synovectomy     TOURNIQUET TIME:  83 minutes at 250mmHg.     ESTIMATED BLOOD LOSS:  9 mL.     IMPLANTS:  None     COMPLICATIONS:  None.     PACKS AND DRAINS:  None.      ANESTHESIA:  Regional mac     IV FLUIDS: Crystalloid.     CONDITION:  Stable.     MICROBIOLOGY: None.     Indications for Procedure:      The patient is a 28-year-old female who presented with significant right wrist pain.  She was found to have ulnar positive variance and ulnar impaction syndrome and a central TFCC tear as well.  The patient has not responded to adequate non operative treatment at this time and/or non operative treatment is not indicated. Thus, the risks, benefits and alternatives to surgery were discussed with the patient in detail.  Specific risks include but are not limited to bleeding, infection, vessel and/or nerve damage, pain,  numbness, tingling, compartment syndrome, need for additional surgery, failure to return to pre-injury and/or preoperative functional status, inability to return to work, scar sensitivity, delayed healing, complex regional pain syndrome, weakness, pulley injury, tendon injury, bowstringing, partial and/or incomplete relief of symptoms, persistence of and/or worsening of symptoms, hardware and/or surgical failure, prominent and/or symptomatic hardware possibly necessitating future removal, osteomyelitis, amputation, loss of function, stiffness, rotational malalignment, functional debility, dysfunction, decreased  strength, need for prolonged postoperative rehabilitation, malunion, nonunion, deep venous thrombosis, pulmonary embolism, arthritis and death.  The patient states an understanding and wishes to proceed with surgery.   All questions were answered.  No guarantees were implied or stated.  Written informed consent was obtained.     Procedure in detail:     On the date of the operative intervention, the patient was evaluated in the preoperative holding area.  With their participation the right upper extremity was marked at the operative site.   The patient was then administered regional anesthesia and taken to the operating room where they were placed on OR table.  The right upper extremity extremity was then placed on a hand table with a nonsterile tourniquet placed high on the patient's right upper arm.  The right upper extremity extremity was then prepped and draped in the usual sterile fashion and time-out was taken and confirmed by all present to confirm the correct patient site procedure and administration of preoperative antibiotics if ordered.  Esmarch was utilized to exsanguinate the right upper extremity and tourniquet was insufflated 250 mmHg.  I then turned my attention towards right wrist arthroscopy.     I then placed the patient's right upper extremity within the [facility] Med arc tower in the  usual fashion. Great care was taken to protect all bony prominences with blue towels.  Once this was assembled and the right upper extremity was positioned in the arc tower, 10 lb of traction were placed. At this time I then used a marking pen to draw out all landmarks about the dorsal right wrist. At this time a sterile 18 gauge needle was utilized to insufflate the wrist with 5 cc of saline at the 3 4 portal site.  This was done atraumatically without difficulty.  At this time an 11 blade was utilized for a small longitudinal skin incision at the 3 4 portal site. A hemostat was then utilized to enter the wrist joint and then the blunt trocar was placed in the 3 4 portal. The trocar was removed and the arthrooscope was introduced.  This was done without any trauma to the wrist. Vision was turned towards the ulnar compartment at which point an 18 gauge needle was placed in the 6 R portal under direct arthroscopic visualization.  Each portal was followed by a small nick in the skin with an 11 blade and then a hemostat into the wrist joint.  Once this had been done an arthroscopic probe was placed in the 6R portal. I then began my systematic evaluation of the radiocarpal joint. Articular cartilage at the radioscaphoid and radiolunate joints was largely intact with mild fraying. The volar wrist extrinsic ligaments were intact. There was mild synovitis in the radioscaphoid compartment.   this was debrided with arthroscopic shaver and cautery device for a synovectomy.  Having completed my synovectimy I turned my attention towards the scapholunate interval from the radiocarpal joint.  This was intact with only minimal fraying and was stable to probing.       Visualization was then turned ulnarly for evaluation of the TFCC.   There was some significant synovitis in the ulnocarpal joint and this was debrided for a synovectomy with a combination of electrocautery and arthroscopic shaver.  The TFCC itself had a full thickness  central tear.  The ulnar head was quite prominent through the central TFCC tear which confirmed that the patient would having ulnar impaction syndrome.  The TFCC was debrided with biters, shaver, and cautery back to a stable rim.  After this the TFCC was stable to probing and the DRUJ was stable to exam.  I then turned my attention towards the prominent ulnar head.  An arthroscopic bur tip was utilized to perform a small wafer excision of the distal aspect of the ulna.  Accessory portals consisting of a 6 U as well as a 4 5 portal were made atraumatically for assistance and instrumentation to complete this procedure.  Several mm of the distal ulna were sharply excised with the arthroscopic bur tip and after completion of the arthroscopic wafer excision the ulna was no longer prominent.    I had now completed my intraarticular procedures.  Fluid was withdrawn from within the wrist joint and the arthroscope was removed.  Portal sites were closed with 4-0 nylon suture.  Sterile dressings were applied consisting of Xeroform 4x4s and a splint to the right upper extremity.  Tourniquet was deflated and there was brisk capillary refill throughout the entire right upper extremity and no significant bleeding.  The patient was then awakened from anesthesia return the post anesthesia care in stable condition.  There were no complications as attending surgeon was present and performed the critical portions of procedure.       Postop plan for the patient:  The patient will be discharged home in stable condition.  She will be referred to Occupational therapy for fabrication of an orthosis at around the 2 week pinky.  Follow-up in 2 weeks for suture removal and re-evaluation of the postoperative plan.           Please be aware that this note has been generated with the assistance of [employer] voice-to-text.  Please excuse any spelling or grammatical errors.

## 2024-03-18 ENCOUNTER — HOSPITAL ENCOUNTER (OUTPATIENT)
Dept: RADIOLOGY | Facility: OTHER | Age: 29
Discharge: HOME | End: 2024-03-18
Attending: PHYSICIAN ASSISTANT
Payer: COMMERCIAL

## 2024-03-18 ENCOUNTER — OFFICE VISIT (OUTPATIENT)
Dept: ORTHOPEDICS | Facility: CLINIC | Age: 29
End: 2024-03-18
Payer: COMMERCIAL

## 2024-03-18 DIAGNOSIS — M25.531 RIGHT WRIST PAIN: ICD-10-CM

## 2024-03-18 DIAGNOSIS — M24.131 DEGENERATIVE TFCC TEAR, RIGHT: Primary | ICD-10-CM

## 2024-03-18 DIAGNOSIS — Z98.890 POSTOPERATIVE STATE: ICD-10-CM

## 2024-03-18 PROCEDURE — 73110 X-RAY EXAM OF WRIST: CPT | Mod: 26,RT,, | Performed by: RADIOLOGY

## 2024-03-18 PROCEDURE — 99024 POSTOP FOLLOW-UP VISIT: CPT | Mod: S$GLB,,, | Performed by: PHYSICIAN ASSISTANT

## 2024-03-18 PROCEDURE — 1159F MED LIST DOCD IN RCRD: CPT | Mod: CPTII,S$GLB,, | Performed by: PHYSICIAN ASSISTANT

## 2024-03-18 PROCEDURE — 99999 PR PBB SHADOW E&M-EST. PATIENT-LVL III: CPT | Mod: PBBFAC,,, | Performed by: PHYSICIAN ASSISTANT

## 2024-03-18 PROCEDURE — 73110 X-RAY EXAM OF WRIST: CPT | Mod: TC,FY,RT

## 2024-03-18 NOTE — PROGRESS NOTES
Dr. Lopez is the supervising physician for this encounter/patient    Erlinda Rain presents for post-operative evaluation.  The patient is now 2 weeks s/p below procedures with Dr. Lopez on 3/6/24.    PROCEDURE(S) PERFORMED:    Arthroscopic distal ulna wafer excision right wrist, CPT code 18291   Right wrist arthroscopy with TFCC debridement and synovectomy    Overall the patient reports doing well.      PE:    AA&O x 4.  NAD  HEENT:  NCAT, sclera nonicteric  Lungs:  Respirations are equal and unlabored.  CV:  2+ bilateral upper and lower extremity pulses.  MSK: The wounds are healing well with no signs of erythema or warmth.  There is no drainage.  No clinical signs or symptoms of infection are present.  Full hand motion present, wrist motion not tested today. SILT. DNVI.    A/P: Status post above, doing well  1) Transition to wrist brace today. OT ordered for postop rehab.  2) All sutures removed today. Wound care and signs of infection discussed. Scar massage discussed.  3) F/U 4 weeks  4) Call with any questions/concerns in the interim      Jamie Russo-DiGeorge PA-C

## 2024-03-27 ENCOUNTER — CLINICAL SUPPORT (OUTPATIENT)
Dept: REHABILITATION | Facility: HOSPITAL | Age: 29
End: 2024-03-27
Payer: COMMERCIAL

## 2024-03-27 DIAGNOSIS — M24.131 DEGENERATIVE TFCC TEAR, RIGHT: ICD-10-CM

## 2024-03-27 DIAGNOSIS — M25.531 RIGHT WRIST PAIN: ICD-10-CM

## 2024-03-27 DIAGNOSIS — Z98.890 POSTOPERATIVE STATE: ICD-10-CM

## 2024-03-27 PROCEDURE — 97166 OT EVAL MOD COMPLEX 45 MIN: CPT

## 2024-04-02 ENCOUNTER — CLINICAL SUPPORT (OUTPATIENT)
Dept: REHABILITATION | Facility: HOSPITAL | Age: 29
End: 2024-04-02
Payer: COMMERCIAL

## 2024-04-02 DIAGNOSIS — R52 PAIN: ICD-10-CM

## 2024-04-02 DIAGNOSIS — R60.0 LOCALIZED EDEMA: ICD-10-CM

## 2024-04-02 DIAGNOSIS — M25.60 DECREASED RANGE OF MOTION: Primary | ICD-10-CM

## 2024-04-02 PROCEDURE — 97140 MANUAL THERAPY 1/> REGIONS: CPT

## 2024-04-02 PROCEDURE — 97110 THERAPEUTIC EXERCISES: CPT

## 2024-04-02 PROCEDURE — 97018 PARAFFIN BATH THERAPY: CPT | Mod: 59

## 2024-04-02 NOTE — PROGRESS NOTES
[facility]  Occupational Therapy Treatment Note     Date: 4/2/2024  Name: Erilnda Rain  [facility] Number: [id number]    Therapy Diagnosis:   Encounter Diagnoses   Name Primary?    Decreased range of motion Yes    Pain     Localized edema      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, right   M25.531 (ICD-10-CM) - Right wrist pain   Z98.890 (ICD-10-CM) - Postoperative state      Surgical Procedure and Date: 3/6/2024, TFCC debridement, arthoscopy  Evaluation Date: 3/27/2024  Insurance Authorization Period Expiration: 3/18/2025   Plan of Care Expiration: 10 weeks; 6/7/2024  Date of Return to MD: NATHANIELD  Visit # / Visits authorized: 1 / 1  FOTO: (date and score)     FOTO Pawel Code:      Precautions:  Standard        Time In: 03:08 PM   Time Out: 04:00 PM  Total Appointment Time (timed & untimed codes): 52 minutes      Subjective     Patient reports: sore  She was compliant with home exercise program given last session.   Response to previous treatment:first tx   Functional change: none noted to this date    Pain: 4/10  Location: right hands      Objective     Objective Measures updated at progress report unless specified.  Observation/Appearance:         Edema. Measured in centimeters.    4/3/2024 4/3/2024     Left Right   2in. Above elbow       2in. Below elbow       Wrist Crease 15.8 cm 16.7 cm   Figure of 8       MCPs             Edema. Measured in centimeters.    4/3/2024 4/3/2024     Left Right   Index:         P1        PIP       P2        DIP       P3       Long:         P1        PIP       P2        DIP       P3       Ring:         P1                   PIP                  P2                    DIP       P3       Small:          P1                   PIP              P2               DIP       P3       Thumb:       Prox. Phalanx       IP       Distal Phalanx             Elbow and Wrist ROM. Measured  in degrees.    4/3/2024 4/3/2024     Left Right   Elbow Ext/Flex       Supination/Pronation WNL/WNL 49/74   Wrist Ext/Flex 66/59 45/20  *pain with flexion   Wrist RD/UD 66/59 16/14         Hand ROM. Measured in degrees.    4/3/2024 4/3/2024     Left Right           Index: MP                   PIP                      DIP                  [place]               Long:  MP                  PIP                  DIP                  [place]               Ring:   MP                  PIP                  DIP                  [place]               Small:  MP                   PIP                   DIP                  [place]               Thumb: MP 51 53                IP 69 54       Rad ADD/ABD 46 40       Pal ADD/ABD 47 45          Opposition MP jt MP jt          Strength (Dynamometer) and Pinch Strength (Pinch Gauge)  Measured in pounds.    4/3/2024 4/3/2024     Left Right   Rung II   Deferred    Key Pinch       3pt Pinch       2pt Pinch                Sensation    3/27/2024 3/27/2024     Left Right   Jal Magali       Normal 1.65-2.83       Diminished Light Touch 3.22-3.61       Diminished Protective 3.84-4.31       Loss of Protective 4.56-6.65       Untestable >6.65       2 Point Discrimination       Static       Dynamic          Manual Muscle Test    4/3/2024 4/3/2024     Left Right   Wrist Extension        Wrist Flexion       Radial Deviation       Ulnar Deviation       Supination       Pronation       Elbow Extension       Elbow Flexion          Special Tests  Thumb CMC Grind Test     Finkelstein's Test     Phalen's Test     Tinel's Test     Louis's Test     Brayan-Littler Test     Digital Collateral Stress Test     ORL Test     Froment's Sign     Pinch OK Sign     Linda's Sign      Egawa Sign      Clamp Sign      SL Ballottement Test     LT Ballottement Test     Scaphoid/WatsonTest     Amadascheilayne's Test     Metacarpal Stress Test     Piano Key Test     ECU Synergy Test     Ulnar Compression Test     TFCC Load Test      Ulnocarpal Stress Test     Midcarpal Shift Test     Pisiform Boost Test     Elbow Flexion Test     Scratch Collapse Test     Tennis Elbow Test     Resisted Middle Finger Extension Test     Mills Test     Chair Test     Biceps Squeeze Test     Biceps Hook Test     Milking Test     Press Up Manuever     PLRI Test     Valgus Stress Test     Varus Stress Test     Spurling Test     Cervical Distraction Test     ULNTT - General     ULNTT - Median Nerve     ULNTT - Radial Nerve     ULNTT - Ulnar Nerve            Limitation/Restriction for FOTO initial eval Survey     Therapist reviewed FOTO scores for Erlinda Rain on 3/27/2024.   FOTO documents entered into [employer] - see Media section.     Limitation Score: 45%          Treatment     Erlinda received the treatments listed below:          therapeutic exercises to develop ROM for 15 minutes including:  Wrist ROM x 10 reps   Thumb ROM x 10 reps  (2 sets)  FTG x 10 reps         manual therapy techniques: Myofacial release and Manual Lymphatic Drainage were applied to the: R  for 10 minutes, including:  Edema massage   Scar tissue mobilization       Parrafin bath with MHP for 10 minutes         Patient Education and Home Exercises     Education provided:   - edema sleeve  - Progress towards goals     Written Home Exercises Provided: yes.  Exercises were reviewed and Erlinda was able to demonstrate them prior to the end of the session.  Erlinda demonstrated good  understanding of the home exercise program provided. See electronic medical record under Patient Instructions for exercises provided during therapy sessions.       Assessment          Erlinda is progressing well towards her goals and there are no updates to goals at this time. Pt prognosis is Good.     Patient will continue to benefit from skilled outpatient occupational therapy to address the deficits listed in the problem list on initial evaluation provide patient/family education and to maximize patient's level of  independence in the home and community environment. The following goals were discussed with the patient and patient is in agreement with them as to be addressed in the treatment plan.   Long term goals: (by d/c)  1)  Patient to be IND with HEP and modalities for pain management  2)  Increase ROM 5-10 degrees to increase functional hand use for ADLs/leisure activities  3)   Increase  strength 5-8 lbs. to carry laundry, carry groceries, sweep, and increase functional independence of right hand for ADLs/iADLs  4)   Increase pinch 3-4  psis for  Increase in functional independence in ADLs/iADLs such as manipulating fasteners and opening containers  5) Patient to score at 62%  or less on FOTO to demonstrate improved perception of functional rightUE Use.                 Short term Goals: ( 4 weeks)   1)  Patient to be IND with HEP and modalities for pain management  2)  Increase ROM 3-5 degrees to increase functional hand use for ADLs/leisure activities  3)   Increase  strength 3-5 lbs. to carry laundry, carry groceries, sweep, and increase functional independence of right hand for ADLs/iADLs  4)   Increase pinch 1-3 psis for Increase in functional independence in ADLs/iADLs such as manipulating fasteners and opening containers  5)   Patient to score at 50%  or less on FOTO to demonstrate improved perception of functional right UE Use.       Patient's spiritual, cultural and educational needs considered and patient agreeable to plan of care and goals.      Plan     Updates/Grading for next session:     Cira Fernandes OT   4/2/2024

## 2024-04-03 ENCOUNTER — PATIENT MESSAGE (OUTPATIENT)
Dept: ORTHOPEDICS | Facility: CLINIC | Age: 29
End: 2024-04-03
Payer: COMMERCIAL

## 2024-04-03 RX ORDER — CELECOXIB 200 MG/1
200 CAPSULE ORAL DAILY
Qty: 30 CAPSULE | Refills: 2 | Status: SHIPPED | OUTPATIENT
Start: 2024-04-03

## 2024-04-03 NOTE — PLAN OF CARE
[facility]  Occupational Therapy Initial Evaluation    Date: 3/27/2024  Name: Erlinda Rain  Clinic Number: [id number]    Therapy Diagnosis:   Encounter Diagnoses   Name Primary?    Degenerative TFCC tear, right     Right wrist pain     Postoperative state      Physician: Russo-Digeorge, Jamie L*    Physician Orders: Eval and treat   Medical Diagnosis:   M24.131 (ICD-10-CM) - Degenerative TFCC tear, right   M25.531 (ICD-10-CM) - Right wrist pain   Z98.890 (ICD-10-CM) - Postoperative state     Surgical Procedure and Date: 3/6/2024, TFCC debridement, arthoscopy  Evaluation Date: 3/27/2024  Insurance Authorization Period Expiration: 3/18/2025   Plan of Care Expiration: 10 weeks; 6/7/2024  Date of Return to MD: ALEJANDRA  Visit # / Visits authorized: 1 / 1  FOTO: (date and score)    FOTO Lobby Code:     Precautions:  Standard      Time In: 03:08 PM   Time Out: 04:00 PM  Total Appointment Time (timed & untimed codes): 52 minutes      SUBJECTIVE     Date of Onset: gradual onset     History of Current Condition/Mechanism of Injury: Erlinda reports: pain for over a year. Failed conservative treatment. Decided on SX      Involved Side: Right  Dominant Side: Right    Prior Therapy: yes  Occupation:  Nurse       Functional Limitations/Social History:    Previous functional status includes: Independent with all ADLs.     Current Functional Status   Home/Living environment: lives alone      Limitation of Functional Status as follows:   ADLs/IADLs:     - Feeding: I    - Bathing: I    - Dressing/Grooming: I    - Driving: I     Leisure: working out, reading       Pain:  Functional Pain Scale Rating 0-10: , worst 7/10, best 3/10   Location: general wrist      Patient's Goals for Therapy:     Medical History:   Past Medical History:   Diagnosis Date    Fibromyalgia     Obesity (BMI 30.0-34.9)     PCOS (polycystic ovarian syndrome)     Vitamin D deficiency        Surgical History:    has a past surgical history that  includes none; Arthroscopy of wrist (Right, 3/6/2024); and Surgical removal of distal ulna (Right, 3/6/2024).    Medications:   has a current medication list which includes the following prescription(s): arazlo, bupropion, celecoxib, cetirizine, desogestrel-ethinyl estradiol, hydrocodone-acetaminophen, metformin, multivitamin, and tramadol.    Allergies:   Review of patient's allergies indicates:   Allergen Reactions    Kiwi (actinidia chinensis) Itching and Nausea Only          OBJECTIVE     Observation/Appearance:       Edema. Measured in centimeters.   4/3/2024 4/3/2024    Left Right   2in. Above elbow     2in. Below elbow     Wrist Crease 15.8 cm 16.7 cm   Figure of 8     MCPs         Edema. Measured in centimeters.   4/3/2024 4/3/2024    Left Right   Index:       P1      PIP     P2      DIP     P3     Long:       P1      PIP     P2      DIP     P3     Ring:       P1                 PIP                P2                  DIP     P3     Small:        P1                 PIP            P2             DIP     P3     Thumb:     Prox. Phalanx     IP     Distal Phalanx         Elbow and Wrist ROM. Measured in degrees.   4/3/2024 4/3/2024    Left Right   Elbow Ext/Flex     Supination/Pronation WNL/WNL 49/74   Wrist Ext/Flex 66/59 45/20  *pain with flexion   Wrist RD/UD 66/59 16/14       Hand ROM. Measured in degrees.   4/3/2024 4/3/2024    Left Right        Index: MP                 PIP                    DIP                [place]          Long:  MP                PIP                DIP                [place]          Ring:   MP                PIP                DIP                [place]          Small:  MP                 PIP                 DIP                [place]          Thumb: MP 51 53                IP 69 54       Rad ADD/ABD 46 40       Pal ADD/ABD 47 45          Opposition MP jt MP jt        Strength (Dynamometer) and Pinch Strength (Pinch Gauge)  Measured in pounds.   4/3/2024 4/3/2024    Left Right   Rung II   Deferred    Key Pinch     3pt Pinch     2pt Pinch           Sensation   3/27/2024 3/27/2024    Left Right   [place] Magali     Normal 1.65-2.83     Diminished Light Touch 3.22-3.61     Diminished Protective 3.84-4.31     Loss of Protective 4.56-6.65     Untestable >6.65     2 Point Discrimination     Static     Dynamic       Manual Muscle Test   4/3/2024 4/3/2024    Left Right   Wrist Extension      Wrist Flexion     Radial Deviation     Ulnar Deviation     Supination     Pronation     Elbow Extension     Elbow Flexion       Special Tests  Thumb CMC Grind Test    Finkelstein's Test    Phalen's Test    Tinel's Test    Louis's Test    Semmes-Littler Test    Digital Collateral Stress Test    ORL Test    Froment's Sign    Pinch OK Sign    Linda's Sign     Egawa Sign     Clamp Sign     SL Ballottement Test    LT Ballottement Test    Scaphoid/WatsonTest    Linscheid's Test    Metacarpal Stress Test    Piano Key Test    ECU Synergy Test    Ulnar Compression Test    TFCC Load Test    Ulnocarpal Stress Test    Midcarpal Shift Test    Pisiform Boost Test    Elbow Flexion Test    Scratch Collapse Test    Tennis Elbow Test    Resisted Middle Finger Extension Test    Mills Test    Chair Test    Biceps Squeeze Test    Biceps Hook Test    Milking Test    Press Up Manuever    PLRI Test    Valgus Stress Test    Varus Stress Test    Spurling Test    Cervical Distraction Test    ULNTT - General    ULNTT - Median Nerve    ULNTT - Radial Nerve    ULNTT - Ulnar Nerve         Limitation/Restriction for FOTO initial eval Survey    Therapist reviewed FOTO scores for Erlinda Rain on 3/27/2024.   FOTO documents entered into [employer] - see Media section.    Limitation Score: 45%         Treatment   Total Treatment time (time-based codes) separate from Evaluation: 15 minutes    Erlinda received the treatments listed below:     Supervised modalities after being cleared for contradictions: Hot Pack - 10 min      Therapeutic exercises to develop ROM  for 5 minutes, including:  Wrist flex/ext x 10 reps         Patient Education and Home Exercises      Education provided:   - edema sleeve given size D    Written Home Exercises Provided: yes.  Exercises were reviewed and Erlinda was able to demonstrate them prior to the end of the session.  Erlinda demonstrated good  understanding of the education provided. See EMR under Patient Instructions for exercises provided during therapy sessions.     Pt was advised to perform these exercises free of pain, and to stop performing them if pain occurs.    Patient/Family Education: role of OT, goals for OT, scheduling/cancellations - pt verbalized understanding. Discussed insurance limitations with patient.    ASSESSMENT     Erlinda Rain is a 28 y.o. female referred to outpatient occupational therapy and presents with a medical diagnosis of s/p R wrist.  Patient presents with the following therapy deficits: Decreased ROM, Decreased  strength, Decreased pinch strength, Decreased muscle strength, Decreased functional hand use, Increased pain, Edema, and Joint Stiffness and demonstrates limitations as described in the chart below. Following medical record review it is determined that pt will benefit from occupational therapy services in order to maximize pain free and/or functional use of right wrist. The following goals were discussed with the patient and patient is in agreement with them as to be addressed in the treatment plan. The patient's rehab potential is Good.     Anticipated barriers to occupational therapy:   Pt has no cultural, educational or language barriers to learning provided.  Medical Necessity is demonstrated by the following  Occupational Profile/History  Co-morbidities and personal factors that may impact the plan of care [x] LOW: Brief chart review  [] MODERATE: Expanded chart review   [] HIGH: Extensive chart review    Moderate / High Support Documentation:      Examination  Performance deficits relating to  physical, cognitive or psychosocial skills that result in activity limitations and/or participation restrictions  [] LOW: addressing 1-3 Performance deficits  [x] MODERATE: 3-5 Performance deficits  [] HIGH: 5+ Performance deficits (please support below)    Moderate / High Support Documentation:    Physical:  Joint Stability  Muscle Power/Strength  Muscle Endurance  Skin Integrity/Scar Formation  Edema   Strength  Pinch Strength  Pain    Cognitive:  No Deficits    Psychosocial:    Habits  Routines  Rituals     Treatment Options [] LOW: Limited options  [] MODERATE: Several options  [] HIGH: Multiple options      Decision Making/ Complexity Score: Moderate           Goals:   The following goals were discussed with the patient and patient is in agreement with them as to be addressed in the treatment plan.   Long term goals: (by d/c)  1)  Patient to be IND with HEP and modalities for pain management  2)  Increase ROM 5-10 degrees to increase functional hand use for ADLs/leisure activities  3)   Increase  strength 5-8 lbs. to carry laundry, carry groceries, sweep, and increase functional independence of right hand for ADLs/iADLs  4)   Increase pinch 3-4  psis for  Increase in functional independence in ADLs/iADLs such as manipulating fasteners and opening containers  5) Patient to score at 62%  or less on FOTO to demonstrate improved perception of functional rightUE Use.            Short term Goals: ( 4 weeks)   1)  Patient to be IND with HEP and modalities for pain management  2)  Increase ROM 3-5 degrees to increase functional hand use for ADLs/leisure activities  3)   Increase  strength 3-5 lbs. to carry laundry, carry groceries, sweep, and increase functional independence of right hand for ADLs/iADLs  4)   Increase pinch 1-3 psis for Increase in functional independence in ADLs/iADLs such as manipulating fasteners and opening containers  5)   Patient to score at 50%  or less on FOTO to demonstrate  improved perception of functional right UE Use.        PLAN   Plan of Care Certification: 3/27/2024 to 6/7/2024.     Outpatient Occupational Therapy 2 times weekly for 10 weeks to include the following interventions: Paraffin, Fluidotherapy, Manual therapy/joint mobilizations, Modalities for pain management, US 3 mhz, Therapeutic exercises/activities., Strengthening, Orthotic Fabrication/Fit/Training, Edema Control, Scar Management, Joint Protection, and Energy Conservation.      Cira Fernandes OT      I CERTIFY THE NEED FOR THESE SERVICES FURNISHED UNDER THIS PLAN OF TREATMENT AND WHILE UNDER MY CARE  Physician's comments:      Physician's Signature: ___________________________________________________

## 2024-04-04 ENCOUNTER — CLINICAL SUPPORT (OUTPATIENT)
Dept: REHABILITATION | Facility: HOSPITAL | Age: 29
End: 2024-04-04
Payer: COMMERCIAL

## 2024-04-04 DIAGNOSIS — R60.0 LOCALIZED EDEMA: ICD-10-CM

## 2024-04-04 DIAGNOSIS — R52 PAIN: ICD-10-CM

## 2024-04-04 DIAGNOSIS — M25.60 DECREASED RANGE OF MOTION: Primary | ICD-10-CM

## 2024-04-04 PROCEDURE — 97110 THERAPEUTIC EXERCISES: CPT

## 2024-04-04 PROCEDURE — 97018 PARAFFIN BATH THERAPY: CPT | Mod: 59

## 2024-04-04 PROCEDURE — 97140 MANUAL THERAPY 1/> REGIONS: CPT

## 2024-04-04 NOTE — PROGRESS NOTES
[facility]  Occupational Therapy Treatment Note     Date: 4/4/2024  Name: Erlinda Rain  [facility] Number: [id number]    Therapy Diagnosis:   Encounter Diagnoses   Name Primary?    Decreased range of motion Yes    Pain     Localized edema        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, right   M25.531 (ICD-10-CM) - Right wrist pain   Z98.890 (ICD-10-CM) - Postoperative state      Surgical Procedure and Date: 3/6/2024, TFCC debridement, arthoscopy  Evaluation Date: 3/27/2024  Insurance Authorization Period Expiration: 3/18/2025   Plan of Care Expiration: 10 weeks; 6/7/2024  Date of Return to MD: NATHANIELD  Visit # / Visits authorized: 1 / 1  FOTO: (date and score)     FOTO Pawel Code:      Precautions:  Standard        Time In: 03:08 PM   Time Out: 04:00 PM  Total Appointment Time (timed & untimed codes): 52 minutes      Subjective     Patient reports: sore  She was compliant with home exercise program given last session.   Response to previous treatment:first tx   Functional change: none noted to this date    Pain: 4/10  Location: right hands      Objective     Objective Measures updated at progress report unless specified.  Observation/Appearance:         Edema. Measured in centimeters.    4/3/2024 4/3/2024     Left Right   2in. Above elbow       2in. Below elbow       Wrist Crease 15.8 cm 16.7 cm   Figure of 8       MCPs             Edema. Measured in centimeters.    4/3/2024 4/3/2024     Left Right   Index:         P1        PIP       P2        DIP       P3       Long:         P1        PIP       P2        DIP       P3       Ring:         P1                   PIP                  P2                    DIP       P3       Small:          P1                   PIP              P2               DIP       P3       Thumb:       Prox. Phalanx       IP       Distal Phalanx             Elbow and Wrist ROM.  Measured in degrees.    4/3/2024 4/3/2024     Left Right   Elbow Ext/Flex       Supination/Pronation WNL/WNL 49/74   Wrist Ext/Flex 66/59 45/20  *pain with flexion   Wrist RD/UD 66/59 16/14         Hand ROM. Measured in degrees.    4/3/2024 4/3/2024     Left Right           Index: MP                   PIP                      DIP                  [place]               Long:  MP                  PIP                  DIP                  [place]               Ring:   MP                  PIP                  DIP                  [place]               Small:  MP                   PIP                   DIP                  [place]               Thumb: MP 51 53                IP 69 54       Rad ADD/ABD 46 40       Pal ADD/ABD 47 45          Opposition MP jt MP jt          Strength (Dynamometer) and Pinch Strength (Pinch Gauge)  Measured in pounds.    4/3/2024 4/3/2024     Left Right   Rung II   Deferred    Key Pinch       3pt Pinch       2pt Pinch                Sensation    3/27/2024 3/27/2024     Left Right   [place] Magali       Normal 1.65-2.83       Diminished Light Touch 3.22-3.61       Diminished Protective 3.84-4.31       Loss of Protective 4.56-6.65       Untestable >6.65       2 Point Discrimination       Static       Dynamic          Manual Muscle Test    4/3/2024 4/3/2024     Left Right   Wrist Extension        Wrist Flexion       Radial Deviation       Ulnar Deviation       Supination       Pronation       Elbow Extension       Elbow Flexion          Special Tests  Thumb CMC Grind Test     Finkelstein's Test     Phalen's Test     Tinel's Test     Louis's Test     [place]-Littler Test     Digital Collateral Stress Test     ORL Test     Froment's Sign     Pinch OK Sign     Linda's Sign      [place] Sign      Clamp Sign      SL Ballottement Test     LT Ballottement Test     Scaphoid/WatsonTest     Amadascheilayne's Test     Metacarpal Stress Test     Piano Key Test     ECU Synergy Test     Ulnar Compression Test     TFCC Load  Test     Ulnocarpal Stress Test     Midcarpal Shift Test     Pisiform Boost Test     Elbow Flexion Test     Scratch Collapse Test     Tennis Elbow Test     Resisted Middle Finger Extension Test     Mills Test     Chair Test     Biceps Squeeze Test     Biceps Hook Test     Milking Test     Press Up Manuever     PLRI Test     Valgus Stress Test     Varus Stress Test     Spurling Test     Cervical Distraction Test     ULNTT - General     ULNTT - Median Nerve     ULNTT - Radial Nerve     ULNTT - Ulnar Nerve            Limitation/Restriction for FOTO initial eval Survey     Therapist reviewed FOTO scores for Erlinda Rain on 3/27/2024.   FOTO documents entered into [employer] - see Media section.     Limitation Score: 45%          Treatment     Erlinda received the treatments listed below:          therapeutic exercises to develop ROM for 15 minutes including:  Wrist ROM x 10 reps   Thumb ROM x 10 reps  (2 sets)  FTG x 10 reps         manual therapy techniques: Myofacial release and Manual Lymphatic Drainage were applied to the: R  for 10 minutes, including:  Edema massage   Scar tissue mobilization       Parrafin bath with MHP for 10 minutes         Patient Education and Home Exercises     Education provided:   - edema sleeve  - Progress towards goals     Written Home Exercises Provided: yes.  Exercises were reviewed and Erlinda was able to demonstrate them prior to the end of the session.  Erlinda demonstrated good  understanding of the home exercise program provided. See electronic medical record under Patient Instructions for exercises provided during therapy sessions.       Assessment      Scar looks better with  application scar pad at nighttime.     Erlinda is progressing well towards her goals and there are no updates to goals at this time. Pt prognosis is Good.     Patient will continue to benefit from skilled outpatient occupational therapy to address the deficits listed in the problem list on initial evaluation provide  patient/family education and to maximize patient's level of independence in the home and community environment. The following goals were discussed with the patient and patient is in agreement with them as to be addressed in the treatment plan.   Long term goals: (by d/c)  1)  Patient to be IND with HEP and modalities for pain management  2)  Increase ROM 5-10 degrees to increase functional hand use for ADLs/leisure activities  3)   Increase  strength 5-8 lbs. to carry laundry, carry groceries, sweep, and increase functional independence of right hand for ADLs/iADLs  4)   Increase pinch 3-4  psis for  Increase in functional independence in ADLs/iADLs such as manipulating fasteners and opening containers  5) Patient to score at 62%  or less on FOTO to demonstrate improved perception of functional rightUE Use.                 Short term Goals: ( 4 weeks)   1)  Patient to be IND with HEP and modalities for pain management  2)  Increase ROM 3-5 degrees to increase functional hand use for ADLs/leisure activities  3)   Increase  strength 3-5 lbs. to carry laundry, carry groceries, sweep, and increase functional independence of right hand for ADLs/iADLs  4)   Increase pinch 1-3 psis for Increase in functional independence in ADLs/iADLs such as manipulating fasteners and opening containers  5)   Patient to score at 50%  or less on FOTO to demonstrate improved perception of functional right UE Use.       Patient's spiritual, cultural and educational needs considered and patient agreeable to plan of care and goals.      Plan     Updates/Grading for next session:     Cira Fernandes OT   4/4/2024

## 2024-04-15 ENCOUNTER — OFFICE VISIT (OUTPATIENT)
Dept: ORTHOPEDICS | Facility: CLINIC | Age: 29
End: 2024-04-15
Payer: COMMERCIAL

## 2024-04-15 ENCOUNTER — CLINICAL SUPPORT (OUTPATIENT)
Dept: REHABILITATION | Facility: HOSPITAL | Age: 29
End: 2024-04-15
Payer: COMMERCIAL

## 2024-04-15 DIAGNOSIS — R60.0 LOCALIZED EDEMA: ICD-10-CM

## 2024-04-15 DIAGNOSIS — R52 PAIN: ICD-10-CM

## 2024-04-15 DIAGNOSIS — M24.131 DEGENERATIVE TFCC TEAR, RIGHT: Primary | ICD-10-CM

## 2024-04-15 DIAGNOSIS — M25.60 DECREASED RANGE OF MOTION: Primary | ICD-10-CM

## 2024-04-15 DIAGNOSIS — Z98.890 POSTOPERATIVE STATE: ICD-10-CM

## 2024-04-15 PROCEDURE — 99024 POSTOP FOLLOW-UP VISIT: CPT | Mod: S$GLB,,, | Performed by: PHYSICIAN ASSISTANT

## 2024-04-15 PROCEDURE — 97140 MANUAL THERAPY 1/> REGIONS: CPT

## 2024-04-15 PROCEDURE — 97018 PARAFFIN BATH THERAPY: CPT

## 2024-04-15 PROCEDURE — 1159F MED LIST DOCD IN RCRD: CPT | Mod: CPTII,S$GLB,, | Performed by: PHYSICIAN ASSISTANT

## 2024-04-15 PROCEDURE — 99999 PR PBB SHADOW E&M-EST. PATIENT-LVL III: CPT | Mod: PBBFAC,,, | Performed by: PHYSICIAN ASSISTANT

## 2024-04-15 PROCEDURE — 97110 THERAPEUTIC EXERCISES: CPT

## 2024-04-15 NOTE — PROGRESS NOTES
"  [facility]  Occupational Therapy Treatment Note     Date: 4/15/2024  Name: Erlinda Rain  Clinic Number: [id number]    Therapy Diagnosis:   Encounter Diagnoses   Name Primary?    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, right   M25.531 (ICD-10-CM) - Right wrist pain   Z98.890 (ICD-10-CM) - Postoperative state      Surgical Procedure and Date: 3/6/2024, TFCC debridement, arthoscopy  Evaluation Date: 3/27/2024  Insurance Authorization Period Expiration: 3/18/2025   Plan of Care Expiration: 10 weeks; 6/7/2024  Date of Return to MD: ALEJANDRA  Visit # / Visits authorized: 1 / 1  FOTO: (date and score)     FOTO Pawel Code:        Precautions: Standard          Time In:    02:17  PM   Time Out: 03:15       PM  Total Appointment Time (timed & untimed codes):  58 minutes      Subjective     Patient reports: "only hurts when I bend it this way (extension)"   She was compliant with home exercise program given last session.   Response to previous treatment:   Functional change: weaning from brace      Pain: 4/10 (wrist extension)   Location: right hands        Objective     Objective Measures updated at progress report unless specified.  Observation/Appearance:         Edema. Measured in centimeters.    4/3/2024 4/3/2024     Left Right   2in. Above elbow       2in. Below elbow       Wrist Crease 15.8 cm 16.7 cm   Figure of 8       MCPs             Edema. Measured in centimeters.    4/3/2024 4/3/2024     Left Right   Index:         P1        PIP       P2        DIP       P3       Long:         P1        PIP       P2        DIP       P3       Ring:         P1                   PIP                  P2                    DIP       P3       Small:          P1                   PIP              P2               DIP       P3       Thumb:       Prox. " Phalanx       IP       Distal Phalanx             Elbow and Wrist ROM. Measured in degrees.    4/3/2024 4/3/2024     Left Right   Elbow Ext/Flex       Supination/Pronation WNL/WNL 49/74   Wrist Ext/Flex 66/59 45/20  *pain with flexion   Wrist RD/UD 66/59 16/14         Hand ROM. Measured in degrees.    4/3/2024 4/3/2024     Left Right           Index: MP                   PIP                      DIP                  [place]               Long:  MP                  PIP                  DIP                  [place]               Ring:   MP                  PIP                  DIP                  [place]               Small:  MP                   PIP                   DIP                  [place]               Thumb: MP 51 53                IP 69 54       Rad ADD/ABD 46 40       Pal ADD/ABD 47 45          Opposition MP jt MP jt          Strength (Dynamometer) and Pinch Strength (Pinch Gauge)  Measured in pounds.    4/3/2024 4/3/2024     Left Right   Rung II   Deferred    Key Pinch       3pt Pinch       2pt Pinch                Sensation    3/27/2024 3/27/2024     Left Right   [place] Magali       Normal 1.65-2.83       Diminished Light Touch 3.22-3.61       Diminished Protective 3.84-4.31       Loss of Protective 4.56-6.65       Untestable >6.65       2 Point Discrimination       Static       Dynamic          Manual Muscle Test    4/3/2024 4/3/2024     Left Right   Wrist Extension        Wrist Flexion       Radial Deviation       Ulnar Deviation       Supination       Pronation       Elbow Extension       Elbow Flexion          Special Tests  Thumb CMC Grind Test     Finkelstein's Test     Phalen's Test     Tinel's Test     Louis's Test     Brayan-Littler Test     Digital Collateral Stress Test     ORL Test     Froment's Sign     Pinch OK Sign     Linda's Sign      Egawa Sign      Clamp Sign      SL Ballottement Test     LT Ballottement Test     Scaphoid/WatsonTest     Amadaschtrista's Test     Metacarpal Stress Test     Thompson  Key Test     ECU Synergy Test     Ulnar Compression Test     TFCC Load Test     Ulnocarpal Stress Test     Midcarpal Shift Test     Pisiform Boost Test     Elbow Flexion Test     Scratch Collapse Test     Tennis Elbow Test     Resisted Middle Finger Extension Test     Mills Test     Chair Test     Biceps Squeeze Test     Biceps Hook Test     Milking Test     Press Up Manuever     PLRI Test     Valgus Stress Test     Varus Stress Test     Spurling Test     Cervical Distraction Test     ULNTT - General     ULNTT - Median Nerve     ULNTT - Radial Nerve     ULNTT - Ulnar Nerve            Limitation/Restriction for FOTO initial eval Survey     Therapist reviewed FOTO scores for Erlinda Rain on 3/27/2024.   FOTO documents entered into [employer] - see Media section.     Limitation Score: 45%          Treatment     Erlinda received the treatments listed below:          therapeutic exercises to develop ROM for 25 minutes including:  Wrist ROM x 10 reps  (3 sets)  Thumb ROM x 10 reps  (2 sets)  FTG x 10 reps   Isospheres       manual therapy techniques: Myofacial release and Manual Lymphatic Drainage were applied to the: R  for 10 minutes, including:  Edema massage   Scar tissue mobilization       Parrafin bath with MHP for 10 minutes         Patient Education and Home Exercises     Education provided:   - edema sleeve  - Progress towards goals     Written Home Exercises Provided: yes.  Exercises were reviewed and Erlinda was able to demonstrate them prior to the end of the session.  Erlinda demonstrated good  understanding of the home exercise program provided. See electronic medical record under Patient Instructions for exercises provided during therapy sessions.       Assessment        Cont scar maturation. Good hollis with tx today. Demonstrated improved ROM     Erlinda is progressing well towards her goals and there are no updates to goals at this time. Pt prognosis is Good.     Patient will continue to benefit from skilled outpatient  occupational therapy to address the deficits listed in the problem list on initial evaluation provide patient/family education and to maximize patient's level of independence in the home and community environment. The following goals were discussed with the patient and patient is in agreement with them as to be addressed in the treatment plan.   Long term goals: (by d/c)  1)  Patient to be IND with HEP and modalities for pain management  2)  Increase ROM 5-10 degrees to increase functional hand use for ADLs/leisure activities  3)   Increase  strength 5-8 lbs. to carry laundry, carry groceries, sweep, and increase functional independence of right hand for ADLs/iADLs  4)   Increase pinch 3-4  psis for  Increase in functional independence in ADLs/iADLs such as manipulating fasteners and opening containers  5) Patient to score at 62%  or less on FOTO to demonstrate improved perception of functional rightUE Use.                 Short term Goals: ( 4 weeks)   1)  Patient to be IND with HEP and modalities for pain management  2)  Increase ROM 3-5 degrees to increase functional hand use for ADLs/leisure activities  3)   Increase  strength 3-5 lbs. to carry laundry, carry groceries, sweep, and increase functional independence of right hand for ADLs/iADLs  4)   Increase pinch 1-3 psis for Increase in functional independence in ADLs/iADLs such as manipulating fasteners and opening containers  5)   Patient to score at 50%  or less on FOTO to demonstrate improved perception of functional right UE Use.       Patient's spiritual, cultural and educational needs considered and patient agreeable to plan of care and goals.      Plan     Updates/Grading for next session:     Cira Fernandes OT   4/15/2024

## 2024-04-15 NOTE — PROGRESS NOTES
Dr. Lopez is the supervising physician for this encounter/patient    Erlinda Rain presents for post-operative evaluation.  The patient is now 6 weeks s/p below procedures with Dr. Lopez on 3/6/24.    PROCEDURE(S) PERFORMED:    Arthroscopic distal ulna wafer excision right wrist, CPT code 66571   Right wrist arthroscopy with TFCC debridement and synovectomy    Overall the patient reports doing well.  She reports 0/10, is taking Celebrex daily which is helpful. She has weaned from wearing the wrist brace but does wear a copperfit compression glove. She continues to work with OT.    PE:    AA&O x 4.  NAD  HEENT:  NCAT, sclera nonicteric  Lungs:  Respirations are equal and unlabored.  CV:  2+ bilateral upper and lower extremity pulses.  MSK: The wounds are well healed without any signs of infection. Mild TTP over the TFCC.  Full hand motion present, wrist motion to 30 degrees flexion/extension with discomfort. SILT. DNVI.    A/P: Status post above, doing well  1) Continue with OT for strengthening, progress as tolerated. Hold on full body weight bearing until 4 months.  2) Continue scar massage discussed. Ice/heat prn.  3) F/U 6 weeks  4) Call with any questions/concerns in the interim      Jamie Russo-DiGeorge PA-C

## 2024-04-18 ENCOUNTER — CLINICAL SUPPORT (OUTPATIENT)
Dept: REHABILITATION | Facility: HOSPITAL | Age: 29
End: 2024-04-18
Payer: COMMERCIAL

## 2024-04-18 DIAGNOSIS — R60.0 LOCALIZED EDEMA: ICD-10-CM

## 2024-04-18 DIAGNOSIS — M25.60 DECREASED RANGE OF MOTION: Primary | ICD-10-CM

## 2024-04-18 DIAGNOSIS — R52 PAIN: ICD-10-CM

## 2024-04-18 PROCEDURE — 97022 WHIRLPOOL THERAPY: CPT | Mod: CO

## 2024-04-18 PROCEDURE — 97110 THERAPEUTIC EXERCISES: CPT | Mod: CO

## 2024-04-18 PROCEDURE — 97140 MANUAL THERAPY 1/> REGIONS: CPT | Mod: CO

## 2024-04-18 NOTE — PROGRESS NOTES
[facility]  Occupational Therapy Treatment Note     Date: 4/18/2024  Name: Erlinda Rain  Clinic Number: [id number]    Therapy Diagnosis:   Encounter Diagnoses   Name Primary?    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, right   M25.531 (ICD-10-CM) - Right wrist pain   Z98.890 (ICD-10-CM) - Postoperative state      Surgical Procedure and Date: 3/6/2024, TFCC debridement, arthoscopy  Evaluation Date: 3/27/2024  Insurance Authorization Period Expiration: 3/18/2025   Plan of Care Expiration: 10 weeks; 6/7/2024  Date of Return to MD: TBD  Visit # / Visits authorized: 17/ 20  FOTO: (date and score)     FOTO Pawel Code:        Precautions: Standard        Time In:    9 AM   Time Out: 9:50 AM  Total Appointment Time (timed & untimed codes):50  minutes      Subjective     Patient reports: stiffness   She was compliant with home exercise program given last session.   Response to previous treatment:   Functional change: weaning from brace      Pain: 4/10 (wrist extension)   Location: right hands        Objective     Objective Measures updated at progress report unless specified.  Observation/Appearance:         Edema. Measured in centimeters.    4/3/2024 4/3/2024     Left Right   2in. Above elbow       2in. Below elbow       Wrist Crease 15.8 cm 16.7 cm   Figure of 8       MCPs             Edema. Measured in centimeters.    4/3/2024 4/3/2024     Left Right   Index:         P1        PIP       P2        DIP       P3       Long:         P1        PIP       P2        DIP       P3       Ring:         P1                   PIP                  P2                    DIP       P3       Small:          P1                   PIP              P2               DIP       P3       Thumb:       Prox. Phalanx       IP       Distal Phalanx             Elbow  and Wrist ROM. Measured in degrees.    4/3/2024 4/3/2024     Left Right   Elbow Ext/Flex       Supination/Pronation WNL/WNL 49/74   Wrist Ext/Flex 66/59 45/20  *pain with flexion   Wrist RD/UD 66/59 16/14         Hand ROM. Measured in degrees.    4/3/2024 4/3/2024     Left Right           Index: MP                   PIP                      DIP                  [place]               Long:  MP                  PIP                  DIP                  [place]               Ring:   MP                  PIP                  DIP                  [place]               Small:  MP                   PIP                   DIP                  [place]               Thumb: MP 51 53                IP 69 54       Rad ADD/ABD 46 40       Pal ADD/ABD 47 45          Opposition MP jt MP jt          Strength (Dynamometer) and Pinch Strength (Pinch Gauge)  Measured in pounds.    4/3/2024 4/3/2024     Left Right   Rung II   Deferred    Key Pinch       3pt Pinch       2pt Pinch                Sensation    3/27/2024 3/27/2024     Left Right   [place] Magali       Normal 1.65-2.83       Diminished Light Touch 3.22-3.61       Diminished Protective 3.84-4.31       Loss of Protective 4.56-6.65       Untestable >6.65       2 Point Discrimination       Static       Dynamic          Manual Muscle Test    4/3/2024 4/3/2024     Left Right   Wrist Extension        Wrist Flexion       Radial Deviation       Ulnar Deviation       Supination       Pronation       Elbow Extension       Elbow Flexion          Special Tests  Thumb CMC Grind Test     Finkelstein's Test     Phalen's Test     Tinel's Test     Louis's Test     Brayan-Littler Test     Digital Collateral Stress Test     ORL Test     Froment's Sign     Pinch OK Sign     Linda's Sign      [place] Sign      Clamp Sign      SL Ballottement Test     LT Ballottement Test     Scaphoid/WatsonTest     Amadascheid's Test     Metacarpal Stress Test     Piano Key Test     ECU Synergy Test     Ulnar Compression Test      TFCC Load Test     Ulnocarpal Stress Test     Midcarpal Shift Test     Pisiform Boost Test     Elbow Flexion Test     Scratch Collapse Test     Tennis Elbow Test     Resisted Middle Finger Extension Test     Mills Test     Chair Test     Biceps Squeeze Test     Biceps Hook Test     Milking Test     Press Up Manuever     PLRI Test     Valgus Stress Test     Varus Stress Test     Spurling Test     Cervical Distraction Test     ULNTT - General     ULNTT - Median Nerve     ULNTT - Radial Nerve     ULNTT - Ulnar Nerve            Limitation/Restriction for FOTO initial eval Survey     Therapist reviewed FOTO scores for Erlinda Rain on 3/27/2024.   FOTO documents entered into [employer] - see Media section.     Limitation Score: 45%          Treatment     Erlinda received the treatments listed below:          therapeutic exercises to develop ROM for 30 minutes including:  Wrist ROM x 10 reps   Thumb ROM x 10 reps  (2 sets)  Sup/pro gentle x 10  Wrist ext off table x 10   Wrist wheel flex/ext, sup/pro, x 2 min ea   Isospheres x 3 min   Towel walks x 3 min   Wrist maze x 3 min     manual therapy techniques: Myofacial release and Manual Lymphatic Drainage were applied to the: R  for 10 minutes, including:  Scar tissue mobilization   K tape for facial glide over incision sites       Parrafin bath with MHP for 10 minutes         Patient Education and Home Exercises     Education provided:   - edema sleeve  - Progress towards goals     Written Home Exercises Provided: yes.  Exercises were reviewed and Erlinda was able to demonstrate them prior to the end of the session.  Erlinda demonstrated good  understanding of the home exercise program provided. See electronic medical record under Patient Instructions for exercises provided during therapy sessions.       Assessment       Pt tolerated session well. Continues to demo stiffness with wrist flexion and discomfort with end range supination.   Client Care conference completed with evaluating  therapist in regards to this patients POC as evidenced by co signature of supervising therapist.       Erlinda is progressing well towards her goals and there are no updates to goals at this time. Pt prognosis is Good.     Patient will continue to benefit from skilled outpatient occupational therapy to address the deficits listed in the problem list on initial evaluation provide patient/family education and to maximize patient's level of independence in the home and community environment. The following goals were discussed with the patient and patient is in agreement with them as to be addressed in the treatment plan.   Long term goals: (by d/c)  1)  Patient to be IND with HEP and modalities for pain management  2)  Increase ROM 5-10 degrees to increase functional hand use for ADLs/leisure activities  3)   Increase  strength 5-8 lbs. to carry laundry, carry groceries, sweep, and increase functional independence of right hand for ADLs/iADLs  4)   Increase pinch 3-4  psis for  Increase in functional independence in ADLs/iADLs such as manipulating fasteners and opening containers  5) Patient to score at 62%  or less on FOTO to demonstrate improved perception of functional rightUE Use.                 Short term Goals: ( 4 weeks)   1)  Patient to be IND with HEP and modalities for pain management  2)  Increase ROM 3-5 degrees to increase functional hand use for ADLs/leisure activities  3)   Increase  strength 3-5 lbs. to carry laundry, carry groceries, sweep, and increase functional independence of right hand for ADLs/iADLs  4)   Increase pinch 1-3 psis for Increase in functional independence in ADLs/iADLs such as manipulating fasteners and opening containers  5)   Patient to score at 50%  or less on FOTO to demonstrate improved perception of functional right UE Use.       Patient's spiritual, cultural and educational needs considered and patient agreeable to plan of care and goals.      Plan     Updates/Grading  for next session:     ANTHONY Gutierrez   4/18/2024

## 2024-04-22 ENCOUNTER — CLINICAL SUPPORT (OUTPATIENT)
Dept: REHABILITATION | Facility: HOSPITAL | Age: 29
End: 2024-04-22
Payer: COMMERCIAL

## 2024-04-22 DIAGNOSIS — R60.0 LOCALIZED EDEMA: ICD-10-CM

## 2024-04-22 DIAGNOSIS — M25.60 DECREASED RANGE OF MOTION: Primary | ICD-10-CM

## 2024-04-22 DIAGNOSIS — R52 PAIN: ICD-10-CM

## 2024-04-22 PROCEDURE — 97018 PARAFFIN BATH THERAPY: CPT | Mod: 59

## 2024-04-22 PROCEDURE — 97140 MANUAL THERAPY 1/> REGIONS: CPT

## 2024-04-22 PROCEDURE — 97530 THERAPEUTIC ACTIVITIES: CPT

## 2024-04-22 PROCEDURE — 97110 THERAPEUTIC EXERCISES: CPT

## 2024-04-22 NOTE — PROGRESS NOTES
[facility]  Occupational Therapy Treatment Note     Date: 4/22/2024  Name: Erlinda Rain  Clinic Number: [id number]    Therapy Diagnosis:   Encounter Diagnoses   Name Primary?    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, right   M25.531 (ICD-10-CM) - Right wrist pain   Z98.890 (ICD-10-CM) - Postoperative state      Surgical Procedure and Date: 3/6/2024, TFCC debridement, arthoscopy  Evaluation Date: 3/27/2024  Insurance Authorization Period Expiration: 3/18/2025   Plan of Care Expiration: 10 weeks; 6/7/2024  Date of Return to MD: TBD  Visit # / Visits authorized: 17/ 20  FOTO: (date and score)     FOTO Pawel Code:        Precautions: Standard        Time In:    9 AM   Time Out: 9:50 AM  Total Appointment Time (timed & untimed codes):50  minutes      Subjective     Patient reports: stiffness   She was compliant with home exercise program given last session.   Response to previous treatment:   Functional change: weaning from brace      Pain: 4/10 (wrist extension)   Location: right hands        Objective     Objective Measures updated at progress report unless specified.  Observation/Appearance:         Edema. Measured in centimeters.    4/3/2024 4/3/2024     Left Right   2in. Above elbow       2in. Below elbow       Wrist Crease 15.8 cm 16.7 cm   Figure of 8       MCPs             Edema. Measured in centimeters.    4/3/2024 4/3/2024     Left Right   Index:         P1        PIP       P2        DIP       P3       Long:         P1        PIP       P2        DIP       P3       Ring:         P1                   PIP                  P2                    DIP       P3       Small:          P1                   PIP              P2               DIP       P3       Thumb:       Prox. Phalanx       IP       Distal Phalanx              Elbow and Wrist ROM. Measured in degrees.    4/3/2024 4/3/2024     Left Right   Elbow Ext/Flex       Supination/Pronation WNL/WNL 49/74   Wrist Ext/Flex 66/59 45/20  *pain with flexion   Wrist RD/UD 66/59 16/14         Hand ROM. Measured in degrees.    4/3/2024 4/3/2024     Left Right           Index: MP                   PIP                      DIP                  [place]               Long:  MP                  PIP                  DIP                  [place]               Ring:   MP                  PIP                  DIP                  [place]               Small:  MP                   PIP                   DIP                  [place]               Thumb: MP 51 53                IP 69 54       Rad ADD/ABD 46 40       Pal ADD/ABD 47 45          Opposition MP jt MP jt          Strength (Dynamometer) and Pinch Strength (Pinch Gauge)  Measured in pounds.    4/3/2024 4/3/2024     Left Right   Rung II   Deferred    Key Pinch       3pt Pinch       2pt Pinch                Sensation    3/27/2024 3/27/2024     Left Right   Yolo Magali       Normal 1.65-2.83       Diminished Light Touch 3.22-3.61       Diminished Protective 3.84-4.31       Loss of Protective 4.56-6.65       Untestable >6.65       2 Point Discrimination       Static       Dynamic          Manual Muscle Test    4/3/2024 4/3/2024     Left Right   Wrist Extension        Wrist Flexion       Radial Deviation       Ulnar Deviation       Supination       Pronation       Elbow Extension       Elbow Flexion          Special Tests  Thumb CMC Grind Test     Finkelstein's Test     Phalen's Test     Tinel's Test     Louis's Test     Brayan-Littler Test     Digital Collateral Stress Test     ORL Test     Froment's Sign     Pinch OK Sign     Linda's Sign      Egawa Sign      Clamp Sign      SL Ballottement Test     LT Ballottement Test     Scaphoid/WatsonTest     Amadascheilayne's Test     Metacarpal Stress Test     Piano Key Test     ECU Synergy Test     Ulnar  Compression Test     TFCC Load Test     Ulnocarpal Stress Test     Midcarpal Shift Test     Pisiform Boost Test     Elbow Flexion Test     Scratch Collapse Test     Tennis Elbow Test     Resisted Middle Finger Extension Test     Mills Test     Chair Test     Biceps Squeeze Test     Biceps Hook Test     Milking Test     Press Up Manuever     PLRI Test     Valgus Stress Test     Varus Stress Test     Spurling Test     Cervical Distraction Test     ULNTT - General     ULNTT - Median Nerve     ULNTT - Radial Nerve     ULNTT - Ulnar Nerve            Limitation/Restriction for FOTO initial eval Survey     Therapist reviewed FOTO scores for Erlinda Rain on 3/27/2024.   FOTO documents entered into [employer] - see Media section.     Limitation Score: 45%          Treatment     Erlinda received the treatments listed below:          therapeutic exercises to develop ROM for 30 minutes including:  Wrist ROM x 10 reps, closed fist   Thumb radial/palmar ROM x 10 reps (2 sets)  Sup/pro gentle x 10 (2 sets)  Wrist ext off table x 10   Wrist wheel flex/ext, sup/pro, x 2 min ea NT  Isospheres x 3 min   Towel walks x 3 min   Wrist maze x 3 min       manual therapy techniques: Myofacial release and Manual Lymphatic Drainage were applied to the: R  for 10 minutes, including:  Scar tissue mobilization   K tape for ECU lengthening         Parrafin bath with MHP for 10 minutes         Patient Education and Home Exercises     Education provided:   - edema sleeve  - Progress towards goals     Written Home Exercises Provided: yes.  Exercises were reviewed and Erlinda was able to demonstrate them prior to the end of the session.  Erlinda demonstrated good  understanding of the home exercise program provided. See electronic medical record under Patient Instructions for exercises provided during therapy sessions.       Assessment         Pt tolerated session well. Slight discomfort with supination near insertion. Applied KT tape      Erlinda is progressing  well towards her goals and there are no updates to goals at this time. Pt prognosis is Good.     Patient will continue to benefit from skilled outpatient occupational therapy to address the deficits listed in the problem list on initial evaluation provide patient/family education and to maximize patient's level of independence in the home and community environment. The following goals were discussed with the patient and patient is in agreement with them as to be addressed in the treatment plan.   Long term goals: (by d/c)  1)  Patient to be IND with HEP and modalities for pain management  2)  Increase ROM 5-10 degrees to increase functional hand use for ADLs/leisure activities  3)   Increase  strength 5-8 lbs. to carry laundry, carry groceries, sweep, and increase functional independence of right hand for ADLs/iADLs  4)   Increase pinch 3-4  psis for  Increase in functional independence in ADLs/iADLs such as manipulating fasteners and opening containers  5) Patient to score at 62%  or less on FOTO to demonstrate improved perception of functional rightUE Use.                 Short term Goals: ( 4 weeks)   1)  Patient to be IND with HEP and modalities for pain management  2)  Increase ROM 3-5 degrees to increase functional hand use for ADLs/leisure activities  3)   Increase  strength 3-5 lbs. to carry laundry, carry groceries, sweep, and increase functional independence of right hand for ADLs/iADLs  4)   Increase pinch 1-3 psis for Increase in functional independence in ADLs/iADLs such as manipulating fasteners and opening containers  5)   Patient to score at 50%  or less on FOTO to demonstrate improved perception of functional right UE Use.       Patient's spiritual, cultural and educational needs considered and patient agreeable to plan of care and goals.      Plan     Updates/Grading for next session:     Cira Fernandes OT   4/22/2024

## 2024-04-23 ENCOUNTER — OFFICE VISIT (OUTPATIENT)
Dept: URGENT CARE | Facility: CLINIC | Age: 29
End: 2024-04-23
Payer: COMMERCIAL

## 2024-04-23 VITALS
RESPIRATION RATE: 19 BRPM | HEIGHT: 62 IN | DIASTOLIC BLOOD PRESSURE: 84 MMHG | OXYGEN SATURATION: 98 % | TEMPERATURE: 100 F | WEIGHT: 170 LBS | HEART RATE: 128 BPM | SYSTOLIC BLOOD PRESSURE: 118 MMHG | BODY MASS INDEX: 31.28 KG/M2

## 2024-04-23 DIAGNOSIS — B34.9 ACUTE VIRAL SYNDROME: ICD-10-CM

## 2024-04-23 DIAGNOSIS — R50.9 FEVER, UNSPECIFIED FEVER CAUSE: Primary | ICD-10-CM

## 2024-04-23 DIAGNOSIS — R05.9 COUGH, UNSPECIFIED TYPE: ICD-10-CM

## 2024-04-23 DIAGNOSIS — R52 BODY ACHES: ICD-10-CM

## 2024-04-23 LAB
BILIRUB UR QL STRIP: NEGATIVE
CTP QC/QA: YES
CTP QC/QA: YES
GLUCOSE UR QL STRIP: NEGATIVE
KETONES UR QL STRIP: NEGATIVE
LEUKOCYTE ESTERASE UR QL STRIP: NEGATIVE
PH, POC UA: 5.5 (ref 5–8)
POC BLOOD, URINE: NEGATIVE
POC MOLECULAR INFLUENZA A AGN: NEGATIVE
POC MOLECULAR INFLUENZA B AGN: NEGATIVE
POC NITRATES, URINE: NEGATIVE
PROT UR QL STRIP: POSITIVE
SARS-COV-2 AG RESP QL IA.RAPID: NEGATIVE
SP GR UR STRIP: 1.02 (ref 1–1.03)
UROBILINOGEN UR STRIP-ACNC: NORMAL (ref 0.1–1.1)

## 2024-04-23 PROCEDURE — 99214 OFFICE O/P EST MOD 30 MIN: CPT | Mod: S$GLB,,, | Performed by: FAMILY MEDICINE

## 2024-04-23 PROCEDURE — 87502 INFLUENZA DNA AMP PROBE: CPT | Mod: QW,S$GLB,, | Performed by: FAMILY MEDICINE

## 2024-04-23 PROCEDURE — 87811 SARS-COV-2 COVID19 W/OPTIC: CPT | Mod: QW,S$GLB,, | Performed by: FAMILY MEDICINE

## 2024-04-23 PROCEDURE — 81003 URINALYSIS AUTO W/O SCOPE: CPT | Mod: QW,S$GLB,, | Performed by: FAMILY MEDICINE

## 2024-04-23 NOTE — PROGRESS NOTES
"Subjective:      Patient ID: Erlinda Rain is a 28 y.o. female.    Vitals:  height is 5' 2" (1.575 m) and weight is 77.1 kg (170 lb). Her oral temperature is 100.4 °F (38 °C) (abnormal). Her blood pressure is 118/84 and her pulse is 128 (abnormal). Her respiration is 19 and oxygen saturation is 98%.     Chief Complaint: Fever    Patient presents with fever, chills, headache, mild congestion, body aches,  Onset 2 days. Otc tylenol and ibuprofen.  Patient denies chest pain, SOB, diarrhea, vomiting, abdominal pain, dysuria.    Fever   This is a new problem. The current episode started yesterday. The maximum temperature noted was 102 to 102.9 F. Associated symptoms include congestion, headaches, muscle aches and sleepiness. Pertinent negatives include no coughing or sore throat. She has tried acetaminophen for the symptoms. The treatment provided no relief.     Constitution: Positive for fever.   HENT:  Positive for congestion. Negative for sore throat.    Respiratory:  Negative for cough.    Neurological:  Positive for headaches.      Objective:     Physical Exam   Constitutional: She is oriented to person, place, and time. She appears well-developed. She is cooperative.  Non-toxic appearance. She does not appear ill. No distress.   HENT:   Head: Normocephalic and atraumatic.   Ears:   Right Ear: Hearing, tympanic membrane, external ear and ear canal normal. no impacted cerumen  Left Ear: Hearing, tympanic membrane, external ear and ear canal normal. no impacted cerumen  Nose: No mucosal edema, rhinorrhea, nasal deformity or congestion. No epistaxis. Right sinus exhibits no maxillary sinus tenderness and no frontal sinus tenderness. Left sinus exhibits no maxillary sinus tenderness and no frontal sinus tenderness.   Mouth/Throat: Uvula is midline, oropharynx is clear and moist and mucous membranes are normal. No trismus in the jaw. Normal dentition. No uvula swelling. No oropharyngeal exudate, posterior oropharyngeal " edema or posterior oropharyngeal erythema.   Eyes: Conjunctivae and lids are normal. No scleral icterus.   Neck: Trachea normal and phonation normal. Neck supple. No edema present. No erythema present. No neck rigidity present.   Cardiovascular: Normal rate, regular rhythm, normal heart sounds and normal pulses.   No murmur heard.  Pulmonary/Chest: Effort normal and breath sounds normal. No stridor. No respiratory distress. She has no decreased breath sounds. She has no wheezes. She has no rhonchi. She has no rales.   Abdominal: Normal appearance. She exhibits no distension. There is no abdominal tenderness. There is no left CVA tenderness and no right CVA tenderness.   Musculoskeletal: Normal range of motion.         General: No deformity. Normal range of motion.      Cervical back: She exhibits no tenderness.   Lymphadenopathy:     She has no cervical adenopathy.   Neurological: She is alert, oriented to person, place, and time and at baseline. She exhibits normal muscle tone. Coordination normal.   Skin: Skin is warm, dry, intact, not diaphoretic and not pale.   Psychiatric: Her speech is normal and behavior is normal. Judgment and thought content normal.   Nursing note and vitals reviewed.      Assessment:     1. Fever, unspecified fever cause    2. Cough, unspecified type    3. Body aches    4. Acute viral syndrome        Plan:   Discussed exam findings/results/diagnosis/plan with patient. Advised to f/u with PCP within 2-5 days. ER precautions given if symptoms get any worse. All questions answered. Patient verbally understood and agreed with treatment plan.  Educational materials and instructions regarding the visit diagnosis and management provided.     Fever, unspecified fever cause  -     POCT Influenza A/B MOLECULAR  -     SARS Coronavirus 2 Antigen, POCT Manual Read  -     POCT Urinalysis, Dipstick, Automated, W/O Scope    Cough, unspecified type    Body aches    Acute viral syndrome        Your  sample(s) was tested for COVID-19 using the [employer] COVID-19 Antigen Card    If you have tested positive in our clinic today, a positive test result means that the virus that causes COVID-19 was detected in your sample and are contagious.    If you have tested negative in our clinic today, to increase the chance that the negative result for COVID-19 is accurate, you should:     Test again in 48 hours if you have symptoms on the first day of testing.   Test 2 more times at least 48 hours apart if you do not have symptoms on the first day of testing.     A negative test result indicates that the virus that causes COVID-19 was not detected in your sample. A negative result is presumptive, meaning it is not certain that you do not have COVID-19. You may still have COVID-19 and you may still be contagious. There is a higher chance of false negative results with antigen tests compared to laboratory-based tests such as PCR. If you tested negative and continue to experience COVID-19-like symptoms, e.g., fever, cough, and/or shortness of breath, you should seek follow up care with your health care provider.

## 2024-04-24 ENCOUNTER — PATIENT MESSAGE (OUTPATIENT)
Dept: REHABILITATION | Facility: HOSPITAL | Age: 29
End: 2024-04-24
Payer: COMMERCIAL

## 2024-04-29 ENCOUNTER — PATIENT OUTREACH (OUTPATIENT)
Dept: ADMINISTRATIVE | Facility: HOSPITAL | Age: 29
End: 2024-04-29
Payer: COMMERCIAL

## 2024-05-01 NOTE — PROGRESS NOTES
FAMILY MEDICINE  [facility]    Reason for visit:   Chief Complaint   Patient presents with    Annual Exam    Immunizations    Discuss weight loss medication       HPI: Erlinda Rain is a 28 y.o. female  - with obesity, fibromyalgia, and PCOS presents for for her routine annual physical.  She would also like to discuss immunizations and prophylactic treatment for an upcoming trip to [place].  She would also like to discuss weight loss medication.    Ortho:  Dr. Dandre Rose MD  Gynecology:Dr. Karlene Larson MD  Ortho Russo-Digeorge, Jamie L., PA-C    Erlinda Rain also reports that she has been feeling more fatigued lately.  She does have a history of chronic fatigue however recently in the last month she reports difficulty focusing.  She is unsure if related with stressor with something metabolic.  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  She will graduate in the next year.  She is looking into going to primary care.  She does try to exercise about 2 days a week.  She does find difficulty with time and preparing healthy foods.    She has an upcoming trip to [place] for a medical mission.  She will be spending 2 weeks there.  She will be in [facility] and a small town outside of [facility] .  She reports that her lead recommend acetazolamide for altitude sickness prevention.  Otherwise no further recommendations on immunizations.  She reports she will not be around the [place] and does not expect to be in high malaria areas.    1. Obesity    Today 5/2/24: see above Erlinda Rain reports last year she tried compounded semaglutide however it made her very nauseous and vomit.  She was approved for Ozempic for weight loss and reports that she tolerated well without any issues.  She would like to restart a GLP 1 for weight loss.  She has had difficulty struggling with her weight for the last several years.  She tries to exercise regularly.    Current weight:   173 lbs start Wegovy  0.25 mg weekly    Prior weight history:   Wt Readings from Last 10 Encounters:  05/02/24 : 78.5 kg (173 lb)  04/23/24 : 77.1 kg (170 lb)  03/06/24 : 77.1 kg (170 lb)  02/19/24 : 77.1 kg (170 lb)  09/22/23 : 77.1 kg (170 lb)  09/12/23 : 79 kg (174 lb 3.2 oz)  08/21/23 : 78.8 kg (173 lb 13.3 oz)  07/21/23 : 76.8 kg (169 lb 5 oz)  07/18/23 : 77.4 kg (170 lb 10.2 oz)  06/06/23 : 78 kg (171 lb 15.3 oz)    Goal weight: 145-150 lbs     Current diet: standard  Current exercise: 2 times a week  Current daily activities: sedentary in school    Prior weight loss program:  Compounded semaglutide    Medications for weight loss:  none    Medications that may impede weight loss:   none    Comorbidities:   PCOS              Review of Systems   HENT:  Negative for hearing loss.    Eyes:  Negative for discharge.   Respiratory:  Negative for wheezing.    Cardiovascular:  Negative for chest pain and palpitations.   Gastrointestinal:  Negative for blood in stool, constipation, diarrhea and vomiting.   Genitourinary:  Negative for dysuria and hematuria.   Musculoskeletal:  Negative for neck pain.   Neurological:  Negative for weakness and headaches.   Endo/Heme/Allergies:  Negative for polydipsia.   All other systems reviewed and are negative.      Social History     Social History Narrative    Single. Currently in NP school       Health Maintenance Topics with due status: Not Due       Topic Last Completion Date    TETANUS VACCINE 06/10/2021     Health Maintenance Due   Topic Date Due    COVID-19 Vaccine (4 - 2023-24 season) 09/01/2023    Cervical Cancer Screening  03/23/2024       ALLERGIES:   Review of patient's allergies indicates:   Allergen Reactions    Kiwi (actinidia chinensis) Itching and Nausea Only       MEDS:   Current Outpatient Medications on File Prior to Visit   Medication Sig Dispense Refill Last Dose    buPROPion (WELLBUTRIN XL) 300 MG 24 hr tablet Take 1 tablet (300 mg total) by mouth once daily. 90 tablet 3 Taking     "celecoxib (CELEBREX) 200 MG capsule Take 1 capsule (200 mg total) by mouth once daily. 30 capsule 2 Taking    cetirizine (ZYRTEC) 10 MG tablet Take 1 tablet by mouth once daily.   Taking    desogestreL-ethinyl estradioL (APRI) 0.15-0.03 mg per tablet Take 1 tablet by mouth once daily. 90 tablet 3 Taking    metFORMIN (GLUCOPHAGE-XR) 500 MG ER 24hr tablet Take 1 tablet (500 mg total) by mouth daily with breakfast. 90 tablet 3 Taking    multivitamin capsule Take 1 capsule by mouth once daily.   Taking    [DISCONTINUED] ARAZLO 0.045 % Lotn Apply 1 application topically every evening.          Vital signs:   Vitals:    05/02/24 0835   BP: 112/78   Pulse: 94   SpO2: 98%   Weight: 78.5 kg (173 lb)   Height: 5' 2" (1.575 m)     Body mass index is 31.64 kg/m².    PHYSICAL EXAM:     Physical Exam  Vitals reviewed.   Constitutional:       General: She is not in acute distress.  HENT:      Head: Normocephalic and atraumatic.      Right Ear: Tympanic membrane and ear canal normal.      Left Ear: Tympanic membrane and ear canal normal.   Eyes:      General: No scleral icterus.     Conjunctiva/sclera: Conjunctivae normal.   Neck:      Thyroid: No thyromegaly.      Vascular: No carotid bruit.     Cardiovascular:      Rate and Rhythm: Normal rate and regular rhythm.      Pulses: Normal pulses.      Heart sounds: Normal heart sounds. No murmur heard.     No friction rub. No gallop.   Pulmonary:      Effort: Pulmonary effort is normal.      Breath sounds: Normal breath sounds. No wheezing, rhonchi or rales.   Abdominal:      General: Bowel sounds are normal. There is no distension.      Palpations: Abdomen is soft.      Tenderness: There is no abdominal tenderness.   Musculoskeletal:      Cervical back: Normal range of motion and neck supple.      Right lower leg: No edema.      Left lower leg: No edema.   Lymphadenopathy:      Cervical: No cervical adenopathy.   Skin:     General: Skin is warm.      Capillary Refill: Capillary refill " takes less than 2 seconds.   Neurological:      Mental Status: She is alert.           PERTINENT RESULTS:   Lab Results   Component Value Date    WBC 7.73 07/07/2023    HGB 13.3 07/07/2023    HCT 39.2 07/07/2023    MCV 88 07/07/2023     07/07/2023       CMP  Sodium   Date Value Ref Range Status   07/07/2023 139 136 - 145 mmol/L Final     Potassium   Date Value Ref Range Status   07/07/2023 3.8 3.5 - 5.1 mmol/L Final     Chloride   Date Value Ref Range Status   07/07/2023 106 95 - 110 mmol/L Final     CO2   Date Value Ref Range Status   07/07/2023 25 23 - 29 mmol/L Final     Glucose   Date Value Ref Range Status   07/07/2023 116 (H) 70 - 110 mg/dL Final     BUN   Date Value Ref Range Status   07/07/2023 8 6 - 20 mg/dL Final     Creatinine   Date Value Ref Range Status   07/07/2023 0.9 0.5 - 1.4 mg/dL Final     Calcium   Date Value Ref Range Status   07/07/2023 9.7 8.7 - 10.5 mg/dL Final     Total Protein   Date Value Ref Range Status   07/07/2023 7.3 6.0 - 8.4 g/dL Final     Albumin   Date Value Ref Range Status   07/07/2023 3.7 3.5 - 5.2 g/dL Final     Total Bilirubin   Date Value Ref Range Status   07/07/2023 0.2 0.1 - 1.0 mg/dL Final     Comment:     For infants and newborns, interpretation of results should be based  on gestational age, weight and in agreement with clinical  observations.    Premature Infant recommended reference ranges:  Up to 24 hours.............<8.0 mg/dL  Up to 48 hours............<12.0 mg/dL  3-5 days..................<15.0 mg/dL  6-29 days.................<15.0 mg/dL       Alkaline Phosphatase   Date Value Ref Range Status   07/07/2023 74 55 - 135 U/L Final     AST   Date Value Ref Range Status   07/07/2023 17 10 - 40 U/L Final     ALT   Date Value Ref Range Status   07/07/2023 19 10 - 44 U/L Final     Anion Gap   Date Value Ref Range Status   07/07/2023 8 8 - 16 mmol/L Final     eGFR   Date Value Ref Range Status   07/07/2023 >60 >60 mL/min/1.73 m^2 Final     Lab Results    Component Value Date    CHOL 194 10/23/2018    CHOL 185 06/24/2015    CHOL 183 12/27/2013     Lab Results   Component Value Date    HDL 51 10/23/2018    HDL 48 06/24/2015    HDL 43 12/27/2013     Lab Results   Component Value Date    LDLCALC 118.4 10/23/2018    LDLCALC 111.8 06/24/2015    LDLCALC 119.0 12/27/2013     Lab Results   Component Value Date    TRIG 123 10/23/2018    TRIG 126 06/24/2015    TRIG 105 12/27/2013       Lab Results   Component Value Date    CHOLHDL 26.3 10/23/2018    CHOLHDL 25.9 06/24/2015    CHOLHDL 23.5 12/27/2013     Lab Results   Component Value Date    HGBA1C 4.9 07/07/2023         ASSESSMENT/PLAN:    1. Encounter for general adult medical examination with abnormal findings  -     TSH; Future; Expected date: 05/02/2024  -     Lipid Panel; Future; Expected date: 05/02/2024  -     Hemoglobin A1C; Future; Expected date: 05/02/2024  -     Comprehensive Metabolic Panel; Future; Expected date: 05/02/2024  -     CBC Auto Differential; Future; Expected date: 05/02/2024    2. Obesity (BMI 30.0-34.9)  Overview:  - discussed recommendation for diet and cardiovascular exercise  - counseling on lifestyle modifications for risk factor reduction  - counseling on management options  - opts for medication management and recommend start Wegovy 0.25 mg weekly  - Erlinda Rain denies history of pancreatitis or heavy alcohol use and denies family and person history of thyroid medullary cancer and MENs  - counseling regarding new medication including expected results, potential side effects, and appropriate use.  - questions elicited and answered  - encouraged to patient to notify me of any questions or concerns  - discussed cost being a limiting issue for weight loss medication and directed to  website  - recommend add psyllium husk  - recommend diversify diet and goal of 3-4 colors of the rainbow in every meal and avoid UPF  - discussed that some GLP 1 can decrease the efficacy of her OCP.  And  to use secondary contraceptive methods      Orders:  -     semaglutide, weight loss, (WEGOVY) 0.25 mg/0.5 mL PnIj; Inject 0.25 mg into the skin every 7 days.  Dispense: 4 each; Refill: 0    3. PCOS (polycystic ovarian syndrome)  Overview:  Lab Results   Component Value Date    HGBA1C 4.9 07/07/2023     - monitor A1C  - menses regular but light with OCP  - no acute issues    Orders:  -     Hemoglobin A1C; Future; Expected date: 05/02/2024    4. Chronic fatigue  Overview:  - chronic  - she reports that she is on Wellbutrin for chronic fatigue by her last provider     Orders:  -     Vitamin D; Future; Expected date: 05/02/2024  -     Vitamin B12; Future; Expected date: 05/02/2024    5. Altitude sickness prophylaxis  -     acetaZOLAMIDE (DIAMOX) 125 MG Tab; 1 pill twice a day and discontinue 2 days after descent in altitude  Dispense: 32 tablet; Refill: 0    6. Vaccine counseling  -     Cancel: Ambulatory referral/consult to [facility]; Future; Expected date: 05/08/2024  -     (In Office Administered) Hepatitis A Vaccine (Adult) (IM); Future; Expected date: 05/02/2024  -     typhoid (VIVOTIF) DR capsule; Take 1 capsule by mouth every other day. for 8 days  Dispense: 4 capsule; Refill: 0          Vaccines recommended: Hep A    Follow up in about 1 month (around 6/2/2024) for Weight, Virtual Visit. or sooner with any concerns    This note is dictated using the M*Modal Fluency Direct word recognition program. There are word recognition mistakes that are occasionally missed on review.    Dr. Paradise Guidry D.O.   Family Medicine

## 2024-05-02 ENCOUNTER — OFFICE VISIT (OUTPATIENT)
Dept: PRIMARY CARE CLINIC | Facility: CLINIC | Age: 29
End: 2024-05-02
Attending: FAMILY MEDICINE
Payer: COMMERCIAL

## 2024-05-02 VITALS
SYSTOLIC BLOOD PRESSURE: 112 MMHG | HEIGHT: 62 IN | BODY MASS INDEX: 31.83 KG/M2 | DIASTOLIC BLOOD PRESSURE: 78 MMHG | WEIGHT: 173 LBS | OXYGEN SATURATION: 98 % | HEART RATE: 94 BPM

## 2024-05-02 DIAGNOSIS — Z29.89 ALTITUDE SICKNESS PROPHYLAXIS: ICD-10-CM

## 2024-05-02 DIAGNOSIS — E66.9 OBESITY (BMI 30.0-34.9): ICD-10-CM

## 2024-05-02 DIAGNOSIS — Z71.85 VACCINE COUNSELING: ICD-10-CM

## 2024-05-02 DIAGNOSIS — R53.82 CHRONIC FATIGUE: ICD-10-CM

## 2024-05-02 DIAGNOSIS — Z00.01 ENCOUNTER FOR GENERAL ADULT MEDICAL EXAMINATION WITH ABNORMAL FINDINGS: Primary | ICD-10-CM

## 2024-05-02 DIAGNOSIS — E28.2 PCOS (POLYCYSTIC OVARIAN SYNDROME): ICD-10-CM

## 2024-05-02 PROBLEM — R52 PAIN: Status: RESOLVED | Noted: 2024-04-15 | Resolved: 2024-05-02

## 2024-05-02 PROBLEM — R00.0 TACHYCARDIA: Status: RESOLVED | Noted: 2023-12-12 | Resolved: 2024-05-02

## 2024-05-02 PROBLEM — M25.60 DECREASED RANGE OF MOTION: Status: RESOLVED | Noted: 2024-04-15 | Resolved: 2024-05-02

## 2024-05-02 PROBLEM — R60.0 LOCALIZED EDEMA: Status: RESOLVED | Noted: 2024-04-15 | Resolved: 2024-05-02

## 2024-05-02 PROBLEM — R29.898 DECREASED GRIP STRENGTH OF RIGHT HAND: Status: RESOLVED | Noted: 2023-11-01 | Resolved: 2024-05-02

## 2024-05-02 PROBLEM — M54.2 NECK PAIN: Status: RESOLVED | Noted: 2023-12-12 | Resolved: 2024-05-02

## 2024-05-02 PROBLEM — R10.2 PELVIC PAIN IN FEMALE: Status: RESOLVED | Noted: 2021-07-28 | Resolved: 2024-05-02

## 2024-05-02 PROBLEM — R00.2 PALPITATIONS: Status: RESOLVED | Noted: 2023-12-12 | Resolved: 2024-05-02

## 2024-05-02 PROCEDURE — 99999 PR PBB SHADOW E&M-EST. PATIENT-LVL IV: CPT | Mod: PBBFAC,,, | Performed by: FAMILY MEDICINE

## 2024-05-02 PROCEDURE — 99213 OFFICE O/P EST LOW 20 MIN: CPT | Mod: 25,S$GLB,, | Performed by: FAMILY MEDICINE

## 2024-05-02 PROCEDURE — 99395 PREV VISIT EST AGE 18-39: CPT | Mod: S$GLB,,, | Performed by: FAMILY MEDICINE

## 2024-05-02 PROCEDURE — 1159F MED LIST DOCD IN RCRD: CPT | Mod: CPTII,S$GLB,, | Performed by: FAMILY MEDICINE

## 2024-05-02 PROCEDURE — 1160F RVW MEDS BY RX/DR IN RCRD: CPT | Mod: CPTII,S$GLB,, | Performed by: FAMILY MEDICINE

## 2024-05-02 PROCEDURE — 3074F SYST BP LT 130 MM HG: CPT | Mod: CPTII,S$GLB,, | Performed by: FAMILY MEDICINE

## 2024-05-02 PROCEDURE — 3078F DIAST BP <80 MM HG: CPT | Mod: CPTII,S$GLB,, | Performed by: FAMILY MEDICINE

## 2024-05-02 PROCEDURE — 3008F BODY MASS INDEX DOCD: CPT | Mod: CPTII,S$GLB,, | Performed by: FAMILY MEDICINE

## 2024-05-02 RX ORDER — ATOVAQUONE AND PROGUANIL HYDROCHLORIDE 250; 100 MG/1; MG/1
4 TABLET, FILM COATED ORAL DAILY
Status: CANCELLED | OUTPATIENT
Start: 2024-05-02

## 2024-05-02 RX ORDER — SEMAGLUTIDE 0.25 MG/.5ML
0.25 INJECTION, SOLUTION SUBCUTANEOUS
Qty: 4 EACH | Refills: 0 | Status: SHIPPED | OUTPATIENT
Start: 2024-05-02 | End: 2024-05-06

## 2024-05-02 RX ORDER — BUPROPION HYDROCHLORIDE 300 MG/1
300 TABLET ORAL
Qty: 90 TABLET | Refills: 3 | Status: SHIPPED | OUTPATIENT
Start: 2024-05-02

## 2024-05-02 RX ORDER — METFORMIN HYDROCHLORIDE 500 MG/1
500 TABLET, EXTENDED RELEASE ORAL DAILY
Qty: 90 TABLET | Refills: 3 | Status: SHIPPED | OUTPATIENT
Start: 2024-05-02 | End: 2025-05-02

## 2024-05-02 RX ORDER — ACETAZOLAMIDE 125 MG/1
TABLET ORAL
Qty: 32 TABLET | Refills: 0 | Status: SHIPPED | OUTPATIENT
Start: 2024-05-02 | End: 2024-06-18

## 2024-05-02 RX ORDER — ACETAZOLAMIDE 500 MG/1
500 CAPSULE, EXTENDED RELEASE ORAL 2 TIMES DAILY
Qty: 60 CAPSULE | Refills: 11 | Status: CANCELLED | OUTPATIENT
Start: 2024-05-02 | End: 2025-05-02

## 2024-05-02 NOTE — TELEPHONE ENCOUNTER
No care due was identified.  [facility] Embedded Care Due Messages. Reference number: [id number].   5/02/2024 12:42:29 PM CDT

## 2024-05-02 NOTE — TELEPHONE ENCOUNTER
Care Due:                  Date            Visit Type   Department     Provider  --------------------------------------------------------------------------------                                MYCHART                              ANNUAL                              CHECKUP/PHY  [facility] PRIMARY  Last Visit: 05-      S            SHANTA Scottny  Chao  Next Visit: None Scheduled  None         None Found                                                            Last  Test          Frequency    Reason                     Performed    Due Date  --------------------------------------------------------------------------------    Cr..........  12 months..  metFORMIN................  07- 07-    HBA1C.......  6 months...  metFORMIN, semaglutide,..  07- 01-    [facility] Embedded Care Due Messages. Reference number: [id number].   5/02/2024 9:45:52 AM CDT

## 2024-05-02 NOTE — TELEPHONE ENCOUNTER
Refill Routing Note   Medication(s) are not appropriate for processing by [facility] for the following reason(s):        Required labs outdated    [facility] action(s):  Defer  Approve               Appointments  past 12m or future 3m with PCP    Date Provider   Last Visit   5/2/2024 Paradise Guidry, DO   Next Visit   Visit date not found Paradise Guidry, DO   ED visits in past 90 days: 0        Note composed:4:21 PM 05/02/2024

## 2024-05-03 ENCOUNTER — CLINICAL SUPPORT (OUTPATIENT)
Dept: REHABILITATION | Facility: HOSPITAL | Age: 29
End: 2024-05-03
Payer: COMMERCIAL

## 2024-05-03 DIAGNOSIS — M25.562 CHRONIC PAIN OF LEFT KNEE: ICD-10-CM

## 2024-05-03 DIAGNOSIS — R29.898 DECREASED GRIP STRENGTH OF RIGHT HAND: ICD-10-CM

## 2024-05-03 DIAGNOSIS — M25.60 DECREASED RANGE OF MOTION: Primary | ICD-10-CM

## 2024-05-03 DIAGNOSIS — R52 PAIN: ICD-10-CM

## 2024-05-03 DIAGNOSIS — G89.29 CHRONIC PAIN OF LEFT KNEE: ICD-10-CM

## 2024-05-03 DIAGNOSIS — M25.531 RIGHT WRIST PAIN: ICD-10-CM

## 2024-05-03 DIAGNOSIS — M24.131 DEGENERATIVE TFCC TEAR, RIGHT: ICD-10-CM

## 2024-05-03 PROCEDURE — 97018 PARAFFIN BATH THERAPY: CPT | Mod: CO

## 2024-05-03 PROCEDURE — 97140 MANUAL THERAPY 1/> REGIONS: CPT | Mod: CO

## 2024-05-03 PROCEDURE — 97110 THERAPEUTIC EXERCISES: CPT | Mod: CO

## 2024-05-03 NOTE — PROGRESS NOTES
[facility]  Occupational Therapy Treatment Note     Date: 5/3/2024  Name: Erlinda Rain  Clinic Number: [id number]    Therapy Diagnosis:   Encounter Diagnoses   Name Primary?    Decreased range of motion Yes    Pain     Degenerative TFCC tear, right     Right wrist pain     Decreased  strength of right hand              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, right   M25.531 (ICD-10-CM) - Right wrist pain   Z98.890 (ICD-10-CM) - Postoperative state      Surgical Procedure and Date: 3/6/2024, TFCC debridement, arthoscopy  Evaluation Date: 3/27/2024  Insurance Authorization Period Expiration: 3/18/2025   Plan of Care Expiration: 10 weeks; 6/7/2024  Date of Return to MD: TBD  Visit # / Visits authorized: 19/ 20  FOTO: (date and score)     FOTO Lobby Code:        Precautions: Standard        Time In: 9:00 AM   Time Out: 9:53 AM  Total Appointment Time (timed & untimed codes): 53 minutes      Subjective     Patient reports: was unable to attend therapy for the last week due to illness,   She was compliant with home exercise program given last session.   Response to previous treatment:   Functional change: weaning from brace      Pain: 4/10 (wrist extension)   Location: right hands        Objective     Objective Measures updated at progress report unless specified.  Observation/Appearance:         Edema. Measured in centimeters.    4/3/2024 4/3/2024     Left Right   2in. Above elbow       2in. Below elbow       Wrist Crease 15.8 cm 16.7 cm   Figure of 8       MCPs             Edema. Measured in centimeters.      4/3/2024 4/3/2024     Left Right   Index:         P1        PIP       P2        DIP       P3       Long:         P1        PIP       P2        DIP       P3       Ring:         P1                   PIP                  P2                    DIP       P3       Small:          P1                    PIP              P2               DIP       P3       Thumb:       Prox. Phalanx       IP       Distal Phalanx             Elbow and Wrist ROM. Measured in degrees.    4/3/2024 4/3/2024 5/3/24     Left Right Right   Elbow Ext/Flex        Supination/Pronation WNL/WNL 49/74 70/80   Wrist Ext/Flex 66/59 45/20  *pain with flexion 70/54   Wrist RD/UD 66/59 16/14 20/25(pain)         Hand ROM. Measured in degrees.    4/3/2024 4/3/2024     Left Right           Index: MP                   PIP                      DIP                  [place]               Long:  MP                  PIP                  DIP                  [place]               Ring:   MP                  PIP                  DIP                  [place]               Small:  MP                   PIP                   DIP                  [place]               Thumb: MP 51 53                IP 69 54       Rad ADD/ABD 46 40       Pal ADD/ABD 47 45          Opposition MP jt MP jt          Strength (Dynamometer) and Pinch Strength (Pinch Gauge)  Measured in pounds.    5/3/2024 5/3/2024      Left Right    Rung II 37  20    Greenberg Pinch  19.5 18.5     3pt Pinch  15 11     2pt Pinch                 Sensation    3/27/2024 3/27/2024     Left Right   [place] Magali       Normal 1.65-2.83       Diminished Light Touch 3.22-3.61       Diminished Protective 3.84-4.31       Loss of Protective 4.56-6.65       Untestable >6.65       2 Point Discrimination       Static       Dynamic          Manual Muscle Test    4/3/2024 4/3/2024     Left Right   Wrist Extension        Wrist Flexion       Radial Deviation       Ulnar Deviation       Supination       Pronation       Elbow Extension       Elbow Flexion          Special Tests  Thumb CMC Grind Test     Finkelstein's Test     Phalen's Test     Tinel's Test     Louis's Test     [place] Test     Digital Collateral Stress Test     ORL Test     Froment's Sign     Pinch OK Sign     Linda's Sign      [place] Sign      Clamp Sign       SL Ballottement Test     LT Ballottement Test     Scaphoid/WatsonTest     Linscheid's Test     Metacarpal Stress Test     Piano Key Test     ECU Synergy Test     Ulnar Compression Test     TFCC Load Test     Ulnocarpal Stress Test     Midcarpal Shift Test     Pisiform Boost Test     Elbow Flexion Test     Scratch Collapse Test     Tennis Elbow Test     Resisted Middle Finger Extension Test     Mills Test     Chair Test     Biceps Squeeze Test     Biceps Hook Test     Milking Test     Press Up Manuever     PLRI Test     Valgus Stress Test     Varus Stress Test     Spurling Test     Cervical Distraction Test     ULNTT - General     ULNTT - Median Nerve     ULNTT - Radial Nerve     ULNTT - Ulnar Nerve            Limitation/Restriction for FOTO initial eval Survey     Therapist reviewed FOTO scores for Erlinda Rain on 3/27/2024.   FOTO documents entered into [employer] - see Media section.     Limitation Score: 45%          Treatment     Erlinda received the treatments listed below:          therapeutic exercises to develop ROM for 35 minutes including:  Updated objective measures   Issued pink putty for  and pinch   Wrist maze x 3 min   Wrist wheel flex/ext, sup/pro, x 2 min ea   True balance x 3 min       manual therapy techniques: Myofacial release and Manual Lymphatic Drainage were applied to the: R  for 8 minutes, including:  Scar tissue mobilization   K tape for ECU lengthening (NT)        Parrafin bath with MHP for 10 minutes         Patient Education and Home Exercises     Education provided:   - edema sleeve  - Progress towards goals     Written Home Exercises Provided: yes.  Exercises were reviewed and Erlinda was able to demonstrate them prior to the end of the session.  Erlinda demonstrated good  understanding of the home exercise program provided. See electronic medical record under Patient Instructions for exercises provided during therapy sessions.       Assessment     Objective measures show improved ROM.   strength assessment reveals deficits. Good tolerance to added putty exercises.     Erlinda is progressing well towards her goals and there are no updates to goals at this time. Pt prognosis is Good.     Patient will continue to benefit from skilled outpatient occupational therapy to address the deficits listed in the problem list on initial evaluation provide patient/family education and to maximize patient's level of independence in the home and community environment. The following goals were discussed with the patient and patient is in agreement with them as to be addressed in the treatment plan.   Long term goals: (by d/c)  1)  Patient to be IND with HEP and modalities for pain management  2)  Increase ROM 5-10 degrees to increase functional hand use for ADLs/leisure activities  3)   Increase  strength 5-8 lbs. to carry laundry, carry groceries, sweep, and increase functional independence of right hand for ADLs/iADLs  4)   Increase pinch 3-4  psis for  Increase in functional independence in ADLs/iADLs such as manipulating fasteners and opening containers  5) Patient to score at 62%  or less on FOTO to demonstrate improved perception of functional rightUE Use.                 Short term Goals: ( 4 weeks)   1)  Patient to be IND with HEP and modalities for pain management  2)  Increase ROM 3-5 degrees to increase functional hand use for ADLs/leisure activities  3)   Increase  strength 3-5 lbs. to carry laundry, carry groceries, sweep, and increase functional independence of right hand for ADLs/iADLs  4)   Increase pinch 1-3 psis for Increase in functional independence in ADLs/iADLs such as manipulating fasteners and opening containers  5)   Patient to score at 50%  or less on FOTO to demonstrate improved perception of functional right UE Use.       Patient's spiritual, cultural and educational needs considered and patient agreeable to plan of care and goals.      Plan     Updates/Grading for next session:      Polina Razo, SILVA/L   5/3/2024

## 2024-05-03 NOTE — PATIENT INSTRUCTIONS
[facility], OCCUPATIONAL [facility]APY  HOME EXERCISE PROGRAM   RICARDO Gutierrez/MARI

## 2024-05-03 NOTE — TELEPHONE ENCOUNTER
Refill Routing Note   Medication(s) are not appropriate for processing by [facility] for the following reason(s):        Med affordability  Required labs outdated    [facility] action(s):  Defer   Requires labs : Yes             Appointments  past 12m or future 3m with PCP    Date Provider   Last Visit   5/2/2024 Paradise Guidry, DO   Next Visit   5/2/2024 Paradise Guidry, DO   ED visits in past 90 days: 0        Note composed:7:02 PM 05/02/2024

## 2024-05-06 ENCOUNTER — LAB VISIT (OUTPATIENT)
Dept: LAB | Facility: OTHER | Age: 29
End: 2024-05-06
Attending: FAMILY MEDICINE
Payer: COMMERCIAL

## 2024-05-06 DIAGNOSIS — R53.82 CHRONIC FATIGUE: ICD-10-CM

## 2024-05-06 DIAGNOSIS — E28.2 PCOS (POLYCYSTIC OVARIAN SYNDROME): ICD-10-CM

## 2024-05-06 DIAGNOSIS — Z00.01 ENCOUNTER FOR GENERAL ADULT MEDICAL EXAMINATION WITH ABNORMAL FINDINGS: ICD-10-CM

## 2024-05-06 LAB
25(OH)D3+25(OH)D2 SERPL-MCNC: 42 NG/ML (ref 30–96)
ALBUMIN SERPL BCP-MCNC: 3.4 G/DL (ref 3.5–5.2)
ALP SERPL-CCNC: 73 U/L (ref 55–135)
ALT SERPL W/O P-5'-P-CCNC: 34 U/L (ref 10–44)
ANION GAP SERPL CALC-SCNC: 11 MMOL/L (ref 8–16)
AST SERPL-CCNC: 13 U/L (ref 10–40)
BASOPHILS # BLD AUTO: 0.12 K/UL (ref 0–0.2)
BASOPHILS NFR BLD: 1.5 % (ref 0–1.9)
BILIRUB SERPL-MCNC: 0.3 MG/DL (ref 0.1–1)
BUN SERPL-MCNC: 15 MG/DL (ref 6–20)
CALCIUM SERPL-MCNC: 9.2 MG/DL (ref 8.7–10.5)
CHLORIDE SERPL-SCNC: 107 MMOL/L (ref 95–110)
CHOLEST SERPL-MCNC: 196 MG/DL (ref 120–199)
CHOLEST/HDLC SERPL: 3.8 {RATIO} (ref 2–5)
CO2 SERPL-SCNC: 20 MMOL/L (ref 23–29)
CREAT SERPL-MCNC: 1 MG/DL (ref 0.5–1.4)
DIFFERENTIAL METHOD BLD: ABNORMAL
EOSINOPHIL # BLD AUTO: 0.1 K/UL (ref 0–0.5)
EOSINOPHIL NFR BLD: 0.9 % (ref 0–8)
ERYTHROCYTE [DISTWIDTH] IN BLOOD BY AUTOMATED COUNT: 12.3 % (ref 11.5–14.5)
EST. GFR  (NO RACE VARIABLE): >60 ML/MIN/1.73 M^2
ESTIMATED AVG GLUCOSE: 100 MG/DL (ref 68–131)
GLUCOSE SERPL-MCNC: 111 MG/DL (ref 70–110)
HBA1C MFR BLD: 5.1 % (ref 4–5.6)
HCT VFR BLD AUTO: 35.7 % (ref 37–48.5)
HDLC SERPL-MCNC: 51 MG/DL (ref 40–75)
HDLC SERPL: 26 % (ref 20–50)
HGB BLD-MCNC: 11.8 G/DL (ref 12–16)
IMM GRANULOCYTES # BLD AUTO: 0.01 K/UL (ref 0–0.04)
IMM GRANULOCYTES NFR BLD AUTO: 0.1 % (ref 0–0.5)
LDLC SERPL CALC-MCNC: 115 MG/DL (ref 63–159)
LYMPHOCYTES # BLD AUTO: 2.8 K/UL (ref 1–4.8)
LYMPHOCYTES NFR BLD: 34 % (ref 18–48)
MCH RBC QN AUTO: 28.6 PG (ref 27–31)
MCHC RBC AUTO-ENTMCNC: 33.1 G/DL (ref 32–36)
MCV RBC AUTO: 87 FL (ref 82–98)
MONOCYTES # BLD AUTO: 0.7 K/UL (ref 0.3–1)
MONOCYTES NFR BLD: 8.7 % (ref 4–15)
NEUTROPHILS # BLD AUTO: 4.5 K/UL (ref 1.8–7.7)
NEUTROPHILS NFR BLD: 54.8 % (ref 38–73)
NONHDLC SERPL-MCNC: 145 MG/DL
NRBC BLD-RTO: 0 /100 WBC
PLATELET # BLD AUTO: 391 K/UL (ref 150–450)
PMV BLD AUTO: 9.4 FL (ref 9.2–12.9)
POTASSIUM SERPL-SCNC: 3.9 MMOL/L (ref 3.5–5.1)
PROT SERPL-MCNC: 7.4 G/DL (ref 6–8.4)
RBC # BLD AUTO: 4.12 M/UL (ref 4–5.4)
SODIUM SERPL-SCNC: 138 MMOL/L (ref 136–145)
TRIGL SERPL-MCNC: 150 MG/DL (ref 30–150)
TSH SERPL DL<=0.005 MIU/L-ACNC: 1.03 UIU/ML (ref 0.4–4)
VIT B12 SERPL-MCNC: 816 PG/ML (ref 210–950)
WBC # BLD AUTO: 8.12 K/UL (ref 3.9–12.7)

## 2024-05-06 PROCEDURE — 82306 VITAMIN D 25 HYDROXY: CPT | Performed by: FAMILY MEDICINE

## 2024-05-06 PROCEDURE — 80061 LIPID PANEL: CPT | Performed by: FAMILY MEDICINE

## 2024-05-06 PROCEDURE — 82607 VITAMIN B-12: CPT | Performed by: FAMILY MEDICINE

## 2024-05-06 PROCEDURE — 36415 COLL VENOUS BLD VENIPUNCTURE: CPT | Performed by: FAMILY MEDICINE

## 2024-05-06 PROCEDURE — 85025 COMPLETE CBC W/AUTO DIFF WBC: CPT | Performed by: FAMILY MEDICINE

## 2024-05-06 PROCEDURE — 80053 COMPREHEN METABOLIC PANEL: CPT | Performed by: FAMILY MEDICINE

## 2024-05-06 PROCEDURE — 84443 ASSAY THYROID STIM HORMONE: CPT | Performed by: FAMILY MEDICINE

## 2024-05-06 PROCEDURE — 83036 HEMOGLOBIN GLYCOSYLATED A1C: CPT | Performed by: FAMILY MEDICINE

## 2024-05-06 RX ORDER — TIRZEPATIDE 2.5 MG/.5ML
2.5 INJECTION, SOLUTION SUBCUTANEOUS
Qty: 2 ML | Refills: 0 | Status: SHIPPED | OUTPATIENT
Start: 2024-05-06 | End: 2024-06-05

## 2024-05-06 RX ORDER — SEMAGLUTIDE 0.25 MG/.5ML
0.25 INJECTION, SOLUTION SUBCUTANEOUS
Qty: 2 EACH | Refills: 0 | OUTPATIENT
Start: 2024-05-06

## 2024-05-07 ENCOUNTER — PATIENT MESSAGE (OUTPATIENT)
Dept: ORTHOPEDICS | Facility: CLINIC | Age: 29
End: 2024-05-07
Payer: COMMERCIAL

## 2024-05-07 ENCOUNTER — PATIENT MESSAGE (OUTPATIENT)
Dept: PRIMARY CARE CLINIC | Facility: CLINIC | Age: 29
End: 2024-05-07
Payer: COMMERCIAL

## 2024-05-07 DIAGNOSIS — D64.9 NORMOCYTIC ANEMIA: Primary | ICD-10-CM

## 2024-05-13 ENCOUNTER — CLINICAL SUPPORT (OUTPATIENT)
Dept: REHABILITATION | Facility: HOSPITAL | Age: 29
End: 2024-05-13
Payer: COMMERCIAL

## 2024-05-13 DIAGNOSIS — G89.29 CHRONIC PAIN OF LEFT KNEE: Primary | ICD-10-CM

## 2024-05-13 DIAGNOSIS — M25.562 CHRONIC PAIN OF LEFT KNEE: Primary | ICD-10-CM

## 2024-05-13 PROCEDURE — 97140 MANUAL THERAPY 1/> REGIONS: CPT

## 2024-05-13 PROCEDURE — 97110 THERAPEUTIC EXERCISES: CPT

## 2024-05-13 PROCEDURE — 97022 WHIRLPOOL THERAPY: CPT

## 2024-05-13 NOTE — PROGRESS NOTES
[facility]  Occupational Therapy Treatment Note     Date: 5/13/2024  Name: Erlinda Rain  Clinic Number: [id number]    Therapy Diagnosis:   Encounter Diagnosis   Name Primary?    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, right   M25.531 (ICD-10-CM) - Right wrist pain   Z98.890 (ICD-10-CM) - Postoperative state      Surgical Procedure and Date: 3/6/2024, TFCC debridement, arthoscopy  Evaluation Date: 3/27/2024  Insurance Authorization Period Expiration: 3/18/2025   Plan of Care Expiration: 10 weeks; 6/7/2024  Date of Return to MD: ALEJANDRA  Visit # / Visits authorized: 20/ 20  FOTO: (date and score)     FOTO Lobby Code:        Precautions: Standard        Time In: 01:15 PM   Time Out: 02:05   PM  Total Appointment Time (timed & untimed codes): 50 minutes      Subjective       Patient reports: carried heavy groceries (supinated)  She was compliant with home exercise program given last session.   Response to previous treatment:   Functional change: weaning from brace      Pain: 4/10 (wrist extension)   Location: right hands      Objective     Objective Measures updated at progress report unless specified.  Observation/Appearance:         Edema. Measured in centimeters.    4/3/2024 4/3/2024     Left Right   2in. Above elbow       2in. Below elbow       Wrist Crease 15.8 cm 16.7 cm   Figure of 8       MCPs             Edema. Measured in centimeters.    4/3/2024 4/3/2024     Left Right   Index:         P1        PIP       P2        DIP       P3       Long:         P1        PIP       P2        DIP       P3       Ring:         P1                   PIP                  P2                    DIP       P3       Small:          P1                   PIP              P2               DIP       P3       Thumb:       Prox. Phalanx       IP       Distal Phalanx              Elbow and Wrist ROM. Measured in degrees.    4/3/2024 4/3/2024 5/3/24     Left Right Right   Elbow Ext/Flex        Supination/Pronation WNL/WNL 49/74 70/80   Wrist Ext/Flex 66/59 45/20  *pain with flexion 70/54   Wrist RD/UD 66/59 16/14 20/25(pain)         Hand ROM. Measured in degrees.    4/3/2024 4/3/2024     Left Right           Index: MP                   PIP                      DIP                  [place]               Long:  MP                  PIP                  DIP                  [place]               Ring:   MP                  PIP                  DIP                  [place]               Small:  MP                   PIP                   DIP                  [place]               Thumb: MP 51 53                IP 69 54       Rad ADD/ABD 46 40       Pal ADD/ABD 47 45          Opposition MP jt MP jt          Strength (Dynamometer) and Pinch Strength (Pinch Gauge)  Measured in pounds.    5/3/2024 5/3/2024      Left Right    Rung II 37  20    Greenberg Pinch  19.5 18.5     3pt Pinch  15 11     2pt Pinch                 Sensation    3/27/2024 3/27/2024     Left Right   [place] Magali       Normal 1.65-2.83       Diminished Light Touch 3.22-3.61       Diminished Protective 3.84-4.31       Loss of Protective 4.56-6.65       Untestable >6.65       2 Point Discrimination       Static       Dynamic          Manual Muscle Test    4/3/2024 4/3/2024     Left Right   Wrist Extension        Wrist Flexion       Radial Deviation       Ulnar Deviation       Supination       Pronation       Elbow Extension       Elbow Flexion            Special Tests  Thumb CMC Grind Test     Finkelstein's Test     Phalen's Test     Tinel's Test     Oluis's Test     [place]-Littler Test     Digital Collateral Stress Test     ORL Test     Froment's Sign     Pinch OK Sign     Linda's Sign      Egawa Sign      Clamp Sign      SL Ballottement Test     LT Ballottement Test     Scaphoid/WatsonTest     Linscheid's Test     Metacarpal Stress Test      Piano Key Test     ECU Synergy Test     Ulnar Compression Test     TFCC Load Test     Ulnocarpal Stress Test     Midcarpal Shift Test     Pisiform Boost Test     Elbow Flexion Test     Scratch Collapse Test     Tennis Elbow Test     Resisted Middle Finger Extension Test     Mills Test     Chair Test     Biceps Squeeze Test     Biceps Hook Test     Milking Test     Press Up Manuever     PLRI Test     Valgus Stress Test     Varus Stress Test     Spurling Test     Cervical Distraction Test     ULNTT - General     ULNTT - Median Nerve     ULNTT - Radial Nerve     ULNTT - Ulnar Nerve            Limitation/Restriction for FOTO initial eval Survey     Therapist reviewed FOTO scores for Erlinda Rain on 3/27/2024.   FOTO documents entered into [employer] - see Media section.     Limitation Score: 45%          Treatment     Erlinda received the treatments listed below:          therapeutic exercises to develop ROM for 35 minutes including:  Cont pink putty   Wrist PRE 3 ways 2# DB x 10 reps (2 sets)   Elbow PRE 3# DB x 10 reps (2 sets)         manual therapy techniques: Myofacial release and Manual Lymphatic Drainage were applied to the: R  for 10 minutes, including:  ECU/FCU STM hawk's  and tissue tool       Fluidotherapy: To R hand for 10 min, continuous air, 115 deg, air speed 50 to decrease pain, edema & scar tissue, sensory re- education, and increased tissue extensibility prior to therex    Patient Education and Home Exercises     Education provided:   -   - Progress towards goals     Written Home Exercises Provided: yes.  Exercises were reviewed and Erlinda was able to demonstrate them prior to the end of the session.  Erlinda demonstrated good  understanding of the home exercise program provided. See electronic medical record under Patient Instructions for exercises provided during therapy sessions.       Assessment     Objective measures show improved ROM.  strength assessment reveals deficits. Good tolerance to added  putty exercises.     Erlinda is progressing well towards her goals and there are no updates to goals at this time. Pt prognosis is Good.     Patient will continue to benefit from skilled outpatient occupational therapy to address the deficits listed in the problem list on initial evaluation provide patient/family education and to maximize patient's level of independence in the home and community environment. The following goals were discussed with the patient and patient is in agreement with them as to be addressed in the treatment plan.   Long term goals: (by d/c)  1)  Patient to be IND with HEP and modalities for pain management  2)  Increase ROM 5-10 degrees to increase functional hand use for ADLs/leisure activities  3)   Increase  strength 5-8 lbs. to carry laundry, carry groceries, sweep, and increase functional independence of right hand for ADLs/iADLs  4)   Increase pinch 3-4  psis for  Increase in functional independence in ADLs/iADLs such as manipulating fasteners and opening containers  5) Patient to score at 62%  or less on FOTO to demonstrate improved perception of functional rightUE Use.                 Short term Goals: ( 4 weeks)   1)  Patient to be IND with HEP and modalities for pain management  2)  Increase ROM 3-5 degrees to increase functional hand use for ADLs/leisure activities  3)   Increase  strength 3-5 lbs. to carry laundry, carry groceries, sweep, and increase functional independence of right hand for ADLs/iADLs  4)   Increase pinch 1-3 psis for Increase in functional independence in ADLs/iADLs such as manipulating fasteners and opening containers  5)   Patient to score at 50%  or less on FOTO to demonstrate improved perception of functional right UE Use.       Patient's spiritual, cultural and educational needs considered and patient agreeable to plan of care and goals.      Plan     Updates/Grading for next session:     Cira Fernandes, MICHAEL   5/13/2024

## 2024-05-17 ENCOUNTER — CLINICAL SUPPORT (OUTPATIENT)
Dept: REHABILITATION | Facility: HOSPITAL | Age: 29
End: 2024-05-17
Payer: COMMERCIAL

## 2024-05-17 DIAGNOSIS — G89.29 CHRONIC PAIN OF LEFT KNEE: Primary | ICD-10-CM

## 2024-05-17 DIAGNOSIS — M25.562 CHRONIC PAIN OF LEFT KNEE: Primary | ICD-10-CM

## 2024-05-17 PROCEDURE — 97140 MANUAL THERAPY 1/> REGIONS: CPT

## 2024-05-17 PROCEDURE — 97110 THERAPEUTIC EXERCISES: CPT

## 2024-05-17 PROCEDURE — 97018 PARAFFIN BATH THERAPY: CPT

## 2024-05-17 NOTE — PROGRESS NOTES
[facility]  Occupational Therapy Treatment Note     Date: 5/17/2024  Name: Erlinda Rain  Clinic Number: [id number]    Therapy Diagnosis:   Encounter Diagnosis   Name Primary?    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, right   M25.531 (ICD-10-CM) - Right wrist pain   Z98.890 (ICD-10-CM) - Postoperative state      Surgical Procedure and Date: 3/6/2024, TFCC debridement, arthoscopy  Evaluation Date: 3/27/2024  Insurance Authorization Period Expiration: 3/18/2025   Plan of Care Expiration: 10 weeks; 6/7/2024  Date of Return to MD: ALEJANDRA  Visit # / Visits authorized: 20/ 20  FOTO: (date and score)     FOTO Lobby Code:        Precautions: Standard        Time In: 01:15 PM   Time Out: 02:05   PM  Total Appointment Time (timed & untimed codes): 50 minutes      Subjective       Patient reports: carried heavy groceries (supinated)  She was compliant with home exercise program given last session.   Response to previous treatment:   Functional change: weaning from brace    Pain: 3/10 (wrist extension)   Location: right hands      Objective     Objective Measures updated at progress report unless specified.  Observation/Appearance:         Edema. Measured in centimeters.    4/3/2024 4/3/2024     Left Right   2in. Above elbow       2in. Below elbow       Wrist Crease 15.8 cm 16.7 cm   Figure of 8       MCPs             Edema. Measured in centimeters.    4/3/2024 4/3/2024     Left Right   Index:         P1        PIP       P2        DIP       P3       Long:         P1        PIP       P2        DIP       P3       Ring:         P1                   PIP                  P2                    DIP       P3       Small:          P1                   PIP              P2               DIP       P3       Thumb:       Prox. Phalanx       IP       Distal Phalanx             Elbow and  Wrist ROM. Measured in degrees.    4/3/2024 4/3/2024 5/3/24     Left Right Right   Elbow Ext/Flex        Supination/Pronation WNL/WNL 49/74 70/80   Wrist Ext/Flex 66/59 45/20  *pain with flexion 70/54   Wrist RD/UD 66/59 16/14 20/25(pain)         Hand ROM. Measured in degrees.    4/3/2024 4/3/2024     Left Right           Index: MP                   PIP                      DIP                  [place]               Long:  MP                  PIP                  DIP                  [place]               Ring:   MP                  PIP                  DIP                  [place]               Small:  MP                   PIP                   DIP                  [place]               Thumb: MP 51 53                IP 69 54       Rad ADD/ABD 46 40       Pal ADD/ABD 47 45          Opposition MP jt MP jt          Strength (Dynamometer) and Pinch Strength (Pinch Gauge)  Measured in pounds.    5/3/2024 5/3/2024      Left Right    Rung II 37  20    Greenberg Pinch  19.5 18.5     3pt Pinch  15 11     2pt Pinch                 Sensation    3/27/2024 3/27/2024     Left Right   [place] Magali       Normal 1.65-2.83       Diminished Light Touch 3.22-3.61       Diminished Protective 3.84-4.31       Loss of Protective 4.56-6.65       Untestable >6.65       2 Point Discrimination       Static       Dynamic          Manual Muscle Test    4/3/2024 4/3/2024     Left Right   Wrist Extension        Wrist Flexion       Radial Deviation       Ulnar Deviation       Supination       Pronation       Elbow Extension       Elbow Flexion            Special Tests  Thumb CMC Grind Test     Finkelstein's Test     Phalen's Test     Tinel's Test     Louis's Test     [place]-Littler Test     Digital Collateral Stress Test     ORL Test     Froment's Sign     Pinch OK Sign     Linda's Sign      [place] Sign      Clamp Sign      SL Ballottement Test     LT Ballottement Test     Scaphoid/WatsonTest     Linscheid's Test     Metacarpal Stress Test     Piano Key Test      ECU Synergy Test     Ulnar Compression Test     TFCC Load Test     Ulnocarpal Stress Test     Midcarpal Shift Test     Pisiform Boost Test     Elbow Flexion Test     Scratch Collapse Test     Tennis Elbow Test     Resisted Middle Finger Extension Test     Mills Test     Chair Test     Biceps Squeeze Test     Biceps Hook Test     Milking Test     Press Up Manuever     PLRI Test     Valgus Stress Test     Varus Stress Test     Spurling Test     Cervical Distraction Test     ULNTT - General     ULNTT - Median Nerve     ULNTT - Radial Nerve     ULNTT - Ulnar Nerve            Limitation/Restriction for FOTO initial eval Survey     Therapist reviewed FOTO scores for Erlinda Rain on 3/27/2024.   FOTO documents entered into [employer] - see Media section.     Limitation Score: 45%          Treatment     Erlinda received the treatments listed below:          therapeutic exercises to develop ROM for 35 minutes including:  Cont pink putty   Wrist PRE 3 ways 2# DB x 10 reps (2 sets)   Elbow PRE 3# DB x 10 reps (2 sets)   Digit abduction/adduction with blue sponges and yellow rubberband   Wrist maze 3 min         manual therapy techniques: Myofacial release and Manual Lymphatic Drainage were applied to the: R  for 10 minutes, including:  ECU/FCU STM hawk's  and tissue tool       Fluidotherapy: To R hand for 10 min, continuous air, 115 deg, air speed 50 to decrease pain, edema & scar tissue, sensory re- education, and increased tissue extensibility prior to therex    Patient Education and Home Exercises     Education provided:   -   - Progress towards goals     Written Home Exercises Provided: yes.  Exercises were reviewed and Erlinda was able to demonstrate them prior to the end of the session.  Erlinda demonstrated good  understanding of the home exercise program provided. See electronic medical record under Patient Instructions for exercises provided during therapy sessions.       Assessment     Objective measures show improved  ROM.  strength assessment reveals deficits. Good tolerance to added putty exercises.     Erlinda is progressing well towards her goals and there are no updates to goals at this time. Pt prognosis is Good.     Patient will continue to benefit from skilled outpatient occupational therapy to address the deficits listed in the problem list on initial evaluation provide patient/family education and to maximize patient's level of independence in the home and community environment. The following goals were discussed with the patient and patient is in agreement with them as to be addressed in the treatment plan.   Long term goals: (by d/c)  1)  Patient to be IND with HEP and modalities for pain management  2)  Increase ROM 5-10 degrees to increase functional hand use for ADLs/leisure activities  3)   Increase  strength 5-8 lbs. to carry laundry, carry groceries, sweep, and increase functional independence of right hand for ADLs/iADLs  4)   Increase pinch 3-4  psis for  Increase in functional independence in ADLs/iADLs such as manipulating fasteners and opening containers  5) Patient to score at 62%  or less on FOTO to demonstrate improved perception of functional rightUE Use.                 Short term Goals: ( 4 weeks)   1)  Patient to be IND with HEP and modalities for pain management  2)  Increase ROM 3-5 degrees to increase functional hand use for ADLs/leisure activities  3)   Increase  strength 3-5 lbs. to carry laundry, carry groceries, sweep, and increase functional independence of right hand for ADLs/iADLs  4)   Increase pinch 1-3 psis for Increase in functional independence in ADLs/iADLs such as manipulating fasteners and opening containers  5)   Patient to score at 50%  or less on FOTO to demonstrate improved perception of functional right UE Use.       Patient's spiritual, cultural and educational needs considered and patient agreeable to plan of care and goals.      Plan     Updates/Grading for next  session:     Cira Fernandes, OT   5/17/2024

## 2024-06-04 ENCOUNTER — OFFICE VISIT (OUTPATIENT)
Dept: ORTHOPEDICS | Facility: CLINIC | Age: 29
End: 2024-06-04
Payer: COMMERCIAL

## 2024-06-04 ENCOUNTER — PATIENT MESSAGE (OUTPATIENT)
Dept: PRIMARY CARE CLINIC | Facility: CLINIC | Age: 29
End: 2024-06-04
Payer: COMMERCIAL

## 2024-06-04 ENCOUNTER — PATIENT MESSAGE (OUTPATIENT)
Dept: ORTHOPEDICS | Facility: CLINIC | Age: 29
End: 2024-06-04

## 2024-06-04 VITALS — HEIGHT: 62 IN | BODY MASS INDEX: 31.85 KG/M2 | WEIGHT: 173.06 LBS

## 2024-06-04 DIAGNOSIS — M24.131 DEGENERATIVE TFCC TEAR, RIGHT: Primary | ICD-10-CM

## 2024-06-04 DIAGNOSIS — Z00.00 ROUTINE MEDICAL EXAM: Primary | ICD-10-CM

## 2024-06-04 PROCEDURE — 1159F MED LIST DOCD IN RCRD: CPT | Mod: CPTII,S$GLB,, | Performed by: ORTHOPAEDIC SURGERY

## 2024-06-04 PROCEDURE — 3044F HG A1C LEVEL LT 7.0%: CPT | Mod: CPTII,S$GLB,, | Performed by: ORTHOPAEDIC SURGERY

## 2024-06-04 PROCEDURE — 99999 PR PBB SHADOW E&M-EST. PATIENT-LVL III: CPT | Mod: PBBFAC,,, | Performed by: ORTHOPAEDIC SURGERY

## 2024-06-04 PROCEDURE — 99024 POSTOP FOLLOW-UP VISIT: CPT | Mod: S$GLB,,, | Performed by: ORTHOPAEDIC SURGERY

## 2024-06-04 NOTE — TELEPHONE ENCOUNTER
Labs ordered. She will also need to get the labs that I ordered 5/7/24. So make sure she lets the labs know that she needs all labs done by Dr. Guidry from 5/7/24 order and 6/4/24 order    Orders Placed This Encounter    Varicella Zoster Antibody, IgG    Hepatitis B Surface Antibody, Qual/Quant    Mumps, IgG Screen    Rubeola antibody IgG    Rubella antibody, IgG   ]    Dr. Paradise Guidry D.O.   [facility]

## 2024-06-04 NOTE — PROGRESS NOTES
Erlinda Rain presents for post-operative evaluation.  The patient is now 3 months s/p below procedures with Dr. Lopez on 3/6/24.    PROCEDURE(S) PERFORMED:    Arthroscopic distal ulna wafer excision right wrist, CPT code 51644   Right wrist arthroscopy with TFCC debridement and synovectomy    Overall the patient reports doing well.  She reports some mild persistent pain.  Having some decreased strength but this is improving.  Working with OT.  No longer in brace.    PE:    AA&O x 4.  NAD  HEENT:  NCAT, sclera nonicteric  Lungs:  Respirations are equal and unlabored.  CV:  2+ bilateral upper and lower extremity pulses.  MSK: The wounds are well healed without any signs of infection. Full painless hand motion present, wrist motion full with mild discomfort. SILT. DNVI.    A/P: Status post above, doing well  1) Continue with OT for strengthening, progress as tolerated.  Activities as tolerated without restrictions.  2) follow up in 3 months for re-evaluation or sooner for any problems.

## 2024-06-05 ENCOUNTER — LAB VISIT (OUTPATIENT)
Dept: LAB | Facility: OTHER | Age: 29
End: 2024-06-05
Attending: FAMILY MEDICINE
Payer: COMMERCIAL

## 2024-06-05 DIAGNOSIS — Z00.00 ROUTINE MEDICAL EXAM: ICD-10-CM

## 2024-06-05 DIAGNOSIS — D64.9 NORMOCYTIC ANEMIA: ICD-10-CM

## 2024-06-05 LAB
BASOPHILS # BLD AUTO: 0.07 K/UL (ref 0–0.2)
BASOPHILS NFR BLD: 0.7 % (ref 0–1.9)
DIFFERENTIAL METHOD BLD: NORMAL
EOSINOPHIL # BLD AUTO: 0.1 K/UL (ref 0–0.5)
EOSINOPHIL NFR BLD: 1.1 % (ref 0–8)
ERYTHROCYTE [DISTWIDTH] IN BLOOD BY AUTOMATED COUNT: 13.1 % (ref 11.5–14.5)
FERRITIN SERPL-MCNC: 23 NG/ML (ref 20–300)
HCT VFR BLD AUTO: 40.4 % (ref 37–48.5)
HGB BLD-MCNC: 13.3 G/DL (ref 12–16)
IMM GRANULOCYTES # BLD AUTO: 0.03 K/UL (ref 0–0.04)
IMM GRANULOCYTES NFR BLD AUTO: 0.3 % (ref 0–0.5)
IRON SERPL-MCNC: 82 UG/DL (ref 30–160)
LYMPHOCYTES # BLD AUTO: 2.2 K/UL (ref 1–4.8)
LYMPHOCYTES NFR BLD: 22.9 % (ref 18–48)
MCH RBC QN AUTO: 29.1 PG (ref 27–31)
MCHC RBC AUTO-ENTMCNC: 32.9 G/DL (ref 32–36)
MCV RBC AUTO: 88 FL (ref 82–98)
MONOCYTES # BLD AUTO: 0.4 K/UL (ref 0.3–1)
MONOCYTES NFR BLD: 4.4 % (ref 4–15)
NEUTROPHILS # BLD AUTO: 6.8 K/UL (ref 1.8–7.7)
NEUTROPHILS NFR BLD: 70.6 % (ref 38–73)
NRBC BLD-RTO: 0 /100 WBC
PLATELET # BLD AUTO: 338 K/UL (ref 150–450)
PMV BLD AUTO: 9.8 FL (ref 9.2–12.9)
RBC # BLD AUTO: 4.57 M/UL (ref 4–5.4)
SATURATED IRON: 16 % (ref 20–50)
TOTAL IRON BINDING CAPACITY: 499 UG/DL (ref 250–450)
TRANSFERRIN SERPL-MCNC: 337 MG/DL (ref 200–375)
WBC # BLD AUTO: 9.6 K/UL (ref 3.9–12.7)

## 2024-06-05 PROCEDURE — 85025 COMPLETE CBC W/AUTO DIFF WBC: CPT | Performed by: FAMILY MEDICINE

## 2024-06-05 PROCEDURE — 36415 COLL VENOUS BLD VENIPUNCTURE: CPT | Performed by: FAMILY MEDICINE

## 2024-06-05 PROCEDURE — 83540 ASSAY OF IRON: CPT | Performed by: FAMILY MEDICINE

## 2024-06-05 PROCEDURE — 86762 RUBELLA ANTIBODY: CPT | Performed by: FAMILY MEDICINE

## 2024-06-05 PROCEDURE — 82728 ASSAY OF FERRITIN: CPT | Performed by: FAMILY MEDICINE

## 2024-06-05 PROCEDURE — 86787 VARICELLA-ZOSTER ANTIBODY: CPT | Performed by: FAMILY MEDICINE

## 2024-06-05 PROCEDURE — 86735 MUMPS ANTIBODY: CPT | Performed by: FAMILY MEDICINE

## 2024-06-05 PROCEDURE — 86706 HEP B SURFACE ANTIBODY: CPT | Performed by: FAMILY MEDICINE

## 2024-06-05 PROCEDURE — 86765 RUBEOLA ANTIBODY: CPT | Performed by: FAMILY MEDICINE

## 2024-06-06 ENCOUNTER — CLINICAL SUPPORT (OUTPATIENT)
Dept: REHABILITATION | Facility: HOSPITAL | Age: 29
End: 2024-06-06
Payer: COMMERCIAL

## 2024-06-06 DIAGNOSIS — R52 PAIN: Primary | ICD-10-CM

## 2024-06-06 LAB
MUMPS IGG INTERPRETATION: POSITIVE
MUMPS IGG SCREEN: 157 AU/ML
RUBEOLA IGG ANTIBODY: >300 AU/ML
RUBEOLA INTERPRETATION: POSITIVE
RUBV IGG SER-ACNC: 39.3 IU/ML
RUBV IGG SER-IMP: REACTIVE
VARICELLA INTERPRETATION: POSITIVE
VARICELLA ZOSTER IGG: 519.5

## 2024-06-06 PROCEDURE — 97140 MANUAL THERAPY 1/> REGIONS: CPT

## 2024-06-06 PROCEDURE — 97110 THERAPEUTIC EXERCISES: CPT

## 2024-06-06 NOTE — PROGRESS NOTES
[facility]  Occupational Therapy Updated POC      Date: 6/6/2024  Name: Erlinda Rain  Clinic Number: [id number]    Therapy Diagnosis:   Encounter Diagnosis   Name Primary?    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, right   M25.531 (ICD-10-CM) - Right wrist pain   Z98.890 (ICD-10-CM) - Postoperative state      Surgical Procedure and Date: 3/6/2024, TFCC debridement, arthoscopy  Evaluation Date: 3/27/2024  Insurance Authorization Period Expiration: 3/18/2025   Plan of Care Expiration: 10 weeks; 6/7/2024  Date of Return to MD: ALEJANDRA  Visit # / Visits authorized: 12/20  FOTO: (date and score)     FOTO Lobby Code:        Precautions: Standard        Time In: 01:15 PM   Time Out: 02:05   PM  Total Appointment Time (timed & untimed codes): 50 minutes      Subjective       Patient reports: carried heavy groceries (supinated)  She was compliant with home exercise program given last session.   Response to previous treatment:   Functional change: weaning from brace    Pain: 3/10 (wrist extension)   Location: right hands      Objective     Objective Measures updated at progress report unless specified.  Observation/Appearance:         Edema. Measured in centimeters.    4/3/2024 4/3/2024     Left Right   2in. Above elbow       2in. Below elbow       Wrist Crease 15.8 cm 16.7 cm   Figure of 8       MCPs             Edema. Measured in centimeters.    4/3/2024 4/3/2024     Left Right   Index:         P1        PIP       P2        DIP       P3       Long:         P1        PIP       P2        DIP       P3       Ring:         P1                   PIP                  P2                    DIP       P3       Small:          P1                   PIP              P2               DIP       P3       Thumb:       Prox. Phalanx       IP       Distal Phalanx             Elbow and Wrist ROM. Measured in  degrees.    4/3/2024 4/3/2024 5/3/24 6/6/24     Left Right Right Right   Elbow Ext/Flex         Supination/Pronation WNL/WNL 49/74 70/80 73/WNL    Wrist Ext/Flex 66/59 45/20  *pain with flexion 70/54 60/53   Wrist RD/UD 66/59 16/14 20/25(pain) 19/29           Hand ROM. Measured in degrees.    4/3/2024 4/3/2024 6/6/2024     Left Right Right            Index: MP                    PIP                       DIP                   [place]                 Long:  MP                   PIP                   DIP                   [place]                 Ring:   MP                   PIP                   DIP                   [place]                 Small:  MP                    PIP                    DIP                   [place]                 Thumb: MP 51 53 55                IP 69 54 58       Rad ADD/ABD 46 40 46       Pal ADD/ABD 47 45 51          Opposition MP jt MP jt MP jt             Strength (Dynamometer) and Pinch Strength (Pinch Gauge)  Measured in pounds.    5/3/2024 5/3/2024 6/6/2024      Left Right Right   Rung II 37  20 35/31/37   Greenberg Pinch  19.5 18.5  18/19.5/20   3pt Pinch  15 11  15/11/15   2pt Pinch 9.5/8/9.5    8.5            Sensation    3/27/2024 3/27/2024     Left Right   [place] Magali       Normal 1.65-2.83       Diminished Light Touch 3.22-3.61       Diminished Protective 3.84-4.31       Loss of Protective 4.56-6.65       Untestable >6.65       2 Point Discrimination       Static       Dynamic          Manual Muscle Test    4/3/2024 4/3/2024     Left Right   Wrist Extension        Wrist Flexion       Radial Deviation       Ulnar Deviation       Supination       Pronation       Elbow Extension       Elbow Flexion              Limitation/Restriction for FOTO initial eval Survey     Therapist reviewed FOTO scores for Erlinda Rain on 3/27/2024.   FOTO documents entered into [employer] - see Media section.     Limitation Score: 45%          Treatment     Erlinda received the treatments listed below:          therapeutic  exercises to develop ROM for 35 minutes including:  Wrist PRE 3 ways 2# DB x 10 reps (3 sets)   Elbow PRE 3# DB x 10 reps (2 sets)   Digit abduction/adduction with blue sponges and yellow rubberband   Wrist maze 3 min   Isometris red flexbar  4 corners red flexbar         manual therapy techniques: Myofacial release and Manual Lymphatic Drainage were applied to the: R  for 10 minutes, including:  ECU/FCU STM hawk's  and tissue tool       Fluidotherapy: To R hand for 10 min, continuous air, 115 deg, air speed 50 to decrease pain, edema & scar tissue, sensory re- education, and increased tissue extensibility prior to therex    Patient Education and Home Exercises     Education provided:   -   - Progress towards goals     Written Home Exercises Provided: yes.  Exercises were reviewed and Erlinda was able to demonstrate them prior to the end of the session.  Erlinda demonstrated good  understanding of the home exercise program provided. See electronic medical record under Patient Instructions for exercises provided during therapy sessions.       Assessment     Objective measures show improved ROM.  strength assessment reveals deficits. Good tolerance to added putty exercises.     Erlinda is progressing well towards her goals and there are no updates to goals at this time. Pt prognosis is Good.     Patient will continue to benefit from skilled outpatient occupational therapy to address the deficits listed in the problem list on initial evaluation provide patient/family education and to maximize patient's level of independence in the home and community environment. The following goals were discussed with the patient and patient is in agreement with them as to be addressed in the treatment plan.   Long term goals: (by d/c)  1)  Patient to be IND with HEP and modalities for pain management  2)  Increase ROM 5-10 degrees to increase functional hand use for ADLs/leisure activities  3)   Increase  strength 5-8 lbs. to  carry laundry, carry groceries, sweep, and increase functional independence of right hand for ADLs/iADLs  4)   Increase pinch 3-4  psis for  Increase in functional independence in ADLs/iADLs such as manipulating fasteners and opening containers  5) Patient to score at 62%  or less on FOTO to demonstrate improved perception of functional rightUE Use.                 Short term Goals: ( 4 weeks)   1)  Patient to be IND with HEP and modalities for pain management  2)  Increase ROM 3-5 degrees to increase functional hand use for ADLs/leisure activities  3)   Increase  strength 3-5 lbs. to carry laundry, carry groceries, sweep, and increase functional independence of right hand for ADLs/iADLs  4)   Increase pinch 1-3 psis for Increase in functional independence in ADLs/iADLs such as manipulating fasteners and opening containers  5)   Patient to score at 50%  or less on FOTO to demonstrate improved perception of functional right UE Use.       Patient's spiritual, cultural and educational needs considered and patient agreeable to plan of care and goals.      Plan     Updates/Grading for next session:     Cira Fernandes OT   6/6/2024

## 2024-06-07 ENCOUNTER — PATIENT MESSAGE (OUTPATIENT)
Dept: PRIMARY CARE CLINIC | Facility: CLINIC | Age: 29
End: 2024-06-07
Payer: COMMERCIAL

## 2024-06-08 LAB
HBV SURFACE AB SER QL IA: POSITIVE
HBV SURFACE AB SERPL IA-ACNC: NORMAL MIU/ML

## 2024-06-13 ENCOUNTER — CLINICAL SUPPORT (OUTPATIENT)
Dept: REHABILITATION | Facility: HOSPITAL | Age: 29
End: 2024-06-13
Payer: COMMERCIAL

## 2024-06-13 DIAGNOSIS — Z98.890 POSTOPERATIVE STATE: Primary | ICD-10-CM

## 2024-06-13 PROCEDURE — 97140 MANUAL THERAPY 1/> REGIONS: CPT

## 2024-06-13 PROCEDURE — 97018 PARAFFIN BATH THERAPY: CPT | Mod: 59

## 2024-06-13 PROCEDURE — 97110 THERAPEUTIC EXERCISES: CPT

## 2024-06-13 NOTE — PROGRESS NOTES
[facility]  Occupational Therapy Treatment Note     Date: 6/13/2024  Name: Erlinda Rain  Clinic Number: [id number]    Therapy Diagnosis:   Encounter Diagnosis   Name Primary?    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, right   M25.531 (ICD-10-CM) - Right wrist pain   Z98.890 (ICD-10-CM) - Postoperative state      Surgical Procedure and Date: 3/6/2024, TFCC debridement, arthoscopy  Evaluation Date: 3/27/2024  Insurance Authorization Period Expiration: 3/18/2025   Plan of Care Expiration: 10 weeks; 6/7/2024  Date of Return to MD: ALEJANDRA  Visit # / Visits authorized: 20/ 20  FOTO: (date and score)     FOTO Lobby Code:      Precautions: Standard     Time In: 08:30 AM   Time Out: 09:30 AM  Total Appointment Time (timed & untimed codes): 60 minutes    Subjective     Patient reports: soreness ulnar aspect of forearm  She was compliant with home exercise program given last session.   Response to previous treatment: Good  Functional change: weaning from brace    Pain: 3/10 (RD/UD)   Location: right hand    Objective     Objective Measures updated at progress report unless specified.  Observation/Appearance:      Edema. Measured in centimeters.    4/3/2024 4/3/2024     Left Right   2in. Above elbow       2in. Below elbow       Wrist Crease 15.8 cm 16.7 cm   Figure of 8       MCPs           Edema. Measured in centimeters.    4/3/2024 4/3/2024     Left Right   Index:         P1        PIP       P2        DIP       P3       Long:         P1        PIP       P2        DIP       P3       Ring:         P1                   PIP                  P2                    DIP       P3       Small:          P1                   PIP              P2               DIP       P3       Thumb:       Prox. Phalanx       IP       Distal Phalanx          Elbow and Wrist ROM. Measured in degrees.     4/3/2024 4/3/2024 5/3/24 6/6/24     Left Right Right Right   Elbow Ext/Flex         Supination/Pronation WNL/WNL 49/74 70/80 73/WNL    Wrist Ext/Flex 66/59 45/20  *pain with flexion 70/54 60/53   Wrist RD/UD 66/59 16/14 20/25(pain) 19/29      Hand ROM. Measured in degrees.    4/3/2024 4/3/2024 6/6/2024     Left Right Right            Index: MP                    PIP                       DIP                   [place]                 Long:  MP                   PIP                   DIP                   [place]                 Ring:   MP                   PIP                   DIP                   [place]                 Small:  MP                    PIP                    DIP                   [place]                 Thumb: MP 51 53 55                IP 69 54 58       Rad ADD/ABD 46 40 46       Pal ADD/ABD 47 45 51          Opposition MP jt MP jt MP jt        Strength (Dynamometer) and Pinch Strength (Pinch Gauge)  Measured in pounds.    5/3/2024 5/3/2024 6/6/2024      Left Right Right   Rung II 37  20 35/31/37   Greenberg Pinch  19.5 18.5  18/19.5/20   3pt Pinch  15 11  15/11/15   2pt Pinch 9.5/8/9.5    8.5      Sensation    3/27/2024 3/27/2024     Left Right   [place] Magali       Normal 1.65-2.83       Diminished Light Touch 3.22-3.61       Diminished Protective 3.84-4.31       Loss of Protective 4.56-6.65       Untestable >6.65       2 Point Discrimination       Static       Dynamic          Manual Muscle Test    4/3/2024 4/3/2024     Left Right   Wrist Extension        Wrist Flexion       Radial Deviation       Ulnar Deviation       Supination       Pronation       Elbow Extension       Elbow Flexion          Special Tests  Thumb CMC Grind Test     Finkelstein's Test     Phalen's Test     Tinel's Test     Louis's Test     Brayan-Littler Test     Digital Collateral Stress Test     ORL Test     Froment's Sign     Pinch OK Sign     Linda's Sign      Egawa Sign      Clamp Sign      SL Ballottement Test     LT Ballottement Test      Scaphoid/WatsonTest     Linscheid's Test     Metacarpal Stress Test     Piano Key Test     ECU Synergy Test     Ulnar Compression Test     TFCC Load Test     Ulnocarpal Stress Test     Midcarpal Shift Test     Pisiform Boost Test     Elbow Flexion Test     Scratch Collapse Test     Tennis Elbow Test     Resisted Middle Finger Extension Test     Mills Test     Chair Test     Biceps Squeeze Test     Biceps Hook Test     Milking Test     Press Up Manuever     PLRI Test     Valgus Stress Test     Varus Stress Test     Spurling Test     Cervical Distraction Test     ULNTT - General     ULNTT - Median Nerve     ULNTT - Radial Nerve     ULNTT - Ulnar Nerve         Limitation/Restriction for FOTO initial eval Survey     Therapist reviewed FOTO scores for Erlinda Rain on 3/27/2024.   FOTO documents entered into [employer] - see Media section.     Limitation Score: 45%        Treatment     Erlinda received the treatments listed below:      therapeutic exercises to develop ROM for 30 minutes including:  - wrist PRE 3 ways 2# DB x 10 reps (3 sets)   - elbow PRE with wrist in supination and neutral using 3# DB x 10 reps (2 sets)   - elbow PRE with wrist in pronation using 2# DB x 10 reps (2 sets)   - wrist Wheel 2 min  - brachial plexus mobilization x 10  - heavy weight proprioception ambulating 3 laps using 4# dumbbell  Digit abduction/adduction with blue sponges and yellow rubberband NT      manual therapy techniques: Myofacial release and Manual Lymphatic Drainage were applied to the: R for 15 minutes, including:  - ECU/FCU STM guasha tool    Modalities after being cleared for contradictions: Paraffin and hot pack to R hand for 8 minutes     Fluidotherapy: To R hand for 10 min, continuous air, 115 deg, air speed 50 to decrease pain, edema & scar tissue, sensory re- education, and increased tissue extensibility prior to therex NT    Patient Education and Home Exercises     Education provided:   -   - Progress towards goals      Written Home Exercises Provided: yes.  Exercises were reviewed and Erlinda was able to demonstrate them prior to the end of the session.  Erlinda demonstrated good  understanding of the home exercise program provided. See electronic medical record under Patient Instructions for exercises provided during therapy sessions.       Assessment     Good tolerance to tx. Verbalized pain decreased by end of tx.    Erlinda is progressing well towards her goals and there are no updates to goals at this time. Pt prognosis is Good.     Patient will continue to benefit from skilled outpatient occupational therapy to address the deficits listed in the problem list on initial evaluation provide patient/family education and to maximize patient's level of independence in the home and community environment. The following goals were discussed with the patient and patient is in agreement with them as to be addressed in the treatment plan.   Long term goals: (by d/c)  1)  Patient to be IND with HEP and modalities for pain management  2)  Increase ROM 5-10 degrees to increase functional hand use for ADLs/leisure activities  3)   Increase  strength 5-8 lbs. to carry laundry, carry groceries, sweep, and increase functional independence of right hand for ADLs/iADLs  4)   Increase pinch 3-4  psis for  Increase in functional independence in ADLs/iADLs such as manipulating fasteners and opening containers  5) Patient to score at 62%  or less on FOTO to demonstrate improved perception of functional rightUE Use.      Short term Goals: ( 4 weeks)   1)  Patient to be IND with HEP and modalities for pain management  2)  Increase ROM 3-5 degrees to increase functional hand use for ADLs/leisure activities  3)   Increase  strength 3-5 lbs. to carry laundry, carry groceries, sweep, and increase functional independence of right hand for ADLs/iADLs  4)   Increase pinch 1-3 psis for Increase in functional independence in ADLs/iADLs such as  manipulating fasteners and opening containers  5)   Patient to score at 50%  or less on FOTO to demonstrate improved perception of functional right UE Use.     Patient's spiritual, cultural and educational needs considered and patient agreeable to plan of care and goals.    Plan     Updates/Grading for next session:     Cira Fernandes OT   6/13/2024

## 2024-06-18 ENCOUNTER — LAB VISIT (OUTPATIENT)
Dept: LAB | Facility: OTHER | Age: 29
End: 2024-06-18
Attending: STUDENT IN AN ORGANIZED HEALTH CARE EDUCATION/TRAINING PROGRAM
Payer: COMMERCIAL

## 2024-06-18 ENCOUNTER — OFFICE VISIT (OUTPATIENT)
Dept: OBSTETRICS AND GYNECOLOGY | Facility: CLINIC | Age: 29
End: 2024-06-18
Payer: COMMERCIAL

## 2024-06-18 VITALS
BODY MASS INDEX: 31.8 KG/M2 | SYSTOLIC BLOOD PRESSURE: 118 MMHG | DIASTOLIC BLOOD PRESSURE: 82 MMHG | HEIGHT: 62 IN | WEIGHT: 172.81 LBS

## 2024-06-18 DIAGNOSIS — Z01.419 WELL WOMAN EXAM WITH ROUTINE GYNECOLOGICAL EXAM: ICD-10-CM

## 2024-06-18 DIAGNOSIS — Z01.419 WELL WOMAN EXAM WITH ROUTINE GYNECOLOGICAL EXAM: Primary | ICD-10-CM

## 2024-06-18 LAB
HIV 1+2 AB+HIV1 P24 AG SERPL QL IA: NEGATIVE
TREPONEMA PALLIDUM IGG+IGM AB [PRESENCE] IN SERUM OR PLASMA BY IMMUNOASSAY: NONREACTIVE

## 2024-06-18 PROCEDURE — 3079F DIAST BP 80-89 MM HG: CPT | Mod: CPTII,S$GLB,, | Performed by: STUDENT IN AN ORGANIZED HEALTH CARE EDUCATION/TRAINING PROGRAM

## 2024-06-18 PROCEDURE — 36415 COLL VENOUS BLD VENIPUNCTURE: CPT | Performed by: STUDENT IN AN ORGANIZED HEALTH CARE EDUCATION/TRAINING PROGRAM

## 2024-06-18 PROCEDURE — 87491 CHLMYD TRACH DNA AMP PROBE: CPT | Performed by: STUDENT IN AN ORGANIZED HEALTH CARE EDUCATION/TRAINING PROGRAM

## 2024-06-18 PROCEDURE — 86593 SYPHILIS TEST NON-TREP QUANT: CPT | Performed by: STUDENT IN AN ORGANIZED HEALTH CARE EDUCATION/TRAINING PROGRAM

## 2024-06-18 PROCEDURE — 99395 PREV VISIT EST AGE 18-39: CPT | Mod: S$GLB,,, | Performed by: STUDENT IN AN ORGANIZED HEALTH CARE EDUCATION/TRAINING PROGRAM

## 2024-06-18 PROCEDURE — 87389 HIV-1 AG W/HIV-1&-2 AB AG IA: CPT | Performed by: STUDENT IN AN ORGANIZED HEALTH CARE EDUCATION/TRAINING PROGRAM

## 2024-06-18 PROCEDURE — 3044F HG A1C LEVEL LT 7.0%: CPT | Mod: CPTII,S$GLB,, | Performed by: STUDENT IN AN ORGANIZED HEALTH CARE EDUCATION/TRAINING PROGRAM

## 2024-06-18 PROCEDURE — 3008F BODY MASS INDEX DOCD: CPT | Mod: CPTII,S$GLB,, | Performed by: STUDENT IN AN ORGANIZED HEALTH CARE EDUCATION/TRAINING PROGRAM

## 2024-06-18 PROCEDURE — 3074F SYST BP LT 130 MM HG: CPT | Mod: CPTII,S$GLB,, | Performed by: STUDENT IN AN ORGANIZED HEALTH CARE EDUCATION/TRAINING PROGRAM

## 2024-06-18 PROCEDURE — 99999 PR PBB SHADOW E&M-EST. PATIENT-LVL III: CPT | Mod: PBBFAC,,, | Performed by: STUDENT IN AN ORGANIZED HEALTH CARE EDUCATION/TRAINING PROGRAM

## 2024-06-18 PROCEDURE — 1159F MED LIST DOCD IN RCRD: CPT | Mod: CPTII,S$GLB,, | Performed by: STUDENT IN AN ORGANIZED HEALTH CARE EDUCATION/TRAINING PROGRAM

## 2024-06-18 PROCEDURE — 87624 HPV HI-RISK TYP POOLED RSLT: CPT | Performed by: STUDENT IN AN ORGANIZED HEALTH CARE EDUCATION/TRAINING PROGRAM

## 2024-06-18 PROCEDURE — 80074 ACUTE HEPATITIS PANEL: CPT | Performed by: STUDENT IN AN ORGANIZED HEALTH CARE EDUCATION/TRAINING PROGRAM

## 2024-06-18 PROCEDURE — 87591 N.GONORRHOEAE DNA AMP PROB: CPT | Performed by: STUDENT IN AN ORGANIZED HEALTH CARE EDUCATION/TRAINING PROGRAM

## 2024-06-18 RX ORDER — DESOGESTREL AND ETHINYL ESTRADIOL 0.15-0.03
1 KIT ORAL DAILY
Qty: 180 TABLET | Refills: 2 | Status: SHIPPED | OUTPATIENT
Start: 2024-06-18 | End: 2025-06-18

## 2024-06-18 NOTE — PROGRESS NOTES
History & Physical  Gynecology      SUBJECTIVE:     Chief Complaint: No chief complaint on file.       History of Present Illness:  Annual exam. No complaints.   Menstrual History: h/o PCOS. OCP helps menses come  Obstetric Hx: g0  LMP: 6/15  STD/STI Hx: Denies any history of STD's  Contraception Hx: OCP  Sexually Active: one male partner, condom  Family history: Denies any personal or family history of GYN/colon cancers   Social: Wears seatbelts. Exercises. Feels safe at home.   Last pap: 3/2021 normal  HPV vaccine: utd  Vitamins: yes      Review of patient's allergies indicates:   Allergen Reactions    Kiwi (actinidia chinensis) Itching and Nausea Only       Past Medical History:   Diagnosis Date    Fibromyalgia     Obesity (BMI 30.0-34.9)     PCOS (polycystic ovarian syndrome)     Vitamin D deficiency      Past Surgical History:   Procedure Laterality Date    ARTHROSCOPY OF WRIST Right 3/6/2024    Procedure: ARTHROSCOPY, WRIST - right with TFCC repair vs debridement, possible Wafer and AIP;  Surgeon: Von Lopez MD;  Location: [facility] OR;  Service: Orthopedics;  Laterality: Right;    none      SURGICAL REMOVAL OF DISTAL ULNA Right 3/6/2024    Procedure: EXCISION, ULNA, DISTAL;  Surgeon: Von Lopez MD;  Location: [facility] OR;  Service: Orthopedics;  Laterality: Right;     OB History          0    Para   0    Term   0       0    AB   0    Living   0         SAB   0    IAB   0    Ectopic   0    Multiple   0    Live Births                   Family History   Problem Relation Name Age of Onset    Hyperlipidemia Mother Mother     Arthritis Mother Mother     Hyperlipidemia Father Father     Heart attacks under age 50 Father Father     Diabetes type II Father Father     Diabetes Father Father     Hypertension Father Father     Hypothyroidism Sister      Eczema Neg Hx      Lupus Neg Hx      Psoriasis Neg Hx      Melanoma Neg Hx      Breast cancer Neg Hx      Ovarian cancer Neg Hx      Colon cancer Neg Hx        Social History     Tobacco Use    Smoking status: Never     Passive exposure: Never    Smokeless tobacco: Never   Substance Use Topics    Alcohol use: Yes     Comment: Socially, rarely    Drug use: No       Current Outpatient Medications   Medication Sig    acetaZOLAMIDE (DIAMOX) 125 MG Tab 1 pill twice a day and discontinue 2 days after descent in altitude    buPROPion (WELLBUTRIN XL) 300 MG 24 hr tablet TAKE 1 TABLET BY MOUTH EVERY DAY    celecoxib (CELEBREX) 200 MG capsule Take 1 capsule (200 mg total) by mouth once daily.    cetirizine (ZYRTEC) 10 MG tablet Take 1 tablet by mouth once daily.    desogestreL-ethinyl estradioL (APRI) 0.15-0.03 mg per tablet Take 1 tablet by mouth once daily.    metFORMIN (GLUCOPHAGE-XR) 500 MG ER 24hr tablet Take 1 tablet (500 mg total) by mouth Daily.    multivitamin capsule Take 1 capsule by mouth once daily.     No current facility-administered medications for this visit.         Review of Systems:  Review of Systems   Constitutional:  Negative for activity change, appetite change, fever and unexpected weight change.   Respiratory:  Negative for shortness of breath.    Cardiovascular:  Negative for chest pain.   Gastrointestinal:  Negative for abdominal pain, blood in stool, constipation, diarrhea, nausea and vomiting.   Genitourinary:  Negative for dysmenorrhea, dyspareunia, dysuria, hematuria, menorrhagia, menstrual problem, pelvic pain, vaginal bleeding, vaginal discharge, vaginal pain, postcoital bleeding and vaginal odor.   Integumentary:  Negative for breast mass.   Breast: Negative for lump and mass       OBJECTIVE:     Physical Exam:  Physical Exam  Vitals reviewed.   Constitutional:       General: She is not in acute distress.     Appearance: She is well-developed. She is not diaphoretic.   HENT:      Head: Normocephalic and atraumatic.   Eyes:      General: No scleral icterus.        Right eye: No discharge.         Left eye: No discharge.      Conjunctiva/sclera:  Conjunctivae normal.   Neck:      Thyroid: No thyromegaly.   Cardiovascular:      Rate and Rhythm: Normal rate.   Pulmonary:      Effort: Pulmonary effort is normal.   Chest:   Breasts:     Breasts are symmetrical.      Right: No inverted nipple, mass, nipple discharge, skin change or tenderness.      Left: No inverted nipple, mass, nipple discharge, skin change or tenderness.   Abdominal:      General: There is no distension.      Palpations: Abdomen is soft.      Tenderness: There is no abdominal tenderness.   Genitourinary:     Labia:         Right: No rash, tenderness, lesion or injury.         Left: No rash, tenderness, lesion or injury.       Vagina: Normal. No signs of injury and foreign body. No vaginal discharge, erythema, tenderness or bleeding.      Cervix: No cervical motion tenderness, discharge or friability.      Uterus: Not deviated, not enlarged, not fixed and not tender.       Adnexa:         Right: No mass, tenderness or fullness.          Left: No mass, tenderness or fullness.     Musculoskeletal:         General: Normal range of motion.      Cervical back: Normal range of motion and neck supple.   Lymphadenopathy:      Cervical: No cervical adenopathy.   Skin:     General: Skin is warm and dry.      Findings: No erythema or rash.   Neurological:      Mental Status: She is alert and oriented to person, place, and time.         ASSESSMENT:     No diagnosis found.         Plan:      WWE  - Vaccines utd  - Pap and cotest collected  - Mammogram age 38  - GC/CT collected. Blood work ordered as well per patient request  - Daily vitamin discussed.  - CBE normal. Physical exam normal. VSS.  - OCP refilled  - RTC for annual or PRN.    Counseling time: 30 minutes    Karlene Larson

## 2024-06-19 LAB
HAV IGM SERPL QL IA: NORMAL
HBV CORE IGM SERPL QL IA: NORMAL
HBV SURFACE AG SERPL QL IA: NORMAL
HCV AB SERPL QL IA: NEGATIVE

## 2024-06-20 LAB
C TRACH DNA SPEC QL NAA+PROBE: NOT DETECTED
N GONORRHOEA DNA SPEC QL NAA+PROBE: NOT DETECTED

## 2024-06-27 ENCOUNTER — CLINICAL SUPPORT (OUTPATIENT)
Dept: REHABILITATION | Facility: HOSPITAL | Age: 29
End: 2024-06-27
Payer: COMMERCIAL

## 2024-06-27 DIAGNOSIS — G89.29 CHRONIC PAIN OF LEFT KNEE: Primary | ICD-10-CM

## 2024-06-27 DIAGNOSIS — M25.562 CHRONIC PAIN OF LEFT KNEE: Primary | ICD-10-CM

## 2024-06-27 PROCEDURE — 97140 MANUAL THERAPY 1/> REGIONS: CPT

## 2024-06-27 PROCEDURE — 97110 THERAPEUTIC EXERCISES: CPT

## 2024-06-27 NOTE — PROGRESS NOTES
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, right   M25.531 (ICD-10-CM) - Right wrist pain   Z98.890 (ICD-10-CM) - Postoperative state      Surgical Procedure and Date: 3/6/2024, TFCC debridement, arthoscopy  Evaluation Date: 3/27/2024  Insurance Authorization Period Expiration: 3/18/2025   Plan of Care Expiration: 10 weeks; 6/7/2024  Date of Return to MD: NATHANIELD  Visit # / Visits authorized: 20/ 20  FOTO: (date and score)     FOTO Lobby Code:      Precautions: Standard     Time In: 12:42 PM   Time Out: 01:30   AM  Total Appointment Time (timed & untimed codes): 48 minutes    Subjective     Patient reports: ECU distal pain  She was compliant with home exercise program given last session.   Response to previous treatment: Good  Functional change: weaning from brace    Pain: 4/10  Location: right hand    Objective     Objective Measures updated at progress report unless specified.  Observation/Appearance:      Edema. Measured in centimeters.    4/3/2024 4/3/2024     Left Right   2in. Above elbow       2in. Below elbow       Wrist Crease 15.8 cm 16.7 cm   Figure of 8       MCPs           Edema. Measured in centimeters.    4/3/2024 4/3/2024     Left Right   Index:         P1        PIP       P2        DIP       P3       Long:         P1        PIP       P2        DIP       P3       Ring:         P1                   PIP                  P2                    DIP       P3       Small:          P1                   PIP              P2               DIP       P3       Thumb:       Prox. Phalanx       IP       Distal Phalanx              Elbow and Wrist ROM. Measured in degrees.    4/3/2024 4/3/2024 5/3/24 6/6/24 6/27/2024     Left Right Right Right Right   Elbow  Ext/Flex          Supination/Pronation WNL/WNL 49/74 70/80 73/WNL  *73/WNL    Wrist Ext/Flex 66/59 45/20  *pain with flexion 70/54 60/53 69/55   Wrist RD/UD 66/59 16/14 20/25(pain) 19/29 19/29      Hand ROM. Measured in degrees.    4/3/2024 4/3/2024 6/6/2024     Left Right Right            Index: MP                    PIP                       DIP                   [place]                 Long:  MP                   PIP                   DIP                   [place]                 Ring:   MP                   PIP                   DIP                   [place]                 Small:  MP                    PIP                    DIP                   [place]                 Thumb: MP 51 53 55                IP 69 54 58       Rad ADD/ABD 46 40 46       Pal ADD/ABD 47 45 51          Opposition MP jt MP jt MP jt        Strength (Dynamometer) and Pinch Strength (Pinch Gauge)  Measured in pounds.    5/3/2024 5/3/2024 6/6/2024  6/27/2024     Left Right Right Right   Rung II 37  20 34 31   Greenberg Pinch  19.5 18.5  19 19   3pt Pinch  15 11  14 12.5   2pt Pinch 9.5/8/9.5    8.5 9      Sensation    3/27/2024 3/27/2024     Left Right   [place] Magali       Normal 1.65-2.83       Diminished Light Touch 3.22-3.61       Diminished Protective 3.84-4.31       Loss of Protective 4.56-6.65       Untestable >6.65       2 Point Discrimination       Static       Dynamic          Manual Muscle Test    4/3/2024 4/3/2024     Left Right   Wrist Extension        Wrist Flexion       Radial Deviation       Ulnar Deviation       Supination       Pronation       Elbow Extension       Elbow Flexion            Limitation/Restriction for FOTO initial eval Survey     Therapist reviewed FOTO scores for Erlinda Rain on 3/27/2024.   FOTO documents entered into [email or web address] - see Media section.     Limitation Score: 45%            Treatment     Erlinda received the treatments listed below:      therapeutic exercises to develop ROM for 30 minutes including:  Updated  measurements please see above   - wrist PRE 3 ways 2# DB x 10 reps (3 sets)   - elbow PRE with wrist in supination and neutral using 3# DB x 10 reps (2 sets)   - elbow PRE with wrist in pronation using 2# DB x 10 reps (2 sets)   - wrist Wheel 2 min  - brachial plexus mobilization x 10  - heavy weight proprioception ambulating 3 laps using 4# dumbbell  Digit abduction/adduction with blue sponges and yellow rubberband NT    manual therapy techniques: Myofacial release and Manual Lymphatic Drainage were applied to the: R for 15 minutes, including:  - ECU/FCU STM guasha tool    Modalities after being cleared for contradictions: Paraffin and hot pack to R hand for 8 minutes     Fluidotherapy: To R hand for 10 min, continuous air, 115 deg, air speed 50 to decrease pain, edema & scar tissue, sensory re- education, and increased tissue extensibility prior to therex NT    Patient Education and Home Exercises     Education provided:   -   - Progress towards goals     Written Home Exercises Provided: yes.  Exercises were reviewed and Erlinda was able to demonstrate them prior to the end of the session.  Erlinda demonstrated good  understanding of the home exercise program provided. See electronic medical record under Patient Instructions for exercises provided during therapy sessions.       Assessment     Good tolerance to tx. Verbalized pain decreased by end of tx. Cont to have distal, ulnar sided pain surrounding incision. Discussed PoC with pt. She would feel comfortable keeping remaining visits. Good understanding of HEP     Erlinda is progressing well towards her goals and there are no updates to goals at this time. Pt prognosis is Good.     Patient will continue to benefit from skilled outpatient occupational therapy to address the deficits listed in the problem list on initial evaluation provide patient/family education and to maximize patient's level of independence in the home and community environment. The following goals  were discussed with the patient and patient is in agreement with them as to be addressed in the treatment plan.   Long term goals: (by d/c)  1)  Patient to be IND with HEP and modalities for pain management  2)  Increase ROM 5-10 degrees to increase functional hand use for ADLs/leisure activities met  3)   Increase  strength 5-8 lbs. to carry laundry, carry groceries, sweep, and increase functional independence of right hand for ADLs/iADLs met   4)   Increase pinch 3-4  psis for  Increase in functional independence in ADLs/iADLs such as manipulating fasteners and opening containers  5) Patient to score at 62%  or less on FOTO to demonstrate improved perception of functional rightUE Use.        Short term Goals: ( 4 weeks)   1)  Patient to be IND with HEP and modalities for pain management   2)  Increase ROM 3-5 degrees to increase functional hand use for ADLs/leisure activities met  3)   Increase  strength 3-5 lbs. to carry laundry, carry groceries, sweep, and increase functional independence of right hand for ADLs/iADLs met   4)   Increase pinch 1-3 psis for Increase in functional independence in ADLs/iADLs such as manipulating fasteners and opening containers met  5)   Patient to score at 50%  or less on FOTO to demonstrate improved perception of functional right UE Use.      Patient's spiritual, cultural and educational needs considered and patient agreeable to plan of care and goals.      PLAN     Updated Certification Period: 6/7/2024 to 8/10/2024   Recommended Treatment Plan: 1 times per week for 8 weeks:  Fluidotherapy, Manual Therapy, Moist Heat/ Ice, Neuromuscular Re-ed, Paraffin, Patient Education, Self Care, Therapeutic Activities, and Therapeutic Exercise  Other Recommendations: remaining visits to go over HEP     MICHAEL Belle OT   6/27/2024

## 2024-07-05 ENCOUNTER — CLINICAL SUPPORT (OUTPATIENT)
Dept: REHABILITATION | Facility: HOSPITAL | Age: 29
End: 2024-07-05
Payer: COMMERCIAL

## 2024-07-05 DIAGNOSIS — R52 PAIN: Primary | ICD-10-CM

## 2024-07-05 PROCEDURE — 97140 MANUAL THERAPY 1/> REGIONS: CPT

## 2024-07-05 PROCEDURE — 97110 THERAPEUTIC EXERCISES: CPT

## 2024-07-05 PROCEDURE — 97018 PARAFFIN BATH THERAPY: CPT

## 2024-07-05 NOTE — PROGRESS NOTES
[facility] OUTPATIENT THERAPY AND WELLNESS  Occupational Therapy treatment note      Date: 7/5/2024  Name: Erlinda Rain  Clinic Number: [id number]    Therapy Diagnosis:   Encounter Diagnosis   Name Primary?    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, right   M25.531 (ICD-10-CM) - Right wrist pain   Z98.890 (ICD-10-CM) - Postoperative state      Surgical Procedure and Date: 3/6/2024, TFCC debridement, arthoscopy  Evaluation Date: 3/27/2024  Insurance Authorization Period Expiration: 3/18/2025   Plan of Care Expiration: 8/10/2024   Date of Return to MD: ALEJANDRA  Visit # / Visits authorized: 20/ 20  FOTO: (date and score)     FOTO Lobby Code:      Precautions: Standard     Time In: 12:42 PM   Time Out: 01:30   AM  Total Appointment Time (timed & untimed codes): 48 minutes    Subjective     Patient reports: ECU distal pain  She was compliant with home exercise program given last session.   Response to previous treatment: Good  Functional change: weaning from brace    Pain: 4/10  Location: right hand    Objective     Objective Measures updated at progress report unless specified.  Observation/Appearance:      Edema. Measured in centimeters.    4/3/2024 4/3/2024     Left Right   2in. Above elbow       2in. Below elbow       Wrist Crease 15.8 cm 16.7 cm   Figure of 8       MCPs           Edema. Measured in centimeters.    4/3/2024 4/3/2024     Left Right   Index:         P1        PIP       P2        DIP       P3       Long:         P1        PIP       P2        DIP       P3       Ring:         P1                   PIP                  P2                    DIP       P3       Small:          P1                   PIP              P2               DIP       P3       Thumb:       Prox. Phalanx       IP       Distal Phalanx              Elbow and Wrist ROM. Measured in degrees.    4/3/2024 4/3/2024 5/3/24 6/6/24  6/27/2024     Left Right Right Right Right   Elbow Ext/Flex          Supination/Pronation WNL/WNL 49/74 70/80 73/WNL  *73/WNL    Wrist Ext/Flex 66/59 45/20  *pain with flexion 70/54 60/53 69/55   Wrist RD/UD 66/59 16/14 20/25(pain) 19/29 19/29      Hand ROM. Measured in degrees.    4/3/2024 4/3/2024 6/6/2024     Left Right Right            Index: MP                    PIP                       DIP                   [place]                 Long:  MP                   PIP                   DIP                   [place]                 Ring:   MP                   PIP                   DIP                   [place]                 Small:  MP                    PIP                    DIP                   [place]                 Thumb: MP 51 53 55                IP 69 54 58       Rad ADD/ABD 46 40 46       Pal ADD/ABD 47 45 51          Opposition MP jt MP jt MP jt        Strength (Dynamometer) and Pinch Strength (Pinch Gauge)  Measured in pounds.    5/3/2024 5/3/2024 6/6/2024  6/27/2024     Left Right Right Right   Rung II 37  20 34 31   Greenberg Pinch  19.5 18.5  19 19   3pt Pinch  15 11  14 12.5   2pt Pinch 9.5/8/9.5    8.5 9      Sensation    3/27/2024 3/27/2024     Left Right   [place] Magali       Normal 1.65-2.83       Diminished Light Touch 3.22-3.61       Diminished Protective 3.84-4.31       Loss of Protective 4.56-6.65       Untestable >6.65       2 Point Discrimination       Static       Dynamic          Manual Muscle Test    4/3/2024 4/3/2024     Left Right   Wrist Extension        Wrist Flexion       Radial Deviation       Ulnar Deviation       Supination       Pronation       Elbow Extension       Elbow Flexion            Limitation/Restriction for FOTO initial eval Survey     Therapist reviewed FOTO scores for Erlinda Rain on 3/27/2024.   FOTO documents entered into [employer] - see Media section.     Limitation Score: 45%            Treatment     Erlinda received the treatments listed below:      supervised modalities: after  being cleared for contradictions, received the following for 10 minutes:  - Fluidotherapy to R hand, continuous air, 115 deg, air speed 50 to decrease pain, edema & scar tissue, sensory re- education, and increased tissue extensibility prior to therex NT  - paraffin bath and MHP for 10 min     therapeutic exercises to develop ROM for 30 minutes including:  - wrist PRE 3 ways 2# DB x 10 reps (2 sets)   - elbow PRE with wrist in supination and neutral using 3# DB x 10 reps (2 sets)   - elbow PRE with wrist in pronation using 2# DB x 10 reps (2 sets)   - wrist wheel flex/ext and pronation/supination x 2 min each  - heavy weight proprioception ambulating 3 laps using (4# dumbbell for 1 lap and 5# dumbbell for 2 laps)  - digit abduction/adduction with blue sponges and yellow rubberband x 10 (3 sets)    manual therapy techniques: Myofacial release and Manual Lymphatic Drainage were applied to the: R for 10 minutes, including:  - ECU/FCU STM hawks        hand for 10 min    Patient Education and Home Exercises     Education provided:   -   - Progress towards goals     Written Home Exercises Provided: yes.  Exercises were reviewed and Erlinda was able to demonstrate them prior to the end of the session.  Erlinda demonstrated good  understanding of the home exercise program provided. See electronic medical record under Patient Instructions for exercises provided during therapy sessions.       Assessment     Good tolerance to tx. Verbalized pain decreased by end of tx. Cont to have distal, ulnar sided pain surrounding incision. Discussed PoC with pt. She would feel comfortable keeping remaining visits. Good understanding of HEP     Erlinda is progressing well towards her goals and there are no updates to goals at this time. Pt prognosis is Good.     Patient will continue to benefit from skilled outpatient occupational therapy to address the deficits listed in the problem list on initial evaluation provide patient/family  education and to maximize patient's level of independence in the home and community environment. The following goals were discussed with the patient and patient is in agreement with them as to be addressed in the treatment plan.   Long term goals: (by d/c)  1)  Patient to be IND with HEP and modalities for pain management  2)  Increase ROM 5-10 degrees to increase functional hand use for ADLs/leisure activities met  3)   Increase  strength 5-8 lbs. to carry laundry, carry groceries, sweep, and increase functional independence of right hand for ADLs/iADLs met   4)   Increase pinch 3-4  psis for  Increase in functional independence in ADLs/iADLs such as manipulating fasteners and opening containers  5) Patient to score at 62%  or less on FOTO to demonstrate improved perception of functional rightUE Use.        Short term Goals: ( 4 weeks)   1)  Patient to be IND with HEP and modalities for pain management   2)  Increase ROM 3-5 degrees to increase functional hand use for ADLs/leisure activities met  3)   Increase  strength 3-5 lbs. to carry laundry, carry groceries, sweep, and increase functional independence of right hand for ADLs/iADLs met   4)   Increase pinch 1-3 psis for Increase in functional independence in ADLs/iADLs such as manipulating fasteners and opening containers met  5)   Patient to score at 50%  or less on FOTO to demonstrate improved perception of functional right UE Use.      Patient's spiritual, cultural and educational needs considered and patient agreeable to plan of care and goals.      PLAN     Updated Certification Period: 6/7/2024 to 8/10/2024   Recommended Treatment Plan: 1 times per week for 8 weeks:  Fluidotherapy, Manual Therapy, Moist Heat/ Ice, Neuromuscular Re-ed, Paraffin, Patient Education, Self Care, Therapeutic Activities, and Therapeutic Exercise  Other Recommendations: remaining visits to go over HEP     MICHAEL Belle OT   7/5/2024

## 2024-07-08 ENCOUNTER — PATIENT MESSAGE (OUTPATIENT)
Dept: ORTHOPEDICS | Facility: CLINIC | Age: 29
End: 2024-07-08
Payer: COMMERCIAL

## 2024-07-08 DIAGNOSIS — M24.131 DEGENERATIVE TFCC TEAR, RIGHT: Primary | ICD-10-CM

## 2024-07-11 ENCOUNTER — PATIENT MESSAGE (OUTPATIENT)
Dept: REHABILITATION | Facility: HOSPITAL | Age: 29
End: 2024-07-11
Payer: COMMERCIAL

## 2024-07-11 RX ORDER — CELECOXIB 200 MG/1
200 CAPSULE ORAL
Qty: 30 CAPSULE | Refills: 2 | Status: SHIPPED | OUTPATIENT
Start: 2024-07-11

## 2024-07-16 ENCOUNTER — CLINICAL SUPPORT (OUTPATIENT)
Dept: REHABILITATION | Facility: HOSPITAL | Age: 29
End: 2024-07-16
Attending: ORTHOPAEDIC SURGERY
Payer: MEDICAID

## 2024-07-16 DIAGNOSIS — M25.60 RANGE OF MOTION DEFICIT: ICD-10-CM

## 2024-07-16 DIAGNOSIS — R29.898 DECREASED GRIP STRENGTH: Primary | ICD-10-CM

## 2024-07-16 DIAGNOSIS — M24.131 DEGENERATIVE TFCC TEAR, RIGHT: ICD-10-CM

## 2024-07-16 PROCEDURE — 97140 MANUAL THERAPY 1/> REGIONS: CPT

## 2024-07-16 PROCEDURE — 97530 THERAPEUTIC ACTIVITIES: CPT

## 2024-07-16 PROCEDURE — 97164 PT RE-EVAL EST PLAN CARE: CPT

## 2024-07-16 NOTE — PROGRESS NOTES
[facility]  Occupational Therapy treatment note and UPdated POC     Date: 7/16/2024  Name: Erlinda Rain  Clinic Number: [id number]    Therapy Diagnosis:   Encounter Diagnoses   Name Primary?    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, right   M25.531 (ICD-10-CM) - Right wrist pain   Z98.890 (ICD-10-CM) - Postoperative state      Surgical Procedure and Date: 3/6/2024, TFCC debridement, arthoscopy    Evaluation Date: 3/27/2024; Re-eval 7/16/24     Insurance Authorization Period Expiration: 3/18/2025     Plan of Care Expiration: 10/16/24  Date of Return to MD: 9/3/24  Visit # / Visits authorized: 20/ 20; Pending auth ; Changing to MEDICAID  FOTO: (date and score)        Precautions: Standard     Time In:  1 pm   Time Out: 145 pm   Total Appointment Time (timed & untimed codes): 45 minutes    Subjective     Patient reports: ECU and dorsal wrist pain with positional or rotational movements; transfers this date to [facility], change in insurance to MEDICAID per report.  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 Continued pain with writing, typing reported and decreased strength or ability to weightbear.     She was compliant with home exercise program given last session.   Response to previous treatment: Good  Functional change: weaning from brace    Pain: 3/10  Location: right hand    Objective     Objective Measures updated at progress report unless specified.    Observation/Appearance:      Edema. Measured in centimeters.    4/3/2024 4/3/2024     Left Right   2in. Above elbow       2in. Below elbow       Wrist Crease 15.8 cm 16.7 cm   Figure of 8       MCPs      "      Elbow and Wrist ROM. Measured in degrees.    4/3/2024 4/3/2024 5/3/24 6/6/24 6/27/2024     Left Right Right Right Right   Elbow Ext/Flex          Supination/Pronation WNL/WNL 49/74 70/80 73/WNL  *73/WNL    Wrist Ext/Flex 66/59 45/20  *pain with flexion 70/54 60/53 69/55   Wrist RD/UD 66/59 16/14 20/25(pain) 19/29 19/29      Hand ROM. Measured in degrees.    4/3/2024 4/3/2024 6/6/2024     Left Right Right   Thumb: MP 51 53 55                IP 69 54 58       Rad ADD/ABD 46 40 46       Pal ADD/ABD 47 45 51          Opposition MP jt MP jt MP jt        Strength (Dynamometer) and Pinch Strength (Pinch Gauge)  Measured in pounds.    5/3/2024 5/3/2024 6/6/2024  6/27/2024     Left Right Right Right   Rung II 37  20 34 31   Greenberg Pinch  19.5 18.5  19 19   3pt Pinch  15 11  14 12.5   2pt Pinch 9.5/8/9.5    8.5 9      Limitation/Restriction for FOTO initial eval Survey     Therapist reviewed FOTO scores for Erlinda Rain on 3/27/2024.   FOTO documents entered into [employer] - see Media section.     Limitation Score: 45%          Treatment   Re-eval x 10 min     Erlinda received the treatments listed below:      supervised modalities: after being cleared for contradictions, received the following for 10 minutes:  - paraffin bath  to R hand, to decrease pain, edema & scar tissue, sensory re- education, and increased tissue extensibility prior to therex     therapeutic exercises to develop ROM for 15 minutes including:  - wrist PRE 3 ways 2# DB x 10 reps - added [place] position   - Added wrist isometrics for wrist extension and ulnar deviation x 10 reps   - Added 1# hammer for sup/pron and wrist circumduction x 10 reps for CW/CCW  -Added red putty for composite digit extension "donut" to strengthen EDC x 10 reps   - added green putty for sustained  x 10 reps for  strengthening.   - ECU stabilization exercise- palm flat on surface and isometric press into table to depress ulnar styloid x 3 sec hold x 10 reps   - putty " press with red or green putty to increase tolerance for weightbearing   - Increased time spent discussing limitations, weaknesses, focus on strength, stability and flexibility and will add additional exercises to promote return to weightbearing and improving stability of wrist for work activities;   - may add therabar, wrist gyro, digi extensor and gripper next session  - reviewed modality use including MH and cold pack use   - provided upgraded HEP     manual therapy techniques: Myofacial release and Manual Lymphatic Drainage were applied to the: R for 10 minutes, including:  - ECU/FCU, EDC  STM, CFM  w/ hawks  to flare tendons and promote healing; possible some mild tenosynovitis noted.   - brief mobilization of wrist to depress ulnar styloid     Patient Education and Home Exercises     Education provided:   -   - Progress towards goals     Written Home Exercises Provided: yes.  Exercises were reviewed and Erlinda was able to demonstrate them prior to the end of the session.  Erlinda demonstrated good  understanding of the home exercise program provided. See electronic medical record under Patient Instructions for exercises provided during therapy sessions.       Assessment     Good tolerance to tx.  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 Will restart OT with transfer to [facility] to continue with strengthening and ROM.     Erlinda is progressing well towards her goals and there are no updates to goals at this time. Pt prognosis is Good.     Patient will continue to benefit from skilled outpatient occupational therapy to address the deficits listed in the problem list on initial evaluation provide patient/family education and to maximize patient's level of independence in the home and community environment. The following goals were discussed with the patient and patient is in agreement  with them as to be addressed in the treatment plan.     Long term goals: (by d/c)  1)    Increase ROM 5-10 degrees to increase functional hand use for ADLs/leisure activities met  2)   Increase  strength 5-8 lbs. to carry laundry, carry groceries, sweep, and increase functional independence of right hand for ADLs/iADLs met, Continuing    3)   Increase pinch 3-4  psis for  Increase in functional independence in ADLs/iADLs such as manipulating fasteners and opening containers  4) Patient to score at 62%  or less on FOTO to demonstrate improved perception of functional rightUE Use.        Short term Goals: ( 4 weeks)   1)  Patient to be IND with HEP and modalities for pain management - met  2)  Increase ROM 3-5 degrees to increase functional hand use for ADLs/leisure activities met  3)   Increase  strength 3-5 lbs. to carry laundry, carry groceries, sweep, and increase functional independence of right hand for ADLs/iADLs met   4)   Increase pinch 1-3 psis for Increase in functional independence in ADLs/iADLs such as manipulating fasteners and opening containers met  5)   Patient to score at 50%  or less on FOTO to demonstrate improved perception of functional right UE Use.      Patient's spiritual, cultural and educational needs considered and patient agreeable to plan of care and goals.      PLAN     Updated Certification Period: 7/16/24 to 10/16/24  Recommended Treatment Plan: 1 times per week for 8-12 weeks:  Fluidotherapy, Manual Therapy, Moist Heat/ Ice, Neuromuscular Re-ed, Paraffin, Patient Education, Self Care, Therapeutic Activities, and Therapeutic Exercise  Other Recommendations: remaining visits to go over HEP     I certify the need for these services furnished under the plan of treatment and while under my care.     ____________________________________________________________________  Physician/Referring Practitioner   Date of Signature       Taty Ambriz OT , CHT   7/16/2024

## 2024-07-16 NOTE — PATIENT INSTRUCTIONS
"[facility], OCCUPATIONAL THERAPY  HOME EXERCISE PROGRAM   Soak hand in hot water or use hot wet compress for 5-10 min in the morning   Or before the exercises to decrease stiffness.     Cold pack x 10 min at night for pain, swelling.   Complete the following strengthening exercises 2x/day.  Hold each position x3 seconds then relax. Complete 10 repetitions each.     Resisted Wrist Extension   With forearm resting palm down, resist upward movement   of hand with other hand.           Resisted Wrist Ulnar Deviation  With forearm resting thumb up, use other hand to resist   downward movement of hand at wrist.  Resisted Forearm Rotation  Use a weight or a hammer. Slowly rotate hand to one side then the other. Also make circles  With the wrist, clockwise, counterclockwise       Resisted Wrist Flexion  With palm up and weight in hand, bend wrist up. Return slowly.      Resisted Wrist Extension  With palm down and weight in hand, bend wrist up. Then bend wrist down.      Resisted Wrist Deviation  With thumb up and weight in hand, bend wrist up. Return slowly.        Resisted  GREEN PUTTY   Squeeze putty and hold 3-5 seconds, relax, repeat 10 reps, 2x daily       Resisted Digit Extension PINK PUTTY   " DONUT" - With thumb and all fingers in center of putty donut, stretch out.                            Copyright © I. All rights reserved.       Therapist: CEZAR Huston CHT        "

## 2024-07-17 NOTE — PLAN OF CARE
[facility]  Occupational Therapy treatment note and UPdated POC     Date: 7/16/2024  Name: Erlinda Rain  Clinic Number: [id number]    Therapy Diagnosis:   Encounter Diagnoses   Name Primary?    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, right   M25.531 (ICD-10-CM) - Right wrist pain   Z98.890 (ICD-10-CM) - Postoperative state      Surgical Procedure and Date: 3/6/2024, TFCC debridement, arthoscopy    Evaluation Date: 3/27/2024; Re-eval 7/16/24     Insurance Authorization Period Expiration: 3/18/2025     Plan of Care Expiration: 10/16/24  Date of Return to MD: 9/3/24  Visit # / Visits authorized: 20/ 20; Pending auth ; Changing to MEDICAID  FOTO: (date and score)        Precautions: Standard     Time In:  1 pm   Time Out: 145 pm   Total Appointment Time (timed & untimed codes): 45 minutes    Subjective     Patient reports: ECU and dorsal wrist pain with positional or rotational movements; transfers this date to [facility], [place], change in insurance to MEDICAID per report.  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 Continued pain with writing, typing reported and decreased strength or ability to weightbear.     She was compliant with home exercise program given last session.   Response to previous treatment: Good  Functional change: weaning from brace    Pain: 3/10  Location: right hand    Objective     Objective Measures updated at progress report unless specified.    Observation/Appearance:      Edema. Measured in centimeters.    4/3/2024 4/3/2024     Left Right   2in. Above elbow       2in. Below elbow       Wrist Crease 15.8 cm 16.7 cm   Figure of 8       MCPs         "   Elbow and Wrist ROM. Measured in degrees.    4/3/2024 4/3/2024 5/3/24 6/6/24 6/27/2024     Left Right Right Right Right   Elbow Ext/Flex          Supination/Pronation WNL/WNL 49/74 70/80 73/WNL  *73/WNL    Wrist Ext/Flex 66/59 45/20  *pain with flexion 70/54 60/53 69/55   Wrist RD/UD 66/59 16/14 20/25(pain) 19/29 19/29      Hand ROM. Measured in degrees.    4/3/2024 4/3/2024 6/6/2024     Left Right Right   Thumb: MP 51 53 55                IP 69 54 58       Rad ADD/ABD 46 40 46       Pal ADD/ABD 47 45 51          Opposition MP jt MP jt MP jt        Strength (Dynamometer) and Pinch Strength (Pinch Gauge)  Measured in pounds.    5/3/2024 5/3/2024 6/6/2024  6/27/2024     Left Right Right Right   Rung II 37  20 34 31   Greenberg Pinch  19.5 18.5  19 19   3pt Pinch  15 11  14 12.5   2pt Pinch 9.5/8/9.5    8.5 9      Limitation/Restriction for FOTO initial eval Survey     Therapist reviewed FOTO scores for Erlinda Rain on 3/27/2024.   FOTO documents entered into [employer] - see Media section.     Limitation Score: 45%          Treatment   Re-eval x 10 min     Erlinda received the treatments listed below:      supervised modalities: after being cleared for contradictions, received the following for 10 minutes:  - paraffin bath  to R hand, to decrease pain, edema & scar tissue, sensory re- education, and increased tissue extensibility prior to therex     therapeutic exercises to develop ROM for 15 minutes including:  - wrist PRE 3 ways 2# DB x 10 reps - added Elkins position   - Added wrist isometrics for wrist extension and ulnar deviation x 10 reps   - Added 1# hammer for sup/pron and wrist circumduction x 10 reps for CW/CCW  -Added red putty for composite digit extension "donut" to strengthen EDC x 10 reps   - added green putty for sustained  x 10 reps for  strengthening.   - ECU stabilization exercise- palm flat on surface and isometric press into table to depress ulnar styloid x 3 sec hold x 10 reps   - putty press " with red or green putty to increase tolerance for weightbearing   - Increased time spent discussing limitations, weaknesses, focus on strength, stability and flexibility and will add additional exercises to promote return to weightbearing and improving stability of wrist for work activities;   - may add therabar, wrist gyro, digi extensor and gripper next session  - reviewed modality use including MH and cold pack use   - provided upgraded HEP     manual therapy techniques: Myofacial release and Manual Lymphatic Drainage were applied to the: R for 10 minutes, including:  - ECU/FCU, EDC  STM, CFM  w/ hawks  to flare tendons and promote healing; possible some mild tenosynovitis noted.   - brief mobilization of wrist to depress ulnar styloid     Patient Education and Home Exercises     Education provided:   -   - Progress towards goals     Written Home Exercises Provided: yes.  Exercises were reviewed and Erlinda was able to demonstrate them prior to the end of the session.  Erlinda demonstrated good  understanding of the home exercise program provided. See electronic medical record under Patient Instructions for exercises provided during therapy sessions.       Assessment     Good tolerance to tx.  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 Will restart OT with transfer to [facility] to continue with strengthening and ROM.     Erlinda is progressing well towards her goals and there are no updates to goals at this time. Pt prognosis is Good.     Patient will continue to benefit from skilled outpatient occupational therapy to address the deficits listed in the problem list on initial evaluation provide patient/family education and to maximize patient's level of independence in the home and community environment. The following goals were discussed with the patient and patient is in agreement with  them as to be addressed in the treatment plan.     Long term goals: (by d/c)  1)    Increase ROM 5-10 degrees to increase functional hand use for ADLs/leisure activities met  2)   Increase  strength 5-8 lbs. to carry laundry, carry groceries, sweep, and increase functional independence of right hand for ADLs/iADLs met, Continuing    3)   Increase pinch 3-4  psis for  Increase in functional independence in ADLs/iADLs such as manipulating fasteners and opening containers  4) Patient to score at 62%  or less on FOTO to demonstrate improved perception of functional rightUE Use.        Short term Goals: ( 4 weeks)   1)  Patient to be IND with HEP and modalities for pain management - met  2)  Increase ROM 3-5 degrees to increase functional hand use for ADLs/leisure activities met  3)   Increase  strength 3-5 lbs. to carry laundry, carry groceries, sweep, and increase functional independence of right hand for ADLs/iADLs met   4)   Increase pinch 1-3 psis for Increase in functional independence in ADLs/iADLs such as manipulating fasteners and opening containers met  5)   Patient to score at 50%  or less on FOTO to demonstrate improved perception of functional right UE Use.      Patient's spiritual, cultural and educational needs considered and patient agreeable to plan of care and goals.      PLAN     Updated Certification Period: 7/16/24 to 10/16/24  Recommended Treatment Plan: 1 times per week for 8-12 weeks:  Fluidotherapy, Manual Therapy, Moist Heat/ Ice, Neuromuscular Re-ed, Paraffin, Patient Education, Self Care, Therapeutic Activities, and Therapeutic Exercise  Other Recommendations: remaining visits to go over HEP     I certify the need for these services furnished under the plan of treatment and while under my care.     ____________________________________________________________________  Physician/Referring Practitioner   Date of Signature       Taty Ambriz OT , CHT   7/16/2024

## 2024-07-25 ENCOUNTER — CLINICAL SUPPORT (OUTPATIENT)
Dept: REHABILITATION | Facility: HOSPITAL | Age: 29
End: 2024-07-25
Payer: COMMERCIAL

## 2024-07-25 DIAGNOSIS — R29.898 DECREASED GRIP STRENGTH: Primary | ICD-10-CM

## 2024-07-25 DIAGNOSIS — M25.60 RANGE OF MOTION DEFICIT: ICD-10-CM

## 2024-07-25 PROCEDURE — 97140 MANUAL THERAPY 1/> REGIONS: CPT

## 2024-07-25 PROCEDURE — 97530 THERAPEUTIC ACTIVITIES: CPT

## 2024-07-25 PROCEDURE — 97018 PARAFFIN BATH THERAPY: CPT

## 2024-07-31 ENCOUNTER — CLINICAL SUPPORT (OUTPATIENT)
Dept: REHABILITATION | Facility: HOSPITAL | Age: 29
End: 2024-07-31
Payer: COMMERCIAL

## 2024-07-31 DIAGNOSIS — R29.898 DECREASED GRIP STRENGTH: Primary | ICD-10-CM

## 2024-07-31 DIAGNOSIS — M25.60 RANGE OF MOTION DEFICIT: ICD-10-CM

## 2024-07-31 PROCEDURE — 97140 MANUAL THERAPY 1/> REGIONS: CPT

## 2024-07-31 PROCEDURE — 97530 THERAPEUTIC ACTIVITIES: CPT

## 2024-07-31 PROCEDURE — 97018 PARAFFIN BATH THERAPY: CPT

## 2024-07-31 NOTE — PROGRESS NOTES
[facility]  Occupational Therapy treatment note and UPdated POC     Date: 7/31/2024  Name: Erlinda Rain  Clinic Number: [id number]    Therapy Diagnosis:   Encounter Diagnoses   Name Primary?    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, right   M25.531 (ICD-10-CM) - Right wrist pain   Z98.890 (ICD-10-CM) - Postoperative state      Surgical Procedure and Date: 3/6/2024, TFCC debridement, arthoscopy    Evaluation Date: 3/27/2024; Re-eval 7/16/24     Insurance Authorization Period Expiration: 3/18/2025     Plan of Care Expiration: 10/16/24  Date of Return to MD: 9/3/24  Visit # / Visits authorized: 20/ 20; Pending auth ; Changing to MEDICAID  FOTO: (date and score)        Precautions: Standard     Time In:  830  Time Out: 915  Total Appointment Time (timed & untimed codes): 45 minutes    Subjective     Patient reports: Pain today 5 out of 10;  I think from typing and using the mouse.  I'll be out of town for a week or so, going to [place].     ECU and dorsal wrist pain with positional or rotational movements; transfers this date to [employer], change in insurance to MEDICAID per report.  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 Continued pain with writing, typing reported and decreased strength or ability to weightbear.     She was compliant with home exercise program given last session.   Response to previous treatment: Good  Functional change: weaning from brace    Pain: 3/10  Location: right hand    Objective     Objective Measures updated at progress report unless specified.    Observation/Appearance:      Edema. Measured in centimeters.    4/3/2024 4/3/2024     Left Right   2in. Above elbow  "      2in. Below elbow       Wrist Crease 15.8 cm 16.7 cm   Figure of 8       MCPs           Elbow and Wrist ROM. Measured in degrees.    4/3/2024 4/3/2024 5/3/24 6/6/24 6/27/2024     Left Right Right Right Right   Elbow Ext/Flex          Supination/Pronation WNL/WNL 49/74 70/80 73/WNL  *73/WNL    Wrist Ext/Flex 66/59 45/20  *pain with flexion 70/54 60/53 69/55   Wrist RD/UD 66/59 16/14 20/25(pain) 19/29 19/29      Hand ROM. Measured in degrees.    4/3/2024 4/3/2024 6/6/2024     Left Right Right   Thumb: MP 51 53 55                IP 69 54 58       Rad ADD/ABD 46 40 46       Pal ADD/ABD 47 45 51          Opposition MP jt MP jt MP jt        Strength (Dynamometer) and Pinch Strength (Pinch Gauge)  Measured in pounds.    5/3/2024 5/3/2024 6/6/2024  6/27/2024     Left Right Right Right   Rung II 37  20 34 31   Greenberg Pinch  19.5 18.5  19 19   3pt Pinch  15 11  14 12.5   2pt Pinch 9.5/8/9.5    8.5 9      Limitation/Restriction for FOTO initial eval Survey     Therapist reviewed FOTO scores for Erlinda Rain on 3/27/2024.   FOTO documents entered into [employer] - see Media section.     Limitation Score: 45%          Treatment     Erlinda received the treatments listed below:      supervised modalities: after being cleared for contradictions, received the following for 10 minutes:  - paraffin bath  to R hand, to decrease pain, edema & scar tissue, sensory re- education, and increased tissue extensibility prior to therex     therapeutic exercises to develop ROM for 15 minutes including:  - wrist PRE 3 ways 2# DB  "place and hold" for wrist extension x 10 reps - added [place] position   - Added wrist isometrics for wrist extension and ulnar deviation x 10 reps   - green therabar for flex/ext, Smiles, frowns   - constant force red for EDC strengthening x 10    - Added 1# hammer for sup/pron and wrist circumduction x 10 reps for CW/CCW  -Added red putty for composite digit extension "donut" to strengthen EDC x 10 reps   - added " green putty for sustained  x 10 reps for  strengthening.   - add red powerweb for weightbearing by pressing with wrist extension x 15 reps   -- putty press with red or green putty to increase tolerance for weightbearing   - Increased time spent discussing limitations, weaknesses, focus on strength, stability and flexibility and will add additional exercises to promote return to weightbearing and improving stability of wrist for work activities;       manual therapy techniques: Myofacial release and Manual Lymphatic Drainage were applied to the: R for 10 minutes, including:  - ECU/FCU, EDC  STM, CFM  w/ hawks  to flare tendons and promote healing; possible some mild tenosynovitis noted.   - brief mobilization of wrist to depress ulnar styloid     Patient Education and Home Exercises     Education provided:   -   - Progress towards goals     Written Home Exercises Provided: yes.  Exercises were reviewed and Erlinda was able to demonstrate them prior to the end of the session.  Erlinda demonstrated good  understanding of the home exercise program provided. See electronic medical record under Patient Instructions for exercises provided during therapy sessions.       Assessment     Good tolerance to tx.  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 Will restart OT with transfer to [employer] to continue with strengthening and ROM.     Erlinda is progressing well towards her goals and there are no updates to goals at this time. Pt prognosis is Good.     Patient will continue to benefit from skilled outpatient occupational therapy to address the deficits listed in the problem list on initial evaluation provide patient/family education and to maximize patient's level of independence in the home and community environment. The following goals were discussed with the patient and patient is in  agreement with them as to be addressed in the treatment plan.     Long term goals: (by d/c)  1)    Increase ROM 5-10 degrees to increase functional hand use for ADLs/leisure activities met  2)   Increase  strength 5-8 lbs. to carry laundry, carry groceries, sweep, and increase functional independence of right hand for ADLs/iADLs met, Continuing    3)   Increase pinch 3-4  psis for  Increase in functional independence in ADLs/iADLs such as manipulating fasteners and opening containers  4) Patient to score at 62%  or less on FOTO to demonstrate improved perception of functional rightUE Use.        Short term Goals: ( 4 weeks)   1)  Patient to be IND with HEP and modalities for pain management - met  2)  Increase ROM 3-5 degrees to increase functional hand use for ADLs/leisure activities met  3)   Increase  strength 3-5 lbs. to carry laundry, carry groceries, sweep, and increase functional independence of right hand for ADLs/iADLs met   4)   Increase pinch 1-3 psis for Increase in functional independence in ADLs/iADLs such as manipulating fasteners and opening containers met  5)   Patient to score at 50%  or less on FOTO to demonstrate improved perception of functional right UE Use.      PLAN     Updated Certification Period: 7/16/24 to 10/16/24        Taty Ambriz OT , CHT   7/31/2024

## 2024-08-05 PROBLEM — Z00.01 ENCOUNTER FOR GENERAL ADULT MEDICAL EXAMINATION WITH ABNORMAL FINDINGS: Status: RESOLVED | Noted: 2024-05-02 | Resolved: 2024-08-05

## 2024-08-13 ENCOUNTER — CLINICAL SUPPORT (OUTPATIENT)
Dept: REHABILITATION | Facility: HOSPITAL | Age: 29
End: 2024-08-13
Payer: COMMERCIAL

## 2024-08-13 ENCOUNTER — PATIENT MESSAGE (OUTPATIENT)
Dept: REHABILITATION | Facility: HOSPITAL | Age: 29
End: 2024-08-13

## 2024-08-13 DIAGNOSIS — R29.898 DECREASED GRIP STRENGTH: Primary | ICD-10-CM

## 2024-08-13 DIAGNOSIS — M25.60 RANGE OF MOTION DEFICIT: ICD-10-CM

## 2024-08-13 PROCEDURE — 97018 PARAFFIN BATH THERAPY: CPT

## 2024-08-13 PROCEDURE — 97530 THERAPEUTIC ACTIVITIES: CPT

## 2024-08-13 PROCEDURE — 97140 MANUAL THERAPY 1/> REGIONS: CPT

## 2024-08-13 NOTE — PROGRESS NOTES
[facility]  Occupational Therapy treatment note and UPdated POC     Date: 8/13/2024  Name: Erlinda Rain  Clinic Number: [id number]    Therapy Diagnosis:   Encounter Diagnoses   Name Primary?    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, right   M25.531 (ICD-10-CM) - Right wrist pain   Z98.890 (ICD-10-CM) - Postoperative state      Surgical Procedure and Date: 3/6/2024, TFCC debridement, arthoscopy    Evaluation Date: 3/27/2024; Re-eval 7/16/24     Insurance Authorization Period Expiration: 3/18/2025     Plan of Care Expiration: 10/16/24  Date of Return to MD: 9/3/24  Visit # / Visits authorized: 20/ 20; Pending auth ;   Changing to MEDICAID  3 of 12 visits  FOTO: (date and score)        Precautions: Standard     Time In:  1115  Time Out: 12  Total Appointment Time (timed & untimed codes): 45 minutes    Subjective     Patient reports: Pain today 3 out of 10; I gave it a rest for the last week,  I think from typing and using the mouse.      ECU and dorsal wrist pain with positional or rotational movements; transfers this date to [employer], change in insurance to MEDICAID per report.  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 Continued pain with writing, typing reported and decreased strength or ability to weightbear.     She was compliant with home exercise program given last session.   Response to previous treatment: Good  Functional change: weaning from brace    Pain: 3/10  Location: right hand    Objective     Objective Measures updated at progress report unless specified.    Observation/Appearance:      Edema. Measured in centimeters.    4/3/2024 4/3/2024     Left Right   2in. Above elbow     "   2in. Below elbow       Wrist Crease 15.8 cm 16.7 cm   Figure of 8       MCPs           Elbow and Wrist ROM. Measured in degrees.    4/3/2024 4/3/2024 5/3/24 6/6/24 6/27/2024     Left Right Right Right Right   Elbow Ext/Flex          Supination/Pronation WNL/WNL 49/74 70/80 73/WNL  *73/WNL    Wrist Ext/Flex 66/59 45/20  *pain with flexion 70/54 60/53 69/55   Wrist RD/UD 66/59 16/14 20/25(pain) 19/29 19/29      Hand ROM. Measured in degrees.    4/3/2024 4/3/2024 6/6/2024     Left Right Right   Thumb: MP 51 53 55                IP 69 54 58       Rad ADD/ABD 46 40 46       Pal ADD/ABD 47 45 51          Opposition MP jt MP jt MP jt        Strength (Dynamometer) and Pinch Strength (Pinch Gauge)  Measured in pounds.    5/3/2024 5/3/2024 6/6/2024  6/27/2024     Left Right Right Right   Rung II 37  20 34 31   Greenberg Pinch  19.5 18.5  19 19   3pt Pinch  15 11  14 12.5   2pt Pinch 9.5/8/9.5    8.5 9      Limitation/Restriction for FOTO initial eval Survey     Therapist reviewed FOTO scores for Erlinda Rain on 3/27/2024.   FOTO documents entered into [employer] - see Media section.     Limitation Score: 45%          Treatment     Erlinda received the treatments listed below:  TA x 3 MEDICAID    supervised modalities: after being cleared for contradictions, received the following for 10 minutes:  - paraffin bath  to R hand, to decrease pain, edema & scar tissue, sensory re- education, and increased tissue extensibility prior to therex     therapeutic exercises to develop ROM for 15 minutes including:  - wrist PRE 3 ways 2# DB  "place and hold" for wrist extension x 10 reps - added [place] position x 10 reps   -    - green therabar for flex/ext, Smiles, frowns x 10 reps each   - constant force green for EDC strengthening x 10    - Added 1# hammer for sup/pron  ( discontinued due to wrist pain) x 10 reps f-Added red putty for composite digit extension "donut" to strengthen EDC x 10 reps   - added green putty for sustained  x 10 " reps for  strengthening.   - add red powerweb for weightbearing by pressing with wrist extension x 15 reps   -- putty press with red or green putty to increase tolerance for weightbearing   - Increased time spent discussing limitations, weaknesses, focus on strength, stability and flexibility and will add additional exercises to promote return to weightbearing and improving stability of wrist for work activities;       manual therapy techniques: Myofacial release and Manual Lymphatic Drainage were applied to the: R for 15 minutes, including:  - ECU/FCU, EDC  STM, CFM  w/ hawks  to flare tendons and promote healing; possible some mild tenosynovitis noted.   - brief mobilization of wrist to depress ulnar styloid     Patient Education and Home Exercises     Education provided:   -   - Progress towards goals     Written Home Exercises Provided: yes.  Exercises were reviewed and Erlinda was able to demonstrate them prior to the end of the session.  Erlinda demonstrated good  understanding of the home exercise program provided. See electronic medical record under Patient Instructions for exercises provided during therapy sessions.       Assessment     Good tolerance to tx.  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 Will restart OT with transfer to [facility] to continue with strengthening and ROM.     Erlinda is progressing well towards her goals and there are no updates to goals at this time. Pt prognosis is Good.     Patient will continue to benefit from skilled outpatient occupational therapy to address the deficits listed in the problem list on initial evaluation provide patient/family education and to maximize patient's level of independence in the home and community environment. The following goals were discussed with the patient and patient is in agreement with them as to be addressed  in the treatment plan.     Long term goals: (by d/c)  1)    Increase ROM 5-10 degrees to increase functional hand use for ADLs/leisure activities met  2)   Increase  strength 5-8 lbs. to carry laundry, carry groceries, sweep, and increase functional independence of right hand for ADLs/iADLs met, Continuing    3)   Increase pinch 3-4  psis for  Increase in functional independence in ADLs/iADLs such as manipulating fasteners and opening containers  4) Patient to score at 62%  or less on FOTO to demonstrate improved perception of functional rightUE Use.        Short term Goals: ( 4 weeks)   1)  Patient to be IND with HEP and modalities for pain management - met  2)  Increase ROM 3-5 degrees to increase functional hand use for ADLs/leisure activities met  3)   Increase  strength 3-5 lbs. to carry laundry, carry groceries, sweep, and increase functional independence of right hand for ADLs/iADLs met   4)   Increase pinch 1-3 psis for Increase in functional independence in ADLs/iADLs such as manipulating fasteners and opening containers met  5)   Patient to score at 50%  or less on FOTO to demonstrate improved perception of functional right UE Use.      PLAN     Updated Certification Period: 7/16/24 to 10/16/24        Taty Ambriz OT , CHT   8/13/2024

## 2024-08-18 ENCOUNTER — PATIENT MESSAGE (OUTPATIENT)
Dept: PRIMARY CARE CLINIC | Facility: CLINIC | Age: 29
End: 2024-08-18
Payer: COMMERCIAL

## 2024-08-19 DIAGNOSIS — R11.0 NAUSEA: Primary | ICD-10-CM

## 2024-08-19 RX ORDER — ONDANSETRON 4 MG/1
4 TABLET, ORALLY DISINTEGRATING ORAL EVERY 8 HOURS PRN
Qty: 90 TABLET | Refills: 0 | Status: SHIPPED | OUTPATIENT
Start: 2024-08-19 | End: 2024-09-18

## 2024-08-20 ENCOUNTER — PATIENT MESSAGE (OUTPATIENT)
Dept: REHABILITATION | Facility: HOSPITAL | Age: 29
End: 2024-08-20
Payer: COMMERCIAL

## 2024-08-21 ENCOUNTER — CLINICAL SUPPORT (OUTPATIENT)
Dept: REHABILITATION | Facility: HOSPITAL | Age: 29
End: 2024-08-21
Payer: COMMERCIAL

## 2024-08-21 DIAGNOSIS — R29.898 DECREASED GRIP STRENGTH: Primary | ICD-10-CM

## 2024-08-21 DIAGNOSIS — M25.60 RANGE OF MOTION DEFICIT: ICD-10-CM

## 2024-08-21 PROCEDURE — 97530 THERAPEUTIC ACTIVITIES: CPT

## 2024-08-21 NOTE — PROGRESS NOTES
[facility]  Occupational Therapy treatment note and UPdated POC     Date: 8/21/2024  Name: Erlinda Rain  Clinic Number: [id number]    Therapy Diagnosis:   Encounter Diagnoses   Name Primary?    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, right   M25.531 (ICD-10-CM) - Right wrist pain   Z98.890 (ICD-10-CM) - Postoperative state      Surgical Procedure and Date: 3/6/2024, TFCC debridement, arthoscopy    Evaluation Date: 3/27/2024; Re-eval 7/16/24     Insurance Authorization Period Expiration: 3/18/2025     Plan of Care Expiration: 10/16/24  Date of Return to MD: 9/3/24  Visit # / Visits authorized: 20/ 20; Pending auth ;   Changing to MEDICAID  4 of 12 visits  FOTO: (date and score)     Precautions: Standard     Time In:  1130  Time Out: 1215  Total Appointment Time (timed & untimed codes): 45 minutes    Subjective     Patient reports:  I think its doing better, I could lift 5# at the gym.   It was a little uncomfortable, but mostly weak     ECU and dorsal wrist pain with positional or rotational movements; transfers this date to [employer], change in insurance to MEDICAID per report.  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 Continued pain with writing, typing reported and decreased strength or ability to weightbear.     She was compliant with home exercise program given last session.   Response to previous treatment: Good  Functional change: weaning from brace    Pain: 3/10  Location: right hand    Objective     Objective Measures updated at progress report unless specified.    Observation/Appearance:      Edema. Measured in centimeters.    4/3/2024 4/3/2024     Left Right   2in. Above elbow      "  2in. Below elbow       Wrist Crease 15.8 cm 16.7 cm   Figure of 8       MCPs           Elbow and Wrist ROM. Measured in degrees.    4/3/2024 4/3/2024 5/3/24 6/6/24 6/27/2024     Left Right Right Right Right   Elbow Ext/Flex          Supination/Pronation WNL/WNL 49/74 70/80 73/WNL  *73/WNL    Wrist Ext/Flex 66/59 45/20  *pain with flexion 70/54 60/53 69/55   Wrist RD/UD 66/59 16/14 20/25(pain) 19/29 19/29      Hand ROM. Measured in degrees.    4/3/2024 4/3/2024 6/6/2024     Left Right Right   Thumb: MP 51 53 55                IP 69 54 58       Rad ADD/ABD 46 40 46       Pal ADD/ABD 47 45 51          Opposition MP jt MP jt MP jt        Strength (Dynamometer) and Pinch Strength (Pinch Gauge)  Measured in pounds.    5/3/2024 5/3/2024 6/6/2024  6/27/2024     Left Right Right Right   Rung II 37  20 34 31   Greenberg Pinch  19.5 18.5  19 19   3pt Pinch  15 11  14 12.5   2pt Pinch 9.5/8/9.5    8.5 9      Limitation/Restriction for FOTO initial eval Survey     Therapist reviewed FOTO scores for Erlinda Rain on 3/27/2024.   FOTO documents entered into [employer] - see Media section.     Limitation Score: 45%          Treatment     Erlinda received the treatments listed below:  TA x 2 MEDICAID    supervised modalities: after being cleared for contradictions, received the following for 10 minutes: (NO CHARGE THIS DATE)   - MH  to R hand (due to small cut on finger, to decrease pain, edema & scar tissue, sensory re- education, and increased tissue extensibility prior to therex     therapeutic exercises to develop ROM for 15 minutes including:  - wrist PRE 3 ways 2# DB  "place and hold" for wrist extension x 10 reps - added [place] position x 10 reps   -    - green therabar for flex/ext, Smiles, frowns x 10 reps each   - constant force green for EDC strengthening x 10    - Added 1# hammer for sup/pron  ( discontinued due to wrist pain) x 10 reps f-Added red putty for composite digit extension "donut" to strengthen EDC x 10 reps   - " added green putty for sustained  x 10 reps for  strengthening.   - add red powerweb for weightbearing by pressing with wrist extension x 15 reps   -- putty press with red or green putty to increase tolerance for weightbearing   - Increased time spent discussing limitations, weaknesses, focus on strength, stability and flexibility and will add additional exercises to promote return to weightbearing and improving stability of wrist for work activities;     manual therapy techniques: Myofacial release and Manual Lymphatic Drainage were applied to the: R for 15 minutes, including:  - ECU/FCU, EDC  STM, CFM  w/ hawks  to flare tendons and promote healing; possible some mild tenosynovitis noted.   - brief mobilization of wrist to depress ulnar styloid     Patient Education and Home Exercises     Education provided:   - Progress towards goals     Written Home Exercises Provided: yes.  Exercises were reviewed and Erlinda was able to demonstrate them prior to the end of the session.  Erlinda demonstrated good  understanding of the home exercise program provided. See electronic medical record under Patient Instructions for exercises provided during therapy sessions.       Assessment     Good tolerance to tx.  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 Will restart OT with transfer to [employer] to continue with strengthening and ROM.     Erlinda is progressing well towards her goals and there are no updates to goals at this time. Pt prognosis is Good.     Patient will continue to benefit from skilled outpatient occupational therapy to address the deficits listed in the problem list on initial evaluation provide patient/family education and to maximize patient's level of independence in the home and community environment. The following goals were discussed with the patient and patient is in  agreement with them as to be addressed in the treatment plan.     Long term goals: (by d/c)  1)    Increase ROM 5-10 degrees to increase functional hand use for ADLs/leisure activities met  2)   Increase  strength 5-8 lbs. to carry laundry, carry groceries, sweep, and increase functional independence of right hand for ADLs/iADLs met, Continuing    3)   Increase pinch 3-4  psis for  Increase in functional independence in ADLs/iADLs such as manipulating fasteners and opening containers  4) Patient to score at 62%  or less on FOTO to demonstrate improved perception of functional rightUE Use.        Short term Goals: ( 4 weeks)   1)  Patient to be IND with HEP and modalities for pain management - met  2)  Increase ROM 3-5 degrees to increase functional hand use for ADLs/leisure activities met  3)   Increase  strength 3-5 lbs. to carry laundry, carry groceries, sweep, and increase functional independence of right hand for ADLs/iADLs met   4)   Increase pinch 1-3 psis for Increase in functional independence in ADLs/iADLs such as manipulating fasteners and opening containers met  5)   Patient to score at 50%  or less on FOTO to demonstrate improved perception of functional right UE Use.      PLAN     Updated Certification Period: 7/16/24 to 10/16/24        Taty Ambriz OT , CHT   8/21/2024

## 2024-09-03 ENCOUNTER — PATIENT MESSAGE (OUTPATIENT)
Dept: PRIMARY CARE CLINIC | Facility: CLINIC | Age: 29
End: 2024-09-03
Payer: COMMERCIAL

## 2024-09-03 ENCOUNTER — OFFICE VISIT (OUTPATIENT)
Dept: ORTHOPEDICS | Facility: CLINIC | Age: 29
End: 2024-09-03
Payer: COMMERCIAL

## 2024-09-03 DIAGNOSIS — E66.9 OBESITY (BMI 30.0-34.9): ICD-10-CM

## 2024-09-03 DIAGNOSIS — M24.131 DEGENERATIVE TFCC TEAR, RIGHT: Primary | ICD-10-CM

## 2024-09-03 DIAGNOSIS — R53.82 CHRONIC FATIGUE: ICD-10-CM

## 2024-09-03 DIAGNOSIS — E28.2 PCOS (POLYCYSTIC OVARIAN SYNDROME): ICD-10-CM

## 2024-09-03 PROCEDURE — 99999 PR PBB SHADOW E&M-EST. PATIENT-LVL II: CPT | Mod: PBBFAC,,, | Performed by: ORTHOPAEDIC SURGERY

## 2024-09-03 PROCEDURE — 99213 OFFICE O/P EST LOW 20 MIN: CPT | Mod: S$GLB,,, | Performed by: ORTHOPAEDIC SURGERY

## 2024-09-03 PROCEDURE — 1159F MED LIST DOCD IN RCRD: CPT | Mod: CPTII,S$GLB,, | Performed by: ORTHOPAEDIC SURGERY

## 2024-09-03 PROCEDURE — 99212 OFFICE O/P EST SF 10 MIN: CPT | Mod: PBBFAC | Performed by: ORTHOPAEDIC SURGERY

## 2024-09-03 PROCEDURE — 3044F HG A1C LEVEL LT 7.0%: CPT | Mod: CPTII,S$GLB,, | Performed by: ORTHOPAEDIC SURGERY

## 2024-09-03 RX ORDER — BUPROPION HYDROCHLORIDE 300 MG/1
300 TABLET ORAL DAILY
Qty: 90 TABLET | Refills: 3 | Status: SHIPPED | OUTPATIENT
Start: 2024-09-03

## 2024-09-03 NOTE — PROGRESS NOTES
Erlinda Rain presents for post-operative evaluation.  The patient is now 6 months s/p below procedures with Dr. Lopez on 3/6/24.    PROCEDURE(S) PERFORMED:    Arthroscopic distal ulna wafer excision right wrist, CPT code 05720   Right wrist arthroscopy with TFCC debridement and synovectomy    She rates pain 3/10 today.    She is still having difficulty returning to her pre-surgical level of function, however.  She notes persistent ulnar-sided right wrist pain particularly with ulnar deviation movements and weightlifting.  She returns for re-evaluation today.    PE:    AA&O x 4.  NAD  HEENT:  NCAT, sclera nonicteric  Lungs:  Respirations are equal and unlabored.  CV:  2+ bilateral upper and lower extremity pulses.  MSK: The wounds are well healed without any signs of infection. Full painless hand motion present, wrist motion full with mild discomfort , worse with ulnar deviation. SILT. DNVI.    A/P: Status post above,  with persistent pain  1)  at this point in time, Erlinda  is not progressing as hoped.  We did discuss that we could always consider a more formal ulnar shortening osteotomy should her symptoms persist but for now we will continue to be conservative and allow her additional recovery time from her surgical procedure.  She may continue activities as tolerated and I would like for her to continue therapy with a focus on strengthening.  Follow up in 3 months for re-evaluation or sooner for any problems.  Depending on her progress we can always discuss ulnar shortening at that next visit.

## 2024-09-03 NOTE — TELEPHONE ENCOUNTER
Care Due:                  Date            Visit Type   Department     Provider  --------------------------------------------------------------------------------                                MYCHART                              ANNUAL                              CHECKUP/PHY  [facility] PRIMARY  Last Visit: 05-      S            SHANTA Guidry  Next Visit: None Scheduled  None         None Found                                                            Last  Test          Frequency    Reason                     Performed    Due Date  --------------------------------------------------------------------------------    HBA1C.......  6 months...  metFORMIN................  05- 11-    [facility] Embedded Care Due Messages. Reference number: [id number].   9/03/2024 3:48:40 PM CDT

## 2024-09-03 NOTE — TELEPHONE ENCOUNTER
Refill Encounter    PCP Visits: Recent Visits  Date Type Provider Dept   05/02/24 Office Visit Paradise Guidry DO [facility] Primary Care   Showing recent visits within past 360 days and meeting all other requirements  Future Appointments  No visits were found meeting these conditions.  Showing future appointments within next 720 days and meeting all other requirements     Last 3 Blood Pressure:   BP Readings from Last 3 Encounters:   06/18/24 118/82   05/02/24 112/78   04/23/24 118/84     Preferred Pharmacy:   [facility]/pharmacy #[id number] - [place], [place] - [address]  [address] [place] [address]  Phone: [phone] Fax: [phone]    [facility]/pharmacy #[id number] - [place] - [address]  [address]  [address]  [place] [address]  Phone: [phone] Fax: [phone]    Requested RX:  Requested Prescriptions     Pending Prescriptions Disp Refills    buPROPion (WELLBUTRIN XL) 300 MG 24 hr tablet 90 tablet 3     Sig: Take 1 tablet (300 mg total) by mouth once daily.      RX Route: Normal

## 2024-09-04 ENCOUNTER — CLINICAL SUPPORT (OUTPATIENT)
Dept: REHABILITATION | Facility: HOSPITAL | Age: 29
End: 2024-09-04
Payer: MEDICAID

## 2024-09-04 DIAGNOSIS — M25.60 RANGE OF MOTION DEFICIT: ICD-10-CM

## 2024-09-04 DIAGNOSIS — R29.898 DECREASED GRIP STRENGTH: Primary | ICD-10-CM

## 2024-09-04 PROCEDURE — 97530 THERAPEUTIC ACTIVITIES: CPT

## 2024-09-04 NOTE — PROGRESS NOTES
[facility]  Occupational Therapy treatment note and UPdated POC     Date: 9/4/2024  Name: Erlinda Rain  Clinic Number: [id number]    Therapy Diagnosis:   Encounter Diagnoses   Name Primary?    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, right   M25.531 (ICD-10-CM) - Right wrist pain   Z98.890 (ICD-10-CM) - Postoperative state      Surgical Procedure and Date: 3/6/2024, TFCC debridement, arthoscopy    Evaluation Date: 3/27/2024; Re-eval 7/16/24     Insurance Authorization Period Expiration: 3/18/2025     Plan of Care Expiration: 10/16/24  Date of Return to MD: 9/3/24  Visit # / Visits authorized: 20/ 20; Pending auth ;   Changing to MEDICAID  7 of 12 visits  FOTO: (date and score)     Precautions: Standard     Time In:  3  Time Out: 345  Total Appointment Time (timed & untimed codes): 45 minutes    Subjective     Patient reports:  I think its doing better, I could lift 5# at the gym.   It was a little uncomfortable, but mostly weak     ECU and dorsal wrist pain with positional or rotational movements; transfers this date to [employer], change in insurance to MEDICAID per report.  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 Continued pain with writing, typing reported and decreased strength or ability to weightbear.     She was compliant with home exercise program given last session.   Response to previous treatment: Good  Functional change: weaning from brace    Pain: 3/10  Location: right hand    Objective   Update POC on 10/16/24    Objective Measures updated at progress report unless specified.    Observation/Appearance:      Edema. Measured in centimeters.    4/3/2024 4/3/2024     Left Right    2in. Above elbow       2in. Below elbow       Wrist Crease 15.8 cm 16.7 cm   Figure of 8       MCPs           Elbow and Wrist ROM. Measured in degrees.    4/3/2024 4/3/2024 5/3/24 6/6/24 6/27/2024     Left Right Right Right Right   Elbow Ext/Flex          Supination/Pronation WNL/WNL 49/74 70/80 73/WNL  *73/WNL    Wrist Ext/Flex 66/59 45/20  *pain with flexion 70/54 60/53 69/55   Wrist RD/UD 66/59 16/14 20/25(pain) 19/29 19/29      Hand ROM. Measured in degrees.    4/3/2024 4/3/2024 6/6/2024     Left Right Right   Thumb: MP 51 53 55                IP 69 54 58       Rad ADD/ABD 46 40 46       Pal ADD/ABD 47 45 51          Opposition MP jt MP jt MP jt        Strength (Dynamometer) and Pinch Strength (Pinch Gauge)  Measured in pounds.    5/3/2024 5/3/2024 6/6/2024  6/27/2024     Left Right Right Right   Rung II 37  20 34 31   Greenberg Pinch  19.5 18.5  19 19   3pt Pinch  15 11  14 12.5   2pt Pinch 9.5/8/9.5    8.5 9      Limitation/Restriction for FOTO initial eval Survey     Therapist reviewed FOTO scores for Erlinda Rain on 3/27/2024.   FOTO documents entered into [employer] - see Media section.     Limitation Score: 45%          Treatment     Erlinda received the treatments listed below:  TA x 3 MEDICAID    supervised modalities: after being cleared for contradictions, received the following for 10 minutes:   - Paraffin bath  to decrease pain, edema & scar tissue, sensory re- education, and increased tissue extensibility prior to therex     therapeutic exercises to develop ROM for 15 minutes including:  - x 10 reps   - Added wrist isometrics for wrist extension and ulnar deviation x 10 reps   -- constant force green for EDC strengthening x 10    - Added 1# hammer for sup/pron and wrist circumduction  x 10 reps for CW/CCW  - power web red for weightbearing x 10 reps   -- added green putty for sustained  x 10 reps for  strengthening.   - add red powerweb for weightbearing by pressing with wrist extension x 15 reps    -- putty press with red or green putty to increase tolerance for weightbearing     manual therapy techniques: Myofacial release and Manual Lymphatic Drainage were applied to the: R for 15 minutes, including:  - ECU/FCU, EDC  STM, CFM  w/ hawks  to flare tendons and promote healing; possible some mild tenosynovitis noted.   - brief mobilization of wrist to depress ulnar styloid     Patient Education and Home Exercises     Education provided:   - Progress towards goals     Written Home Exercises Provided: yes.  Exercises were reviewed and Erlinda was able to demonstrate them prior to the end of the session.  Erlinda demonstrated good  understanding of the home exercise program provided. See electronic medical record under Patient Instructions for exercises provided during therapy sessions.       Assessment     Good tolerance to tx.  Cont to have dorsal wrist and  ulnar sided pain with positional or rotational movements, gradually improving. 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 Will restart OT with transfer to [employer] to continue with strengthening and ROM.     Erlinda is progressing well towards her goals and there are no updates to goals at this time. Pt prognosis is Good.     Patient will continue to benefit from skilled outpatient occupational therapy to address the deficits listed in the problem list on initial evaluation provide patient/family education and to maximize patient's level of independence in the home and community environment. The following goals were discussed with the patient and patient is in agreement with them as to be addressed in the treatment plan.     Long term goals: (by d/c)  1)    Increase ROM 5-10 degrees to increase functional hand use for ADLs/leisure activities met  2)   Increase  strength 5-8 lbs. to carry laundry, carry groceries, sweep, and increase functional independence of right hand for ADLs/iADLs met, Continuing    3)   Increase pinch 3-4  psis for   Increase in functional independence in ADLs/iADLs such as manipulating fasteners and opening containers  4) Patient to score at 62%  or less on FOTO to demonstrate improved perception of functional rightUE Use.        Short term Goals: ( 4 weeks)   1)  Patient to be IND with HEP and modalities for pain management - met  2)  Increase ROM 3-5 degrees to increase functional hand use for ADLs/leisure activities met  3)   Increase  strength 3-5 lbs. to carry laundry, carry groceries, sweep, and increase functional independence of right hand for ADLs/iADLs met   4)   Increase pinch 1-3 psis for Increase in functional independence in ADLs/iADLs such as manipulating fasteners and opening containers met  5)   Patient to score at 50%  or less on FOTO to demonstrate improved perception of functional right UE Use.      PLAN     Updated Certification Period: 7/16/24 to 10/16/24        Taty Ambriz OT , CHT   9/4/2024

## 2024-09-10 ENCOUNTER — PATIENT MESSAGE (OUTPATIENT)
Dept: REHABILITATION | Facility: HOSPITAL | Age: 29
End: 2024-09-10
Payer: COMMERCIAL

## 2024-09-13 ENCOUNTER — PATIENT MESSAGE (OUTPATIENT)
Dept: REHABILITATION | Facility: HOSPITAL | Age: 29
End: 2024-09-13
Payer: COMMERCIAL

## 2024-09-13 ENCOUNTER — PATIENT MESSAGE (OUTPATIENT)
Dept: ORTHOPEDICS | Facility: CLINIC | Age: 29
End: 2024-09-13
Payer: COMMERCIAL

## 2024-09-18 ENCOUNTER — CLINICAL SUPPORT (OUTPATIENT)
Dept: REHABILITATION | Facility: HOSPITAL | Age: 29
End: 2024-09-18
Payer: MEDICAID

## 2024-09-18 DIAGNOSIS — R29.898 DECREASED GRIP STRENGTH: ICD-10-CM

## 2024-09-18 DIAGNOSIS — M25.60 RANGE OF MOTION DEFICIT: ICD-10-CM

## 2024-09-18 DIAGNOSIS — R52 PAIN: Primary | ICD-10-CM

## 2024-09-18 PROCEDURE — 97530 THERAPEUTIC ACTIVITIES: CPT

## 2024-09-18 NOTE — PROGRESS NOTES
"  [facility]  Occupational Therapy treatment note and UPdated POC     Date: 9/18/2024  Name: Erlinda Rain  Clinic Number: [id number]    Therapy Diagnosis:   Encounter Diagnoses   Name Primary?    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, right   M25.531 (ICD-10-CM) - Right wrist pain   Z98.890 (ICD-10-CM) - Postoperative state      Surgical Procedure and Date: 3/6/2024, TFCC debridement, arthoscopy    Evaluation Date: 3/27/2024; Re-eval 7/16/24     Insurance Authorization Period Expiration: 3/18/2025     Plan of Care Expiration: 10/16/24  Date of Return to MD: 9/3/24  Visit # / Visits authorized: 20/ 20; Pending auth ;   Changing to MEDICAID  7 of 12 visits  FOTO: (date and score)     Precautions: Standard     Time In:  3  Time Out: 3:45  Total Appointment Time (timed & untimed codes): 45 minutes    Subjective     Patient reports:  "I've been going back to the gym; I can do a lot more as long as my wrist is in neutral"   Discomfort is decreasing with daily activity. Specific positioning, mostly with radial deviation     She was compliant with home exercise program given last session.   Response to previous treatment: Good  Functional change: weaning from brace    Pain: 3/10  Location: right hand    Objective     Objective Measures updated at progress report unless specified.    Observation/Appearance:      Edema. Measured in centimeters.    4/3/2024 4/3/2024     Left Right   2in. Above elbow       2in. Below elbow       Wrist Crease 15.8 cm 16.7 cm   Figure of 8       MCPs           Elbow and Wrist ROM. Measured in degrees.    4/3/2024 4/3/2024 5/3/24 6/6/24 6/27/2024     Left Right Right Right Right   Elbow Ext/Flex          Supination/Pronation WNL/WNL 49/74 70/80 73/WNL  *73/WNL    Wrist Ext/Flex 66/59 45/20  *pain with " flexion 70/54 60/53 69/55   Wrist RD/UD 66/59 16/14 20/25(pain) 19/29 19/29      Hand ROM. Measured in degrees.    4/3/2024 4/3/2024 6/6/2024     Left Right Right   Thumb: MP 51 53 55                IP 69 54 58       Rad ADD/ABD 46 40 46       Pal ADD/ABD 47 45 51          Opposition MP jt MP jt MP jt        Strength (Dynamometer) and Pinch Strength (Pinch Gauge)  Measured in pounds.    5/3/2024 5/3/2024 6/6/2024  6/27/2024     Left Right Right Right   Rung II 37  20 34 31   Greenberg Pinch  19.5 18.5  19 19   3pt Pinch  15 11  14 12.5   2pt Pinch 9.5/8/9.5    8.5 9      Limitation/Restriction for FOTO initial eval Survey     Therapist reviewed FOTO scores for Erlinda Rain on 3/27/2024.   FOTO documents entered into [employer] - see Media section.     Limitation Score: 45%          Treatment     Erlinda received the treatments listed below:  TA x 2 MEDICAID    supervised modalities: after being cleared for contradictions, received the following for 10 minutes:   - Paraffin bath  to decrease pain, edema & scar tissue, sensory re- education, and increased tissue extensibility prior to therex     therapeutic exercises to develop ROM for 15 minutes including:  - x 10 reps   - Added wrist isometrics for wrist extension and ulnar deviation x 10 reps   -- constant force green for EDC strengthening x 10    --- added green putty for sustained  x 10 reps for  strengthening.   - add red powerweb for weightbearing by pressing with wrist extension x 15 reps   -- Increased time spent discussing limitations, weaknesses, focus on strength, stability and flexibility and will add additional exercises to promote return to weightbearing and improving stability of wrist for work activities;   - Green flexbar isometric strengthening x3 planes x10 ea   - Light wrist strengthening in 3 planes x 10 ea      manual therapy techniques: Myofacial release and Manual Lymphatic Drainage were applied to the: R for 15 minutes, including:  - ECU/FCU,  EDC  STM, CFM  w/ hawks  to flare tendons and promote healing; possible some mild tenosynovitis noted.   - brief mobilization of wrist to depress ulnar styloid     Patient Education and Home Exercises     Education provided:   - Progress towards goals     Written Home Exercises Provided: yes.  Exercises were reviewed and Erlinda was able to demonstrate them prior to the end of the session.  Erlinda demonstrated good  understanding of the home exercise program provided. See electronic medical record under Patient Instructions for exercises provided during therapy sessions.       Assessment     Tightness continues to be present at forearm. Aching and tenderness is present with mild relief noted following manual therapy. Plan to progress with increased attention to dynamic stability and strengthening.     Erlinda is progressing well towards her goals and there are no updates to goals at this time. Pt prognosis is Good.     Patient will continue to benefit from skilled outpatient occupational therapy to address the deficits listed in the problem list on initial evaluation provide patient/family education and to maximize patient's level of independence in the home and community environment. The following goals were discussed with the patient and patient is in agreement with them as to be addressed in the treatment plan.     Long term goals: (by d/c)  1)    Increase ROM 5-10 degrees to increase functional hand use for ADLs/leisure activities met  2)   Increase  strength 5-8 lbs. to carry laundry, carry groceries, sweep, and increase functional independence of right hand for ADLs/iADLs met, Continuing    3)   Increase pinch 3-4  psis for  Increase in functional independence in ADLs/iADLs such as manipulating fasteners and opening containers  4) Patient to score at 62%  or less on FOTO to demonstrate improved perception of functional rightUE Use.        Short term Goals: ( 4 weeks)   1)  Patient to be IND with HEP and  modalities for pain management - met  2)  Increase ROM 3-5 degrees to increase functional hand use for ADLs/leisure activities met  3)   Increase  strength 3-5 lbs. to carry laundry, carry groceries, sweep, and increase functional independence of right hand for ADLs/iADLs met   4)   Increase pinch 1-3 psis for Increase in functional independence in ADLs/iADLs such as manipulating fasteners and opening containers met  5)   Patient to score at 50%  or less on FOTO to demonstrate improved perception of functional right UE Use.      PLAN     Updated Certification Period: 7/16/24 to 10/16/24        Radha Puentes OT , CHT   9/18/2024

## 2024-09-25 ENCOUNTER — CLINICAL SUPPORT (OUTPATIENT)
Dept: REHABILITATION | Facility: HOSPITAL | Age: 29
End: 2024-09-25
Payer: MEDICAID

## 2024-09-25 DIAGNOSIS — R29.898 DECREASED GRIP STRENGTH: Primary | ICD-10-CM

## 2024-09-25 DIAGNOSIS — M25.60 RANGE OF MOTION DEFICIT: ICD-10-CM

## 2024-09-25 PROCEDURE — 97530 THERAPEUTIC ACTIVITIES: CPT

## 2024-09-25 NOTE — PROGRESS NOTES
"  [facility]  Occupational Therapy treatment note and UPdated POC     Date: 9/25/2024  Name: Erlinda Rain  Clinic Number: [id number]    Therapy Diagnosis:   Encounter Diagnoses   Name Primary?    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, right   M25.531 (ICD-10-CM) - Right wrist pain   Z98.890 (ICD-10-CM) - Postoperative state      Surgical Procedure and Date: 3/6/2024, TFCC debridement, arthoscopy    Evaluation Date: 3/27/2024; Re-eval 7/16/24     Insurance Authorization Period Expiration: 3/18/2025     Plan of Care Expiration: 10/16/24  Date of Return to MD: 9/3/24  Visit # / Visits authorized: 20/ 20; Pending auth ;   Changing to MEDICAID  8 of 12 visits  FOTO: (date and score)     Precautions: Standard     Time In:  3  Time Out: 3:45  Total Appointment Time (timed & untimed codes): 45 minutes    Subjective     Patient reports:  "I've been going back to the gym; I can do a lot more as long as my wrist is in neutral"   Discomfort is decreasing with daily activity. Specific positioning, mostly with radial deviation     She was compliant with home exercise program given last session.   Response to previous treatment: Good  Functional change: weaning from brace    Pain: 3/10  Location: right hand    Objective     Objective Measures updated at progress report unless specified.    Observation/Appearance:      Edema. Measured in centimeters.    4/3/2024 4/3/2024     Left Right   2in. Above elbow       2in. Below elbow       Wrist Crease 15.8 cm 16.7 cm   Figure of 8       MCPs           Elbow and Wrist ROM. Measured in degrees.    4/3/2024 4/3/2024 5/3/24 6/6/24 6/27/2024     Left Right Right Right Right   Elbow Ext/Flex          Supination/Pronation WNL/WNL 49/74 70/80 73/WNL  *73/WNL    Wrist Ext/Flex 66/59 45/20  *pain with flexion 70/54 " "60/53 69/55   Wrist RD/UD 66/59 16/14 20/25(pain) 19/29 19/29      Hand ROM. Measured in degrees.    4/3/2024 4/3/2024 6/6/2024     Left Right Right   Thumb: MP 51 53 55                IP 69 54 58       Rad ADD/ABD 46 40 46       Pal ADD/ABD 47 45 51          Opposition MP jt MP jt MP jt        Strength (Dynamometer) and Pinch Strength (Pinch Gauge)  Measured in pounds.    5/3/2024 5/3/2024 6/6/2024  6/27/2024     Left Right Right Right   Rung II 37  20 34 31   Greenberg Pinch  19.5 18.5  19 19   3pt Pinch  15 11  14 12.5   2pt Pinch 9.5/8/9.5    8.5 9      Limitation/Restriction for FOTO initial eval Survey     Therapist reviewed FOTO scores for Erlinda Rain on 3/27/2024.   FOTO documents entered into [employer] - see Media section.     Limitation Score: 45%          Treatment     Erlinda received the treatments listed below:  TA x 3 MEDICAID    supervised modalities: after being cleared for contradictions, received the following for 10 minutes:   - Paraffin bath  to decrease pain, edema & scar tissue, sensory re- education, and increased tissue extensibility prior to therex     therapeutic exercises to develop ROM for 15 minutes including:  - wrist PRE 3 ways 2# DB  "place and hold" for wrist extension x 10 reps  x 10 reps   - Added wrist isometrics for wrist extension and ulnar deviation x 10 reps   -- constant force green for EDC strengthening x 10    - Added 1# hammer for sup/pron and wrist circumduction  x 10 reps for CW/CCW  -- added green putty for sustained  x 10 reps for  strengthening.   - add red powerweb for weightbearing by pressing with wrist extension x 15 reps   -- Increased time spent discussing limitations, weaknesses, focus on strength, stability and flexibility and will add additional exercises to promote return to weightbearing and improving stability of wrist for work activities;   - Green flexbar isometric strengthening x3 planes x10 ea   - Light wrist strengthening in 3 planes x 10 ea  "     manual therapy techniques: Myofacial release and Manual Lymphatic Drainage were applied to the: R for 15 minutes, including:  - ECU/FCU, EDC  STM, CFM  w/ hawks  to flare tendons and promote healing; possible some mild tenosynovitis noted.   - brief mobilization of wrist to depress ulnar styloid     Patient Education and Home Exercises     Education provided:   - Progress towards goals     Written Home Exercises Provided: yes.  Exercises were reviewed and Erlinda was able to demonstrate them prior to the end of the session.  Erlinda demonstrated good  understanding of the home exercise program provided. See electronic medical record under Patient Instructions for exercises provided during therapy sessions.       Assessment     Tightness continues to be present at forearm. Aching and tenderness is present with mild relief noted following manual therapy. Plan to progress with increased attention to dynamic stability and strengthening.     Erlinda is progressing well towards her goals and there are no updates to goals at this time. Pt prognosis is Good.     Patient will continue to benefit from skilled outpatient occupational therapy to address the deficits listed in the problem list on initial evaluation provide patient/family education and to maximize patient's level of independence in the home and community environment. The following goals were discussed with the patient and patient is in agreement with them as to be addressed in the treatment plan.     Long term goals: (by d/c)  1)    Increase ROM 5-10 degrees to increase functional hand use for ADLs/leisure activities met  2)   Increase  strength 5-8 lbs. to carry laundry, carry groceries, sweep, and increase functional independence of right hand for ADLs/iADLs met, Continuing    3)   Increase pinch 3-4  psis for  Increase in functional independence in ADLs/iADLs such as manipulating fasteners and opening containers  4) Patient to score at 62%  or less on  FOTO to demonstrate improved perception of functional rightUE Use.        Short term Goals: ( 4 weeks)   1)  Patient to be IND with HEP and modalities for pain management - met  2)  Increase ROM 3-5 degrees to increase functional hand use for ADLs/leisure activities met  3)   Increase  strength 3-5 lbs. to carry laundry, carry groceries, sweep, and increase functional independence of right hand for ADLs/iADLs met   4)   Increase pinch 1-3 psis for Increase in functional independence in ADLs/iADLs such as manipulating fasteners and opening containers met  5)   Patient to score at 50%  or less on FOTO to demonstrate improved perception of functional right UE Use.      PLAN     Updated Certification Period: 7/16/24 to 10/16/24    Taty Ambriz OT , CHT   9/25/2024

## 2024-10-03 ENCOUNTER — CLINICAL SUPPORT (OUTPATIENT)
Dept: REHABILITATION | Facility: HOSPITAL | Age: 29
End: 2024-10-03
Payer: MEDICAID

## 2024-10-03 DIAGNOSIS — R29.898 DECREASED GRIP STRENGTH: Primary | ICD-10-CM

## 2024-10-03 PROCEDURE — 97530 THERAPEUTIC ACTIVITIES: CPT

## 2024-10-07 NOTE — PROGRESS NOTES
"  [facility]  Occupational Therapy treatment note and UPdated POC     Date: 10/3/2024  Name: Erlinda Rain  Clinic Number: [id number]    Therapy Diagnosis:   No diagnosis found.    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, right   M25.531 (ICD-10-CM) - Right wrist pain   Z98.890 (ICD-10-CM) - Postoperative state      Surgical Procedure and Date: 3/6/2024, TFCC debridement, arthoscopy    Evaluation Date: 3/27/2024; Re-eval 7/16/24     Insurance Authorization Period Expiration: 3/18/2025     Plan of Care Expiration: 10/16/24  Date of Return to MD: 9/3/24  Visit # / Visits authorized: 20/ 20; Pending auth ;   Changing to MEDICAID  9 of 12 visits  FOTO: (date and score)     Precautions: Standard     Time In:  2:30  Time Out: 3:15  Total Appointment Time (timed & untimed codes): 45 minutes    Reassess and update POC next visit!    Subjective     Patient reports:  "its been a little sore today, but has gotten a lot better overall."  Discomfort is decreasing with daily activity. Specific positioning, mostly with radial deviation     She was compliant with home exercise program given last session.   Response to previous treatment: Good  Functional change: weaning from brace    Pain: 3/10  Location: right hand    Objective     Objective Measures updated at progress report unless specified.    Observation/Appearance:      Edema. Measured in centimeters.    4/3/2024 4/3/2024     Left Right   2in. Above elbow       2in. Below elbow       Wrist Crease 15.8 cm 16.7 cm   Figure of 8       MCPs           Elbow and Wrist ROM. Measured in degrees.    4/3/2024 4/3/2024 5/3/24 6/6/24 6/27/2024     Left Right Right Right Right   Elbow Ext/Flex          Supination/Pronation WNL/WNL 49/74 70/80 73/WNL  *73/WNL    Wrist Ext/Flex 66/59 45/20  *pain with flexion 70/54 60/53 69/55   Wrist RD/UD 66/59 16/14 20/25(pain) 19/29 " "19/29      Hand ROM. Measured in degrees.    4/3/2024 4/3/2024 6/6/2024     Left Right Right   Thumb: MP 51 53 55                IP 69 54 58       Rad ADD/ABD 46 40 46       Pal ADD/ABD 47 45 51          Opposition MP jt MP jt MP jt        Strength (Dynamometer) and Pinch Strength (Pinch Gauge)  Measured in pounds.    5/3/2024 5/3/2024 6/6/2024  6/27/2024     Left Right Right Right   Rung II 37  20 34 31   Greenberg Pinch  19.5 18.5  19 19   3pt Pinch  15 11  14 12.5   2pt Pinch 9.5/8/9.5    8.5 9      Limitation/Restriction for FOTO initial eval Survey     Therapist reviewed FOTO scores for Erlinda Rain on 3/27/2024.   FOTO documents entered into [employer] - see Media section.     Limitation Score: 45%          Treatment     Erlinda received the treatments listed below:  TA x 3 MEDICAID    supervised modalities: after being cleared for contradictions, received the following for 10 minutes:   - Paraffin bath  to decrease pain, edema & scar tissue, sensory re- education, and increased tissue extensibility prior to therex     therapeutic exercises to develop ROM for 15 minutes including:  - wrist PRE 3 ways 2# DB  "place and hold" for wrist extension x 10 reps  x 10 reps   - Added wrist isometrics for wrist extension and ulnar deviation x 10 reps   -- constant force green for EDC strengthening x 10    - Added 1# hammer for sup/pron and wrist circumduction  x 10 reps for CW/CCW  -- added green putty for sustained  x 10 reps for  strengthening.   - add red powerweb for weightbearing by pressing with wrist extension x 15 reps   -- Increased time spent discussing limitations, weaknesses, focus on strength, stability and flexibility and will add additional exercises to promote return to weightbearing and improving stability of wrist for work activities;   - Green flexbar isometric strengthening x3 planes x10 ea   - Light wrist strengthening in 3 planes x 10 ea      manual therapy techniques: Myofacial release and Manual " Lymphatic Drainage were applied to the: R for 15 minutes, including:  - ECU/FCU, EDC  STM, CFM  w/ hawks  to flare tendons and promote healing; possible some mild tenosynovitis noted.   - brief mobilization of wrist to depress ulnar styloid     Patient Education and Home Exercises     Education provided:   - Progress towards goals     Written Home Exercises Provided: yes.  Exercises were reviewed and Erlinda was able to demonstrate them prior to the end of the session.  Erlinda demonstrated good  understanding of the home exercise program provided. See electronic medical record under Patient Instructions for exercises provided during therapy sessions.       Assessment     Aching and tenderness remain with slight pressure to ulnar aspect of wrist. Strength and endurance continue to progress. Plan to progress with increased attention to strength and stability.     Erlinda is progressing well towards her goals and there are no updates to goals at this time. Pt prognosis is Good.     Patient will continue to benefit from skilled outpatient occupational therapy to address the deficits listed in the problem list on initial evaluation provide patient/family education and to maximize patient's level of independence in the home and community environment. The following goals were discussed with the patient and patient is in agreement with them as to be addressed in the treatment plan.     Long term goals: (by d/c)  1)    Increase ROM 5-10 degrees to increase functional hand use for ADLs/leisure activities met  2)   Increase  strength 5-8 lbs. to carry laundry, carry groceries, sweep, and increase functional independence of right hand for ADLs/iADLs met, Continuing    3)   Increase pinch 3-4  psis for  Increase in functional independence in ADLs/iADLs such as manipulating fasteners and opening containers  4) Patient to score at 62%  or less on FOTO to demonstrate improved perception of functional rightUE Use.        Short  term Goals: ( 4 weeks)   1)  Patient to be IND with HEP and modalities for pain management - met  2)  Increase ROM 3-5 degrees to increase functional hand use for ADLs/leisure activities met  3)   Increase  strength 3-5 lbs. to carry laundry, carry groceries, sweep, and increase functional independence of right hand for ADLs/iADLs met   4)   Increase pinch 1-3 psis for Increase in functional independence in ADLs/iADLs such as manipulating fasteners and opening containers met  5)   Patient to score at 50%  or less on FOTO to demonstrate improved perception of functional right UE Use.      PLAN     Updated Certification Period: 7/16/24 to 10/16/24    Radha Puentes OT   10/3/2024

## 2024-10-09 ENCOUNTER — CLINICAL SUPPORT (OUTPATIENT)
Dept: REHABILITATION | Facility: HOSPITAL | Age: 29
End: 2024-10-09
Payer: MEDICAID

## 2024-10-09 DIAGNOSIS — M25.60 RANGE OF MOTION DEFICIT: Primary | ICD-10-CM

## 2024-10-09 DIAGNOSIS — R29.898 DECREASED GRIP STRENGTH: ICD-10-CM

## 2024-10-09 PROCEDURE — 97530 THERAPEUTIC ACTIVITIES: CPT

## 2024-10-09 NOTE — PROGRESS NOTES
"  [facility]  Occupational Therapy treatment note and UPdated POC     Date: 10/9/2024  Name: Erlinda Rain  Clinic Number: [id number]    Therapy Diagnosis:   Encounter Diagnoses   Name Primary?    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, right   M25.531 (ICD-10-CM) - Right wrist pain   Z98.890 (ICD-10-CM) - Postoperative state      Surgical Procedure and Date: 3/6/2024, TFCC debridement, arthoscopy    Evaluation Date: 3/27/2024; Re-eval 7/16/24     Insurance Authorization Period Expiration: 3/18/2025     Plan of Care Expiration: 10/16/24  Date of Return to MD: 9/3/24  Visit # / Visits authorized: 20/ 20; Pending auth ;   Changing to MEDICAID  10 of 12 visits  FOTO: (date and score)     Precautions: Standard     Time In:  2:15  Time Out: 3:00  Total Appointment Time (timed & untimed codes): 45 minutes    Reassess and update POC next visit!    Subjective     Patient reports: " I am able to do a lot more but any heavy lifting hurts it"  Discomfort is decreasing with daily activity. Specific positioning, mostly with radial deviation     She was compliant with home exercise program given last session.   Response to previous treatment: Good  Functional change: weaning from brace    Pain: 3/10  Location: right hand    Objective     Objective Measures updated at progress report unless specified.    Observation/Appearance:      Edema. Measured in centimeters.    4/3/2024 4/3/2024     Left Right   2in. Above elbow       2in. Below elbow       Wrist Crease 15.8 cm 16.7 cm   Figure of 8       MCPs           Elbow and Wrist ROM. Measured in degrees.    4/3/2024 4/3/2024 5/3/24 6/6/24 6/27/2024     Left Right Right Right Right   Elbow Ext/Flex          Supination/Pronation WNL/WNL 49/74 70/80 73/WNL  *73/WNL    Wrist Ext/Flex 66/59 45/20  *pain with " "flexion 70/54 60/53 69/55   Wrist RD/UD 66/59 16/14 20/25(pain) 19/29 19/29      Hand ROM. Measured in degrees.    4/3/2024 4/3/2024 6/6/2024     Left Right Right   Thumb: MP 51 53 55                IP 69 54 58       Rad ADD/ABD 46 40 46       Pal ADD/ABD 47 45 51          Opposition MP jt MP jt MP jt        Strength (Dynamometer) and Pinch Strength (Pinch Gauge)  Measured in pounds.    5/3/2024 5/3/2024 6/6/2024  6/27/2024     Left Right Right Right   Rung II 37  20 34 31   Greenberg Pinch  19.5 18.5  19 19   3pt Pinch  15 11  14 12.5   2pt Pinch 9.5/8/9.5    8.5 9      Limitation/Restriction for FOTO initial eval Survey     Therapist reviewed FOTO scores for Erlinda Rain on 3/27/2024.   FOTO documents entered into [email or web address] - see Media section.     Limitation Score: 45%          Treatment     Erlinda received the treatments listed below:  TA x 3 MEDICAID    supervised modalities: after being cleared for contradictions, received the following for 10 minutes:   - Paraffin bath  to decrease pain, edema & scar tissue, sensory re- education, and increased tissue extensibility prior to therex     therapeutic exercises to develop ROM for 15 minutes including:  - wrist PRE 3 ways 2# DB  "place and hold" for wrist extension x 10 reps  x 10 reps   - Added wrist isometrics for wrist extension and ulnar deviation x 10 reps   -- constant force green for EDC strengthening x 10    - Added 1# hammer for sup/pron and wrist circumduction  x 10 reps for CW/CCW  -- added green putty for sustained  x 10 reps for  strengthening.   - add red powerweb for weightbearing by pressing with wrist extension x 15 reps   -- Increased time spent discussing limitations, weaknesses, focus on strength, stability and flexibility and will add additional exercises to promote return to weightbearing and improving stability of wrist for work activities;   - Green flexbar isometric strengthening x3 planes x10 ea   - Light wrist strengthening in 3 planes x " 10 ea    - gyroscope   - in hand weighted dumbbell rotation  - manual elbow isometrics  - shoulder isometrics on the wall   - wall crawls w/ tband     manual therapy techniques: Myofacial release and Manual Lymphatic Drainage were applied to the: R for 15 minutes, including:  - ECU/FCU, EDC  STM, CFM  w/ hawks  to flare tendons and promote healing; possible some mild tenosynovitis noted.   - brief mobilization of wrist to depress ulnar styloid     Patient Education and Home Exercises     Education provided:   - Progress towards goals     Written Home Exercises Provided: yes.  Exercises were reviewed and Erlinda was able to demonstrate them prior to the end of the session.  Erlinda demonstrated good  understanding of the home exercise program provided. See electronic medical record under Patient Instructions for exercises provided during therapy sessions.       Assessment     Aching continues to be present at ulnar side of wrist when any weight is applied through the hand. Mild pain reported with resistance and weightbearing. Increased attention to proximal stability today as patient notices more difficulty with activity above 90* of shoulder flexion.     Erlinda is progressing well towards her goals and there are no updates to goals at this time. Pt prognosis is Good.     Patient will continue to benefit from skilled outpatient occupational therapy to address the deficits listed in the problem list on initial evaluation provide patient/family education and to maximize patient's level of independence in the home and community environment. The following goals were discussed with the patient and patient is in agreement with them as to be addressed in the treatment plan.     Long term goals: (by d/c)  1)    Increase ROM 5-10 degrees to increase functional hand use for ADLs/leisure activities met  2)   Increase  strength 5-8 lbs. to carry laundry, carry groceries, sweep, and increase functional independence of right  hand for ADLs/iADLs met, Continuing    3)   Increase pinch 3-4  psis for  Increase in functional independence in ADLs/iADLs such as manipulating fasteners and opening containers  4) Patient to score at 62%  or less on FOTO to demonstrate improved perception of functional rightUE Use.        Short term Goals: ( 4 weeks)   1)  Patient to be IND with HEP and modalities for pain management - met  2)  Increase ROM 3-5 degrees to increase functional hand use for ADLs/leisure activities met  3)   Increase  strength 3-5 lbs. to carry laundry, carry groceries, sweep, and increase functional independence of right hand for ADLs/iADLs met   4)   Increase pinch 1-3 psis for Increase in functional independence in ADLs/iADLs such as manipulating fasteners and opening containers met  5)   Patient to score at 50%  or less on FOTO to demonstrate improved perception of functional right UE Use.      PLAN     Updated Certification Period: 7/16/24 to 10/16/24    Radha Puentes OT   10/9/2024

## 2024-10-15 ENCOUNTER — PATIENT MESSAGE (OUTPATIENT)
Dept: ORTHOPEDICS | Facility: CLINIC | Age: 29
End: 2024-10-15
Payer: MEDICAID

## 2024-10-15 DIAGNOSIS — M24.131 DEGENERATIVE TFCC TEAR, RIGHT: Primary | ICD-10-CM

## 2024-10-15 RX ORDER — CELECOXIB 200 MG/1
200 CAPSULE ORAL DAILY
Qty: 30 CAPSULE | Refills: 2 | Status: SHIPPED | OUTPATIENT
Start: 2024-10-15 | End: 2025-01-13

## 2024-10-16 ENCOUNTER — PATIENT MESSAGE (OUTPATIENT)
Dept: ORTHOPEDICS | Facility: CLINIC | Age: 29
End: 2024-10-16
Payer: MEDICAID

## 2024-10-17 ENCOUNTER — CLINICAL SUPPORT (OUTPATIENT)
Dept: REHABILITATION | Facility: HOSPITAL | Age: 29
End: 2024-10-17
Payer: MEDICAID

## 2024-10-17 DIAGNOSIS — M25.60 RANGE OF MOTION DEFICIT: Primary | ICD-10-CM

## 2024-10-17 DIAGNOSIS — R29.898 DECREASED GRIP STRENGTH: ICD-10-CM

## 2024-10-17 PROCEDURE — 97530 THERAPEUTIC ACTIVITIES: CPT

## 2024-10-21 NOTE — PROGRESS NOTES
[facility]  Occupational Therapy treatment note and UPdated POC     Date: 10/17/2024  Name: Erlinda Rain  Clinic Number: [id number]    Therapy Diagnosis:   No diagnosis found.      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, right   M25.531 (ICD-10-CM) - Right wrist pain   Z98.890 (ICD-10-CM) - Postoperative state      Surgical Procedure and Date: 3/6/2024, TFCC debridement, arthoscopy    Evaluation Date: 3/27/2024; Re-eval 7/16/24     Insurance Authorization Period Expiration: 3/18/2025     Plan of Care Expiration: 10/16/24 extended to 12/31/2024  Date of Return to MD: 9/3/24  Visit # / Visits authorized: 20/ 20; Pending auth ;   Changing to MEDICAID  11 of 12 visits  FOTO: (date and score)     Precautions: Standard     Time In:  2:30  Time Out: 3:15  Total Appointment Time (timed & untimed codes): 45 minutes      Subjective     Patient reports: I've been trying to do more; but it still gets sore.  Discomfort is decreasing with daily activity. Specific positioning, mostly with radial deviation     She was compliant with home exercise program given last session.   Response to previous treatment: Good  Functional change: weaning from brace    Pain: 3/10  Location: right hand    Objective     Objective Measures updated at progress report unless specified.    Observation/Appearance:      Edema. Measured in centimeters.    4/3/2024 4/3/2024     Left Right   2in. Above elbow       2in. Below elbow       Wrist Crease 15.8 cm 16.7 cm   Figure of 8       MCPs           Elbow and Wrist ROM. Measured in degrees.    4/3/2024 4/3/2024 5/3/24 6/6/24 6/27/2024 10/9/2024     Left Right Right Right Right Right   Elbow Ext/Flex           Supination/Pronation WNL/WNL 49/74 70/80 73/WNL  *73/WNL  75/wnl   Wrist Ext/Flex 66/59 45/20  *pain with flexion 70/54 60/53 69/55 72/61   Wrist RD/UD 66/59 16/14 20/25(pain)  "19/29 19/29 wnl      Hand ROM. Measured in degrees.    4/3/2024 4/3/2024 6/6/2024     Left Right Right   Thumb: MP 51 53 55                IP 69 54 58       Rad ADD/ABD 46 40 46       Pal ADD/ABD 47 45 51          Opposition MP jt MP jt MP jt        Strength (Dynamometer) and Pinch Strength (Pinch Gauge)  Measured in pounds.    5/3/2024 5/3/2024 6/6/2024  6/27/2024 10/9/2024     Left Right Right Right Right   Rung II 37  20 34 31 34   Greenberg Pinch  19.5 18.5  19 19 20   3pt Pinch  15 11  14 12.5 14   2pt Pinch 9.5/8/9.5    8.5 9       Limitation/Restriction for FOTO initial eval Survey     Therapist reviewed FOTO scores for Erlinda Rain on 3/27/2024.   FOTO documents entered into [employer] - see Media section.     Limitation Score: 45%          Treatment     Erlinda received the treatments listed below:  TA x 3 MEDICAID    supervised modalities: after being cleared for contradictions, received the following for 10 minutes:   - Paraffin bath  to decrease pain, edema & scar tissue, sensory re- education, and increased tissue extensibility prior to therex     therapeutic exercises to develop ROM for 15 minutes including:  - wrist PRE 3 ways 2# DB  "place and hold" for wrist extension x 10 reps  x 10 reps   - Added wrist isometrics for wrist extension and ulnar deviation x 10 reps   -- constant force green for EDC strengthening x 10    - Added 1# hammer for sup/pron and wrist circumduction  x 10 reps for CW/CCW  -- added green putty for sustained  x 10 reps for  strengthening.   - add red powerweb for weightbearing by pressing with wrist extension x 15 reps   -- Increased time spent discussing limitations, weaknesses, focus on strength, stability and flexibility and will add additional exercises to promote return to weightbearing and improving stability of wrist for work activities;   - Green flexbar isometric strengthening x3 planes x10 ea   - Light wrist strengthening in 3 planes x 10 ea    - gyroscope   - in hand " weighted dumbbell rotation  - manual elbow isometrics  - shoulder isometrics on the wall   - wall crawls w/ tband     manual therapy techniques: Myofacial release and Manual Lymphatic Drainage were applied to the: R for 15 minutes, including:  - ECU/FCU, EDC  STM, CFM  w/ hawks  to flare tendons and promote healing; possible some mild tenosynovitis noted.   - brief mobilization of wrist to depress ulnar styloid     Patient Education and Home Exercises     Education provided:   - Progress towards goals     Written Home Exercises Provided: yes.  Exercises were reviewed and Erlinda was able to demonstrate them prior to the end of the session.  Erlinda demonstrated good  understanding of the home exercise program provided. See electronic medical record under Patient Instructions for exercises provided during therapy sessions.       Assessment     Pt sheeba's gains in ROM and strength; however limitations continue to be noted in pain and strength as she is not confident she can easily perform heavy activity required for work. Soreness at pain at Ulnar side of wrist remains present. Increased attention is placed on proximal strengthening at this time. She will benefit form continued therapy in order to continue to address deficits in order for her to return to prior level of activity.     Erlinda is progressing well towards her goals and there are no updates to goals at this time. Pt prognosis is Good.     Patient will continue to benefit from skilled outpatient occupational therapy to address the deficits listed in the problem list on initial evaluation provide patient/family education and to maximize patient's level of independence in the home and community environment. The following goals were discussed with the patient and patient is in agreement with them as to be addressed in the treatment plan.     Long term goals: (by d/c)  1)    Increase ROM 5-10 degrees to increase functional hand use for ADLs/leisure activities  met  2)   Increase  strength 5-8 lbs. to carry laundry, carry groceries, sweep, and increase functional independence of right hand for ADLs/iADLs met, Continuing    3)   Increase pinch 3-4  psis for  Increase in functional independence in ADLs/iADLs such as manipulating fasteners and opening containers  4) Patient to score at 62%  or less on FOTO to demonstrate improved perception of functional rightUE Use.        Short term Goals: ( 4 weeks)   1)  Patient to be IND with HEP and modalities for pain management - met  2)  Increase ROM 3-5 degrees to increase functional hand use for ADLs/leisure activities met  3)   Increase  strength 3-5 lbs. to carry laundry, carry groceries, sweep, and increase functional independence of right hand for ADLs/iADLs met   4)   Increase pinch 1-3 psis for Increase in functional independence in ADLs/iADLs such as manipulating fasteners and opening containers met  5)   Patient to score at 50%  or less on FOTO to demonstrate improved perception of functional right UE Use.      PLAN     Updated Certification Period:  10/16/24 extended to 12/31/2024/    Radha Puentes OT   10/17/2024

## 2024-10-21 NOTE — PLAN OF CARE
[facility]  Occupational Therapy treatment note and UPdated POC      Date: 10/17/2024  Name: Erlinda Rain  Clinic Number: [id number]     Therapy Diagnosis:   No diagnosis found.        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, right   M25.531 (ICD-10-CM) - Right wrist pain   Z98.890 (ICD-10-CM) - Postoperative state      Surgical Procedure and Date: 3/6/2024, TFCC debridement, arthoscopy     Evaluation Date: 3/27/2024; Re-eval 7/16/24      Insurance Authorization Period Expiration: 3/18/2025      Plan of Care Expiration: 10/16/24 extended to 12/31/2024  Date of Return to MD: 9/3/24  Visit # / Visits authorized: 20/ 20; Pending auth ;   Changing to MEDICAID  11 of 12 visits  FOTO: (date and score)     Precautions: Standard     Time In:  2:30  Time Out: 3:15  Total Appointment Time (timed & untimed codes): 45 minutes        Subjective      Patient reports: I've been trying to do more; but it still gets sore.  Discomfort is decreasing with daily activity. Specific positioning, mostly with radial deviation      She was compliant with home exercise program given last session.   Response to previous treatment: Good  Functional change: weaning from brace     Pain: 3/10  Location: right hand     Objective      Objective Measures updated at progress report unless specified.     Observation/Appearance:      Edema. Measured in centimeters.    4/3/2024 4/3/2024     Left Right   2in. Above elbow       2in. Below elbow       Wrist Crease 15.8 cm 16.7 cm   Figure of 8       MCPs           Elbow and Wrist ROM. Measured in degrees.    4/3/2024 4/3/2024 5/3/24 6/6/24 6/27/2024 10/9/2024     Left Right Right Right Right Right   Elbow Ext/Flex               Supination/Pronation WNL/WNL 49/74 70/80 73/WNL  *73/WNL  75/wnl   Wrist Ext/Flex 66/59 45/20  *pain with flexion 70/54 60/53 69/55 72/61   Wrist RD/UD 66/59  "16/14 20/25(pain) 19/29 19/29 wnl      Hand ROM. Measured in degrees.    4/3/2024 4/3/2024 6/6/2024     Left Right Right   Thumb: MP 51 53 55                IP 69 54 58       Rad ADD/ABD 46 40 46       Pal ADD/ABD 47 45 51          Opposition MP jt MP jt MP jt        Strength (Dynamometer) and Pinch Strength (Pinch Gauge)  Measured in pounds.    5/3/2024 5/3/2024 6/6/2024  6/27/2024 10/9/2024     Left Right Right Right Right   Rung II 37  20 34 31 34   Greenberg Pinch  19.5 18.5  19 19 20   3pt Pinch  15 11  14 12.5 14   2pt Pinch 9.5/8/9.5    8.5 9        Limitation/Restriction for FOTO initial eval Survey     Therapist reviewed FOTO scores for Erlinda Rain on 3/27/2024.   FOTO documents entered into [employer] - see Media section.     Limitation Score: 45%            Treatment      Erlinda received the treatments listed below:  TA x 3 MEDICAID     supervised modalities: after being cleared for contradictions, received the following for 10 minutes:   - Paraffin bath  to decrease pain, edema & scar tissue, sensory re- education, and increased tissue extensibility prior to therex      therapeutic exercises to develop ROM for 15 minutes including:  - wrist PRE 3 ways 2# DB  "place and hold" for wrist extension x 10 reps  x 10 reps   - Added wrist isometrics for wrist extension and ulnar deviation x 10 reps   -- constant force green for EDC strengthening x 10    - Added 1# hammer for sup/pron and wrist circumduction  x 10 reps for CW/CCW  -- added green putty for sustained  x 10 reps for  strengthening.   - add red powerweb for weightbearing by pressing with wrist extension x 15 reps   -- Increased time spent discussing limitations, weaknesses, focus on strength, stability and flexibility and will add additional exercises to promote return to weightbearing and improving stability of wrist for work activities;   - Green flexbar isometric strengthening x3 planes x10 ea   - Light wrist strengthening in 3 planes x 10 ea    - " gyroscope   - in hand weighted dumbbell rotation  - manual elbow isometrics  - shoulder isometrics on the wall   - wall crawls w/ tband      manual therapy techniques: Myofacial release and Manual Lymphatic Drainage were applied to the: R for 15 minutes, including:  - ECU/FCU, EDC  STM, CFM  w/ hawks  to flare tendons and promote healing; possible some mild tenosynovitis noted.   - brief mobilization of wrist to depress ulnar styloid      Patient Education and Home Exercises      Education provided:   - Progress towards goals      Written Home Exercises Provided: yes.  Exercises were reviewed and Erlinda was able to demonstrate them prior to the end of the session.  Erlinda demonstrated good  understanding of the home exercise program provided. See electronic medical record under Patient Instructions for exercises provided during therapy sessions.       Assessment      Pt sheeba's gains in ROM and strength; however limitations continue to be noted in pain and strength as she is not confident she can easily perform heavy activity required for work. Soreness at pain at Ulnar side of wrist remains present. Increased attention is placed on proximal strengthening at this time. She will benefit form continued therapy in order to continue to address deficits in order for her to return to prior level of activity.      Erlinda is progressing well towards her goals and there are no updates to goals at this time. Pt prognosis is Good.      Patient will continue to benefit from skilled outpatient occupational therapy to address the deficits listed in the problem list on initial evaluation provide patient/family education and to maximize patient's level of independence in the home and community environment. The following goals were discussed with the patient and patient is in agreement with them as to be addressed in the treatment plan.      Long term goals: (by d/c)  1)    Increase ROM 5-10 degrees to increase functional hand use  for ADLs/leisure activities met  2)   Increase  strength 5-8 lbs. to carry laundry, carry groceries, sweep, and increase functional independence of right hand for ADLs/iADLs met, Continuing    3)   Increase pinch 3-4  psis for  Increase in functional independence in ADLs/iADLs such as manipulating fasteners and opening containers  4) Patient to score at 62%  or less on FOTO to demonstrate improved perception of functional rightUE Use.         Short term Goals: ( 4 weeks)   1)  Patient to be IND with HEP and modalities for pain management - met  2)  Increase ROM 3-5 degrees to increase functional hand use for ADLs/leisure activities met  3)   Increase  strength 3-5 lbs. to carry laundry, carry groceries, sweep, and increase functional independence of right hand for ADLs/iADLs met   4)   Increase pinch 1-3 psis for Increase in functional independence in ADLs/iADLs such as manipulating fasteners and opening containers met  5)   Patient to score at 50%  or less on FOTO to demonstrate improved perception of functional right UE Use.      PLAN      Updated Certification Period:  10/16/24 extended to 12/31/2024/     Radha Puentes OT   10/17/2024

## 2024-10-23 ENCOUNTER — CLINICAL SUPPORT (OUTPATIENT)
Dept: REHABILITATION | Facility: HOSPITAL | Age: 29
End: 2024-10-23
Payer: MEDICAID

## 2024-10-23 DIAGNOSIS — M25.60 RANGE OF MOTION DEFICIT: Primary | ICD-10-CM

## 2024-10-23 DIAGNOSIS — R29.898 DECREASED GRIP STRENGTH: ICD-10-CM

## 2024-10-23 PROCEDURE — 97530 THERAPEUTIC ACTIVITIES: CPT

## 2024-10-23 NOTE — PROGRESS NOTES
[facility]  Occupational Therapy treatment note and UPdated POC     Date: 10/23/2024  Name: Erlinda Rain  Clinic Number: [id number]    Therapy Diagnosis:   Encounter Diagnoses   Name Primary?    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, right   M25.531 (ICD-10-CM) - Right wrist pain   Z98.890 (ICD-10-CM) - Postoperative state      Surgical Procedure and Date: 3/6/2024, TFCC debridement, arthoscopy    Evaluation Date: 3/27/2024; Re-eval 7/16/24     Insurance Authorization Period Expiration: 3/18/2025     Plan of Care Expiration: 10/16/24 extended to 12/31/2024  Date of Return to MD: 9/3/24  Visit # / Visits authorized: 20/ 20; Pending auth ;   Changing to MEDICAID  11 of 12 visits  FOTO: (date and score)     Precautions: Standard     Time In:  915  Time Out: 10  Total Appointment Time (timed & untimed codes): 45 minutes      Subjective     Patient reports: I've been trying to do more; but it still gets sore.  Discomfort is decreasing with daily activity. Specific positioning, mostly with radial deviation     She was compliant with home exercise program given last session.   Response to previous treatment: Good  Functional change: weaning from brace    Pain: 3/10  Location: right hand    Objective     Objective Measures updated at progress report unless specified.    Observation/Appearance:      Edema. Measured in centimeters.    4/3/2024 4/3/2024     Left Right   2in. Above elbow       2in. Below elbow       Wrist Crease 15.8 cm 16.7 cm   Figure of 8       MCPs           Elbow and Wrist ROM. Measured in degrees.    4/3/2024 4/3/2024 5/3/24 6/6/24 6/27/2024 10/9/2024     Left Right Right Right Right Right   Elbow Ext/Flex           Supination/Pronation WNL/WNL 49/74 70/80 73/WNL  *73/WNL  75/wnl   Wrist Ext/Flex 66/59 45/20  *pain with  "flexion 70/54 60/53 69/55 72/61   Wrist RD/UD 66/59 16/14 20/25(pain) 19/29 19/29 wnl      Hand ROM. Measured in degrees.    4/3/2024 4/3/2024 6/6/2024     Left Right Right   Thumb: MP 51 53 55                IP 69 54 58       Rad ADD/ABD 46 40 46       Pal ADD/ABD 47 45 51          Opposition MP jt MP jt MP jt        Strength (Dynamometer) and Pinch Strength (Pinch Gauge)  Measured in pounds.    5/3/2024 5/3/2024 6/6/2024  6/27/2024 10/9/2024     Left Right Right Right Right   Rung II 37  20 34 31 34   Greenberg Pinch  19.5 18.5  19 19 20   3pt Pinch  15 11  14 12.5 14   2pt Pinch 9.5/8/9.5    8.5 9       Limitation/Restriction for FOTO initial eval Survey     Therapist reviewed FOTO scores for Erlinda Rain on 3/27/2024.   FOTO documents entered into [employer] - see Media section.     Limitation Score: 45%          Treatment     Erlinda received the treatments listed below:  TA x 3 MEDICAID    supervised modalities: after being cleared for contradictions, received the following for 10 minutes:   - Paraffin bath  to decrease pain, edema & scar tissue, sensory re- education, and increased tissue extensibility prior to therex     therapeutic exercises to develop ROM for 20 minutes including:  - wrist PRE 3 ways 2# DB  "place and hold" for wrist extension x 10 reps  x 10 reps   -- constant force red for EDC strengthening x 10    - Added 1# hammer for sup/pron and wrist circumduction  x 10 reps for CW/CCW  -- added green putty for sustained  x 10 reps for  strengthening.   - add red powerweb for weightbearing by pressing with wrist extension x 15 reps   -- Increased time spent discussing limitations, weaknesses, focus on strength, stability and flexibility and will add additional exercises to promote return to weightbearing and improving stability of wrist for work activities;   - Green flexbar isometric strengthening x3 planes x10 ea , Flex, Ext x 10 reps each   - gyroscope   -  manual therapy techniques: Myofacial " release and Manual Lymphatic Drainage were applied to the: R for 15 minutes, including:  - ECU/FCU, EDC  STM, CFM  w/ hawks  to flare tendons and promote healing; possible some mild tenosynovitis noted.   - brief mobilization of wrist to depress ulnar styloid     Patient Education and Home Exercises     Education provided:   - Progress towards goals     Written Home Exercises Provided: yes.  Exercises were reviewed and Erlinda was able to demonstrate them prior to the end of the session.  Erlinda demonstrated good  understanding of the home exercise program provided. See electronic medical record under Patient Instructions for exercises provided during therapy sessions.       Assessment     Pt sheeba's gains in ROM and strength; however limitations continue to be noted in pain and strength as she is not confident she can easily perform heavy activity required for work. Soreness at pain at Ulnar side of wrist remains present. Increased attention is placed on proximal strengthening at this time. She will benefit form continued therapy in order to continue to address deficits in order for her to return to prior level of activity.     Erlinda is progressing well towards her goals and there are no updates to goals at this time. Pt prognosis is Good.     Patient will continue to benefit from skilled outpatient occupational therapy to address the deficits listed in the problem list on initial evaluation provide patient/family education and to maximize patient's level of independence in the home and community environment. The following goals were discussed with the patient and patient is in agreement with them as to be addressed in the treatment plan.     Long term goals: (by d/c)  1)    Increase ROM 5-10 degrees to increase functional hand use for ADLs/leisure activities met  2)   Increase  strength 5-8 lbs. to carry laundry, carry groceries, sweep, and increase functional independence of right hand for ADLs/iADLs met,  Continuing    3)   Increase pinch 3-4  psis for  Increase in functional independence in ADLs/iADLs such as manipulating fasteners and opening containers  4) Patient to score at 62%  or less on FOTO to demonstrate improved perception of functional rightUE Use.        Short term Goals: ( 4 weeks)   1)  Patient to be IND with HEP and modalities for pain management - met  2)  Increase ROM 3-5 degrees to increase functional hand use for ADLs/leisure activities met  3)   Increase  strength 3-5 lbs. to carry laundry, carry groceries, sweep, and increase functional independence of right hand for ADLs/iADLs met   4)   Increase pinch 1-3 psis for Increase in functional independence in ADLs/iADLs such as manipulating fasteners and opening containers met  5)   Patient to score at 50%  or less on FOTO to demonstrate improved perception of functional right UE Use.      PLAN     Updated Certification Period:  10/16/24 extended to 12/31/2024/    Taty Ambriz OT , CHT   10/23/2024                              [facility]  Occupational Therapy treatment note and UPdated POC     Date: 10/23/2024  Name: Erlinda Rain  [facility] Number: [id number]    Therapy Diagnosis:   Encounter Diagnoses   Name Primary?    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, right   M25.531 (ICD-10-CM) - Right wrist pain   Z98.890 (ICD-10-CM) - Postoperative state      Surgical Procedure and Date: 3/6/2024, TFCC debridement, arthoscopy    Evaluation Date: 3/27/2024; Re-eval 7/16/24     Insurance Authorization Period Expiration: 3/18/2025     Plan of Care Expiration: 10/16/24 extended to 12/31/2024  Date of Return to MD: 9/3/24  Visit # / Visits authorized: 20/ 20; Pending auth ;   Changing to MEDICAID  11 of 12 visits  FOTO: (date and score)     Precautions: Standard     Time  In:  2:30  Time Out: 3:15  Total Appointment Time (timed & untimed codes): 45 minutes      Subjective     Patient reports: I've been trying to do more; but it still gets sore.  Discomfort is decreasing with daily activity. Specific positioning, mostly with radial deviation     She was compliant with home exercise program given last session.   Response to previous treatment: Good  Functional change: weaning from brace    Pain: 3/10  Location: right hand    Objective     Objective Measures updated at progress report unless specified.    Observation/Appearance:      Edema. Measured in centimeters.    4/3/2024 4/3/2024     Left Right   2in. Above elbow       2in. Below elbow       Wrist Crease 15.8 cm 16.7 cm   Figure of 8       MCPs           Elbow and Wrist ROM. Measured in degrees.    4/3/2024 4/3/2024 5/3/24 6/6/24 6/27/2024 10/9/2024     Left Right Right Right Right Right   Elbow Ext/Flex           Supination/Pronation WNL/WNL 49/74 70/80 73/WNL  *73/WNL  75/wnl   Wrist Ext/Flex 66/59 45/20  *pain with flexion 70/54 60/53 69/55 72/61   Wrist RD/UD 66/59 16/14 20/25(pain) 19/29 19/29 wnl      Hand ROM. Measured in degrees.    4/3/2024 4/3/2024 6/6/2024     Left Right Right   Thumb: MP 51 53 55                IP 69 54 58       Rad ADD/ABD 46 40 46       Pal ADD/ABD 47 45 51          Opposition MP jt MP jt MP jt        Strength (Dynamometer) and Pinch Strength (Pinch Gauge)  Measured in pounds.    5/3/2024 5/3/2024 6/6/2024  6/27/2024 10/9/2024     Left Right Right Right Right   Rung II 37  20 34 31 34   Greenberg Pinch  19.5 18.5  19 19 20   3pt Pinch  15 11  14 12.5 14   2pt Pinch 9.5/8/9.5    8.5 9       Limitation/Restriction for FOTO initial eval Survey     Therapist reviewed FOTO scores for Erlinda Rain on 3/27/2024.   FOTO documents entered into [employer] - see Media section.     Limitation Score: 45%          Treatment     Erlinda received the treatments listed below:  TA x 3 MEDICAID    supervised modalities: after  "being cleared for contradictions, received the following for 10 minutes:   - Paraffin bath  to decrease pain, edema & scar tissue, sensory re- education, and increased tissue extensibility prior to therex     therapeutic exercises to develop ROM for 15 minutes including:  - wrist PRE 3 ways 2# DB  "place and hold" for wrist extension x 10 reps  x 10 reps   - Added wrist isometrics for wrist extension and ulnar deviation x 10 reps   -- constant force green for EDC strengthening x 10    - Added 1# hammer for sup/pron and wrist circumduction  x 10 reps for CW/CCW  -- added green putty for sustained  x 10 reps for  strengthening.   - add red powerweb for weightbearing by pressing with wrist extension x 15 reps   -- Increased time spent discussing limitations, weaknesses, focus on strength, stability and flexibility and will add additional exercises to promote return to weightbearing and improving stability of wrist for work activities;   - Green flexbar isometric strengthening x3 planes x10 ea   - Light wrist strengthening in 3 planes x 10 ea    - gyroscope   - in hand weighted dumbbell rotation  - manual elbow isometrics  - shoulder isometrics on the wall   - wall crawls w/ tband     manual therapy techniques: Myofacial release and Manual Lymphatic Drainage were applied to the: R for 15 minutes, including:  - ECU/FCU, EDC  STM, CFM  w/ hawks  to flare tendons and promote healing; possible some mild tenosynovitis noted.   - brief mobilization of wrist to depress ulnar styloid     Patient Education and Home Exercises     Education provided:   - Progress towards goals     Written Home Exercises Provided: yes.  Exercises were reviewed and Erlinda was able to demonstrate them prior to the end of the session.  Erlinda demonstrated good  understanding of the home exercise program provided. See electronic medical record under Patient Instructions for exercises provided during therapy sessions.       Assessment     Pt " sheeba's gains in ROM and strength; however limitations continue to be noted in pain and strength as she is not confident she can easily perform heavy activity required for work. Soreness at pain at Ulnar side of wrist remains present. Increased attention is placed on proximal strengthening at this time. She will benefit form continued therapy in order to continue to address deficits in order for her to return to prior level of activity.     Erlinda is progressing well towards her goals and there are no updates to goals at this time. Pt prognosis is Good.     Patient will continue to benefit from skilled outpatient occupational therapy to address the deficits listed in the problem list on initial evaluation provide patient/family education and to maximize patient's level of independence in the home and community environment. The following goals were discussed with the patient and patient is in agreement with them as to be addressed in the treatment plan.     Long term goals: (by d/c)  1)    Increase ROM 5-10 degrees to increase functional hand use for ADLs/leisure activities met  2)   Increase  strength 5-8 lbs. to carry laundry, carry groceries, sweep, and increase functional independence of right hand for ADLs/iADLs met, Continuing    3)   Increase pinch 3-4  psis for  Increase in functional independence in ADLs/iADLs such as manipulating fasteners and opening containers  4) Patient to score at 62%  or less on FOTO to demonstrate improved perception of functional rightUE Use.        Short term Goals: ( 4 weeks)   1)  Patient to be IND with HEP and modalities for pain management - met  2)  Increase ROM 3-5 degrees to increase functional hand use for ADLs/leisure activities met  3)   Increase  strength 3-5 lbs. to carry laundry, carry groceries, sweep, and increase functional independence of right hand for ADLs/iADLs met   4)   Increase pinch 1-3 psis for Increase in functional independence in ADLs/iADLs such  as manipulating fasteners and opening containers met  5)   Patient to score at 50%  or less on FOTO to demonstrate improved perception of functional right UE Use.      PLAN     Updated Certification Period:  10/16/24 extended to 12/31/2024/    Taty Ambriz OT   10/23/2024

## 2024-10-30 ENCOUNTER — CLINICAL SUPPORT (OUTPATIENT)
Dept: REHABILITATION | Facility: HOSPITAL | Age: 29
End: 2024-10-30
Payer: MEDICAID

## 2024-10-30 DIAGNOSIS — R29.898 DECREASED GRIP STRENGTH: Primary | ICD-10-CM

## 2024-10-30 DIAGNOSIS — M25.60 RANGE OF MOTION DEFICIT: ICD-10-CM

## 2024-10-30 PROCEDURE — 97530 THERAPEUTIC ACTIVITIES: CPT

## 2024-10-30 NOTE — PROGRESS NOTES
[facility]  Occupational Therapy treatment note and UPdated POC     Date: 10/30/2024  Name: Erlinda Rain  Clinic Number: [id number]    Therapy Diagnosis:   Encounter Diagnoses   Name Primary?    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, right   M25.531 (ICD-10-CM) - Right wrist pain   Z98.890 (ICD-10-CM) - Postoperative state      Surgical Procedure and Date: 3/6/2024, TFCC debridement, arthoscopy    Evaluation Date: 3/27/2024; Re-eval 7/16/24     Insurance Authorization Period Expiration: 3/18/2025     Plan of Care Expiration: 10/16/24 extended to 12/31/2024  Date of Return to MD: 9/3/24  Visit # / Visits authorized: 20/ 20; Pending auth ;   Changing to MEDICAID  12 of 24 visits  FOTO: (date and score)     Precautions: Standard     Time In:  915  Time Out: 10  Total Appointment Time (timed & untimed codes): 45 minutes      Subjective     Patient reports: I've been trying to do more; but it still gets sore.  Discomfort is decreasing with daily activity. Pain positional, mostly with radial deviation     She was compliant with home exercise program given last session.   Response to previous treatment: Good  Functional change: weaning from brace    Pain: 3/10  Location: right hand    Objective     Objective Measures updated at progress report unless specified.    Observation/Appearance:      Edema. Measured in centimeters.    4/3/2024 4/3/2024     Left Right   2in. Above elbow       2in. Below elbow       Wrist Crease 15.8 cm 16.7 cm   Figure of 8       MCPs           Elbow and Wrist ROM. Measured in degrees.    4/3/2024 4/3/2024 5/3/24 6/6/24 6/27/2024 10/9/2024     Left Right Right Right Right Right   Elbow Ext/Flex           Supination/Pronation WNL/WNL 49/74 70/80 73/WNL  *73/WNL  75/wnl   Wrist Ext/Flex 66/59 45/20  *pain with flexion  "70/54 60/53 69/55 72/61   Wrist RD/UD 66/59 16/14 20/25(pain) 19/29 19/29 wnl      Hand ROM. Measured in degrees.    4/3/2024 4/3/2024 6/6/2024     Left Right Right   Thumb: MP 51 53 55                IP 69 54 58       Rad ADD/ABD 46 40 46       Pal ADD/ABD 47 45 51          Opposition MP jt MP jt MP jt        Strength (Dynamometer) and Pinch Strength (Pinch Gauge)  Measured in pounds.    5/3/2024 5/3/2024 6/6/2024  6/27/2024 10/9/2024     Left Right Right Right Right   Rung II 37  20 34 31 34   Greenberg Pinch  19.5 18.5  19 19 20   3pt Pinch  15 11  14 12.5 14   2pt Pinch 9.5/8/9.5    8.5 9       Limitation/Restriction for FOTO initial eval Survey     Therapist reviewed FOTO scores for Erlinda Rain on 3/27/2024.   FOTO documents entered into [employer] - see Media section.     Limitation Score: 45%          Treatment     Erlinda received the treatments listed below:  TA x 3 MEDICAID    supervised modalities: after being cleared for contradictions, received the following for 10 minutes:   - Paraffin bath  to decrease pain, edema & scar tissue, sensory re- education, and increased tissue extensibility prior to therex     therapeutic exercises to develop ROM for 20 minutes including:  - wrist PRE 3 ways 2# DB  "place and hold" for wrist extension x 10 reps  x 10 reps   -- constant force red for EDC strengthening x 10    - Added 1# hammer for sup/pron and wrist circumduction  x 10 reps for CW/CCW  -- added green putty for sustained  x 10 reps for  strengthening.   - add red powerweb for weightbearing by pressing with wrist extension x 15 reps   --- Green flexbar isometric strengthening x3 planes x10 ea , Flex, Ext x 10 reps each   - gyroscope   -  manual therapy techniques: Myofacial release and Manual Lymphatic Drainage were applied to the: R for 15 minutes, including:  - ECU/FCU, EDC  STM, CFM  w/ hawks  to flare tendons and promote healing; possible some mild tenosynovitis noted.   - brief mobilization of wrist " to depress ulnar styloid     Patient Education and Home Exercises     Education provided:   - Progress towards goals     Written Home Exercises Provided: yes.  Exercises were reviewed and Erlinda was able to demonstrate them prior to the end of the session.  Erlinda demonstrated good  understanding of the home exercise program provided. See electronic medical record under Patient Instructions for exercises provided during therapy sessions.       Assessment     Pt sheeba's gains in ROM and strength; however limitations continue to be noted in pain and strength as she is not confident she can easily perform heavy activity required for work. Soreness at pain at Ulnar side of wrist remains present. Increased attention is placed on proximal strengthening at this time. She will benefit form continued therapy in order to continue to address deficits in order for her to return to prior level of activity.     Erilnda is progressing well towards her goals and there are no updates to goals at this time. Pt prognosis is Good.     Patient will continue to benefit from skilled outpatient occupational therapy to address the deficits listed in the problem list on initial evaluation provide patient/family education and to maximize patient's level of independence in the home and community environment. The following goals were discussed with the patient and patient is in agreement with them as to be addressed in the treatment plan.     Long term goals: (by d/c)  1)    Increase ROM 5-10 degrees to increase functional hand use for ADLs/leisure activities met  2)   Increase  strength 5-8 lbs. to carry laundry, carry groceries, sweep, and increase functional independence of right hand for ADLs/iADLs met, Continuing    3)   Increase pinch 3-4  psis for  Increase in functional independence in ADLs/iADLs such as manipulating fasteners and opening containers  4) Patient to score at 62%  or less on FOTO to demonstrate improved perception of  functional rightUE Use.        Short term Goals: ( 4 weeks)   1)  Patient to be IND with HEP and modalities for pain management - met  2)  Increase ROM 3-5 degrees to increase functional hand use for ADLs/leisure activities met  3)   Increase  strength 3-5 lbs. to carry laundry, carry groceries, sweep, and increase functional independence of right hand for ADLs/iADLs met   4)   Increase pinch 1-3 psis for Increase in functional independence in ADLs/iADLs such as manipulating fasteners and opening containers met  5)   Patient to score at 50%  or less on FOTO to demonstrate improved perception of functional right UE Use.      PLAN     Updated Certification Period:  10/16/24 extended to 12/31/2024    Taty Ambriz OT , CHT   10/30/2024                              [facility] OUTPATIENT THERAPY AND WELLNESS  Occupational Therapy treatment note and UPdated POC     Date: 10/30/2024  Name: Erlinda Rain  [facility] Number: [id number]    Therapy Diagnosis:   Encounter Diagnoses   Name Primary?    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, right   M25.531 (ICD-10-CM) - Right wrist pain   Z98.890 (ICD-10-CM) - Postoperative state      Surgical Procedure and Date: 3/6/2024, TFCC debridement, arthoscopy    Evaluation Date: 3/27/2024; Re-eval 7/16/24     Insurance Authorization Period Expiration: 3/18/2025     Plan of Care Expiration: 10/16/24 extended to 12/31/2024  Date of Return to MD: 9/3/24  Visit # / Visits authorized: 20/ 20; Pending auth ;   Changing to MEDICAID  11 of 12 visits  FOTO: (date and score)     Precautions: Standard     Time In:  2:30  Time Out: 3:15  Total Appointment Time (timed & untimed codes): 45 minutes      Subjective     Patient reports: I've been trying to do more; but it still gets sore.  Discomfort is decreasing with daily activity. Specific  positioning, mostly with radial deviation     She was compliant with home exercise program given last session.   Response to previous treatment: Good  Functional change: weaning from brace    Pain: 3/10  Location: right hand    Objective     Objective Measures updated at progress report unless specified.    Observation/Appearance:      Edema. Measured in centimeters.    4/3/2024 4/3/2024     Left Right   2in. Above elbow       2in. Below elbow       Wrist Crease 15.8 cm 16.7 cm   Figure of 8       MCPs           Elbow and Wrist ROM. Measured in degrees.    4/3/2024 4/3/2024 5/3/24 6/6/24 6/27/2024 10/9/2024     Left Right Right Right Right Right   Elbow Ext/Flex           Supination/Pronation WNL/WNL 49/74 70/80 73/WNL  *73/WNL  75/wnl   Wrist Ext/Flex 66/59 45/20  *pain with flexion 70/54 60/53 69/55 72/61   Wrist RD/UD 66/59 16/14 20/25(pain) 19/29 19/29 wnl      Hand ROM. Measured in degrees.    4/3/2024 4/3/2024 6/6/2024     Left Right Right   Thumb: MP 51 53 55                IP 69 54 58       Rad ADD/ABD 46 40 46       Pal ADD/ABD 47 45 51          Opposition MP jt MP jt MP jt        Strength (Dynamometer) and Pinch Strength (Pinch Gauge)  Measured in pounds.    5/3/2024 5/3/2024 6/6/2024  6/27/2024 10/9/2024     Left Right Right Right Right   Rung II 37  20 34 31 34   Greenberg Pinch  19.5 18.5  19 19 20   3pt Pinch  15 11  14 12.5 14   2pt Pinch 9.5/8/9.5    8.5 9       Limitation/Restriction for FOTO initial eval Survey     Therapist reviewed FOTO scores for Erlinda Rain on 3/27/2024.   FOTO documents entered into [employer] - see Media section.     Limitation Score: 45%          Treatment     Erlinda received the treatments listed below:  TA x 3 MEDICAID    supervised modalities: after being cleared for contradictions, received the following for 10 minutes:   - Paraffin bath  to decrease pain, edema & scar tissue, sensory re- education, and increased tissue extensibility prior to therex     therapeutic exercises to  "develop ROM for 15 minutes including:  - wrist PRE 3 ways 2# DB  "place and hold" for wrist extension x 10 reps  x 10 reps   - Added wrist isometrics for wrist extension and ulnar deviation x 10 reps   -- constant force green for EDC strengthening x 10    - Added 1# hammer for sup/pron and wrist circumduction  x 10 reps for CW/CCW  -- added green putty for sustained  x 10 reps for  strengthening.   - add red powerweb for weightbearing by pressing with wrist extension x 15 reps   -- Increased time spent discussing limitations, weaknesses, focus on strength, stability and flexibility and will add additional exercises to promote return to weightbearing and improving stability of wrist for work activities;   - Green flexbar isometric strengthening x3 planes x10 ea   - Light wrist strengthening in 3 planes x 10 ea    - gyroscope   - in hand weighted dumbbell rotation  - manual elbow isometrics  - shoulder isometrics on the wall   - wall crawls w/ tband     manual therapy techniques: Myofacial release and Manual Lymphatic Drainage were applied to the: R for 15 minutes, including:  - ECU/FCU, EDC  STM, CFM  w/ hawks  to flare tendons and promote healing; possible some mild tenosynovitis noted.   - brief mobilization of wrist to depress ulnar styloid     Patient Education and Home Exercises     Education provided:   - Progress towards goals     Written Home Exercises Provided: yes.  Exercises were reviewed and Erlinda was able to demonstrate them prior to the end of the session.  Erlinda demonstrated good  understanding of the home exercise program provided. See electronic medical record under Patient Instructions for exercises provided during therapy sessions.       Assessment     Pt sheeba's gains in ROM and strength; however limitations continue to be noted in pain and strength as she is not confident she can easily perform heavy activity required for work. Soreness at pain at Ulnar side of wrist remains present. " Increased attention is placed on proximal strengthening at this time. She will benefit form continued therapy in order to continue to address deficits in order for her to return to prior level of activity.     Erlinda is progressing well towards her goals and there are no updates to goals at this time. Pt prognosis is Good.     Patient will continue to benefit from skilled outpatient occupational therapy to address the deficits listed in the problem list on initial evaluation provide patient/family education and to maximize patient's level of independence in the home and community environment. The following goals were discussed with the patient and patient is in agreement with them as to be addressed in the treatment plan.     Long term goals: (by d/c)  1)    Increase ROM 5-10 degrees to increase functional hand use for ADLs/leisure activities met  2)   Increase  strength 5-8 lbs. to carry laundry, carry groceries, sweep, and increase functional independence of right hand for ADLs/iADLs met, Continuing    3)   Increase pinch 3-4  psis for  Increase in functional independence in ADLs/iADLs such as manipulating fasteners and opening containers  4) Patient to score at 62%  or less on FOTO to demonstrate improved perception of functional rightUE Use.        Short term Goals: ( 4 weeks)   1)  Patient to be IND with HEP and modalities for pain management - met  2)  Increase ROM 3-5 degrees to increase functional hand use for ADLs/leisure activities met  3)   Increase  strength 3-5 lbs. to carry laundry, carry groceries, sweep, and increase functional independence of right hand for ADLs/iADLs met   4)   Increase pinch 1-3 psis for Increase in functional independence in ADLs/iADLs such as manipulating fasteners and opening containers met  5)   Patient to score at 50%  or less on FOTO to demonstrate improved perception of functional right UE Use.      PLAN     Updated Certification Period:  10/16/24 extended to  12/31/2024/    Taty Ambriz OT   10/30/2024

## 2024-11-06 ENCOUNTER — CLINICAL SUPPORT (OUTPATIENT)
Dept: REHABILITATION | Facility: HOSPITAL | Age: 29
End: 2024-11-06
Payer: MEDICAID

## 2024-11-06 DIAGNOSIS — R29.898 DECREASED GRIP STRENGTH: Primary | ICD-10-CM

## 2024-11-06 DIAGNOSIS — M25.60 RANGE OF MOTION DEFICIT: ICD-10-CM

## 2024-11-06 PROCEDURE — 97530 THERAPEUTIC ACTIVITIES: CPT

## 2024-11-06 NOTE — PROGRESS NOTES
[facility]  Occupational Therapy treatment note and UPdated POC     Date: 11/6/2024  Name: Erlinda Rain  Clinic Number: [id number]    Therapy Diagnosis:   Encounter Diagnoses   Name Primary?    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, right   M25.531 (ICD-10-CM) - Right wrist pain   Z98.890 (ICD-10-CM) - Postoperative state      Surgical Procedure and Date: 3/6/2024, TFCC debridement, arthoscopy    Evaluation Date: 3/27/2024; Re-eval 7/16/24     Insurance Authorization Period Expiration: 3/18/2025     Plan of Care Expiration: 10/16/24 extended to 12/31/2024  Date of Return to MD: 9/3/24  Visit # / Visits authorized: 20/ 20; Pending auth ;   Changing to MEDICAID  13 of 24 visits  FOTO: (date and score)  FOTO #2 11/6/24      Precautions: Standard     Time In:  830  Time Out: 915  Total Appointment Time (timed & untimed codes): 45 minutes      Subjective     Patient reports: Its better this week. Typing still aggravates it I've been trying to do more; but it still gets sore.  Discomfort is decreasing with daily activity. Specific positioning, mostly with radial deviation     She was compliant with home exercise program given last session.   Response to previous treatment: Good  Functional change: weaning from brace    Pain: 3/10  Location: right hand    Objective     Objective Measures updated at progress report unless specified.    Observation/Appearance:      Edema. Measured in centimeters.    4/3/2024 4/3/2024     Left Right   2in. Above elbow       2in. Below elbow       Wrist Crease 15.8 cm 16.7 cm   Figure of 8       MCPs           Elbow and Wrist ROM. Measured in degrees.    4/3/2024 4/3/2024 5/3/24 6/6/24 6/27/2024 10/9/2024     Left Right Right Right Right Right   Elbow Ext/Flex           Supination/Pronation WNL/WNL 49/74 70/80  "73/WNL  *73/WNL  75/wnl   Wrist Ext/Flex 66/59 45/20  *pain with flexion 70/54 60/53 69/55 72/61   Wrist RD/UD 66/59 16/14 20/25(pain) 19/29 19/29 wnl      Hand ROM. Measured in degrees.    4/3/2024 4/3/2024 6/6/2024     Left Right Right   Thumb: MP 51 53 55                IP 69 54 58       Rad ADD/ABD 46 40 46       Pal ADD/ABD 47 45 51          Opposition MP jt MP jt MP jt        Strength (Dynamometer) and Pinch Strength (Pinch Gauge)  Measured in pounds.    5/3/2024 5/3/2024 6/6/2024  6/27/2024 10/9/2024     Left Right Right Right Right   Rung II 37  20 34 31 34   Greenberg Pinch  19.5 18.5  19 19 20   3pt Pinch  15 11  14 12.5 14   2pt Pinch 9.5/8/9.5    8.5 9       Limitation/Restriction for FOTO initial eval Survey     Therapist reviewed FOTO scores for Erlinda Rain on 3/27/2024.   FOTO documents entered into [employer] - see Media section.     Limitation Score: 45%          Treatment     Erlinda received the treatments listed below:  TA x 3 MEDICAID    supervised modalities: after being cleared for contradictions, received the following for 10 minutes:   - Paraffin bath  to decrease pain, edema & scar tissue, sensory re- education, and increased tissue extensibility prior to therex     therapeutic exercises to develop ROM for 20 minutes including:  - wrist PRE 3 ways  with upgrade to 3# DB  "place and hold" for wrist extension x 10 reps  x 10 reps   -- constant force red for EDC strengthening x 10    - Added 1# hammer for sup/pron and wrist circumduction  x 10 reps for CW/CCW  -- added green putty for sustained  x 10 reps for  strengthening.   - add red powerweb for weightbearing by pressing with wrist extension x 15 reps   --- Green flexbar isometric strengthening x3 planes x10 ea , Flex, Ext x 10 reps each   - gyroscope   -  manual therapy techniques: Myofacial release and Manual Lymphatic Drainage were applied to the: R for 15 minutes, including:  - ECU/FCU, EDC  STM, CFM  w/ hawks  to flare tendons and " promote healing; possible some mild tenosynovitis noted.   - brief mobilization of wrist to depress ulnar styloid   - KT tape to dorsal ulnar side of wrist with cross strap for support/stability     Patient Education and Home Exercises     Education provided:   - Progress towards goals     Written Home Exercises Provided: yes.  Exercises were reviewed and Erlinda was able to demonstrate them prior to the end of the session.  Erlinda demonstrated good  understanding of the home exercise program provided. See electronic medical record under Patient Instructions for exercises provided during therapy sessions.       Assessment     Pt sheeba's gains in ROM and strength; however limitations continue to be noted in pain and strength as she is not confident she can easily perform heavy activity required for work. Soreness at pain at Ulnar side of wrist remains present. Increased attention is placed on proximal strengthening at this time. She will benefit form continued therapy in order to continue to address deficits in order for her to return to prior level of activity.     Erlinda is progressing well towards her goals and there are no updates to goals at this time. Pt prognosis is Good.     Patient will continue to benefit from skilled outpatient occupational therapy to address the deficits listed in the problem list on initial evaluation provide patient/family education and to maximize patient's level of independence in the home and community environment. The following goals were discussed with the patient and patient is in agreement with them as to be addressed in the treatment plan.     Long term goals: (by d/c)  1)    Increase ROM 5-10 degrees to increase functional hand use for ADLs/leisure activities met  2)   Increase  strength 5-8 lbs. to carry laundry, carry groceries, sweep, and increase functional independence of right hand for ADLs/iADLs met, Continuing    3)   Increase pinch 3-4  psis for  Increase in functional  independence in ADLs/iADLs such as manipulating fasteners and opening containers  4) Patient to score at 62%  or less on FOTO to demonstrate improved perception of functional rightUE Use.        Short term Goals: ( 4 weeks)   1)  Patient to be IND with HEP and modalities for pain management - met  2)  Increase ROM 3-5 degrees to increase functional hand use for ADLs/leisure activities met  3)   Increase  strength 3-5 lbs. to carry laundry, carry groceries, sweep, and increase functional independence of right hand for ADLs/iADLs met   4)   Increase pinch 1-3 psis for Increase in functional independence in ADLs/iADLs such as manipulating fasteners and opening containers met  5)   Patient to score at 50%  or less on FOTO to demonstrate improved perception of functional right UE Use.      PLAN     Updated Certification Period:  10/16/24 extended to 12/31/2024/    Taty Ambriz OT , CHT   11/6/2024                              [facility]  Occupational Therapy treatment note and UPdated POC     Date: 11/6/2024  Name: Erlinda Rain  [facility] Number: [id number]    Therapy Diagnosis:   Encounter Diagnoses   Name Primary?    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, right   M25.531 (ICD-10-CM) - Right wrist pain   Z98.890 (ICD-10-CM) - Postoperative state      Surgical Procedure and Date: 3/6/2024, TFCC debridement, arthoscopy    Evaluation Date: 3/27/2024; Re-eval 7/16/24     Insurance Authorization Period Expiration: 3/18/2025     Plan of Care Expiration: 10/16/24 extended to 12/31/2024  Date of Return to MD: 9/3/24  Visit # / Visits authorized: 20/ 20; Pending auth ;   Changing to MEDICAID  11 of 12 visits  FOTO: (date and score)     Precautions: Standard     Time In:  2:30  Time Out: 3:15  Total Appointment Time (timed & untimed  codes): 45 minutes      Subjective     Patient reports: I've been trying to do more; but it still gets sore.  Discomfort is decreasing with daily activity. Specific positioning, mostly with radial deviation     She was compliant with home exercise program given last session.   Response to previous treatment: Good  Functional change: weaning from brace    Pain: 3/10  Location: right hand    Objective     Objective Measures updated at progress report unless specified.    Observation/Appearance:      Edema. Measured in centimeters.    4/3/2024 4/3/2024     Left Right   2in. Above elbow       2in. Below elbow       Wrist Crease 15.8 cm 16.7 cm   Figure of 8       MCPs           Elbow and Wrist ROM. Measured in degrees.    4/3/2024 4/3/2024 5/3/24 6/6/24 6/27/2024 10/9/2024     Left Right Right Right Right Right   Elbow Ext/Flex           Supination/Pronation WNL/WNL 49/74 70/80 73/WNL  *73/WNL  75/wnl   Wrist Ext/Flex 66/59 45/20  *pain with flexion 70/54 60/53 69/55 72/61   Wrist RD/UD 66/59 16/14 20/25(pain) 19/29 19/29 wnl      Hand ROM. Measured in degrees.    4/3/2024 4/3/2024 6/6/2024     Left Right Right   Thumb: MP 51 53 55                IP 69 54 58       Rad ADD/ABD 46 40 46       Pal ADD/ABD 47 45 51          Opposition MP jt MP jt MP jt        Strength (Dynamometer) and Pinch Strength (Pinch Gauge)  Measured in pounds.    5/3/2024 5/3/2024 6/6/2024  6/27/2024 10/9/2024     Left Right Right Right Right   Rung II 37  20 34 31 34   Greenberg Pinch  19.5 18.5  19 19 20   3pt Pinch  15 11  14 12.5 14   2pt Pinch 9.5/8/9.5    8.5 9       Limitation/Restriction for FOTO initial eval Survey     Therapist reviewed FOTO scores for Erlinda Rain on 3/27/2024.   FOTO documents entered into [employer] - see Media section.     Limitation Score: 45%          Treatment     Erlinda received the treatments listed below:  TA x 3 MEDICAID    supervised modalities: after being cleared for contradictions, received the following for 10  "minutes:   - Paraffin bath  to decrease pain, edema & scar tissue, sensory re- education, and increased tissue extensibility prior to therex     therapeutic exercises to develop ROM for 15 minutes including:  - wrist PRE 3 ways 2# DB  "place and hold" for wrist extension x 10 reps  x 10 reps   - Added wrist isometrics for wrist extension and ulnar deviation x 10 reps   -- constant force green for EDC strengthening x 10    - Added 1# hammer for sup/pron and wrist circumduction  x 10 reps for CW/CCW  -- added green putty for sustained  x 10 reps for  strengthening.   - add red powerweb for weightbearing by pressing with wrist extension x 15 reps   -- Increased time spent discussing limitations, weaknesses, focus on strength, stability and flexibility and will add additional exercises to promote return to weightbearing and improving stability of wrist for work activities;   - Green flexbar isometric strengthening x3 planes x10 ea   - Light wrist strengthening in 3 planes x 10 ea    - gyroscope   - in hand weighted dumbbell rotation  - manual elbow isometrics  - shoulder isometrics on the wall   - wall crawls w/ tband     manual therapy techniques: Myofacial release and Manual Lymphatic Drainage were applied to the: R for 15 minutes, including:  - ECU/FCU, EDC  STM, CFM  w/ hawks  to flare tendons and promote healing; possible some mild tenosynovitis noted.   - brief mobilization of wrist to depress ulnar styloid     Patient Education and Home Exercises     Education provided:   - Progress towards goals     Written Home Exercises Provided: yes.  Exercises were reviewed and Erlinda was able to demonstrate them prior to the end of the session.  Erlinda demonstrated good  understanding of the home exercise program provided. See electronic medical record under Patient Instructions for exercises provided during therapy sessions.       Assessment     Pt demo's gains in ROM and strength; however limitations continue to " be noted in pain and strength as she is not confident she can easily perform heavy activity required for work. Soreness at pain at Ulnar side of wrist remains present. Increased attention is placed on proximal strengthening at this time. She will benefit form continued therapy in order to continue to address deficits in order for her to return to prior level of activity.     Erlinda is progressing well towards her goals and there are no updates to goals at this time. Pt prognosis is Good.     Patient will continue to benefit from skilled outpatient occupational therapy to address the deficits listed in the problem list on initial evaluation provide patient/family education and to maximize patient's level of independence in the home and community environment. The following goals were discussed with the patient and patient is in agreement with them as to be addressed in the treatment plan.     Long term goals: (by d/c)  1)    Increase ROM 5-10 degrees to increase functional hand use for ADLs/leisure activities met  2)   Increase  strength 5-8 lbs. to carry laundry, carry groceries, sweep, and increase functional independence of right hand for ADLs/iADLs met, Continuing    3)   Increase pinch 3-4  psis for  Increase in functional independence in ADLs/iADLs such as manipulating fasteners and opening containers  4) Patient to score at 62%  or less on FOTO to demonstrate improved perception of functional rightUE Use.        Short term Goals: ( 4 weeks)   1)  Patient to be IND with HEP and modalities for pain management - met  2)  Increase ROM 3-5 degrees to increase functional hand use for ADLs/leisure activities met  3)   Increase  strength 3-5 lbs. to carry laundry, carry groceries, sweep, and increase functional independence of right hand for ADLs/iADLs met   4)   Increase pinch 1-3 psis for Increase in functional independence in ADLs/iADLs such as manipulating fasteners and opening containers met  5)   Patient  to score at 50%  or less on FOTO to demonstrate improved perception of functional right UE Use.      PLAN     Updated Certification Period:  10/16/24 extended to 12/31/2024/    Taty Ambriz OT   11/6/2024

## 2024-11-13 ENCOUNTER — CLINICAL SUPPORT (OUTPATIENT)
Dept: REHABILITATION | Facility: HOSPITAL | Age: 29
End: 2024-11-13
Payer: MEDICAID

## 2024-11-13 ENCOUNTER — PATIENT MESSAGE (OUTPATIENT)
Dept: ORTHOPEDICS | Facility: CLINIC | Age: 29
End: 2024-11-13
Payer: MEDICAID

## 2024-11-13 DIAGNOSIS — R29.898 DECREASED GRIP STRENGTH: Primary | ICD-10-CM

## 2024-11-13 DIAGNOSIS — M25.60 RANGE OF MOTION DEFICIT: ICD-10-CM

## 2024-11-13 DIAGNOSIS — R52 PAIN: ICD-10-CM

## 2024-11-13 PROCEDURE — 97530 THERAPEUTIC ACTIVITIES: CPT

## 2024-11-13 NOTE — PROGRESS NOTES
[facility]  Occupational Therapy treatment note      Date: 11/13/2024  Name: Erlinda Rain  Clinic Number: [id number]    Therapy Diagnosis:   No diagnosis found.            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, right   M25.531 (ICD-10-CM) - Right wrist pain   Z98.890 (ICD-10-CM) - Postoperative state      Surgical Procedure and Date: 3/6/2024, TFCC debridement, arthoscopy    Evaluation Date: 3/27/2024; Re-eval 7/16/24     Insurance Authorization Period Expiration: 3/18/2025     Plan of Care Expiration: 10/16/24 extended to 12/31/2024  Date of Return to MD: 9/3/24  Visit # / Visits authorized: 20/ 20; Pending auth ;   Changing to MEDICAID  14 of 24 visits  FOTO: (date and score)     Precautions: Standard     Time In:  10:00  Time Out: 10:45  Total Appointment Time (timed & untimed codes): 45 minutes      Subjective     Patient reports: Its been really sore today; still hurts when I do some workouts   Discomfort is decreasing with daily activity. Specific positioning, mostly with radial deviation     She was compliant with home exercise program given last session.   Response to previous treatment: Good  Functional change: weaning from brace    Pain: 3/10  Location: right hand    Objective     Objective Measures updated at progress report unless specified.    Observation/Appearance:      Edema. Measured in centimeters.    4/3/2024 4/3/2024     Left Right   2in. Above elbow       2in. Below elbow       Wrist Crease 15.8 cm 16.7 cm   Figure of 8       MCPs           Elbow and Wrist ROM. Measured in degrees.    4/3/2024 4/3/2024 5/3/24 6/6/24 6/27/2024 10/9/2024     Left Right Right Right Right Right   Elbow Ext/Flex           Supination/Pronation WNL/WNL 49/74 70/80 73/WNL  *73/WNL  75/wnl   Wrist Ext/Flex 66/59 45/20  *pain with flexion 70/54 60/53 69/55 72/61   Wrist RD/UD 66/59 16/14 20/25(pain)  "19/29 19/29 wnl      Hand ROM. Measured in degrees.    4/3/2024 4/3/2024 6/6/2024     Left Right Right   Thumb: MP 51 53 55                IP 69 54 58       Rad ADD/ABD 46 40 46       Pal ADD/ABD 47 45 51          Opposition MP jt MP jt MP jt        Strength (Dynamometer) and Pinch Strength (Pinch Gauge)  Measured in pounds.    5/3/2024 5/3/2024 6/6/2024  6/27/2024 10/9/2024     Left Right Right Right Right   Rung II 37  20 34 31 34   Greenberg Pinch  19.5 18.5  19 19 20   3pt Pinch  15 11  14 12.5 14   2pt Pinch 9.5/8/9.5    8.5 9       Limitation/Restriction for FOTO initial eval Survey     Therapist reviewed FOTO scores for Erlinda Rain on 3/27/2024.   FOTO documents entered into [employer] - see Media section.     Limitation Score: 45%          Treatment     Erlinda received the treatments listed below:  TA x 3 MEDICAID    supervised modalities: after being cleared for contradictions, received the following for 10 minutes:   - Paraffin bath  to decrease pain, edema & scar tissue, sensory re- education, and increased tissue extensibility prior to therex     therapeutic exercises to develop ROM for 15 minutes including:  - wrist PRE 3 ways 2# DB  "place and hold" for wrist extension x 10 reps  x 10 reps   - Added wrist isometrics for wrist extension and ulnar deviation x 10 reps   -- constant force green for EDC strengthening x 10    - Added 1# hammer for sup/pron and wrist circumduction  x 10 reps for CW/CCW  -- added green putty for sustained  x 10 reps for  strengthening.   - add red powerweb for weightbearing by pressing with wrist extension x 15 reps   -- Increased time spent discussing limitations, weaknesses, focus on strength, stability and flexibility and will add additional exercises to promote return to weightbearing and improving stability of wrist for work activities;   - Green flexbar isometric strengthening x3 planes x10 ea   - Light wrist strengthening in 3 planes x 10 ea    - gyroscope   - in hand " weighted dumbbell rotation    manual therapy techniques: Myofacial release and Manual Lymphatic Drainage were applied to the: R for 15 minutes, including:  - ECU/FCU, EDC  STM, CFM  w/ hawks  to flare tendons and promote healing; possible some mild tenosynovitis noted.   - brief mobilization of wrist to depress ulnar styloid   - ktape to lateral epicondyle and ulnar wrist    Patient Education and Home Exercises     Education provided:   - Progress towards goals     Written Home Exercises Provided: yes.  Exercises were reviewed and Erlinda was able to demonstrate them prior to the end of the session.  rElinda demonstrated good  understanding of the home exercise program provided. See electronic medical record under Patient Instructions for exercises provided during therapy sessions.       Assessment     Pt demo's improved ROM and strength with R wrist. Pain and soreness is noted with resisted supination. Aching and tightness is noted at dorsal wrist and forearm; tenderness and tightness is noted to decrease following manual therapy and modalities. Plan to progress as tolerated.     Erlinda is progressing well towards her goals and there are no updates to goals at this time. Pt prognosis is Good.     Patient will continue to benefit from skilled outpatient occupational therapy to address the deficits listed in the problem list on initial evaluation provide patient/family education and to maximize patient's level of independence in the home and community environment. The following goals were discussed with the patient and patient is in agreement with them as to be addressed in the treatment plan.     Long term goals: (by d/c)  1)    Increase ROM 5-10 degrees to increase functional hand use for ADLs/leisure activities met  2)   Increase  strength 5-8 lbs. to carry laundry, carry groceries, sweep, and increase functional independence of right hand for ADLs/iADLs met, Continuing    3)   Increase pinch 3-4  psis for   Increase in functional independence in ADLs/iADLs such as manipulating fasteners and opening containers  4) Patient to score at 62%  or less on FOTO to demonstrate improved perception of functional rightUE Use.        Short term Goals: ( 4 weeks)   1)  Patient to be IND with HEP and modalities for pain management - met  2)  Increase ROM 3-5 degrees to increase functional hand use for ADLs/leisure activities met  3)   Increase  strength 3-5 lbs. to carry laundry, carry groceries, sweep, and increase functional independence of right hand for ADLs/iADLs met   4)   Increase pinch 1-3 psis for Increase in functional independence in ADLs/iADLs such as manipulating fasteners and opening containers met  5)   Patient to score at 50%  or less on FOTO to demonstrate improved perception of functional right UE Use.      PLAN     Updated Certification Period:  10/16/24 extended to 12/31/2024/    Radha Puentes OT   11/13/2024

## 2024-11-16 ENCOUNTER — PATIENT MESSAGE (OUTPATIENT)
Dept: OBSTETRICS AND GYNECOLOGY | Facility: CLINIC | Age: 29
End: 2024-11-16
Payer: MEDICAID

## 2024-11-20 ENCOUNTER — CLINICAL SUPPORT (OUTPATIENT)
Dept: REHABILITATION | Facility: HOSPITAL | Age: 29
End: 2024-11-20
Payer: COMMERCIAL

## 2024-11-20 DIAGNOSIS — R29.898 DECREASED GRIP STRENGTH: Primary | ICD-10-CM

## 2024-11-20 DIAGNOSIS — R52 PAIN: ICD-10-CM

## 2024-11-20 PROCEDURE — 97140 MANUAL THERAPY 1/> REGIONS: CPT

## 2024-11-20 PROCEDURE — 97018 PARAFFIN BATH THERAPY: CPT

## 2024-11-20 PROCEDURE — 97110 THERAPEUTIC EXERCISES: CPT

## 2024-11-20 NOTE — PROGRESS NOTES
[facility]  Occupational Therapy treatment note      Date: 11/20/2024  Name: Erlinda Rain  Clinic Number: [id number]    Therapy Diagnosis:   No diagnosis found.            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, right   M25.531 (ICD-10-CM) - Right wrist pain   Z98.890 (ICD-10-CM) - Postoperative state      Surgical Procedure and Date: 3/6/2024, TFCC debridement, arthoscopy    Evaluation Date: 3/27/2024; Re-eval 7/16/24     Insurance Authorization Period Expiration: 3/18/2025     Plan of Care Expiration: 10/16/24 extended to 12/31/2024  Date of Return to MD: 9/3/24  Visit # / Visits authorized: 20/ 20; Pending auth ;   Changing to MEDICAID  16 of 24 visits  FOTO: (date and score)     Precautions: Standard     Time In:  10:00  Time Out: 10:45  Total Appointment Time (timed & untimed codes): 45 minutes      Subjective     Patient reports: Its a little sore today; she states she has returned to boxing using the bag and it hasn't been difficult. 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   Discomfort is decreasing with daily activity. Specific positioning, mostly with radial deviation     She was compliant with home exercise program given last session.   Response to previous treatment: Good  Functional change: weaning from brace    Pain: 3/10  Location: right hand    Objective     Objective Measures updated at progress report unless specified.    Observation/Appearance:      Edema. Measured in centimeters.    4/3/2024 4/3/2024     Left Right   2in. Above elbow       2in. Below elbow       Wrist Crease 15.8 cm 16.7 cm   Figure of 8       MCPs           Elbow and Wrist ROM. Measured in degrees.    4/3/2024 4/3/2024 5/3/24 6/6/24 6/27/2024 10/9/2024     Left Right Right Right Right Right   Elbow  "Ext/Flex           Supination/Pronation WNL/WNL 49/74 70/80 73/WNL  *73/WNL  75/wnl   Wrist Ext/Flex 66/59 45/20  *pain with flexion 70/54 60/53 69/55 72/61   Wrist RD/UD 66/59 16/14 20/25(pain) 19/29 19/29 wnl      Hand ROM. Measured in degrees.    4/3/2024 4/3/2024 6/6/2024     Left Right Right   Thumb: MP 51 53 55                IP 69 54 58       Rad ADD/ABD 46 40 46       Pal ADD/ABD 47 45 51          Opposition MP jt MP jt MP jt        Strength (Dynamometer) and Pinch Strength (Pinch Gauge)  Measured in pounds.    5/3/2024 5/3/2024 6/6/2024  6/27/2024 10/9/2024     Left Right Right Right Right   Rung II 37  20 34 31 34   Greenberg Pinch  19.5 18.5  19 19 20   3pt Pinch  15 11  14 12.5 14   2pt Pinch 9.5/8/9.5    8.5 9       Limitation/Restriction for FOTO initial eval Survey     Therapist reviewed FOTO scores for Erlinda Rain on 3/27/2024.   FOTO documents entered into [employer] - see Media section.     Limitation Score: 45%          Treatment     Erlinda received the treatments listed below:  TA x 3 MEDICAID    supervised modalities: after being cleared for contradictions, received the following for 10 minutes:   - Paraffin bath  to decrease pain, edema & scar tissue, sensory re- education, and increased tissue extensibility prior to therex     therapeutic exercises to develop ROM for 15 minutes including:  - wrist PRE 3 ways 2# DB  "place and hold" for wrist extension x 10 reps  x 10 reps   - Added wrist isometrics for wrist extension and ulnar deviation x 10 reps    - Added 1# hammer for sup/pron and wrist circumduction  x 10 reps for CW/CCW  -- add red powerweb for weightbearing by pressing with wrist extension x 15 reps   -- Increased time spent discussing limitations, weaknesses, focus on strength, stability and flexibility and will add additional exercises to promote return to weightbearing and improving stability of wrist for work activities;   - Green flexbar isometric strengthening x3 planes x10 ea   - Light " wrist strengthening in 3 planes x 10 ea    - in hand weighted dumbbell rotation    manual therapy techniques: Myofacial release and Manual Lymphatic Drainage were applied to the: R for 15 minutes, including:  - ECU/FCU, EDC  STM, CFM  w/ hawks  to flare tendons and promote healing; possible some mild tenosynovitis noted.   - brief mobilization of wrist to depress ulnar styloid   - ktape to lateral epicondyle and ulnar wrist    Patient Education and Home Exercises     Education provided:   - Progress towards goals     Written Home Exercises Provided: yes.  Exercises were reviewed and Erlinda was able to demonstrate them prior to the end of the session.  Erlinda demonstrated good  understanding of the home exercise program provided. See electronic medical record under Patient Instructions for exercises provided during therapy sessions.       Assessment     Pt sheeba's improved tolerance to strength and stability exercises with mild fatigue following session. Tenderness and tightness are present at lateral epicondyle WAD. She demo's improved ROM and endurance throughout session. Discussed possible need for wrist widget or bullseye brace for returning to work. Plan to progress as tolerated.     Erlinda is progressing well towards her goals and there are no updates to goals at this time. Pt prognosis is Good.     Patient will continue to benefit from skilled outpatient occupational therapy to address the deficits listed in the problem list on initial evaluation provide patient/family education and to maximize patient's level of independence in the home and community environment. The following goals were discussed with the patient and patient is in agreement with them as to be addressed in the treatment plan.     Long term goals: (by d/c)  1)    Increase ROM 5-10 degrees to increase functional hand use for ADLs/leisure activities met  2)   Increase  strength 5-8 lbs. to carry laundry, carry groceries, sweep, and increase  functional independence of right hand for ADLs/iADLs met, Continuing    3)   Increase pinch 3-4  psis for  Increase in functional independence in ADLs/iADLs such as manipulating fasteners and opening containers  4) Patient to score at 62%  or less on FOTO to demonstrate improved perception of functional rightUE Use.        Short term Goals: ( 4 weeks)   1)  Patient to be IND with HEP and modalities for pain management - met  2)  Increase ROM 3-5 degrees to increase functional hand use for ADLs/leisure activities met  3)   Increase  strength 3-5 lbs. to carry laundry, carry groceries, sweep, and increase functional independence of right hand for ADLs/iADLs met   4)   Increase pinch 1-3 psis for Increase in functional independence in ADLs/iADLs such as manipulating fasteners and opening containers met  5)   Patient to score at 50%  or less on FOTO to demonstrate improved perception of functional right UE Use.      PLAN     Updated Certification Period:  10/16/24 extended to 12/31/2024/    Radha Puentes OT   11/20/2024

## 2024-12-01 RX ORDER — DESOGESTREL AND ETHINYL ESTRADIOL 0.15-0.03
1 KIT ORAL
Qty: 84 TABLET | Refills: 1 | Status: SHIPPED | OUTPATIENT
Start: 2024-12-01 | End: 2024-12-02

## 2024-12-01 NOTE — TELEPHONE ENCOUNTER
Refill Decision Note   Erlinda Rain  is requesting a refill authorization.  Brief Assessment and Rationale for Refill:  Approve     Medication Therapy Plan:         Comments:     Note composed:11:51 AM 12/01/2024

## 2024-12-02 ENCOUNTER — PATIENT MESSAGE (OUTPATIENT)
Dept: OBSTETRICS AND GYNECOLOGY | Facility: CLINIC | Age: 29
End: 2024-12-02
Payer: MEDICAID

## 2024-12-02 RX ORDER — DESOGESTREL AND ETHINYL ESTRADIOL 0.15-0.03
1 KIT ORAL DAILY
Qty: 90 TABLET | Refills: 3 | Status: SHIPPED | OUTPATIENT
Start: 2024-12-02 | End: 2025-12-02

## 2024-12-03 ENCOUNTER — OFFICE VISIT (OUTPATIENT)
Dept: ORTHOPEDICS | Facility: CLINIC | Age: 29
End: 2024-12-03
Payer: COMMERCIAL

## 2024-12-03 DIAGNOSIS — M24.131 DEGENERATIVE TFCC TEAR, RIGHT: Primary | ICD-10-CM

## 2024-12-03 PROCEDURE — 3044F HG A1C LEVEL LT 7.0%: CPT | Mod: CPTII,S$GLB,, | Performed by: ORTHOPAEDIC SURGERY

## 2024-12-03 PROCEDURE — 99999 PR PBB SHADOW E&M-EST. PATIENT-LVL II: CPT | Mod: PBBFAC,,, | Performed by: ORTHOPAEDIC SURGERY

## 2024-12-03 PROCEDURE — 1159F MED LIST DOCD IN RCRD: CPT | Mod: CPTII,S$GLB,, | Performed by: ORTHOPAEDIC SURGERY

## 2024-12-03 PROCEDURE — 20605 DRAIN/INJ JOINT/BURSA W/O US: CPT | Mod: RT,S$GLB,, | Performed by: ORTHOPAEDIC SURGERY

## 2024-12-03 PROCEDURE — 99213 OFFICE O/P EST LOW 20 MIN: CPT | Mod: 25,S$GLB,, | Performed by: ORTHOPAEDIC SURGERY

## 2024-12-03 RX ORDER — MELOXICAM 15 MG/1
15 TABLET ORAL DAILY
Qty: 30 TABLET | Refills: 2 | Status: SHIPPED | OUTPATIENT
Start: 2024-12-03

## 2024-12-03 RX ORDER — TRIAMCINOLONE ACETONIDE 40 MG/ML
40 INJECTION, SUSPENSION INTRA-ARTICULAR; INTRAMUSCULAR
Status: DISCONTINUED | OUTPATIENT
Start: 2024-12-03 | End: 2024-12-03 | Stop reason: HOSPADM

## 2024-12-03 RX ADMIN — TRIAMCINOLONE ACETONIDE 40 MG: 40 INJECTION, SUSPENSION INTRA-ARTICULAR; INTRAMUSCULAR at 11:12

## 2024-12-03 NOTE — PROGRESS NOTES
Interval history 12/03/2024:  Erlinda presents today for post-op evaluation. The patient is now 9months /p below procedures with Dr. Lopez on 3/6/24.    PROCEDURE(S) PERFORMED:    Arthroscopic distal ulna wafer excision right wrist, CPT code 59770   Right wrist arthroscopy with TFCC debridement and synovectomy  She rates pain 3/10 today. She states she fells better than the previous visit, yes still has some pain with ulnar deviation of her wrist. She has returned to almost all activities, such as boxing, weightlifiting, and working.     Interval history 09/03/2024:  Erlinda Rain presents for post-operative evaluation.  The patient is now 6 months s/p below procedures with Dr. Lopez on 3/6/24.    PROCEDURE(S) PERFORMED:    Arthroscopic distal ulna wafer excision right wrist, CPT code 22627   Right wrist arthroscopy with TFCC debridement and synovectomy    She rates pain 3/10 today.    She is still having difficulty returning to her pre-surgical level of function, however.  She notes persistent ulnar-sided right wrist pain particularly with ulnar deviation movements and weightlifting.  She returns for re-evaluation today.    PE:    AA&O x 4.  NAD  HEENT:  NCAT, sclera nonicteric  Lungs:  Respirations are equal and unlabored.  CV:  2+ bilateral upper and lower extremity pulses.  MSK: The wounds are well healed without any signs of infection. Full painless hand motion present, wrist motion full with mild discomfort , worse with ulnar deviation. SILT. DNVI.    A/P: Status post above,  with persistent pain  1)  at this point in time, Erlinda  is doing well. She still has some reported pain. She is not interested in surgery at this time. She requested a CSI, which was performed in the dorsal-ulnar aspect of her right wrist in clinic today.  Follow up in 3 months for re-evaluation or sooner for any problems.  Depending on her progress we can always discuss ulnar shortening at that next visit.

## 2024-12-03 NOTE — PROCEDURES
Intermediate Joint Aspiration/Injection    Date/Time: 12/3/2024 11:15 AM    Performed by: Von Lopez MD  Authorized by: Von Lopez MD    Consent Done?:  Yes (Verbal)  Indications:  Pain  Site marked: The procedure site was marked    Timeout: Prior to procedure the correct patient, procedure, and site was verified      Location:  Wrist  Wrist joint: right ulnocarpal.  Prep: Patient was prepped and draped in usual sterile fashion    Ultrasonic Guidance for needle placement: No  Needle size:  25 G  Approach:  Dorsal  Medications:  40 mg triamcinolone acetonide 40 mg/mL  Patient tolerance:  Patient tolerated the procedure well with no immediate complications

## 2024-12-04 ENCOUNTER — CLINICAL SUPPORT (OUTPATIENT)
Dept: REHABILITATION | Facility: HOSPITAL | Age: 29
End: 2024-12-04
Payer: COMMERCIAL

## 2024-12-04 DIAGNOSIS — R29.898 DECREASED GRIP STRENGTH: Primary | ICD-10-CM

## 2024-12-04 PROCEDURE — 97530 THERAPEUTIC ACTIVITIES: CPT | Mod: CO

## 2024-12-04 NOTE — PROGRESS NOTES
[facility]  Occupational Therapy treatment note      Date: 12/4/2024  Name: Erlinda Rain  Clinic Number: [id number]    Therapy Diagnosis:   No diagnosis found.          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, right   M25.531 (ICD-10-CM) - Right wrist pain   Z98.890 (ICD-10-CM) - Postoperative state      Surgical Procedure and Date: 3/6/2024, TFCC debridement, arthoscopy    Evaluation Date: 3/27/2024; Re-eval 7/16/24     Insurance Authorization Period Expiration: 3/18/2025     Plan of Care Expiration: 10/16/24 extended to 12/31/2024  Date of Return to MD: 9/3/24  Visit # / Visits authorized: 20/ 20; Pending auth ;   Changing to MEDICAID  17 of 24 visits  FOTO: (date and score)     Precautions: Standard     Time In:  9:17 am   Time Out: 10:00 am   Total Appointment Time (timed & untimed codes): 43 minutes      Subjective     Patient reports: Its a little sore today; she states she has returned to boxing using the bag and it hasn't been difficult.  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   Discomfort is decreasing with daily activity. Specific positioning, mostly with radial deviation   Had visit with MD, cleared for work and returned to ER. Pain all day yesterday after injection;     She was compliant with home exercise program given last session.   Response to previous treatment: Good  Functional change: weaning from brace    Pain: 3/10  Location: right hand    Objective     Objective Measures updated at progress report unless specified.    Observation/Appearance:      Edema. Measured in centimeters.    4/3/2024 4/3/2024     Left Right   2in. Above elbow       2in. Below elbow       Wrist Crease 15.8 cm 16.7 cm   Figure of 8       MCPs           Elbow and Wrist ROM. Measured in degrees.    4/3/2024  "4/3/2024 5/3/24 6/6/24 6/27/2024 10/9/2024     Left Right Right Right Right Right   Elbow Ext/Flex           Supination/Pronation WNL/WNL 49/74 70/80 73/WNL  *73/WNL  75/wnl   Wrist Ext/Flex 66/59 45/20  *pain with flexion 70/54 60/53 69/55 72/61   Wrist RD/UD 66/59 16/14 20/25(pain) 19/29 19/29 wnl      Hand ROM. Measured in degrees.    4/3/2024 4/3/2024 6/6/2024     Left Right Right   Thumb: MP 51 53 55                IP 69 54 58       Rad ADD/ABD 46 40 46       Pal ADD/ABD 47 45 51          Opposition MP jt MP jt MP jt        Strength (Dynamometer) and Pinch Strength (Pinch Gauge)  Measured in pounds.    5/3/2024 5/3/2024 6/6/2024  6/27/2024 10/9/2024     Left Right Right Right Right   Rung II 37  20 34 31 34   Greenberg Pinch  19.5 18.5  19 19 20   3pt Pinch  15 11  14 12.5 14   2pt Pinch 9.5/8/9.5    8.5 9       Limitation/Restriction for FOTO initial eval Survey     Therapist reviewed FOTO scores for Erlinda Rain on 3/27/2024.   FOTO documents entered into [employer] - see Media section.     Limitation Score: 45%          Treatment     Erlinda received the treatments listed below:  TA x 3 MEDICAID    supervised modalities: after being cleared for contradictions, received the following for 10 minutes:   - Paraffin bath  to decrease pain, edema & scar tissue, sensory re- education, and increased tissue extensibility prior to therex     therapeutic exercises to develop ROM for 15 minutes including:  - wrist PRE 3 ways 2# DB  "place and hold" for wrist extension x 10 reps  x 10 reps   - Added wrist isometrics for wrist extension and ulnar deviation x 10 reps    - Added 1# hammer for sup/pron and wrist circumduction  x 10 reps for CW/CCW  -- added green putty for sustained  x 10 reps for  strengthening.   - add red powerweb for weightbearing by pressing with wrist extension x 15 reps   -- Increased time spent discussing limitations, weaknesses, focus on strength, stability and flexibility and will add additional " exercises to promote return to weightbearing and improving stability of wrist for work activities;   - Green flexbar isometric strengthening x3 planes x10 ea   - Light wrist strengthening in 3 planes x 10 ea    - in hand weighted dumbbell rotation    manual therapy techniques: Myofacial release and Manual Lymphatic Drainage were applied to the: R for 15 minutes, including:  - ECU/FCU, EDC  STM, CFM  w/ hawks  to flare tendons and promote healing; possible some mild tenosynovitis noted.   - brief mobilization of wrist to depress ulnar styloid   - ktape to lateral epicondyle and ulnar wrist        Patient Education and Home Exercises     Education provided:   - Progress towards goals     Written Home Exercises Provided: yes.  Exercises were reviewed and Erlinda was able to demonstrate them prior to the end of the session.  Erlinda demonstrated good  understanding of the home exercise program provided. See electronic medical record under Patient Instructions for exercises provided during therapy sessions.       Assessment     Pt sheeba's improved tolerance to strength and stability exercises with mild fatigue following session. Tenderness and tightness are present at lateral epicondyle WAD. She demo's improved ROM and endurance throughout session. Discussed possible need for wrist widget or bullseye brace for returning to work. Plan to progress as tolerated.     Erlinda is progressing well towards her goals and there are no updates to goals at this time. Pt prognosis is Good.     Patient will continue to benefit from skilled outpatient occupational therapy to address the deficits listed in the problem list on initial evaluation provide patient/family education and to maximize patient's level of independence in the home and community environment. The following goals were discussed with the patient and patient is in agreement with them as to be addressed in the treatment plan.     Long term goals: (by d/c)  1)    Increase  ROM 5-10 degrees to increase functional hand use for ADLs/leisure activities met  2)   Increase  strength 5-8 lbs. to carry laundry, carry groceries, sweep, and increase functional independence of right hand for ADLs/iADLs met, Continuing    3)   Increase pinch 3-4  psis for  Increase in functional independence in ADLs/iADLs such as manipulating fasteners and opening containers  4) Patient to score at 62%  or less on FOTO to demonstrate improved perception of functional rightUE Use.        Short term Goals: ( 4 weeks)   1)  Patient to be IND with HEP and modalities for pain management - met  2)  Increase ROM 3-5 degrees to increase functional hand use for ADLs/leisure activities met  3)   Increase  strength 3-5 lbs. to carry laundry, carry groceries, sweep, and increase functional independence of right hand for ADLs/iADLs met   4)   Increase pinch 1-3 psis for Increase in functional independence in ADLs/iADLs such as manipulating fasteners and opening containers met  5)   Patient to score at 50%  or less on FOTO to demonstrate improved perception of functional right UE Use.      PLAN     Updated Certification Period:  10/16/24 extended to 12/31/2024/    RICARDO Mayo   12/4/2024

## 2024-12-05 ENCOUNTER — PATIENT MESSAGE (OUTPATIENT)
Dept: PRIMARY CARE CLINIC | Facility: CLINIC | Age: 29
End: 2024-12-05
Payer: MEDICAID

## 2024-12-10 NOTE — PROGRESS NOTES
[facility] OUTPATIENT THERAPY AND WELLNESS  Occupational Therapy treatment note      Date: 12/4/2024  Name: Erlinda Rain  Clinic Number: [id number]    Therapy Diagnosis:   Encounter Diagnosis   Name Primary?    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, right   M25.531 (ICD-10-CM) - Right wrist pain   Z98.890 (ICD-10-CM) - Postoperative state      Surgical Procedure and Date: 3/6/2024, TFCC debridement, arthoscopy    Evaluation Date: 3/27/2024; Re-eval 7/16/24     Insurance Authorization Period Expiration: 3/18/2025     Plan of Care Expiration: 10/16/24 extended to 12/31/2024  Date of Return to MD: 9/3/24  Visit # / Visits authorized: 20/ 20; Pending auth ;   Changing to MEDICAID  17 of 24 visits  FOTO: (date and score)     Precautions: Standard     Time In:  9:17 am   Time Out: 10:00 am   Total Appointment Time (timed & untimed codes): 43 minutes      Subjective     Patient reports: Its a little sore today; she states she has returned to boxing using the bag and it hasn't been difficult.  Discomfort is decreasing with daily activity. Specific positioning, mostly with radial deviation   Had visit with MD, cleared for work and returned to ER. Pain all day yesterday after injection; mild soreness today     She was compliant with home exercise program given last session.   Response to previous treatment: Good  Functional change: weaning from brace    Pain: 3/10  Location: right hand    Objective     Objective Measures updated at progress report unless specified.    Observation/Appearance:      Edema. Measured in centimeters.    4/3/2024 4/3/2024     Left Right   2in. Above elbow       2in. Below elbow       Wrist Crease 15.8 cm 16.7 cm   Figure of 8       MCPs           Elbow and Wrist ROM. Measured in degrees.    4/3/2024 4/3/2024 5/3/24 6/6/24 6/27/2024 10/9/2024     Left Right Right Right  "Right Right   Elbow Ext/Flex           Supination/Pronation WNL/WNL 49/74 70/80 73/WNL  *73/WNL  75/wnl   Wrist Ext/Flex 66/59 45/20  *pain with flexion 70/54 60/53 69/55 72/61   Wrist RD/UD 66/59 16/14 20/25(pain) 19/29 19/29 wnl      Hand ROM. Measured in degrees.    4/3/2024 4/3/2024 6/6/2024     Left Right Right   Thumb: MP 51 53 55                IP 69 54 58       Rad ADD/ABD 46 40 46       Pal ADD/ABD 47 45 51          Opposition MP jt MP jt MP jt        Strength (Dynamometer) and Pinch Strength (Pinch Gauge)  Measured in pounds.    5/3/2024 5/3/2024 6/6/2024  6/27/2024 10/9/2024     Left Right Right Right Right   Rung II 37  20 34 31 34   Greenberg Pinch  19.5 18.5  19 19 20   3pt Pinch  15 11  14 12.5 14   2pt Pinch 9.5/8/9.5    8.5 9       Limitation/Restriction for FOTO initial eval Survey     Therapist reviewed FOTO scores for Erlinda Rain on 3/27/2024.   FOTO documents entered into [employer] - see Media section.     Limitation Score: 45%          Treatment     Erlinda received the treatments listed below:  TA x 3 MEDICAID    supervised modalities: after being cleared for contradictions, received the following for 10 minutes:   - Paraffin bath  to decrease pain, edema & scar tissue, sensory re- education, and increased tissue extensibility prior to therex     therapeutic exercises to develop ROM for 25 minutes including:  - wrist PRE 3 ways 2# DB  "place and hold" for wrist extension x 10 reps  x 10 reps   - gyroscope x 2 min   - green therabar for flex/ext, Smiles, frowns x 10 reps each   - constant force green for EDC strengthening x 10    - Added 1# hammer for sup/pron and wrist circumduction  x 10 reps for CW/CCW  -Added red putty for composite digit extension "donut" to strengthen EDC x 10 reps   - added green putty for sustained  x 10 reps for  strengthening.   - add red powerweb for weightbearing by pressing with wrist extension x 15 reps   -  manual therapy techniques: Myofacial release and Manual " Lymphatic Drainage were applied to the: R for 8 minutes, including:  - ECU/FCU, EDC  STM,     Patient Education and Home Exercises     Education provided:   - Progress towards goals     Written Home Exercises Provided: yes.  Exercises were reviewed and Erlinda was able to demonstrate them prior to the end of the session.  Erlinda demonstrated good  understanding of the home exercise program provided. See electronic medical record under Patient Instructions for exercises provided during therapy sessions.       Assessment     Pt demo's improved tolerance to strength and stability exercises with mild fatigue following session and mild pain noted about dorsal wrist.  Plan to progress as tolerated.     Erlinda is progressing well towards her goals and there are no updates to goals at this time. Pt prognosis is Good.     Patient will continue to benefit from skilled outpatient occupational therapy to address the deficits listed in the problem list on initial evaluation provide patient/family education and to maximize patient's level of independence in the home and community environment. The following goals were discussed with the patient and patient is in agreement with them as to be addressed in the treatment plan.     Long term goals: (by d/c)  1)    Increase ROM 5-10 degrees to increase functional hand use for ADLs/leisure activities met  2)   Increase  strength 5-8 lbs. to carry laundry, carry groceries, sweep, and increase functional independence of right hand for ADLs/iADLs met, Continuing    3)   Increase pinch 3-4  psis for  Increase in functional independence in ADLs/iADLs such as manipulating fasteners and opening containers  4) Patient to score at 62%  or less on FOTO to demonstrate improved perception of functional rightUE Use.        Short term Goals: ( 4 weeks)   1)  Patient to be IND with HEP and modalities for pain management - met  2)  Increase ROM 3-5 degrees to increase functional hand use for  ADLs/leisure activities met  3)   Increase  strength 3-5 lbs. to carry laundry, carry groceries, sweep, and increase functional independence of right hand for ADLs/iADLs met   4)   Increase pinch 1-3 psis for Increase in functional independence in ADLs/iADLs such as manipulating fasteners and opening containers met  5)   Patient to score at 50%  or less on FOTO to demonstrate improved perception of functional right UE Use.      PLAN     Updated Certification Period:  10/16/24 extended to 12/31/2024    RICARDO Mayo   12/4/2024

## 2024-12-12 ENCOUNTER — LAB VISIT (OUTPATIENT)
Dept: LAB | Facility: OTHER | Age: 29
End: 2024-12-12
Payer: COMMERCIAL

## 2024-12-12 ENCOUNTER — OFFICE VISIT (OUTPATIENT)
Dept: PRIMARY CARE CLINIC | Facility: CLINIC | Age: 29
End: 2024-12-12
Payer: COMMERCIAL

## 2024-12-12 VITALS
DIASTOLIC BLOOD PRESSURE: 79 MMHG | HEART RATE: 79 BPM | SYSTOLIC BLOOD PRESSURE: 114 MMHG | OXYGEN SATURATION: 100 % | BODY MASS INDEX: 27.44 KG/M2 | WEIGHT: 150 LBS

## 2024-12-12 DIAGNOSIS — R53.82 CHRONIC FATIGUE: ICD-10-CM

## 2024-12-12 DIAGNOSIS — G57.01 PIRIFORMIS SYNDROME OF RIGHT SIDE: ICD-10-CM

## 2024-12-12 DIAGNOSIS — R00.2 PALPITATIONS: ICD-10-CM

## 2024-12-12 DIAGNOSIS — R00.2 PALPITATIONS: Primary | ICD-10-CM

## 2024-12-12 LAB
25(OH)D3+25(OH)D2 SERPL-MCNC: 35 NG/ML (ref 30–96)
ALBUMIN SERPL BCP-MCNC: 3.7 G/DL (ref 3.5–5.2)
ALP SERPL-CCNC: 87 U/L (ref 40–150)
ALT SERPL W/O P-5'-P-CCNC: 20 U/L (ref 10–44)
ANION GAP SERPL CALC-SCNC: 10 MMOL/L (ref 8–16)
AST SERPL-CCNC: 15 U/L (ref 10–40)
BASOPHILS # BLD AUTO: 0.06 K/UL (ref 0–0.2)
BASOPHILS NFR BLD: 0.6 % (ref 0–1.9)
BILIRUB SERPL-MCNC: 0.2 MG/DL (ref 0.1–1)
BUN SERPL-MCNC: 15 MG/DL (ref 6–20)
CALCIUM SERPL-MCNC: 9.3 MG/DL (ref 8.7–10.5)
CHLORIDE SERPL-SCNC: 109 MMOL/L (ref 95–110)
CO2 SERPL-SCNC: 21 MMOL/L (ref 23–29)
CREAT SERPL-MCNC: 1.1 MG/DL (ref 0.5–1.4)
DIFFERENTIAL METHOD BLD: NORMAL
EOSINOPHIL # BLD AUTO: 0.1 K/UL (ref 0–0.5)
EOSINOPHIL NFR BLD: 0.7 % (ref 0–8)
ERYTHROCYTE [DISTWIDTH] IN BLOOD BY AUTOMATED COUNT: 11.9 % (ref 11.5–14.5)
EST. GFR  (NO RACE VARIABLE): >60 ML/MIN/1.73 M^2
GLUCOSE SERPL-MCNC: 75 MG/DL (ref 70–110)
HCT VFR BLD AUTO: 40.2 % (ref 37–48.5)
HGB BLD-MCNC: 13.4 G/DL (ref 12–16)
IMM GRANULOCYTES # BLD AUTO: 0.03 K/UL (ref 0–0.04)
IMM GRANULOCYTES NFR BLD AUTO: 0.3 % (ref 0–0.5)
IRON SERPL-MCNC: 76 UG/DL (ref 30–160)
LYMPHOCYTES # BLD AUTO: 3.2 K/UL (ref 1–4.8)
LYMPHOCYTES NFR BLD: 31.3 % (ref 18–48)
MAGNESIUM SERPL-MCNC: 2 MG/DL (ref 1.6–2.6)
MCH RBC QN AUTO: 29 PG (ref 27–31)
MCHC RBC AUTO-ENTMCNC: 33.3 G/DL (ref 32–36)
MCV RBC AUTO: 87 FL (ref 82–98)
MONOCYTES # BLD AUTO: 0.6 K/UL (ref 0.3–1)
MONOCYTES NFR BLD: 5.5 % (ref 4–15)
NEUTROPHILS # BLD AUTO: 6.2 K/UL (ref 1.8–7.7)
NEUTROPHILS NFR BLD: 61.6 % (ref 38–73)
NRBC BLD-RTO: 0 /100 WBC
PLATELET # BLD AUTO: 377 K/UL (ref 150–450)
PMV BLD AUTO: 10.4 FL (ref 9.2–12.9)
POTASSIUM SERPL-SCNC: 3.8 MMOL/L (ref 3.5–5.1)
PROT SERPL-MCNC: 7.6 G/DL (ref 6–8.4)
RBC # BLD AUTO: 4.62 M/UL (ref 4–5.4)
SATURATED IRON: 19 % (ref 20–50)
SODIUM SERPL-SCNC: 140 MMOL/L (ref 136–145)
TOTAL IRON BINDING CAPACITY: 408 UG/DL (ref 250–450)
TRANSFERRIN SERPL-MCNC: 276 MG/DL (ref 200–375)
TSH SERPL DL<=0.005 MIU/L-ACNC: 0.89 UIU/ML (ref 0.4–4)
VIT B12 SERPL-MCNC: 504 PG/ML (ref 210–950)
WBC # BLD AUTO: 10.08 K/UL (ref 3.9–12.7)

## 2024-12-12 PROCEDURE — 36415 COLL VENOUS BLD VENIPUNCTURE: CPT

## 2024-12-12 PROCEDURE — 84443 ASSAY THYROID STIM HORMONE: CPT

## 2024-12-12 PROCEDURE — 99999 PR PBB SHADOW E&M-EST. PATIENT-LVL III: CPT | Mod: PBBFAC,,,

## 2024-12-12 PROCEDURE — 83735 ASSAY OF MAGNESIUM: CPT

## 2024-12-12 PROCEDURE — 85025 COMPLETE CBC W/AUTO DIFF WBC: CPT

## 2024-12-12 PROCEDURE — 82607 VITAMIN B-12: CPT

## 2024-12-12 PROCEDURE — 80053 COMPREHEN METABOLIC PANEL: CPT

## 2024-12-12 PROCEDURE — 82306 VITAMIN D 25 HYDROXY: CPT

## 2024-12-12 PROCEDURE — 84466 ASSAY OF TRANSFERRIN: CPT

## 2024-12-12 NOTE — ASSESSMENT & PLAN NOTE
- Notices recent worsening  - Discussed contributing factors (school, work, inconsistent sleep schedule, screen time, diet)  - She is physically active and exercises at least twice/week  - Recommend repeating labs to rule out underlying causes  - Discussed that 90% of fatigue are attributed to lifestyle factors

## 2024-12-12 NOTE — PROGRESS NOTES
INTERNAL MEDICINE  [facility]    Reason for visit:   Chief Complaint   Patient presents with    Fatigue     HPI: Erlinda Rain is a 29 y.o. female presenting today for fatigue.    Patient is an established patient of PCP, Dr. Paradise Guidry DO. This patient is new to me.    History of Present Illness    CHIEF COMPLAINT:  Patient presents today with increased fatigue and brain fog.    FATIGUE AND COGNITIVE SYMPTOMS:  She reports worsening fatigue and brain fog over the past couple months with decreased concentration and memory. While she has a history of chronic fatigue, symptoms have become more dramatic recently. She notes excessive fatigue during workouts, particularly during simple weight lifting. Blood work in May showed slightly low blood counts that normalized after returning from a two-week trip to [place] at high altitude. She continues Wellbutrin for fatigue management.    CARDIOVASCULAR:  She experienced an episode of palpitations with tachycardia () and lightheadedness on Saturday that woke her from sleep. EKG at urgent care was normal. She reports her heart rate generally remains elevated in the 90s. During workouts, she experiences significant heart rate elevation with near-syncope, requiring reduction in activity. She denies experiencing any chest pain or SOB at rest.     SLEEP:  She reports fluctuating sleep patterns and is actively working on sleep hygiene. She aims to be in bed by 10 PM and naturally wakes around 8-8:30 AM. She works 3, 12-hour shifts through the week. 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    MUSCULOSKELETAL:  She reports right thigh and buttock pain primarily during prolonged driving, described as a bad aching sensation that resolves with ambulation.    WEIGHT MANAGEMENT:  She reports 20-pound weight loss since starting Mounjaro injections.      ROS:  General: -fever, -chills, +fatigue, -weight gain, +weight loss  Cardiovascular: -chest  pain, +palpitations, -lower extremity edema  Respiratory: -cough, -shortness of breath  Musculoskeletal: +joint pain, -muscle pain  Skin: -rash, -lesion  Neurological: -headache, -dizziness, -numbness, -tingling  Psychiatric: -anxiety, -depression, +sleep difficulty, +memory problems         Social History     Social History Narrative    Single. Currently in NP school       ALLERGIES:   Review of patient's allergies indicates:   Allergen Reactions    Kiwi (actinidia chinensis) Itching and Nausea Only       MEDS:   Current Outpatient Medications on File Prior to Visit   Medication Sig Dispense Refill Last Dose/Taking    buPROPion (WELLBUTRIN XL) 300 MG 24 hr tablet Take 1 tablet (300 mg total) by mouth once daily. 90 tablet 3     cetirizine (ZYRTEC) 10 MG tablet Take 1 tablet by mouth once daily.       desogestreL-ethinyl estradioL (ENSKYCE) 0.15-0.03 mg per tablet Take 1 tablet by mouth once daily. 90 tablet 3     meloxicam (MOBIC) 15 MG tablet Take 1 tablet (15 mg total) by mouth once daily. 30 tablet 2     multivitamin capsule Take 1 capsule by mouth once daily.       [DISCONTINUED] metFORMIN (GLUCOPHAGE-XR) 500 MG ER 24hr tablet Take 1 tablet (500 mg total) by mouth Daily. (Patient not taking: Reported on 12/3/2024) 90 tablet 3        Vital signs:   Vitals:    12/12/24 1332   BP: 114/79   Pulse: 79   SpO2: 100%   Weight: 68 kg (150 lb)     Body mass index is 27.44 kg/m².    PHYSICAL EXAM:     Physical Exam    General: No acute distress. Well-developed. Well-nourished.  HENT: Normocephalic. Atraumatic.  Cardiovascular: Regular rate. Regular rhythm. No murmurs. No rubs. No gallops. Normal S1, S2.  Respiratory: Normal respiratory effort. Clear to auscultation bilaterally. No rales. No rhonchi. No wheezing.  Musculoskeletal: No obvious deformity.  Extremities: No lower extremity edema.  Neurological: Alert & oriented x3. No slurred speech.   Psychiatric: Normal mood. Normal affect. Good insight. Good judgment.  Skin:  Warm. Dry. No rash.         PERTINENT RESULTS:   No visits with results within 1 Week(s) from this visit.   Latest known visit with results is:   Lab Visit on 06/18/2024   Component Date Value Ref Range Status    HIV 1/2 Ag/Ab 06/18/2024 Negative  Negative Final    Treponema Pallidum Antibodies (IgG* 06/18/2024 Nonreactive  Nonreactive Final    Hepatitis B Surface Ag 06/18/2024 Non-reactive  Non-reactive Final    Hep B C IgM 06/18/2024 Non-reactive  Non-reactive Final    Hep A IgM 06/18/2024 Non-reactive  Non-reactive Final    Hepatitis C Ab 06/18/2024 Negative  Negative Final       ASSESSMENT/PLAN:    Assessment & Plan    IMPRESSION:  - Considered chronic fatigue as primary concern, noting recent worsening of symptoms  - Evaluated recent lab results showing normal H&H levels post-[place] trip  - Assessed recent episode of palpitations and tachycardia, noting urgent care EKG was normal  - Considered echocardiogram to rule out structural or functional cardiac issues  - Discussed possible piriformis syndrome as cause of right thigh pain during long drives  - Attributed majority of fatigue to lifestyle factors, including busy schedule and inconsistent sleep patterns    CHRONIC FATIGUE:  - Explained that 90% of fatigue is typically attributed to lifestyle factors such as diet, exercise, and sleep habits.  - Discussed the importance of consistent sleep schedule and bedtime routine for managing fatigue.  - Explained the benefits of outdoor activities and sun exposure for overall well-being.  - Patient to maintain consistent sleep schedule, aiming for bedtime around 10 PM.  - Recommend increasing outdoor activities and sun exposure.  - Patient to ensure adequate protein intake and vegetable consumption in diet.  - Patient to continue current exercise routine of at least 2 times per week.  - Continued Wellbutrin at current dose for fatigue management.  - Comprehensive labs ordered including complete blood count, metabolic  panel, B12, vitamin D, iron levels, and thyroid function tests.  - Echocardiogram ordered to evaluate cardiac structure and function.    PALPITATIONS:  - Contact the office if palpitations recur or worsen.    INSOMNIA:  - Discussed the importance of consistent sleep schedule and bedtime routine for managing fatigue.  - Patient to maintain consistent sleep schedule, aiming for bedtime around 10 PM.    RADICULOPATHY (THIGH PAIN):  - Recommend considering pre-medication with OTC anti-inflammatory before long drives to manage thigh pain.  - Follow up if thigh pain persists or worsens to consider scheduling physical therapy.    WEIGHT MANAGEMENT:  - Continued Mounjaro injections for weight management.    FOLLOW-UP:  - Follow up after receiving message with lab results and further recommendations.         1. Palpitations  Assessment & Plan:  Reports one episode with tachycardia occurring during sleep (waking her)  EKG at urgent care was NSR rate 86. No abnormalities.  Most recent labs have been unremarkable.  Will obtain new labs and schedule echocardiogram.  Patient instructed to inform me of any recurrent episodes to potentially initiate a holter monitor.    Orders:  -     Echo Saline Bubble? No; Ultrasound enhancing contrast? No; Future  -     Magnesium; Future; Expected date: 12/12/2024    2. Chronic fatigue  Overview:  - chronic  - she reports that she is on Wellbutrin for chronic fatigue by her last provider     Assessment & Plan:  - Notices recent worsening  - Discussed contributing factors (school, work, inconsistent sleep schedule, screen time, diet)  - She is physically active and exercises at least twice/week  - Recommend repeating labs to rule out underlying causes  - Discussed that 90% of fatigue are attributed to lifestyle factors      Orders:  -     CBC W/ AUTO DIFFERENTIAL; Future; Expected date: 12/12/2024  -     COMPREHENSIVE METABOLIC PANEL; Future; Expected date: 12/12/2024  -     Vitamin B12; Future;  Expected date: 12/12/2024  -     Vitamin D; Future; Expected date: 12/12/2024  -     IRON AND TIBC; Future; Expected date: 12/12/2024  -     TSH; Future; Expected date: 12/12/2024  -     Magnesium; Future; Expected date: 12/12/2024    3. 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   Will follow-up for lab/test review via [employer].    DOUGLAS PerezC   Internal Medicine

## 2024-12-12 NOTE — ASSESSMENT & PLAN NOTE
Reports one episode with tachycardia occurring during sleep (waking her)  EKG at urgent care was NSR rate 86. No abnormalities.  Most recent labs have been unremarkable.  Will obtain new labs and schedule echocardiogram.  Patient instructed to inform me of any recurrent episodes to potentially initiate a holter monitor.

## 2024-12-12 NOTE — ASSESSMENT & PLAN NOTE
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

## 2024-12-16 ENCOUNTER — PATIENT MESSAGE (OUTPATIENT)
Dept: PRIMARY CARE CLINIC | Facility: CLINIC | Age: 29
End: 2024-12-16
Payer: MEDICAID

## 2024-12-18 ENCOUNTER — CLINICAL SUPPORT (OUTPATIENT)
Dept: REHABILITATION | Facility: HOSPITAL | Age: 29
End: 2024-12-18
Payer: COMMERCIAL

## 2024-12-18 ENCOUNTER — HOSPITAL ENCOUNTER (OUTPATIENT)
Dept: CARDIOLOGY | Facility: HOSPITAL | Age: 29
Discharge: HOME | End: 2024-12-18
Payer: COMMERCIAL

## 2024-12-18 VITALS
DIASTOLIC BLOOD PRESSURE: 79 MMHG | HEIGHT: 62 IN | HEART RATE: 89 BPM | WEIGHT: 150 LBS | SYSTOLIC BLOOD PRESSURE: 114 MMHG | BODY MASS INDEX: 27.6 KG/M2

## 2024-12-18 DIAGNOSIS — R29.898 DECREASED GRIP STRENGTH: Primary | ICD-10-CM

## 2024-12-18 DIAGNOSIS — M25.60 RANGE OF MOTION DEFICIT: ICD-10-CM

## 2024-12-18 DIAGNOSIS — R00.2 PALPITATIONS: ICD-10-CM

## 2024-12-18 LAB
ASCENDING AORTA: 2.38 CM
AV AREA BY CONTINUOUS VTI: 2.2 CM2
AV INDEX (PROSTH): 0.78
AV LVOT MEAN GRADIENT: 2 MMHG
AV LVOT PEAK GRADIENT: 5 MMHG
AV MEAN GRADIENT: 4.3 MMHG
AV PEAK GRADIENT: 9 MMHG
AV VALVE AREA BY VELOCITY RATIO: 2.1 CM²
AV VALVE AREA: 2.2 CM2
AV VELOCITY RATIO: 0.73
BSA FOR ECHO PROCEDURE: 1.73 M2
CV ECHO LV RWT: 0.3 CM
DOP CALC AO PEAK VEL: 1.5 M/S
DOP CALC AO VTI: 24.8 CM
DOP CALC LVOT AREA: 2.8 CM2
DOP CALC LVOT DIAMETER: 1.9 CM
DOP CALC LVOT PEAK VEL: 1.1 M/S
DOP CALC LVOT STROKE VOLUME: 55 CM3
DOP CALC RVOT AREA: 2.57 CM2
DOP CALC RVOT DIAMETER: 1.81 CM
DOP CALCLVOT PEAK VEL VTI: 19.4 CM
E WAVE DECELERATION TIME: 166.46 MS
E/A RATIO: 1.19
E/E' RATIO: 5.19 M/S
ECHO EF ESTIMATED: 56 %
ECHO LV POSTERIOR WALL: 0.6 CM (ref 0.6–1.1)
FRACTIONAL SHORTENING: 30 % (ref 28–44)
HR MV ECHO: 89 BPM
INTERVENTRICULAR SEPTUM: 0.7 CM (ref 0.6–1.1)
IVC DIAMETER: 0.9 CM
IVRT: 79.92 MS
LA MAJOR: 5.16 CM
LA MINOR: 5.07 CM
LA WIDTH: 3.41 CM
LEFT ATRIUM SIZE: 2.98 CM
LEFT ATRIUM VOLUME INDEX MOD: 21.3 ML/M2
LEFT ATRIUM VOLUME INDEX: 26.1 ML/M2
LEFT ATRIUM VOLUME MOD: 36.07 ML
LEFT ATRIUM VOLUME: 44.18 CM3
LEFT INTERNAL DIMENSION IN SYSTOLE: 2.8 CM (ref 2.1–4)
LEFT VENTRICLE DIASTOLIC VOLUME INDEX: 40.6 ML/M2
LEFT VENTRICLE DIASTOLIC VOLUME: 68.62 ML
LEFT VENTRICLE MASS INDEX: 42.1 G/M2
LEFT VENTRICLE SYSTOLIC VOLUME INDEX: 17.9 ML/M2
LEFT VENTRICLE SYSTOLIC VOLUME: 30.25 ML
LEFT VENTRICULAR INTERNAL DIMENSION IN DIASTOLE: 4 CM (ref 3.5–6)
LEFT VENTRICULAR MASS: 71.2 G
LV LATERAL E/E' RATIO: 3.77
LV SEPTAL E/E' RATIO: 8.3
MV A" WAVE DURATION": 85.63 MS
MV PEAK A VEL: 0.7 M/S
MV PEAK E VEL: 0.83 M/S
OHS CV RV/LV RATIO: 0.75 CM
PISA TR MAX VEL: 1.96 M/S
PULM VEIN A" WAVE DURATION": 85.63 MS
PULM VEIN S/D RATIO: 0.84
PULMONIC VEIN PEAK A VELOCITY: 0.3 M/S
PV PEAK D VEL: 0.49 M/S
PV PEAK S VEL: 0.41 M/S
RA MAJOR: 3.98 CM
RA PRESSURE ESTIMATED: 3 MMHG
RA WIDTH: 3.37 CM
RIGHT ATRIAL AREA: 10.7 CM2
RIGHT VENTRICLE DIASTOLIC BASEL DIMENSION: 3 CM
RV TB RVSP: 5 MMHG
RV TISSUE DOPPLER FREE WALL SYSTOLIC VELOCITY 1 (APICAL 4 CHAMBER VIEW): 15.69 CM/S
SINUS: 2.35 CM
STJ: 1.98 CM
TDI LATERAL: 0.22 M/S
TDI SEPTAL: 0.1 M/S
TDI: 0.16 M/S
TR MAX PG: 15 MMHG
TRICUSPID ANNULAR PLANE SYSTOLIC EXCURSION: 2.04 CM
TV PEAK GRADIENT: 15 MMHG
TV REST PULMONARY ARTERY PRESSURE: 18 MMHG
Z-SCORE OF LEFT VENTRICULAR DIMENSION IN END DIASTOLE: -1.63
Z-SCORE OF LEFT VENTRICULAR DIMENSION IN END SYSTOLE: -0.32

## 2024-12-18 PROCEDURE — 97530 THERAPEUTIC ACTIVITIES: CPT

## 2024-12-18 PROCEDURE — 93306 TTE W/DOPPLER COMPLETE: CPT | Mod: 26,,, | Performed by: INTERNAL MEDICINE

## 2024-12-18 PROCEDURE — 93306 TTE W/DOPPLER COMPLETE: CPT

## 2024-12-18 NOTE — PROGRESS NOTES
[facility]  Occupational Therapy treatment note and Discharge Status     Date: 12/18/2024  Name: Erlinda Rain  Clinic Number: [id number]    Therapy Diagnosis:   Encounter Diagnoses   Name Primary?    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, right   M25.531 (ICD-10-CM) - Right wrist pain   Z98.890 (ICD-10-CM) - Postoperative state      Surgical Procedure and Date: 3/6/2024, TFCC debridement, arthoscopy    Evaluation Date: 3/27/2024;   Re-eval 7/16/24     Insurance Authorization Period Expiration: 3/18/2025     Plan of Care Expiration: 10/16/24 extended to 12/31/2024  Date of Return to MD: 9/3/24  Visit # / Visits authorized: 20/ 20; Pending auth ;   Changing to MEDICAID  18 of 24 visits  FOTO: (date and score)     Precautions: Standard     Time In:  9:20 am   Time Out: 10:05 am   Total Appointment Time (timed & untimed codes): 45 minutes      Subjective     Patient reports: I got a cortisone injection on 12/3.  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  Discomfort is decreasing with daily activity. Specific positioning, mostly with radial deviation   Had visit with MD, cleared for work and returned to ER.   She was compliant with home exercise program given last session.   Response to previous treatment: Good  Functional change: weaning from brace    Pain: 3/10  Location: right hand    Objective     Objective Measures updated at progress report unless specified.    Discharge status as follows:   Observation/Appearance:      Edema. Measured in centimeters.    4/3/2024 4/3/2024     Left Right   2in. Above elbow       2in. Below elbow       Wrist Crease 15.8 cm 16.7 cm   Figure of 8       MCPs           Elbow and Wrist ROM. Measured in degrees.    4/3/2024 4/3/2024 5/3/24  "6/6/24 6/27/2024 10/9/2024     Left Right Right Right Right Right   Elbow Ext/Flex           Supination/Pronation WNL/WNL 49/74 70/80 73/WNL  *73/WNL  75/wnl   Wrist Ext/Flex 66/59 45/20  *pain with flexion 70/54 60/53 69/55 72/61   Wrist RD/UD 66/59 16/14 20/25(pain) 19/29 19/29 wnl      Hand ROM. Measured in degrees.    4/3/2024 4/3/2024 6/6/2024     Left Right Right   Thumb: MP 51 53 55                IP 69 54 58       Rad ADD/ABD 46 40 46       Pal ADD/ABD 47 45 51          Opposition MP jt MP jt MP jt        Strength (Dynamometer) and Pinch Strength (Pinch Gauge)  Measured in pounds.    5/3/2024 5/3/2024 6/6/2024  6/27/2024 10/9/2024 12/18/24     Left Right Right Right Right Right    Rung II 37  20 34 31 34 37.7 (+3.7)   Greenberg Pinch  19.5 18.5  19 19 20    3pt Pinch  15 11  14 12.5 14    2pt Pinch 9.5/8/9.5    8.5 9        Limitation/Restriction for FOTO initial eval Survey     Therapist reviewed FOTO scores for Erlinda Rain on 3/27/2024.   FOTO documents entered into [employer] - see Media section.     Limitation Score: 45%          Treatment     Erlinda received the treatments listed below:  TA x 3 MEDICAID    supervised modalities: after being cleared for contradictions, received the following for 10 minutes:   - Paraffin bath  to decrease pain, edema & scar tissue, sensory re- education, and increased tissue extensibility prior to therex     therapeutic exercises to develop ROM for 25 minutes including:  - wrist PRE 3 ways 3# DB  "place and hold" for wrist extension x 10 reps  x 10 reps   - gyroscope x 2 min   - green therabar for flex/ext, Smiles, frowns x 10 reps each   - constant force green for EDC strengthening x 10    - Added 1# hammer for sup/pron and wrist circumduction  x 10 reps for CW/CCW  -Added red putty for composite digit extension "donut" to strengthen EDC x 10 reps   - added green putty for sustained  x 10 reps for  strengthening.   - add red powerweb for weightbearing by pressing with " wrist extension x 15 reps   -  manual therapy techniques: Myofacial release and Manual Lymphatic Drainage were applied to the: R for 10 minutes, including:  - ECU/FCU, EDC  STM,     Patient Education and Home Exercises     Education provided:   - Progress towards goals     Written Home Exercises Provided: yes.  Exercises were reviewed and Erlinda was able to demonstrate them prior to the end of the session.  Erlinda demonstrated good  understanding of the home exercise program provided. See electronic medical record under Patient Instructions for exercises provided during therapy sessions.       Assessment     Patient IND with HEP and modality use; patient returning to previous activity level with minimal discomfort and pain more manageable.  Encouraged patient to continue with HEP and wrist strengthening at gym.  Will recommend discharge at this time with all goals met.     Erlinda is progressing well towards her goals and there are no updates to goals at this time. Pt prognosis is Good.     Long term goals: (by d/c)  1)    Increase ROM 5-10 degrees to increase functional hand use for ADLs/leisure activities met  2)   Increase  strength 5-8 lbs. to carry laundry, carry groceries, sweep, and increase functional independence of right hand for ADLs/iADLs met,  3)   Increase pinch 3-4  psis for  Increase in functional independence in ADLs/iADLs such as manipulating fasteners and opening containers- met        Short term Goals: ( 4 weeks)   1)  Patient to be IND with HEP and modalities for pain management - met  2)  Increase ROM 3-5 degrees to increase functional hand use for ADLs/leisure activities met  3)   Increase  strength 3-5 lbs. to carry laundry, carry groceries, sweep, and increase functional independence of right hand for ADLs/iADLs met   4)   Increase pinch 1-3 psis for Increase in functional independence in ADLs/iADLs such as manipulating fasteners and opening containers met     PLAN     Will discharge  at this time with all goals met and patient in agreement.     Taty Ambriz, OT , CHT   12/18/2024

## 2025-01-13 ENCOUNTER — PATIENT MESSAGE (OUTPATIENT)
Dept: PRIMARY CARE CLINIC | Facility: CLINIC | Age: 30
End: 2025-01-13
Payer: MEDICAID

## 2025-01-13 DIAGNOSIS — Z11.1 SCREENING-PULMONARY TB: Primary | ICD-10-CM

## 2025-01-17 ENCOUNTER — LAB VISIT (OUTPATIENT)
Dept: LAB | Facility: OTHER | Age: 30
End: 2025-01-17
Attending: FAMILY MEDICINE
Payer: COMMERCIAL

## 2025-01-17 DIAGNOSIS — Z11.1 SCREENING-PULMONARY TB: ICD-10-CM

## 2025-01-17 PROCEDURE — 86480 TB TEST CELL IMMUN MEASURE: CPT | Performed by: FAMILY MEDICINE

## 2025-01-24 LAB
GAMMA INTERFERON BACKGROUND BLD IA-ACNC: 0.01 IU/ML
M TB IFN-G CD4+ BCKGRND COR BLD-ACNC: -0.01 IU/ML
M TB IFN-G CD4+ BCKGRND COR BLD-ACNC: -0.01 IU/ML
MITOGEN IGNF BCKGRD COR BLD-ACNC: 8.93 IU/ML
TB GOLD PLUS: NEGATIVE

## 2025-03-03 ENCOUNTER — OFFICE VISIT (OUTPATIENT)
Dept: ORTHOPEDICS | Facility: CLINIC | Age: 30
End: 2025-03-03
Payer: COMMERCIAL

## 2025-03-03 VITALS — WEIGHT: 149.94 LBS | HEIGHT: 62 IN | BODY MASS INDEX: 27.59 KG/M2

## 2025-03-03 DIAGNOSIS — M24.131 DEGENERATIVE TFCC TEAR, RIGHT: Primary | ICD-10-CM

## 2025-03-03 PROCEDURE — 99999 PR PBB SHADOW E&M-EST. PATIENT-LVL III: CPT | Mod: PBBFAC,,, | Performed by: ORTHOPAEDIC SURGERY

## 2025-03-03 PROCEDURE — 1159F MED LIST DOCD IN RCRD: CPT | Mod: CPTII,S$GLB,, | Performed by: ORTHOPAEDIC SURGERY

## 2025-03-03 PROCEDURE — 3008F BODY MASS INDEX DOCD: CPT | Mod: CPTII,S$GLB,, | Performed by: ORTHOPAEDIC SURGERY

## 2025-03-03 PROCEDURE — 99213 OFFICE O/P EST LOW 20 MIN: CPT | Mod: S$GLB,,, | Performed by: ORTHOPAEDIC SURGERY

## 2025-03-03 NOTE — PROGRESS NOTES
Erlinda presents today for post-op evaluation. The patient is now 1 year /p below procedures with Dr. Lopez on 3/6/24.    PROCEDURE(S) PERFORMED:    Arthroscopic distal ulna wafer excision right wrist, CPT code 46777   Right wrist arthroscopy with TFCC debridement and synovectomy    She notes that overall she is doing well.  She is still having some intermittent and mild ulnar-sided left wrist pain but she was able to return to boxing without any difficulty.  She is doing well at work.  No new complaints.    PE:    AA&O x 4.  NAD  HEENT:  NCAT, sclera nonicteric  Lungs:  Respirations are equal and unlabored.  CV:  2+ bilateral upper and lower extremity pulses.  MSK: The wounds are well healed without any signs of infection. Full painless hand motion present, wrist motion full with mild discomfort today. SILT. DNVI.    A/P: Status post above, overall doing well     1)  at this point in time, the patient is back to essentially all activities with only minimal symptoms.  She seems to be doing well overall though certainly not completely pain-free.  We discussed continuing unrestricted activities at this time.  Should her pain worsen or persist or become more severe we could certainly move forward with an ulnar shortening osteotomy but her symptoms do not warrant that intervention at this time.  As such, she may follow up as needed/if needed or certainly for any issues.

## 2025-03-15 ENCOUNTER — PATIENT MESSAGE (OUTPATIENT)
Dept: PRIMARY CARE CLINIC | Facility: CLINIC | Age: 30
End: 2025-03-15
Payer: MEDICAID

## 2025-03-18 ENCOUNTER — OFFICE VISIT (OUTPATIENT)
Dept: PRIMARY CARE CLINIC | Facility: CLINIC | Age: 30
End: 2025-03-18
Payer: COMMERCIAL

## 2025-03-18 VITALS
DIASTOLIC BLOOD PRESSURE: 72 MMHG | SYSTOLIC BLOOD PRESSURE: 101 MMHG | BODY MASS INDEX: 28.27 KG/M2 | OXYGEN SATURATION: 100 % | WEIGHT: 154.56 LBS | HEART RATE: 90 BPM

## 2025-03-18 DIAGNOSIS — F41.9 ANXIETY: ICD-10-CM

## 2025-03-18 DIAGNOSIS — H65.01 NON-RECURRENT ACUTE SEROUS OTITIS MEDIA OF RIGHT EAR: Primary | ICD-10-CM

## 2025-03-18 DIAGNOSIS — J30.2 CHRONIC SEASONAL ALLERGIC RHINITIS: ICD-10-CM

## 2025-03-18 DIAGNOSIS — Z87.09 HISTORY OF SINUSITIS: ICD-10-CM

## 2025-03-18 PROCEDURE — 1159F MED LIST DOCD IN RCRD: CPT | Mod: CPTII,S$GLB,,

## 2025-03-18 PROCEDURE — 3078F DIAST BP <80 MM HG: CPT | Mod: CPTII,S$GLB,,

## 2025-03-18 PROCEDURE — 99213 OFFICE O/P EST LOW 20 MIN: CPT | Mod: S$GLB,,,

## 2025-03-18 PROCEDURE — 3008F BODY MASS INDEX DOCD: CPT | Mod: CPTII,S$GLB,,

## 2025-03-18 PROCEDURE — 99999 PR PBB SHADOW E&M-EST. PATIENT-LVL IV: CPT | Mod: PBBFAC,,,

## 2025-03-18 PROCEDURE — 3074F SYST BP LT 130 MM HG: CPT | Mod: CPTII,S$GLB,,

## 2025-03-18 RX ORDER — BUSPIRONE HYDROCHLORIDE 10 MG/1
10 TABLET ORAL NIGHTLY
Qty: 30 TABLET | Refills: 11 | Status: SHIPPED | OUTPATIENT
Start: 2025-03-18 | End: 2026-03-18

## 2025-03-18 RX ORDER — LORATADINE 10 MG/1
10 TABLET ORAL DAILY
Qty: 90 TABLET | Refills: 3 | Status: SHIPPED | OUTPATIENT
Start: 2025-03-18 | End: 2026-03-18

## 2025-03-18 RX ORDER — CEFDINIR 300 MG/1
300 CAPSULE ORAL 2 TIMES DAILY
Qty: 10 CAPSULE | Refills: 0 | Status: SHIPPED | OUTPATIENT
Start: 2025-03-18 | End: 2025-03-23

## 2025-03-18 NOTE — PROGRESS NOTES
"INTERNAL MEDICINE  [facility]    Reason for visit:   Chief Complaint   Patient presents with    Sinus Problem    Anxiety     HPI: Erlinda Rain is a 29 y.o. female presenting today for worsening sinus congestion and ear fullness with additional concern for worsening anxiety.    Patient is an established patient of PCP, Dr. Paradise Guidry DO. This patient is known to me.    Per patient A[employer] message 3/15/2025: "About two weeks ago I have a sinus infection and went to urgent care and was prescribed Amoxicillin 875 mg twice a day for a week. I got better for the next week but then my congestion, runny nose, and ear fullness came back this past week. Do I need another antibiotic? I have been taking Claritin everyday and sometimes Sudafed if the congestion is really bad but I am still not fully improving."    History of Current Illness-  She initially experienced flu-like symptoms with worsening congestion and rhinorrhea after one week. After 10 days, she was diagnosed with sinusitis at urgent care and prescribed antibiotics. Following approximately 1.5 weeks of improvement, she developed recurrent symptoms including congestion, rhinorrhea, sneezing, and particularly bothersome ear fullness. She reports increased frequency of sinus infections over the past couple years. Daily Zyrtec, recent trial of Claritin, and occasional Flonase have not improved her symptoms.     ENT History-  She has a history of chronic seasonal allergic rhinitis, recurrent ear infections during childhood and adolescence, and history of cerumen impaction requiring removal.    Mental Health-  She reports worsening anxiety with associated brain fog and concentration difficulties, which she attributes to academic stress. Her anxiety interferes with sleep initiation. She attends biweekly therapy sessions, utilizing breathing and mindfulness techniques.    Current Medications-  Wellbutrin (not prescribed for anxiety), Zyrtec " transitioning to Claritin for allergies, Flonase nasal spray as needed      ROS:  General: -fever, -chills, -fatigue, -weight gain, -weight loss  Eyes: -vision changes, -redness, -discharge  ENT: -ear pain, +nasal congestion, -sore throat, +runny nose, +nasal discharge, +frequent sneezing, +ear pressure  Cardiovascular: -chest pain, -palpitations, -lower extremity edema  Respiratory: -cough, -shortness of breath  Gastrointestinal: -abdominal pain, -nausea, -vomiting, -diarrhea, -constipation, -blood in stool  Genitourinary: -dysuria, -hematuria, -frequency  Musculoskeletal: -joint pain, -muscle pain  Skin: -rash, -lesion  Neurological: -headache, -dizziness, -numbness, -tingling, +decreased concentration, +lack of focus/concentration, +confusion or disorientation  Psychiatric: +anxiety, -depression, +sleep difficulty, +difficulty falling asleep  Allergic: +seasonal allergies, +rhinitis         ALLERGIES:   Review of patient's allergies indicates:   Allergen Reactions    Kiwi (actinidia chinensis) Itching and Nausea Only       MEDS:   Current Outpatient Medications on File Prior to Visit   Medication Sig Dispense Refill Last Dose/Taking    buPROPion (WELLBUTRIN XL) 300 MG 24 hr tablet Take 1 tablet (300 mg total) by mouth once daily. 90 tablet 3 Taking    desogestreL-ethinyl estradioL (ENSKYCE) 0.15-0.03 mg per tablet Take 1 tablet by mouth once daily. 90 tablet 3 Taking    meloxicam (MOBIC) 15 MG tablet Take 1 tablet (15 mg total) by mouth once daily. 30 tablet 2 Taking    multivitamin capsule Take 1 capsule by mouth once daily.   Taking       Vital signs:   Vitals:    03/18/25 1338   BP: 101/72   BP Location: Left arm   Patient Position: Sitting   Pulse: 90   SpO2: 100%   Weight: 70.1 kg (154 lb 8.7 oz)     Body mass index is 28.27 kg/m².    PHYSICAL EXAM:     Physical Exam  Vitals reviewed.   Constitutional:       Appearance: Normal appearance. She is not ill-appearing or diaphoretic.   HENT:      Head:  Normocephalic and atraumatic.      Right Ear: Ear canal and external ear normal. There is no impacted cerumen. Tympanic membrane is injected and erythematous. Tympanic membrane is not perforated.      Left Ear: Ear canal and external ear normal. A middle ear effusion is present. There is no impacted cerumen. Tympanic membrane is not injected, perforated or erythematous.      Nose: Congestion present.      Mouth/Throat:      Pharynx: Oropharynx is clear.   Eyes:      Conjunctiva/sclera: Conjunctivae normal.   Cardiovascular:      Rate and Rhythm: Normal rate and regular rhythm.      Heart sounds: Normal heart sounds. No murmur heard.  Pulmonary:      Effort: Pulmonary effort is normal. No respiratory distress.      Breath sounds: Normal breath sounds. No wheezing, rhonchi or rales.   Lymphadenopathy:      Cervical: No cervical adenopathy.   Skin:     Coloration: Skin is not pale.   Neurological:      Mental Status: She is alert and oriented to person, place, and time.   Psychiatric:         Mood and Affect: Affect normal. Mood is anxious.         Behavior: Behavior normal.         Thought Content: Thought content normal.         Judgment: Judgment normal.         PERTINENT RESULTS:   No visits with results within 1 Week(s) from this visit.   Latest known visit with results is:   Lab Visit on 01/17/2025   Component Date Value Ref Range Status    NIL 01/17/2025 0.22233  IU/mL Final    Comment: The Nil tube value is used to determine if the patient   has a preexisting immune response which could cause a   false-positive reading on the test.   For a test to be valid, the NIL tube must have a value   of less than or equal to 8.0 IU/mL.  The mitogen control tube is used to assure the patient   has a healthy immune status and also serves as a control   for correct blood handling and incubation. It is used to   detect false negative readings.   The TB antigen tubes are coated with the M. tuberculosis   specific antigens. For  a test to be considered positive,   at least one of the TB antigen tube values minus the Nil   tube value must be greater than or equal to 0.35 IU/mL   and be greater than or equal to 25% of the Nil tube value.  Diagnosing or excluding tuberculosis disease, and assessing   the probability of LTNI, requires a combination of   epidemiological, historical, medical, and diagnostic   findings that should be considered when interpreting   the test results.       TB1 - Nil 01/17/2025 -0.006  IU/mL Final    TB2 - Nil 01/17/2025 -0.009  IU/mL Final    Mitogen - Nil 01/17/2025 8.929  IU/mL Final    TB Gold Plus 01/17/2025 Negative  Negative Final    M. tuberculosis infection NOT likely.       ASSESSMENT/PLAN:    Assessment & Plan      Sinusitis-  - Instructed the patient to use a neti pot for sinus rinse.  - Recommend Mucinex to loosen mucus.  - Referred the patient to ENT for chronic sinus management.  - Noted the patient's recurring sinus infections over the past couple of years, with recent episodes of congestion, rhinorrhea, sneezing, and ear fullness.    Otitis Media-  - Examined the patient's ears and observed erythema in the ear canal and superior portion of the tympanic membrane, indicating possible infection.  - Prescribed Omnicef (cephalosporin) 2 times daily for 5 days with food to treat the ear infection.  - Opted for cephalosporin antibiotic instead of amoxicillin due to recent use.  - Noted the patient's history of frequent otitis media as a child and teenager.    Allergic Rhinitis-  - Recommend switching from Zyrtec to Claritan for allergy management.  - Noted the patient's history of seasonal allergies and current use of antihistamines.  - Discussed various antihistamine options and their usage.    Anxiety-  - Explained potential side effects of SSRIs, including weight neutrality of fluoxetine and possible sexual dysfunction.  - Discussed various anxiety medication options, including daily SSRIs and as-needed  medications like beta blockers and buspirone.  - Prescribed buspirone 10 mg as needed at night for anxiety management.  - Continued Wellbutrin at current dose.  - Offered psychiatry referral for anxiety management and she is interested in establishing with psych.  - Patient to practice breathing exercises and mindfulness techniques for anxiety management.    Sleep Disorder-  - Noted the patient's report of difficulty falling asleep due to anxiety.  - Acknowledged sleep issues as part of anxiety symptoms.  - Prescribed buspirone 10 mg for nightly use as needed, which may help with sleep due to its mild sedative effects and ability to address anxiety.    Follow-up-  - Scheduled follow up in 1 month virtually to assess effectiveness of anxiety medication.  - Instructed the patient to contact the office if needing an earlier appointment.         1. Non-recurrent acute serous otitis media of right ear  -     cefdinir (OMNICEF) 300 MG capsule; Take 1 capsule (300 mg total) by mouth 2 (two) times a day. for 5 days  Dispense: 10 capsule; Refill: 0    2. Chronic seasonal allergic rhinitis  -     loratadine (CLARITIN) 10 mg tablet; Take 1 tablet (10 mg total) by mouth once daily.  Dispense: 90 tablet; Refill: 3    3. History of sinusitis  -     Ambulatory referral/consult to ENT; Future; Expected date: 03/25/2025    4. Anxiety  -     busPIRone (BUSPAR) 10 MG tablet; Take 1 tablet (10 mg total) by mouth every evening.  Dispense: 30 tablet; Refill: 11  -     Ambulatory referral/consult to Psychiatry; Future; Expected date: 03/25/2025    Health maintenance addressed: UTD  Vaccines recommended: Covid-19    Follow up in about 4 weeks (around 4/15/2025) for Virtual Visit.     ZEB Perez   Internal Medicine

## 2025-03-23 ENCOUNTER — PATIENT MESSAGE (OUTPATIENT)
Dept: PRIMARY CARE CLINIC | Facility: CLINIC | Age: 30
End: 2025-03-23
Payer: MEDICAID

## 2025-03-23 DIAGNOSIS — H65.04 RECURRENT ACUTE SEROUS OTITIS MEDIA OF RIGHT EAR: Primary | ICD-10-CM

## 2025-03-24 RX ORDER — AMOXICILLIN AND CLAVULANATE POTASSIUM 875; 125 MG/1; MG/1
1 TABLET, FILM COATED ORAL EVERY 12 HOURS
Qty: 14 TABLET | Refills: 0 | Status: SHIPPED | OUTPATIENT
Start: 2025-03-24 | End: 2025-03-31

## 2025-03-25 DIAGNOSIS — M24.131 DEGENERATIVE TFCC TEAR, RIGHT: ICD-10-CM

## 2025-03-26 RX ORDER — MELOXICAM 15 MG/1
15 TABLET ORAL
Qty: 30 TABLET | Refills: 2 | Status: SHIPPED | OUTPATIENT
Start: 2025-03-26

## 2025-04-17 ENCOUNTER — PATIENT MESSAGE (OUTPATIENT)
Dept: ORTHOPEDICS | Facility: CLINIC | Age: 30
End: 2025-04-17
Payer: MEDICAID

## 2025-08-19 ENCOUNTER — OFFICE VISIT (OUTPATIENT)
Dept: OBSTETRICS AND GYNECOLOGY | Facility: CLINIC | Age: 30
End: 2025-08-19
Payer: COMMERCIAL

## 2025-08-19 VITALS
BODY MASS INDEX: 28.91 KG/M2 | SYSTOLIC BLOOD PRESSURE: 109 MMHG | DIASTOLIC BLOOD PRESSURE: 77 MMHG | WEIGHT: 158.06 LBS

## 2025-08-19 DIAGNOSIS — Z01.419 WELL WOMAN EXAM WITH ROUTINE GYNECOLOGICAL EXAM: Primary | ICD-10-CM

## 2025-08-19 PROCEDURE — 99999 PR PBB SHADOW E&M-EST. PATIENT-LVL III: CPT | Mod: PBBFAC,,, | Performed by: STUDENT IN AN ORGANIZED HEALTH CARE EDUCATION/TRAINING PROGRAM

## 2025-08-19 PROCEDURE — 3074F SYST BP LT 130 MM HG: CPT | Mod: CPTII,S$GLB,, | Performed by: STUDENT IN AN ORGANIZED HEALTH CARE EDUCATION/TRAINING PROGRAM

## 2025-08-19 PROCEDURE — 1160F RVW MEDS BY RX/DR IN RCRD: CPT | Mod: CPTII,S$GLB,, | Performed by: STUDENT IN AN ORGANIZED HEALTH CARE EDUCATION/TRAINING PROGRAM

## 2025-08-19 PROCEDURE — 3008F BODY MASS INDEX DOCD: CPT | Mod: CPTII,S$GLB,, | Performed by: STUDENT IN AN ORGANIZED HEALTH CARE EDUCATION/TRAINING PROGRAM

## 2025-08-19 PROCEDURE — 3078F DIAST BP <80 MM HG: CPT | Mod: CPTII,S$GLB,, | Performed by: STUDENT IN AN ORGANIZED HEALTH CARE EDUCATION/TRAINING PROGRAM

## 2025-08-19 PROCEDURE — 1159F MED LIST DOCD IN RCRD: CPT | Mod: CPTII,S$GLB,, | Performed by: STUDENT IN AN ORGANIZED HEALTH CARE EDUCATION/TRAINING PROGRAM

## 2025-08-19 PROCEDURE — 99395 PREV VISIT EST AGE 18-39: CPT | Mod: S$GLB,,, | Performed by: STUDENT IN AN ORGANIZED HEALTH CARE EDUCATION/TRAINING PROGRAM

## 2025-08-19 RX ORDER — DESOGESTREL AND ETHINYL ESTRADIOL 0.15-0.03
1 KIT ORAL DAILY
Qty: 90 TABLET | Refills: 3 | Status: SHIPPED | OUTPATIENT
Start: 2025-08-19 | End: 2026-08-19

## (undated) DEVICE — BLADE SURG CARBON STEEL SZ11

## (undated) DEVICE — Device

## (undated) DEVICE — BNDG COFLEX FOAM LF2 ST 4X5YD

## (undated) DEVICE — SUT 4/0 18IN ETHILON BL P3

## (undated) DEVICE — ELECTRODE REM PLYHSV RETURN 9

## (undated) DEVICE — TOWEL OR DISP STRL BLUE 4/PK

## (undated) DEVICE — BLADE SHAVER MICRO HUB 2.9MM

## (undated) DEVICE — BURR VORTEX STERLING 3.5MM

## (undated) DEVICE — NDL 22GA X1 1/2 REG BEVEL

## (undated) DEVICE — NDL SPINAL 18GX3.5 SPINOCAN

## (undated) DEVICE — DRAPE U SPLIT SHEET 54X76IN

## (undated) DEVICE — GOWN ECLIPSE REINF LV4 XLNG XL

## (undated) DEVICE — BANDAGE MATRIX HK LOOP 2IN 5YD

## (undated) DEVICE — TUBING & CANNULA JOINT SMALL

## (undated) DEVICE — SOL IRR SOD CHL .9% POUR

## (undated) DEVICE — COVER LIGHT HANDLE 80/CA

## (undated) DEVICE — TRAP DIGIT FINGER

## (undated) DEVICE — PROBE ARTHSCP EDGE ENERGY 30

## (undated) DEVICE — COVER CAMERA OPERATING ROOM

## (undated) DEVICE — TUBING SUC UNIV W/CONN 12FT

## (undated) DEVICE — BANDAGE ESMARK ELASTIC ST 4X9

## (undated) DEVICE — DRESSING XEROFORM NONADH 1X8IN

## (undated) DEVICE — SLING ARM MEDIUM FOAM STRAP

## (undated) DEVICE — HOSE DUAL W/CPC CONNECTORS